# Patient Record
Sex: FEMALE | Race: OTHER | HISPANIC OR LATINO | Employment: OTHER | URBAN - METROPOLITAN AREA
[De-identification: names, ages, dates, MRNs, and addresses within clinical notes are randomized per-mention and may not be internally consistent; named-entity substitution may affect disease eponyms.]

---

## 2018-07-24 ENCOUNTER — OFFICE VISIT (OUTPATIENT)
Dept: FAMILY MEDICINE CLINIC | Facility: CLINIC | Age: 28
End: 2018-07-24
Payer: COMMERCIAL

## 2018-07-24 VITALS
HEART RATE: 114 BPM | TEMPERATURE: 97.4 F | OXYGEN SATURATION: 98 % | HEIGHT: 65 IN | WEIGHT: 109.7 LBS | BODY MASS INDEX: 18.28 KG/M2 | SYSTOLIC BLOOD PRESSURE: 110 MMHG | RESPIRATION RATE: 18 BRPM | DIASTOLIC BLOOD PRESSURE: 74 MMHG

## 2018-07-24 DIAGNOSIS — R93.89 ABNORMAL CHEST X-RAY: ICD-10-CM

## 2018-07-24 DIAGNOSIS — R87.610 ATYPICAL SQUAMOUS CELLS OF UNDETERMINED SIGNIFICANCE (ASCUS) ON PAPANICOLAOU SMEAR OF CERVIX: ICD-10-CM

## 2018-07-24 DIAGNOSIS — F41.9 ANXIETY: ICD-10-CM

## 2018-07-24 DIAGNOSIS — F17.200 NICOTINE USE DISORDER: Primary | ICD-10-CM

## 2018-07-24 DIAGNOSIS — F32.A DEPRESSION, UNSPECIFIED DEPRESSION TYPE: ICD-10-CM

## 2018-07-24 DIAGNOSIS — Z12.4 CERVICAL CANCER SCREENING: ICD-10-CM

## 2018-07-24 PROCEDURE — 99204 OFFICE O/P NEW MOD 45 MIN: CPT | Performed by: PHYSICIAN ASSISTANT

## 2018-07-24 PROCEDURE — 3008F BODY MASS INDEX DOCD: CPT | Performed by: PHYSICIAN ASSISTANT

## 2018-07-24 RX ORDER — HYDROXYZINE PAMOATE 50 MG/1
CAPSULE ORAL
COMMUNITY
End: 2019-04-04

## 2018-07-24 RX ORDER — TRAZODONE HYDROCHLORIDE 100 MG/1
TABLET ORAL
COMMUNITY
End: 2019-07-30 | Stop reason: ALTCHOICE

## 2018-07-24 RX ORDER — VARENICLINE TARTRATE 0.5 MG/1
TABLET, FILM COATED ORAL
COMMUNITY
End: 2019-04-04

## 2018-07-24 RX ORDER — IBUPROFEN 800 MG/1
TABLET ORAL EVERY 6 HOURS PRN
COMMUNITY
End: 2021-04-07

## 2018-07-24 NOTE — PATIENT INSTRUCTIONS
Records from [facility] from June and July have been reviewed  Chest x-ray does reveal a possible 7 mm nodule and recommendation is to have a follow-up CT scan of the chest in September October 2018  Other pulmonary studies done on July 5th were normal  Continue Chantix for smoking cessation as prescribed by previous PCP  Continue follow-up with Psychiatry as advised  Patient considering transferring    Phone number given for the [facility] for routine screening Pap smear since she has had an abnormal Pap smear in the past   Recommend follow-up here in October after the CT scan of the chest

## 2018-07-24 NOTE — PROGRESS NOTES
Assessment/Plan:    Patient Instructions   Records from [facility] from June and July have been reviewed  Chest x-ray does reveal a possible 7 mm nodule and recommendation is to have a follow-up CT scan of the chest in September October 2018  Other pulmonary studies done on July 5th were normal  Continue Chantix for smoking cessation as prescribed by previous PCP  Continue follow-up with Psychiatry as advised  Patient considering transferring  Phone number given for the [facility] for routine screening Pap smear since she has had an abnormal Pap smear in the past   Recommend follow-up here in October after the CT scan of the chest     M*Modal software was used to dictate this note  It may contain errors with dictating incorrect words/spelling  Please contact provider directly for any questions  Diagnoses and all orders for this visit:    Nicotine use disorder  -     CT chest w contrast; Future    Abnormal chest x-ray  -     CT chest w contrast; Future    Cervical cancer screening  -     Ambulatory referral to Gynecology; Future    Anxiety    Depression, unspecified depression type    Atypical squamous cells of undetermined significance (ASCUS) on Papanicolaou smear of cervix    Other orders  -     varenicline (CHANTIX) 0 5 mg tablet;   -     traZODone (DESYREL) 100 mg tablet;   -     lurasidone (LATUDA) 80 mg tablet;   -     hydrOXYzine pamoate (VISTARIL) 50 mg capsule;   -     Albuterol Sulfate 108 (90 Base) MCG/ACT AEPB; Inhale 1 puff every 4 (four) hours  -     ibuprofen (MOTRIN) 800 mg tablet; Take by mouth every 6 (six) hours as needed for headaches  -     Etonogestrel 68 MG IMPL; Inserted 9/24/2013          Subjective:      Patient ID: April Parks is a 33 y o  female  Patient presents for new patient establishment  She states that she was not happy with her care at [facility] so is transferring here    She was recently seen in the emergency room in June at [facility] for a cough  She still has a persistent dry cough  Denies any fevers or chills  She did have some pulmonary studies done ordered by her previous primary care provider which were normal   These were done on July 5th  She is concerned because she was told that she has a mass on her chest x-ray  She does currently smoke but is in midst of quitting by utilizing Chantix  This is not the 1st time she has tried to quit smoking  She would also like to establish with a [facility] gynecologist   She has been following them regularly because she has had abnormal Pap smears in the past         The following portions of the patient's history were reviewed and updated as appropriate:   She  has no past medical history on file  She   Patient Active Problem List    Diagnosis Date Noted    Nicotine use disorder 07/24/2018    Abnormal chest x-ray 07/24/2018    Cervical cancer screening 07/24/2018    Anxiety 07/24/2018    Depression 07/24/2018    Atypical squamous cells of undetermined significance (ASCUS) on Papanicolaou smear of cervix 05/28/2013     She  has no past surgical history on file  Her family history is not on file  She  reports that she has been smoking  She has never used smokeless tobacco  She reports that she drinks alcohol  She reports that she does not use drugs  Current Outpatient Prescriptions   Medication Sig Dispense Refill    Albuterol Sulfate 108 (90 Base) MCG/ACT AEPB Inhale 1 puff every 4 (four) hours      Etonogestrel 68 MG IMPL Inserted 9/24/2013      hydrOXYzine pamoate (VISTARIL) 50 mg capsule       ibuprofen (MOTRIN) 800 mg tablet Take by mouth every 6 (six) hours as needed for headaches      lurasidone (LATUDA) 80 mg tablet       traZODone (DESYREL) 100 mg tablet       varenicline (CHANTIX) 0 5 mg tablet        No current facility-administered medications for this visit  No current outpatient prescriptions on file prior to visit       No current facility-administered medications on file prior to visit  She has No Known Allergies       Review of Systems   Constitutional: Negative  HENT: Negative  Eyes: Negative  Respiratory:        As stated in HPI   Cardiovascular: Negative  Gastrointestinal: Negative  Endocrine: Negative  Genitourinary: Negative  Musculoskeletal: Negative  Skin: Negative  Allergic/Immunologic: Negative  Neurological: Negative  Hematological: Negative  Psychiatric/Behavioral:        She does follow with Psychiatry with galileo Bautista  She states that she stable on her medications  She is considering transferring to another psychiatrist          Objective:      /74 (BP Location: Right arm, Patient Position: Sitting, Cuff Size: Standard)   Pulse (!) 114   Temp (!) 97 4 °F (36 3 °C) (Tympanic)   Resp 18   Ht 5' 5" (1 651 m)   Wt 49 8 kg (109 lb 11 2 oz)   SpO2 98%   Breastfeeding?  No   BMI 18 26 kg/m²          Physical Exam

## 2018-08-23 ENCOUNTER — APPOINTMENT (OUTPATIENT)
Dept: RADIOLOGY | Facility: MEDICAL CENTER | Age: 28
End: 2018-08-23
Payer: COMMERCIAL

## 2018-08-23 ENCOUNTER — OFFICE VISIT (OUTPATIENT)
Dept: URGENT CARE | Facility: MEDICAL CENTER | Age: 28
End: 2018-08-23
Payer: COMMERCIAL

## 2018-08-23 VITALS
HEART RATE: 74 BPM | BODY MASS INDEX: 17.49 KG/M2 | SYSTOLIC BLOOD PRESSURE: 127 MMHG | RESPIRATION RATE: 16 BRPM | TEMPERATURE: 96.9 F | DIASTOLIC BLOOD PRESSURE: 68 MMHG | HEIGHT: 65 IN | OXYGEN SATURATION: 100 % | WEIGHT: 105 LBS

## 2018-08-23 DIAGNOSIS — M25.512 ACUTE PAIN OF LEFT SHOULDER: ICD-10-CM

## 2018-08-23 DIAGNOSIS — M75.22 BICEPS TENDINITIS OF LEFT SHOULDER: Primary | ICD-10-CM

## 2018-08-23 PROCEDURE — 96374 THER/PROPH/DIAG INJ IV PUSH: CPT | Performed by: FAMILY MEDICINE

## 2018-08-23 PROCEDURE — 73030 X-RAY EXAM OF SHOULDER: CPT

## 2018-08-23 PROCEDURE — 99213 OFFICE O/P EST LOW 20 MIN: CPT | Performed by: FAMILY MEDICINE

## 2018-08-23 RX ORDER — KETOROLAC TROMETHAMINE 30 MG/ML
30 INJECTION, SOLUTION INTRAMUSCULAR; INTRAVENOUS ONCE
Status: COMPLETED | OUTPATIENT
Start: 2018-08-23 | End: 2018-08-23

## 2018-08-23 RX ORDER — METHOCARBAMOL 500 MG/1
500 TABLET, FILM COATED ORAL 3 TIMES DAILY
Qty: 21 TABLET | Refills: 0 | Status: SHIPPED | OUTPATIENT
Start: 2018-08-23 | End: 2019-04-04

## 2018-08-23 RX ADMIN — KETOROLAC TROMETHAMINE 30 MG: 30 INJECTION, SOLUTION INTRAMUSCULAR; INTRAVENOUS at 21:31

## 2018-08-24 NOTE — PROGRESS NOTES
[facility]        NAME: Jermaine Che is a 33 y o  female  : 1990    MRN: [id number]  DATE: 2018  TIME: 9:30 PM    Assessment and Plan   Biceps tendinitis of left shoulder [M75 22]  1  Biceps tendinitis of left shoulder  methocarbamol (ROBAXIN) 500 mg tablet   2  Acute pain of left shoulder  ketorolac (TORADOL) injection 30 mg    XR shoulder 2+ vw left         Patient Instructions       Follow up with PCP in 3-5 days  Proceed to  ER if symptoms worsen  Chief Complaint     Chief Complaint   Patient presents with    Arm Pain     LUE; non-traumatic; increasingly worse x 1 week; took Advil yesterday         History of Present Illness       Patient complaining of left upper arm pain for the past week  About 1 week ago patient fell and landed on the left shoulder at home  Denies any pain immediately until the following day  Pain has been localized to the left upper arm  She has difficulty moving her arms  Pain is worse lifting, pulling  Feels better when rested  No previous injury to the left shoulder in the past   Pain is constant, localized  She has been taking some Advil with minimal improvement  Refers to occasional numbness but denies weakness of the left upper extremity  Pain is currently 6/10  At times, she notices crepitation of the left shoulder  Review of Systems   Review of Systems   Constitutional: Negative  Musculoskeletal: Positive for arthralgias and myalgias  Neurological: Negative            Current Medications       Current Outpatient Prescriptions:     Albuterol Sulfate 108 (90 Base) MCG/ACT AEPB, Inhale 1 puff every 4 (four) hours, Disp: , Rfl:     Etonogestrel 68 MG IMPL, Inserted 2013, Disp: , Rfl:     hydrOXYzine pamoate (VISTARIL) 50 mg capsule, , Disp: , Rfl:     ibuprofen (MOTRIN) 800 mg tablet, Take by mouth every 6 (six) hours as needed for headaches, Disp: , Rfl:     lurasidone (LATUDA) 80 mg tablet, , Disp: , Rfl:   methocarbamol (ROBAXIN) 500 mg tablet, Take 1 tablet (500 mg total) by mouth 3 (three) times a day for 7 days, Disp: 21 tablet, Rfl: 0    traZODone (DESYREL) 100 mg tablet, , Disp: , Rfl:     varenicline (CHANTIX) 0 5 mg tablet, , Disp: , Rfl:     Current Facility-Administered Medications:     ketorolac (TORADOL) injection 30 mg, 30 mg, Intramuscular, Once, João Howell MD    Current Allergies     Allergies as of 08/23/2018    (No Known Allergies)            The following portions of the patient's history were reviewed and updated as appropriate: allergies, current medications, past family history, past medical history, past social history, past surgical history and problem list      No past medical history on file  No past surgical history on file  No family history on file  Medications have been verified  Objective   /68   Pulse 74   Temp (!) 96 9 °F (36 1 °C)   Resp 16   Ht 5' 5" (1 651 m)   Wt 47 6 kg (105 lb)   SpO2 100%   BMI 17 47 kg/m²        Physical Exam     Physical Exam   Musculoskeletal: She exhibits tenderness  Left upper extremity-good range on flexion and extension at the elbow  Limited range at the shoulder secondary to pain  Point tenderness of the distal and proximal biceps had with no evidence ecchymosis or effusion  Good hand  and strength, 5/5  Nursing note and vitals reviewed

## 2018-08-24 NOTE — PATIENT INSTRUCTIONS
Left shoulder x-ray reveals no fracture subluxation  Patient's left arm was placed in a sling  Patient received Toradol 30 mg IM in the office, after 10-15 minutes, pain diminished  I prescribed Robaxin 500 mg t i d   Advised patient apply heating pad to affected area several times a day  I recommended she follow up with her primary care provider and request physical therapy  Tendinitis   WHAT YOU NEED TO KNOW:   Tendinitis is painful inflammation or breakdown of your tendons  It may also be called tendinopathy  Tendinitis often occurs in the knee, shoulder, ankle, hip, or elbow  DISCHARGE INSTRUCTIONS:   Medicines:   · Pain medicines  such as acetaminophen or NSAIDs may decrease swelling and pain or fever  These medicines are available without a doctor's order  Ask which medicine to take  Ask how much to take and when to take it  Follow directions  Acetaminophen and NSAIDs can cause liver or kidney damage if not taken correctly  If you take blood thinner medicine, always ask your healthcare provider if NSAIDs are safe for you  Always read the medicine label and follow the directions on it before using these medicine  · Take your medicine as directed  Contact your healthcare provider if you think your medicine is not helping or if you have side effects  Tell him if you are allergic to any medicine  Keep a list of the medicines, vitamins, and herbs you take  Include the amounts, and when and why you take them  Bring the list or the pill bottles to follow-up visits  Carry your medicine list with you in case of an emergency  Management:   · Rest  your tendon as directed to help it heal  Ask your healthcare provider if you need to stop putting weight on your affected area  · Ice  helps decrease swelling and pain  Ice may also help prevent tissue damage  Use an ice pack, or put crushed ice in a plastic bag   Cover it with a towel and place it on the affected area for 10 to 15 minutes every hour or as directed  · Support devices  such as a cane, splint, shoe insert, or brace may help reduce your pain  · Physical therapy  may be ordered by your healthcare provider  This may be used to teach you exercises to help improve movement and strength, and to decrease pain  You may also learn how to improve your posture, and how to lift or exercise correctly  Prevention:   · Stretch and warm up  before you exercise  · Exercise regularly  to strengthen the muscles around your joint  Ease into an exercise routine for the first 3 weeks to prevent another injury  Ask your healthcare provider about the best exercise plan for you  Rest fully between activities  · Use the right equipment  for sports and exercise  Wear braces or tape around weak joints as directed  Follow up with your healthcare provider as directed:  Write down your questions so you remember to ask them during your visits  Contact your healthcare provider if:   · You have increased pain even after you take medicine  · You have questions or concerns about your condition or care  Return to the emergency department if:   · You have increased redness over the joint, or swelling in the joint  · You suddenly cannot move your joint  © 2017 [employer]  Information is for End User's use only and may not be sold, redistributed or otherwise used for commercial purposes  All illustrations and images included in CareNotes® are the copyrighted property of [employer]  or Bhavin Haider  The above information is an  only  It is not intended as medical advice for individual conditions or treatments  Talk to your doctor, nurse or pharmacist before following any medical regimen to see if it is safe and effective for you

## 2019-02-05 ENCOUNTER — OFFICE VISIT (OUTPATIENT)
Dept: URGENT CARE | Age: 29
End: 2019-02-05
Payer: COMMERCIAL

## 2019-02-05 VITALS
HEIGHT: 65 IN | OXYGEN SATURATION: 99 % | HEART RATE: 115 BPM | DIASTOLIC BLOOD PRESSURE: 75 MMHG | TEMPERATURE: 99.3 F | BODY MASS INDEX: 17.93 KG/M2 | RESPIRATION RATE: 20 BRPM | SYSTOLIC BLOOD PRESSURE: 116 MMHG | WEIGHT: 107.6 LBS

## 2019-02-05 DIAGNOSIS — J22 CHEST COLD: Primary | ICD-10-CM

## 2019-02-05 PROCEDURE — 99213 OFFICE O/P EST LOW 20 MIN: CPT | Performed by: PHYSICIAN ASSISTANT

## 2019-02-05 RX ORDER — PREDNISONE 20 MG/1
40 TABLET ORAL DAILY
Qty: 10 TABLET | Refills: 0 | Status: SHIPPED | OUTPATIENT
Start: 2019-02-05 | End: 2019-02-11

## 2019-02-05 RX ORDER — FLUTICASONE PROPIONATE 50 MCG
1 SPRAY, SUSPENSION (ML) NASAL DAILY
Qty: 16 G | Refills: 0 | Status: SHIPPED | OUTPATIENT
Start: 2019-02-05 | End: 2019-04-04

## 2019-02-05 RX ORDER — ALBUTEROL SULFATE 90 UG/1
2 AEROSOL, METERED RESPIRATORY (INHALATION) EVERY 6 HOURS PRN
Qty: 18 G | Refills: 0 | Status: SHIPPED | OUTPATIENT
Start: 2019-02-05 | End: 2019-07-30 | Stop reason: ALTCHOICE

## 2019-02-05 NOTE — PROGRESS NOTES
[facility]        NAME: Binta Fernandez is a 33 y o  female  : 1990    MRN: [id number]  DATE: 2019  TIME: 3:25 PM    Assessment and Plan   Chest cold [J22]  1  Chest cold  albuterol (VENTOLIN HFA) 90 mcg/act inhaler    predniSONE 20 mg tablet    fluticasone (FLONASE) 50 mcg/act nasal spray     Patient appears clinically well  Tachycardic  Temperature 99 3  Patient has been using antipyretics which may be keeping temperature down  Patient will be treated for viral URI  Patient educated on indications to go to ER  Patient educated on fever control on oral rehydration  Patient states she understands agrees  Patient Instructions       Continue to monitor symptoms  Drink plenty of fluids  Take over-the-counter acetaminophen or ibuprofen for fever control  Follow up with family doctor this week  Go to emergency room if new or worsening symptoms develop  Chief Complaint     Chief Complaint   Patient presents with    Cold Like Symptoms     Pt reports three day hx of chest tightness, nasal congestion, sore throat, chills/aches and left ear pain  Had 103 fever last clarita  Taking Mucinex Cold & Flu  Last dose 4 hours ago  History of Present Illness       URI    This is a new problem  Episode onset: Two days  The problem has been gradually worsening  Maximum temperature: 103 yesterday per patient  T-max today 98  Associated symptoms include coughing, sinus pain, sneezing and a sore throat  Pertinent negatives include no abdominal pain, chest pain, congestion, diarrhea, dysuria, ear pain, headaches, nausea, neck pain, rash, rhinorrhea, swollen glands, vomiting or wheezing  Treatments tried: Mucinex cold and flu  The treatment provided moderate relief  Patient tolerating p o  Foods and liquids  No known sick contacts  Patient had flu shot this year      Review of Systems   Review of Systems   Constitutional: Negative for chills, diaphoresis, fatigue and fever    HENT: Positive for postnasal drip, sinus pain, sinus pressure, sneezing and sore throat  Negative for congestion, ear pain, rhinorrhea and voice change  Eyes: Negative  Respiratory: Positive for cough  Negative for chest tightness, shortness of breath and wheezing  Cardiovascular: Negative for chest pain and palpitations  Gastrointestinal: Negative for abdominal pain, constipation, diarrhea, nausea and vomiting  Endocrine: Negative  Genitourinary: Negative for dysuria  Musculoskeletal: Negative for back pain, myalgias and neck pain  Skin: Negative for pallor and rash  Allergic/Immunologic: Negative  Neurological: Negative for dizziness, syncope and headaches  Hematological: Negative  Psychiatric/Behavioral: Negative            Current Medications       Current Outpatient Prescriptions:     albuterol (VENTOLIN HFA) 90 mcg/act inhaler, Inhale 2 puffs every 6 (six) hours as needed for wheezing, Disp: 18 g, Rfl: 0    Etonogestrel 68 MG IMPL, Inserted 9/24/2013, Disp: , Rfl:     fluticasone (FLONASE) 50 mcg/act nasal spray, 1 spray into each nostril daily, Disp: 16 g, Rfl: 0    hydrOXYzine pamoate (VISTARIL) 50 mg capsule, , Disp: , Rfl:     ibuprofen (MOTRIN) 800 mg tablet, Take by mouth every 6 (six) hours as needed for headaches, Disp: , Rfl:     lurasidone (LATUDA) 80 mg tablet, , Disp: , Rfl:     methocarbamol (ROBAXIN) 500 mg tablet, Take 1 tablet (500 mg total) by mouth 3 (three) times a day for 7 days, Disp: 21 tablet, Rfl: 0    predniSONE 20 mg tablet, Take 2 tablets (40 mg total) by mouth daily for 5 days, Disp: 10 tablet, Rfl: 0    traZODone (DESYREL) 100 mg tablet, , Disp: , Rfl:     varenicline (CHANTIX) 0 5 mg tablet, , Disp: , Rfl:     Current Allergies     Allergies as of 02/05/2019    (No Known Allergies)            The following portions of the patient's history were reviewed and updated as appropriate: allergies, current medications, past family history, past medical history, past social history, past surgical history and problem list      No past medical history on file  History reviewed  No pertinent surgical history  History reviewed  No pertinent family history  Medications have been verified  Objective   /75   Pulse (!) 115   Temp 99 3 °F (37 4 °C)   Resp 20   Ht 5' 5" (1 651 m)   Wt 48 8 kg (107 lb 9 6 oz)   LMP 02/05/2019   SpO2 99%   BMI 17 91 kg/m²        Physical Exam     Physical Exam   Constitutional: She appears well-developed and well-nourished  No distress  HENT:   Head: Normocephalic and atraumatic  Right Ear: External ear normal    Left Ear: External ear normal    Eyes: Conjunctivae are normal  Right eye exhibits no discharge  Left eye exhibits no discharge  Neck: Normal range of motion  Neck supple  Cardiovascular: Normal rate, regular rhythm, normal heart sounds and intact distal pulses  Pulmonary/Chest: Effort normal and breath sounds normal  No respiratory distress  She has no wheezes  She has no rales  Lymphadenopathy:     She has no cervical adenopathy  Skin: Skin is warm  No rash noted  She is not diaphoretic  Nursing note and vitals reviewed

## 2019-02-05 NOTE — PATIENT INSTRUCTIONS
Continue to monitor symptoms  Drink plenty of fluids  Take over-the-counter acetaminophen or ibuprofen for fever control  Follow up with family doctor this week  Go to emergency room if new or worsening symptoms develop  Upper Respiratory Infection   WHAT YOU NEED TO KNOW:   An upper respiratory infection is also called the common cold  It is an infection that can affect your nose, throat, ears, and sinuses  For healthy people, the common cold is usually not serious and does not need special treatment  Cold symptoms are usually worst for the first 3 to 5 days  Most people get better in 7 to 14 days  You may continue to cough for 2 to 3 weeks  Colds are caused by viruses and do not get better with antibiotics  DISCHARGE INSTRUCTIONS:   Return to the emergency department if:   · You have chest pain or trouble breathing  Contact your healthcare provider if:   · You have a fever over 102ºF (39°C)  · Your sore throat gets worse or you see white or yellow spots in your throat  · Your symptoms get worse after 3 to 5 days or your cold is not better in 14 days  · You have a rash anywhere on your skin  · You have large, tender lumps in your neck  · You have thick, green or yellow drainage from your nose  · You cough up thick yellow, green, or bloody mucus  · You have vomiting for more than 24 hours and cannot keep fluids down  · You have a bad earache  · You have questions or concerns about your condition or care  Medicines: You may need any of the following:  · Decongestants  help reduce nasal congestion and help you breathe more easily  If you take decongestant pills, they may make you feel restless or cause problems with your sleep  Do not use decongestant sprays for more than a few days  · Cough suppressants  help reduce coughing  Ask your healthcare provider which type of cough medicine is best for you  · NSAIDs , such as ibuprofen, help decrease swelling, pain, and fever  NSAIDs can cause stomach bleeding or kidney problems in certain people  If you take blood thinner medicine, always ask your healthcare provider if NSAIDs are safe for you  Always read the medicine label and follow directions  · Acetaminophen  decreases pain and fever  It is available without a doctor's order  Ask how much to take and how often to take it  Follow directions  Read the labels of all other medicines you are using to see if they also contain acetaminophen, or ask your doctor or pharmacist  Acetaminophen can cause liver damage if not taken correctly  Do not use more than 4 grams (4,000 milligrams) total of acetaminophen in one day  · Take your medicine as directed  Contact your healthcare provider if you think your medicine is not helping or if you have side effects  Tell him or her if you are allergic to any medicine  Keep a list of the medicines, vitamins, and herbs you take  Include the amounts, and when and why you take them  Bring the list or the pill bottles to follow-up visits  Carry your medicine list with you in case of an emergency  Follow up with your healthcare provider as directed:  Write down your questions so you remember to ask them during your visits  Self-care:   · Rest as much as possible  Slowly start to do more each day  · Drink more liquids as directed  Liquids will help thin and loosen mucus so you can cough it up  Liquids will also help prevent dehydration  Liquids that help prevent dehydration include water, fruit juice, and broth  Do not drink liquids that contain caffeine  Caffeine can increase your risk for dehydration  Ask your healthcare provider how much liquid to drink each day  · Soothe a sore throat  Gargle with warm salt water  This helps your sore throat feel better  Make salt water by dissolving ¼ teaspoon salt in 1 cup warm water  You may also suck on hard candy or throat lozenges  You may use a sore throat spray      · Use a humidifier or vaporizer  Use a cool mist humidifier or a vaporizer to increase air moisture in your home  This may make it easier for you to breathe and help decrease your cough  · Use saline nasal drops as directed  These help relieve congestion  · Apply petroleum-based jelly around the outside of your nostrils  This can decrease irritation from blowing your nose  · Do not smoke  Nicotine and other chemicals in cigarettes and cigars can make your symptoms worse  They can also cause infections such as bronchitis or pneumonia  Ask your healthcare provider for information if you currently smoke and need help to quit  E-cigarettes or smokeless tobacco still contain nicotine  Talk to your healthcare provider before you use these products  Prevent spreading your cold to others:   · Try to stay away from other people during the first 2 to 3 days of your cold when it is more easily spread  · Do not share food or drinks  · Do not share hand towels with household members  · Wash your hands often, especially after you blow your nose  Turn away from other people and cover your mouth and nose with a tissue when you sneeze or cough  © 2017 [employer] Information is for End User's use only and may not be sold, redistributed or otherwise used for commercial purposes  All illustrations and images included in CareNotes® are the copyrighted property of [employer]  or [employer]havin Haider  The above information is an  only  It is not intended as medical advice for individual conditions or treatments  Talk to your doctor, nurse or pharmacist before following any medical regimen to see if it is safe and effective for you

## 2019-02-11 ENCOUNTER — OFFICE VISIT (OUTPATIENT)
Dept: FAMILY MEDICINE CLINIC | Facility: CLINIC | Age: 29
End: 2019-02-11

## 2019-02-11 VITALS
OXYGEN SATURATION: 97 % | RESPIRATION RATE: 16 BRPM | WEIGHT: 106 LBS | TEMPERATURE: 97 F | SYSTOLIC BLOOD PRESSURE: 118 MMHG | HEIGHT: 66 IN | BODY MASS INDEX: 17.04 KG/M2 | HEART RATE: 100 BPM | DIASTOLIC BLOOD PRESSURE: 66 MMHG

## 2019-02-11 DIAGNOSIS — J06.9 ACUTE UPPER RESPIRATORY INFECTION: Primary | ICD-10-CM

## 2019-02-11 PROCEDURE — 99214 OFFICE O/P EST MOD 30 MIN: CPT | Performed by: PHYSICIAN ASSISTANT

## 2019-02-11 RX ORDER — PROMETHAZINE HYDROCHLORIDE AND CODEINE PHOSPHATE 6.25; 1 MG/5ML; MG/5ML
5 SYRUP ORAL EVERY 4 HOURS PRN
Qty: 120 ML | Refills: 0 | Status: SHIPPED | OUTPATIENT
Start: 2019-02-11 | End: 2019-04-04

## 2019-02-11 NOTE — PROGRESS NOTES
Assessment/Plan:    ER record has been reviewed  No chest x-ray ordered    Patient Instructions   Increase clear liquids  Hold Mucinex cold and flu  Start promethazine with codeine as package directs  Avoid the medication of working or driving because the drowsy side effects  I did give her proper instruction on utilization of the Ventolin inhaler  she has been told that she can take 2 inhalations every 4-6 hours as needed  Recommend over-the-counter generic Sudafed 30 mg 2 tablets every 6 hours as needed  Apply moist heat to chest 3 times daily for 10 minutes as needed  Recommend over-the-counter ibuprofen as needed as package directs with food  Recommend follow-up if any symptoms increase or there is no improvement over the next week       M*Modal software was used to dictate this note  It may contain errors with dictating incorrect words/spelling  Please contact provider directly for any questions  Diagnoses and all orders for this visit:    Acute upper respiratory infection  -     promethazine-codeine (PHENERGAN WITH CODEINE) 6 25-10 mg/5 mL syrup; Take 5 mL by mouth every 4 (four) hours as needed for cough          Subjective:      Patient ID: Caro Boone is a 33 y o  female  Patient presents today for same-day appointment for evaluation of cough, nasal congestion over the past 7 days  She did go the emergency room and was prescribed fluticasone and Ventolin inhaler  She denies any pulmonary problems  She states that she does notice some yellow mucus production with cough  She states she had a fever her initial day which has now resolved  She also complains of chest discomfort with coughing but no treatment  She has been utilizing over-the-counter Mucinex cold and flu with minimal relief also  Denies any ear pain or throat pain  The following portions of the patient's history were reviewed and updated as appropriate:   She  has no past medical history on file    She Patient Active Problem List    Diagnosis Date Noted    Acute upper respiratory infection 02/11/2019    Nicotine use disorder 07/24/2018    Abnormal chest x-ray 07/24/2018    Cervical cancer screening 07/24/2018    Anxiety 07/24/2018    Depression 07/24/2018    Atypical squamous cells of undetermined significance (ASCUS) on Papanicolaou smear of cervix 05/28/2013     She  has no past surgical history on file  Her family history is not on file  She  reports that she has been smoking  She has never used smokeless tobacco  She reports that she drinks alcohol  She reports that she does not use drugs  Current Outpatient Medications   Medication Sig Dispense Refill    albuterol (VENTOLIN HFA) 90 mcg/act inhaler Inhale 2 puffs every 6 (six) hours as needed for wheezing 18 g 0    Etonogestrel 68 MG IMPL Inserted 9/24/2013      fluticasone (FLONASE) 50 mcg/act nasal spray 1 spray into each nostril daily 16 g 0    hydrOXYzine pamoate (VISTARIL) 50 mg capsule       ibuprofen (MOTRIN) 800 mg tablet Take by mouth every 6 (six) hours as needed for headaches      lurasidone (LATUDA) 80 mg tablet       methocarbamol (ROBAXIN) 500 mg tablet Take 1 tablet (500 mg total) by mouth 3 (three) times a day for 7 days 21 tablet 0    promethazine-codeine (PHENERGAN WITH CODEINE) 6 25-10 mg/5 mL syrup Take 5 mL by mouth every 4 (four) hours as needed for cough 120 mL 0    traZODone (DESYREL) 100 mg tablet       varenicline (CHANTIX) 0 5 mg tablet        No current facility-administered medications for this visit        Current Outpatient Medications on File Prior to Visit   Medication Sig    albuterol (VENTOLIN HFA) 90 mcg/act inhaler Inhale 2 puffs every 6 (six) hours as needed for wheezing    Etonogestrel 68 MG IMPL Inserted 9/24/2013    fluticasone (FLONASE) 50 mcg/act nasal spray 1 spray into each nostril daily    hydrOXYzine pamoate (VISTARIL) 50 mg capsule     ibuprofen (MOTRIN) 800 mg tablet Take by mouth every 6 (six) hours as needed for headaches    lurasidone (LATUDA) 80 mg tablet     methocarbamol (ROBAXIN) 500 mg tablet Take 1 tablet (500 mg total) by mouth 3 (three) times a day for 7 days    traZODone (DESYREL) 100 mg tablet     varenicline (CHANTIX) 0 5 mg tablet     [DISCONTINUED] predniSONE 20 mg tablet Take 2 tablets (40 mg total) by mouth daily for 5 days     No current facility-administered medications on file prior to visit  She has No Known Allergies       Review of Systems   Constitutional:        As stated in HPI   HENT:        As stated in HPI   Respiratory: Positive for cough  Objective:      /66 (BP Location: Right arm, Patient Position: Sitting, Cuff Size: Standard)   Pulse 100   Temp (!) 97 °F (36 1 °C) (Tympanic)   Resp 16   Ht 5' 6" (1 676 m)   Wt 48 1 kg (106 lb)   LMP 02/05/2019   SpO2 97%   Breastfeeding? No   BMI 17 11 kg/m²          Physical Exam   Constitutional: She appears well-developed and well-nourished  HENT:   Head: Normocephalic and atraumatic  Right Ear: External ear normal    Left Ear: External ear normal    Mouth/Throat: Oropharynx is clear and moist    Cardiovascular: Normal rate, regular rhythm and normal heart sounds  No murmur heard  Pulmonary/Chest: Effort normal and breath sounds normal  She has no wheezes  She has no rales  Lymphadenopathy:     She has no cervical adenopathy

## 2019-02-11 NOTE — PATIENT INSTRUCTIONS
Increase clear liquids  Hold Mucinex cold and flu  Start promethazine with codeine as package directs  Avoid the medication of working or driving because the drowsy side effects  I did give her proper instruction on utilization of the Ventolin inhaler  she has been told that she can take 2 inhalations every 4-6 hours as needed    Recommend over-the-counter generic Sudafed 30 mg 2 tablets every 6 hours as needed  Apply moist heat to chest 3 times daily for 10 minutes as needed  Recommend over-the-counter ibuprofen as needed as package directs with food  Recommend follow-up if any symptoms increase or there is no improvement over the next week

## 2019-02-22 ENCOUNTER — HOSPITAL ENCOUNTER (OUTPATIENT)
Dept: CT IMAGING | Facility: HOSPITAL | Age: 29
Discharge: HOME | End: 2019-02-22
Payer: COMMERCIAL

## 2019-02-22 DIAGNOSIS — R93.89 ABNORMAL CHEST X-RAY: ICD-10-CM

## 2019-02-22 DIAGNOSIS — F17.200 NICOTINE USE DISORDER: ICD-10-CM

## 2019-02-22 PROCEDURE — 71260 CT THORAX DX C+: CPT

## 2019-02-22 RX ADMIN — IOHEXOL 85 ML: 350 INJECTION, SOLUTION INTRAVENOUS at 16:46

## 2019-02-27 ENCOUNTER — TELEPHONE (OUTPATIENT)
Dept: FAMILY MEDICINE CLINIC | Facility: CLINIC | Age: 29
End: 2019-02-27

## 2019-03-28 ENCOUNTER — OFFICE VISIT (OUTPATIENT)
Dept: OBGYN CLINIC | Facility: CLINIC | Age: 29
End: 2019-03-28

## 2019-03-28 ENCOUNTER — TELEPHONE (OUTPATIENT)
Dept: OBGYN CLINIC | Facility: CLINIC | Age: 29
End: 2019-03-28

## 2019-03-28 VITALS
WEIGHT: 111 LBS | SYSTOLIC BLOOD PRESSURE: 105 MMHG | HEART RATE: 90 BPM | HEIGHT: 65 IN | BODY MASS INDEX: 18.49 KG/M2 | DIASTOLIC BLOOD PRESSURE: 68 MMHG

## 2019-03-28 DIAGNOSIS — Z30.016 ENCOUNTER FOR INITIAL PRESCRIPTION OF TRANSDERMAL PATCH HORMONAL CONTRACEPTIVE DEVICE: ICD-10-CM

## 2019-03-28 DIAGNOSIS — Z30.46 NEXPLANON REMOVAL: Primary | ICD-10-CM

## 2019-03-28 PROCEDURE — 99203 OFFICE O/P NEW LOW 30 MIN: CPT | Performed by: OBSTETRICS & GYNECOLOGY

## 2019-03-28 NOTE — TELEPHONE ENCOUNTER
Patient called stating that   norelgestromin-ethinyl estradiol (ORTHO EVRA) 150-35 MCG/24HR patch, not covered by insurance, may we prescribe BCP?      Message sent to Provider

## 2019-03-28 NOTE — PROGRESS NOTES
Assessment:  33 y o  G0 for removal of nexplanon secondary to acne side effects  Wishes to proceed with combined contraceptives; prefers patch  Discussed difficulties with insurance coverage  Will order and if too expensive she wishes to proceed with pills  Plan:  Diagnoses and all orders for this visit:    Encounter for Nexplanon removal       - Removed         Encounter for initial prescription of transdermal patch hormonal contraceptive device  -     norelgestromin-ethinyl estradiol (ORTHO EVRA) 150-35 MCG/24HR; Place 1 patch on the skin once a week For 3 weeks, then 1 week without patch   - Return to office in 3mo for annual and contraceptive check        __________________________________________________________________    Subjective   Darnelle Button is a 33 y o  G0 who presents with request to remove Nexplanon  Patient has used Nexplanon x5yrs  She notes that she has begun having worsening acne and attributes it to Nexplanon  Always has had acne on Nexplanon, but now just getting worse and nothing OTC is helping  She does not intend to get pregnant any time soon and wishes to switch to a method of contraception that will help with acne  Dicussed low-androgenic progestins  Discussed combined contraceptives such as pills/patches/rings  Patient denies a hx of migraine with aura (though does get headaches relieved with motrin occasionally)  She denies a hx of VTE/stroke/seizure or HTN  She notes she does smoke cigarettes and has no plan to quit  Discussed contraindication to estrogens when 33+ yo and using tobacco  Discussed other health benefits of smoking cessation  Offered help to quit; declines      Placed 9/6/16 (Lot# [id number])      The following portions of the patient's history were reviewed and updated as appropriate: allergies, current medications, past medical history, past social history, past surgical history and problem list       Objective  /68   Pulse 90   Ht 5' 5" (1 651 m)   Wt 50 3 kg (111 lb)   LMP 02/28/2019 (Exact Date)   BMI 18 47 kg/m²        Physical Exam:  Physical Exam   Constitutional: She is oriented to person, place, and time  She appears well-developed and well-nourished  No distress  HENT:   Head: Normocephalic and atraumatic  Eyes: No scleral icterus  Cardiovascular: Normal rate  Pulmonary/Chest: Effort normal  No accessory muscle usage  No respiratory distress  Abdominal: Soft  She exhibits no distension  There is no tenderness  There is no rebound and no guarding  Musculoskeletal: She exhibits no edema or tenderness  Neurological: She is alert and oriented to person, place, and time  Skin: Skin is warm and dry  No rash noted  No erythema  Psychiatric: She has a normal mood and affect  Her behavior is normal        Remove and insert drug implant  Date/Time: 3/29/2019 7:50 AM  Performed by: Luciano Zimmerman MD  Authorized by: Luciano Zimmerman MD     Consent:     Consent obtained:  Written    Consent given by:  Patient    Procedural risks discussed:  Bleeding, death, damage to other organs, repeat procedure, possible conversion to open surgery, infection and failure rate    Patient questions answered: yes      Patient agrees, verbalizes understanding, and wants to proceed: yes    Indication:     Indication: Presence of non-biodegradable drug delivery implant    Pre-procedure:     Local anesthetic:  Lidocaine 1%    The site was cleaned and prepped in a sterile fashion: yes    Procedure:     Procedure:  Removal    Small stab incision was made in arm: yes      Left/right:  Left  Comments:      Local anesthesia was infiltrated (lidocaine 1% 1mL)  Following sterile prep with betadine, a small stab incision was made at the site of insertion at the distal end of the implant  With pressure, implant was brought to the skin and the end was grasped with a hemostat  Device removed with traction easily  Incision closed with steri strips and sterile bandage applied  Patient tolerated the procedure well with no immediate complications

## 2019-03-29 ENCOUNTER — TELEPHONE (OUTPATIENT)
Dept: OBGYN CLINIC | Facility: CLINIC | Age: 29
End: 2019-03-29

## 2019-03-29 DIAGNOSIS — Z30.011 ENCOUNTER FOR INITIAL PRESCRIPTION OF CONTRACEPTIVE PILLS: Primary | ICD-10-CM

## 2019-03-29 PROCEDURE — 11982 REMOVE DRUG IMPLANT DEVICE: CPT | Performed by: OBSTETRICS & GYNECOLOGY

## 2019-03-29 RX ORDER — NORGESTIMATE AND ETHINYL ESTRADIOL 7DAYSX3 LO
1 KIT ORAL DAILY
Qty: 90 TABLET | Refills: 1 | Status: SHIPPED | OUTPATIENT
Start: 2019-03-29 | End: 2021-03-31

## 2019-03-29 NOTE — TELEPHONE ENCOUNTER
Called patient and left message via voicemail informing patient that the rx for BCP was sent to pharmacy

## 2019-04-04 ENCOUNTER — OFFICE VISIT (OUTPATIENT)
Dept: FAMILY MEDICINE CLINIC | Facility: CLINIC | Age: 29
End: 2019-04-04

## 2019-04-04 VITALS
WEIGHT: 113 LBS | TEMPERATURE: 98.4 F | HEIGHT: 65 IN | RESPIRATION RATE: 18 BRPM | OXYGEN SATURATION: 98 % | HEART RATE: 88 BPM | SYSTOLIC BLOOD PRESSURE: 100 MMHG | DIASTOLIC BLOOD PRESSURE: 70 MMHG | BODY MASS INDEX: 18.83 KG/M2

## 2019-04-04 DIAGNOSIS — F17.200 NICOTINE USE DISORDER: ICD-10-CM

## 2019-04-04 DIAGNOSIS — N89.8 VAGINAL DISCHARGE: ICD-10-CM

## 2019-04-04 DIAGNOSIS — R10.12 LEFT UPPER QUADRANT PAIN: Primary | ICD-10-CM

## 2019-04-04 PROCEDURE — 99214 OFFICE O/P EST MOD 30 MIN: CPT | Performed by: PHYSICIAN ASSISTANT

## 2019-04-04 RX ORDER — RANITIDINE 150 MG/1
150 TABLET ORAL 2 TIMES DAILY
Qty: 60 TABLET | Refills: 0 | Status: SHIPPED | OUTPATIENT
Start: 2019-04-04 | End: 2019-08-12 | Stop reason: SDUPTHER

## 2019-05-01 ENCOUNTER — TELEPHONE (OUTPATIENT)
Dept: FAMILY MEDICINE CLINIC | Facility: CLINIC | Age: 29
End: 2019-05-01

## 2019-05-21 ENCOUNTER — APPOINTMENT (EMERGENCY)
Dept: ULTRASOUND IMAGING | Facility: HOSPITAL | Age: 29
End: 2019-05-21
Payer: COMMERCIAL

## 2019-05-21 ENCOUNTER — HOSPITAL ENCOUNTER (EMERGENCY)
Facility: HOSPITAL | Age: 29
Discharge: HOME | End: 2019-05-21
Attending: EMERGENCY MEDICINE | Admitting: EMERGENCY MEDICINE
Payer: COMMERCIAL

## 2019-05-21 VITALS
OXYGEN SATURATION: 99 % | BODY MASS INDEX: 18.64 KG/M2 | HEART RATE: 88 BPM | DIASTOLIC BLOOD PRESSURE: 68 MMHG | SYSTOLIC BLOOD PRESSURE: 118 MMHG | TEMPERATURE: 98 F | RESPIRATION RATE: 18 BRPM | WEIGHT: 111.99 LBS

## 2019-05-21 DIAGNOSIS — M79.89 SWELLING OF RIGHT UPPER EXTREMITY: ICD-10-CM

## 2019-05-21 DIAGNOSIS — M79.603 UPPER EXTREMITY PAIN: ICD-10-CM

## 2019-05-21 DIAGNOSIS — R42 DIZZINESS: Primary | ICD-10-CM

## 2019-05-21 DIAGNOSIS — N93.8 DYSFUNCTIONAL UTERINE BLEEDING: ICD-10-CM

## 2019-05-21 LAB
ANION GAP SERPL CALCULATED.3IONS-SCNC: 7 MMOL/L (ref 5–14)
ANISOCYTOSIS BLD QL SMEAR: PRESENT
APTT PPP: 28 SECONDS (ref 23–34)
B-HCG SERPL-ACNC: <3 MIU/ML
BASOPHILS # BLD AUTO: 0.21 THOUSAND/UL (ref 0–0.1)
BASOPHILS NFR MAR MANUAL: 2 % (ref 0–1)
BUN SERPL-MCNC: 13 MG/DL (ref 5–25)
CALCIUM SERPL-MCNC: 9.7 MG/DL (ref 8.4–10.2)
CHLORIDE SERPL-SCNC: 107 MMOL/L (ref 97–108)
CO2 SERPL-SCNC: 26 MMOL/L (ref 22–30)
CREAT SERPL-MCNC: 0.71 MG/DL (ref 0.6–1.2)
DACRYOCYTES BLD QL SMEAR: PRESENT
EOSINOPHIL # BLD AUTO: 0.32 THOUSAND/UL (ref 0–0.4)
EOSINOPHIL NFR BLD MANUAL: 3 % (ref 0–6)
ERYTHROCYTE [DISTWIDTH] IN BLOOD BY AUTOMATED COUNT: 13.8 %
EXT PREG TEST URINE: NEGATIVE
GFR SERPL CREATININE-BSD FRML MDRD: 116 ML/MIN/1.73SQ M
GLUCOSE SERPL-MCNC: 107 MG/DL (ref 70–99)
HCT VFR BLD AUTO: 40 % (ref 36–46)
HGB BLD-MCNC: 13.6 G/DL (ref 12–16)
INR PPP: 0.95 (ref 0.89–1.1)
LG PLATELETS BLD QL SMEAR: PRESENT
LYMPHOCYTES # BLD AUTO: 4.28 THOUSAND/UL (ref 0.5–4)
LYMPHOCYTES # BLD AUTO: 40 % (ref 25–45)
MCH RBC QN AUTO: 31.7 PG (ref 26–34)
MCHC RBC AUTO-ENTMCNC: 34 G/DL (ref 31–36)
MCV RBC AUTO: 93 FL (ref 80–100)
MONOCYTES # BLD AUTO: 0.54 THOUSAND/UL (ref 0.2–0.9)
MONOCYTES NFR BLD AUTO: 5 % (ref 1–10)
NEUTS SEG # BLD: 4.92 THOUSAND/UL (ref 1.8–7.8)
NEUTS SEG NFR BLD AUTO: 46 %
PLATELET # BLD AUTO: 300 THOUSANDS/UL (ref 150–450)
PLATELET BLD QL SMEAR: ADEQUATE
PMV BLD AUTO: 8.6 FL (ref 8.9–12.7)
POIKILOCYTOSIS BLD QL SMEAR: PRESENT
POTASSIUM SERPL-SCNC: 3.6 MMOL/L (ref 3.6–5)
PROTHROMBIN TIME: 10.1 SECONDS (ref 9.5–11.6)
RBC # BLD AUTO: 4.3 MILLION/UL (ref 4–5.2)
RBC MORPH BLD: PRESENT
SODIUM SERPL-SCNC: 140 MMOL/L (ref 137–147)
TOTAL CELLS COUNTED SPEC: 100
VARIANT LYMPHS # BLD AUTO: 4 % (ref 0–0)
WBC # BLD AUTO: 10.7 THOUSAND/UL (ref 4.5–11)

## 2019-05-21 PROCEDURE — 85007 BL SMEAR W/DIFF WBC COUNT: CPT | Performed by: EMERGENCY MEDICINE

## 2019-05-21 PROCEDURE — 81025 URINE PREGNANCY TEST: CPT | Performed by: EMERGENCY MEDICINE

## 2019-05-21 PROCEDURE — 99285 EMERGENCY DEPT VISIT HI MDM: CPT | Performed by: EMERGENCY MEDICINE

## 2019-05-21 PROCEDURE — 76830 TRANSVAGINAL US NON-OB: CPT

## 2019-05-21 PROCEDURE — 85730 THROMBOPLASTIN TIME PARTIAL: CPT | Performed by: EMERGENCY MEDICINE

## 2019-05-21 PROCEDURE — 84702 CHORIONIC GONADOTROPIN TEST: CPT | Performed by: EMERGENCY MEDICINE

## 2019-05-21 PROCEDURE — 85610 PROTHROMBIN TIME: CPT | Performed by: EMERGENCY MEDICINE

## 2019-05-21 PROCEDURE — 85027 COMPLETE CBC AUTOMATED: CPT | Performed by: EMERGENCY MEDICINE

## 2019-05-21 PROCEDURE — 80048 BASIC METABOLIC PNL TOTAL CA: CPT | Performed by: EMERGENCY MEDICINE

## 2019-05-21 PROCEDURE — 99284 EMERGENCY DEPT VISIT MOD MDM: CPT

## 2019-05-21 PROCEDURE — 76856 US EXAM PELVIC COMPLETE: CPT

## 2019-05-21 PROCEDURE — 36415 COLL VENOUS BLD VENIPUNCTURE: CPT | Performed by: EMERGENCY MEDICINE

## 2019-06-05 ENCOUNTER — OFFICE VISIT (OUTPATIENT)
Dept: OBGYN CLINIC | Facility: CLINIC | Age: 29
End: 2019-06-05

## 2019-06-05 VITALS
DIASTOLIC BLOOD PRESSURE: 70 MMHG | BODY MASS INDEX: 18.33 KG/M2 | WEIGHT: 110 LBS | SYSTOLIC BLOOD PRESSURE: 123 MMHG | HEART RATE: 81 BPM | HEIGHT: 65 IN

## 2019-06-05 DIAGNOSIS — Z01.419 ENCOUNTER FOR ANNUAL ROUTINE GYNECOLOGICAL EXAMINATION: Primary | ICD-10-CM

## 2019-06-05 DIAGNOSIS — Z01.419 GYNECOLOGIC EXAM NORMAL: ICD-10-CM

## 2019-06-05 DIAGNOSIS — Z11.3 SCREEN FOR STD (SEXUALLY TRANSMITTED DISEASE): ICD-10-CM

## 2019-06-05 PROBLEM — Z72.51 HIGH RISK HETEROSEXUAL BEHAVIOR: Status: ACTIVE | Noted: 2019-06-05

## 2019-06-05 PROCEDURE — 87591 N.GONORRHOEAE DNA AMP PROB: CPT | Performed by: NURSE PRACTITIONER

## 2019-06-05 PROCEDURE — 87491 CHLMYD TRACH DNA AMP PROBE: CPT | Performed by: NURSE PRACTITIONER

## 2019-06-05 PROCEDURE — G0101 CA SCREEN;PELVIC/BREAST EXAM: HCPCS | Performed by: NURSE PRACTITIONER

## 2019-06-05 PROCEDURE — G0124 SCREEN C/V THIN LAYER BY MD: HCPCS | Performed by: PATHOLOGY

## 2019-06-05 PROCEDURE — G0145 SCR C/V CYTO,THINLAYER,RESCR: HCPCS | Performed by: PATHOLOGY

## 2019-06-07 LAB
C TRACH DNA SPEC QL NAA+PROBE: NEGATIVE
N GONORRHOEA DNA SPEC QL NAA+PROBE: NEGATIVE

## 2019-06-12 PROBLEM — R87.612 LGSIL ON PAP SMEAR OF CERVIX: Status: ACTIVE | Noted: 2019-06-12

## 2019-06-12 PROBLEM — R87.619 ATYPICAL GLANDULAR CELLS OF UNDETERMINED SIGNIFICANCE (AGUS) ON CERVICAL PAP SMEAR: Status: ACTIVE | Noted: 2019-06-12

## 2019-06-12 LAB
LAB AP GYN PRIMARY INTERPRETATION: ABNORMAL
Lab: ABNORMAL
PATH INTERP SPEC-IMP: ABNORMAL

## 2019-06-26 ENCOUNTER — PROCEDURE VISIT (OUTPATIENT)
Dept: OBGYN CLINIC | Facility: CLINIC | Age: 29
End: 2019-06-26

## 2019-06-26 VITALS
SYSTOLIC BLOOD PRESSURE: 109 MMHG | BODY MASS INDEX: 18.3 KG/M2 | WEIGHT: 110 LBS | DIASTOLIC BLOOD PRESSURE: 73 MMHG | HEART RATE: 102 BPM

## 2019-06-26 DIAGNOSIS — R87.612 LOW GRADE SQUAMOUS INTRAEPITH LESION ON CYTOLOGIC SMEAR CERVIX (LGSIL): Primary | ICD-10-CM

## 2019-06-26 LAB — SL AMB POCT URINE HCG: NEGATIVE

## 2019-06-26 PROCEDURE — 88305 TISSUE EXAM BY PATHOLOGIST: CPT | Performed by: PATHOLOGY

## 2019-06-26 PROCEDURE — 88344 IMHCHEM/IMCYTCHM EA MLT ANTB: CPT | Performed by: PATHOLOGY

## 2019-06-26 PROCEDURE — 81025 URINE PREGNANCY TEST: CPT | Performed by: STUDENT IN AN ORGANIZED HEALTH CARE EDUCATION/TRAINING PROGRAM

## 2019-06-26 PROCEDURE — 99213 OFFICE O/P EST LOW 20 MIN: CPT | Performed by: STUDENT IN AN ORGANIZED HEALTH CARE EDUCATION/TRAINING PROGRAM

## 2019-07-17 ENCOUNTER — OFFICE VISIT (OUTPATIENT)
Dept: OBGYN CLINIC | Facility: CLINIC | Age: 29
End: 2019-07-17

## 2019-07-17 VITALS
HEART RATE: 103 BPM | WEIGHT: 109 LBS | BODY MASS INDEX: 18.14 KG/M2 | DIASTOLIC BLOOD PRESSURE: 84 MMHG | SYSTOLIC BLOOD PRESSURE: 129 MMHG

## 2019-07-17 DIAGNOSIS — R87.612 LGSIL ON PAP SMEAR OF CERVIX: Primary | ICD-10-CM

## 2019-07-17 PROBLEM — R93.89 ABNORMAL CHEST X-RAY: Status: RESOLVED | Noted: 2018-07-24 | Resolved: 2019-07-17

## 2019-07-17 PROBLEM — F32.A DEPRESSION: Status: RESOLVED | Noted: 2018-07-24 | Resolved: 2019-07-17

## 2019-07-17 PROBLEM — F31.9 BIPOLAR 1 DISORDER (HCC): Status: ACTIVE | Noted: 2019-05-20

## 2019-07-17 PROBLEM — Z01.419 ENCOUNTER FOR ANNUAL ROUTINE GYNECOLOGICAL EXAMINATION: Status: RESOLVED | Noted: 2019-06-05 | Resolved: 2019-07-17

## 2019-07-17 PROBLEM — F42.9 OCD (OBSESSIVE COMPULSIVE DISORDER): Status: ACTIVE | Noted: 2019-05-20

## 2019-07-17 PROBLEM — Z72.51 HIGH RISK HETEROSEXUAL BEHAVIOR: Status: RESOLVED | Noted: 2019-06-05 | Resolved: 2019-07-17

## 2019-07-17 PROBLEM — F41.9 ANXIETY: Status: RESOLVED | Noted: 2018-07-24 | Resolved: 2019-07-17

## 2019-07-17 PROBLEM — J06.9 ACUTE UPPER RESPIRATORY INFECTION: Status: RESOLVED | Noted: 2019-02-11 | Resolved: 2019-07-17

## 2019-07-17 PROBLEM — F90.9 ADHD (ATTENTION DEFICIT HYPERACTIVITY DISORDER): Status: ACTIVE | Noted: 2019-05-20

## 2019-07-17 PROBLEM — R10.12 LEFT UPPER QUADRANT PAIN: Status: RESOLVED | Noted: 2019-04-04 | Resolved: 2019-07-17

## 2019-07-17 PROCEDURE — 99213 OFFICE O/P EST LOW 20 MIN: CPT | Performed by: OBSTETRICS & GYNECOLOGY

## 2019-07-17 RX ORDER — LURASIDONE HYDROCHLORIDE 40 MG/1
TABLET, FILM COATED ORAL
Refills: 0 | COMMUNITY
Start: 2019-07-04 | End: 2022-07-13

## 2019-07-17 NOTE — PROGRESS NOTES
H&P Exam  Dot Holding 30 y o  female MRN: [id number]  Unit/Bed#:  Encounter: [id number]      Patient is here for colposcopy results  Results are as follows:  2016: NILM  19: pap smear LSIL, atypical glandular cells  19: colposcopy 6o'clock WNL, 12 o'clock CIN2-3, ECC CIN2-3    Discussed with patient that per ASCCP guidelines, the next step is a LEEP procedure  Counseling given on procedural technique along with risks, benefits, and alternatives to the procedure  Risks include thermal damage, pain, bleeding, infection, and acute cardiac or respiratory events  Patient is aware that final pathology may include a diagnosis worse than CIN2-3 including cancer  All questions were answered and patient would like to proceed  LMP: 2019  Cycles are normal, every 28-30 days, lasting 5-6 days  Contraception: oral contraceptives  Allergies: none  Family history: Breast cancer in maternal grandmother, no history of uterine, ovarian, cervical cancer  ETOH: social   Drug use: smokes 1 pack/day, no other recreational drug use    Blood use during admission if needed: yes    OB History    Para Term  AB Living   0 0 0 0 0 0   SAB TAB Ectopic Multiple Live Births   0 0 0 0 0       Review of Systems   Constitutional: Negative  HENT: Negative  Eyes: Negative  Respiratory: Negative  Cardiovascular: Negative  Gastrointestinal: Negative  Endocrine: Negative  Genitourinary: Negative  Musculoskeletal: Negative  Allergic/Immunologic: Negative  Neurological: Negative  Hematological: Negative  Psychiatric/Behavioral: Negative           Historical Information   Past Medical History:   Diagnosis Date    Abnormal Pap smear of cervix      ASCUS    ADHD     Bipolar 1 disorder (HCC)     Migraine     OCD (obsessive compulsive disorder)      Past Surgical History:   Procedure Laterality Date    GYNECOLOGIC CRYOSURGERY      patient was 26years old    NO PAST SURGERIES       Social History   Social History     Substance and Sexual Activity   Alcohol Use Yes    Comment: Occasional      Social History     Substance and Sexual Activity   Drug Use No     Social History     Tobacco Use   Smoking Status Current Every Day Smoker    Packs/day: 1 00   Smokeless Tobacco Never Used     Family History: non-contributory    Meds/Allergies      (Not in a hospital admission)   No Known Allergies    OBJECTIVE:    Vitals: Blood pressure 129/84, pulse 103, weight 49 4 kg (109 lb), last menstrual period 07/14/2019, not currently breastfeeding  Body mass index is 18 14 kg/m²  Physical Exam   Constitutional: She is oriented to person, place, and time  She appears well-developed and well-nourished  Cardiovascular: Normal rate, regular rhythm and normal heart sounds  Exam reveals no gallop and no friction rub  No murmur heard  Pulmonary/Chest: Effort normal and breath sounds normal  No stridor  No respiratory distress  She has no wheezes  She has no rales  She exhibits no tenderness  Abdominal: Soft  Bowel sounds are normal  She exhibits no distension and no mass  There is no tenderness  There is no rebound and no guarding  No hernia  Neurological: She is alert and oriented to person, place, and time  Psychiatric: She has a normal mood and affect  Her behavior is normal  Judgment and thought content normal    Vitals reviewed  Assessment/Plan     ASSESSMENT:  32yo G0 female who will be undergoing LEEP procedure  PLAN:    BROCK 2-3 on colposcopy  Discussed procedure with patient, please see above  Consent form signed  Hibiclens given to patient  Will task Ciara to schedule surgery     NPO after midnight  No antibiotics needed    Discussed with Dr Ernie Bedolla MD  7/17/2019  1:49 PM

## 2019-07-17 NOTE — H&P (VIEW-ONLY)
H&P Exam  Monica Hernandez 30 y o  female MRN: [id number]  Unit/Bed#:  Encounter: [id number]      Patient is here for colposcopy results  Results are as follows:  2016: NILM  19: pap smear LSIL, atypical glandular cells  19: colposcopy 6o'clock WNL, 12 o'clock CIN2-3, ECC CIN2-3    Discussed with patient that per ASCCP guidelines, the next step is a LEEP procedure  Counseling given on procedural technique along with risks, benefits, and alternatives to the procedure  Risks include thermal damage, pain, bleeding, infection, and acute cardiac or respiratory events  Patient is aware that final pathology may include a diagnosis worse than CIN2-3 including cancer  All questions were answered and patient would like to proceed  LMP: 2019  Cycles are normal, every 28-30 days, lasting 5-6 days  Contraception: oral contraceptives  Allergies: none  Family history: Breast cancer in maternal grandmother, no history of uterine, ovarian, cervical cancer  ETOH: social   Drug use: smokes 1 pack/day, no other recreational drug use    Blood use during admission if needed: yes    OB History    Para Term  AB Living   0 0 0 0 0 0   SAB TAB Ectopic Multiple Live Births   0 0 0 0 0       Review of Systems   Constitutional: Negative  HENT: Negative  Eyes: Negative  Respiratory: Negative  Cardiovascular: Negative  Gastrointestinal: Negative  Endocrine: Negative  Genitourinary: Negative  Musculoskeletal: Negative  Allergic/Immunologic: Negative  Neurological: Negative  Hematological: Negative  Psychiatric/Behavioral: Negative           Historical Information   Past Medical History:   Diagnosis Date    Abnormal Pap smear of cervix      ASCUS    ADHD     Bipolar 1 disorder (HCC)     Migraine     OCD (obsessive compulsive disorder)      Past Surgical History:   Procedure Laterality Date    GYNECOLOGIC CRYOSURGERY      patient was 26years old    NO PAST SURGERIES       Social History   Social History     Substance and Sexual Activity   Alcohol Use Yes    Comment: Occasional      Social History     Substance and Sexual Activity   Drug Use No     Social History     Tobacco Use   Smoking Status Current Every Day Smoker    Packs/day: 1 00   Smokeless Tobacco Never Used     Family History: non-contributory    Meds/Allergies      (Not in a hospital admission)   No Known Allergies    OBJECTIVE:    Vitals: Blood pressure 129/84, pulse 103, weight 49 4 kg (109 lb), last menstrual period 07/14/2019, not currently breastfeeding  Body mass index is 18 14 kg/m²  Physical Exam   Constitutional: She is oriented to person, place, and time  She appears well-developed and well-nourished  Cardiovascular: Normal rate, regular rhythm and normal heart sounds  Exam reveals no gallop and no friction rub  No murmur heard  Pulmonary/Chest: Effort normal and breath sounds normal  No stridor  No respiratory distress  She has no wheezes  She has no rales  She exhibits no tenderness  Abdominal: Soft  Bowel sounds are normal  She exhibits no distension and no mass  There is no tenderness  There is no rebound and no guarding  No hernia  Neurological: She is alert and oriented to person, place, and time  Psychiatric: She has a normal mood and affect  Her behavior is normal  Judgment and thought content normal    Vitals reviewed  Assessment/Plan     ASSESSMENT:  32yo G0 female who will be undergoing LEEP procedure  PLAN:    BROCK 2-3 on colposcopy  Discussed procedure with patient, please see above  Consent form signed  Hibiclens given to patient  Will task Ciara to schedule surgery     NPO after midnight  No antibiotics needed    Discussed with Dr Brisa Márquez MD  7/17/2019  1:49 PM

## 2019-07-17 NOTE — H&P
H&P Exam  Weston King 30 y o  female MRN: [id number]  Unit/Bed#:  Encounter: [id number]      Patient is here for colposcopy results  Results are as follows:  2016: NILM  19: pap smear LSIL, atypical glandular cells  19: colposcopy 6o'clock WNL, 12 o'clock CIN2-3, ECC CIN2-3    Discussed with patient that per ASCCP guidelines, the next step is a LEEP procedure  Counseling given on procedural technique along with risks, benefits, and alternatives to the procedure  Risks include thermal damage, pain, bleeding, infection, and acute cardiac or respiratory events  Patient is aware that final pathology may include a diagnosis worse than CIN2-3 including cancer  All questions were answered and patient would like to proceed  LMP: 2019  Cycles are normal, every 28-30 days, lasting 5-6 days  Contraception: oral contraceptives  Allergies: none  Family history: Breast cancer in maternal grandmother, no history of uterine, ovarian, cervical cancer  ETOH: social   Drug use: smokes 1 pack/day, no other recreational drug use    Blood use during admission if needed: yes    OB History    Para Term  AB Living   0 0 0 0 0 0   SAB TAB Ectopic Multiple Live Births   0 0 0 0 0       Review of Systems   Constitutional: Negative  HENT: Negative  Eyes: Negative  Respiratory: Negative  Cardiovascular: Negative  Gastrointestinal: Negative  Endocrine: Negative  Genitourinary: Negative  Musculoskeletal: Negative  Allergic/Immunologic: Negative  Neurological: Negative  Hematological: Negative  Psychiatric/Behavioral: Negative           Historical Information   Past Medical History:   Diagnosis Date    Abnormal Pap smear of cervix      ASCUS    ADHD     Bipolar 1 disorder (HCC)     Migraine     OCD (obsessive compulsive disorder)      Past Surgical History:   Procedure Laterality Date    GYNECOLOGIC CRYOSURGERY      patient was 26years old    NO PAST SURGERIES       Social History   Social History     Substance and Sexual Activity   Alcohol Use Yes    Comment: Occasional      Social History     Substance and Sexual Activity   Drug Use No     Social History     Tobacco Use   Smoking Status Current Every Day Smoker    Packs/day: 1 00   Smokeless Tobacco Never Used     Family History: non-contributory    Meds/Allergies      (Not in a hospital admission)   No Known Allergies    OBJECTIVE:    Vitals: Blood pressure 129/84, pulse 103, weight 49 4 kg (109 lb), last menstrual period 07/14/2019, not currently breastfeeding  Body mass index is 18 14 kg/m²  Physical Exam   Constitutional: She is oriented to person, place, and time  She appears well-developed and well-nourished  Cardiovascular: Normal rate, regular rhythm and normal heart sounds  Exam reveals no gallop and no friction rub  No murmur heard  Pulmonary/Chest: Effort normal and breath sounds normal  No stridor  No respiratory distress  She has no wheezes  She has no rales  She exhibits no tenderness  Abdominal: Soft  Bowel sounds are normal  She exhibits no distension and no mass  There is no tenderness  There is no rebound and no guarding  No hernia  Neurological: She is alert and oriented to person, place, and time  Psychiatric: She has a normal mood and affect  Her behavior is normal  Judgment and thought content normal    Vitals reviewed  Assessment/Plan     ASSESSMENT:  32yo G0 female who will be undergoing LEEP procedure  PLAN:    BROCK 2-3 on colposcopy  Discussed procedure with patient, please see above  Consent form signed  Hibiclens given to patient  Will task Ciara to schedule surgery     NPO after midnight  No antibiotics needed    Discussed with Dr Nando Rae MD  7/17/2019  1:49 PM

## 2019-07-18 PROBLEM — Z01.411 ENCOUNTER FOR WELL WOMAN EXAM WITH ABNORMAL FINDINGS: Status: ACTIVE | Noted: 2019-07-18

## 2019-08-08 ENCOUNTER — ANESTHESIA EVENT (OUTPATIENT)
Dept: PERIOP | Facility: HOSPITAL | Age: 29
End: 2019-08-08
Payer: COMMERCIAL

## 2019-08-09 ENCOUNTER — ANESTHESIA (OUTPATIENT)
Dept: PERIOP | Facility: HOSPITAL | Age: 29
End: 2019-08-09
Payer: COMMERCIAL

## 2019-08-09 ENCOUNTER — HOSPITAL ENCOUNTER (OUTPATIENT)
Facility: HOSPITAL | Age: 29
Setting detail: OUTPATIENT SURGERY
Discharge: HOME | End: 2019-08-09
Attending: OBSTETRICS & GYNECOLOGY | Admitting: OBSTETRICS & GYNECOLOGY
Payer: COMMERCIAL

## 2019-08-09 VITALS
RESPIRATION RATE: 16 BRPM | WEIGHT: 108 LBS | DIASTOLIC BLOOD PRESSURE: 53 MMHG | TEMPERATURE: 97.6 F | OXYGEN SATURATION: 100 % | HEART RATE: 73 BPM | HEIGHT: 65 IN | SYSTOLIC BLOOD PRESSURE: 120 MMHG | BODY MASS INDEX: 17.99 KG/M2

## 2019-08-09 DIAGNOSIS — Z01.411 ENCOUNTER FOR WELL WOMAN EXAM WITH ABNORMAL FINDINGS: ICD-10-CM

## 2019-08-09 DIAGNOSIS — G89.18 POSTOPERATIVE PAIN: Primary | ICD-10-CM

## 2019-08-09 PROBLEM — Z98.890 S/P LEEP: Status: ACTIVE | Noted: 2019-08-09

## 2019-08-09 LAB
EXT PREGNANCY TEST URINE: NEGATIVE
EXT. CONTROL: NORMAL

## 2019-08-09 PROCEDURE — 88344 IMHCHEM/IMCYTCHM EA MLT ANTB: CPT | Performed by: PATHOLOGY

## 2019-08-09 PROCEDURE — 88307 TISSUE EXAM BY PATHOLOGIST: CPT | Performed by: PATHOLOGY

## 2019-08-09 PROCEDURE — 81025 URINE PREGNANCY TEST: CPT | Performed by: ANESTHESIOLOGY

## 2019-08-09 PROCEDURE — 57522 CONIZATION OF CERVIX: CPT | Performed by: OBSTETRICS & GYNECOLOGY

## 2019-08-09 RX ORDER — IODINE SOLUTION STRONG 5% (LUGOL'S) 5 %
SOLUTION ORAL AS NEEDED
Status: DISCONTINUED | OUTPATIENT
Start: 2019-08-09 | End: 2019-08-09 | Stop reason: HOSPADM

## 2019-08-09 RX ORDER — PROPOFOL 10 MG/ML
INJECTION, EMULSION INTRAVENOUS AS NEEDED
Status: DISCONTINUED | OUTPATIENT
Start: 2019-08-09 | End: 2019-08-09 | Stop reason: SURG

## 2019-08-09 RX ORDER — HYDROMORPHONE HCL/PF 1 MG/ML
0.5 SYRINGE (ML) INJECTION
Status: DISCONTINUED | OUTPATIENT
Start: 2019-08-09 | End: 2019-08-09 | Stop reason: HOSPADM

## 2019-08-09 RX ORDER — FENTANYL CITRATE 50 UG/ML
INJECTION, SOLUTION INTRAMUSCULAR; INTRAVENOUS AS NEEDED
Status: DISCONTINUED | OUTPATIENT
Start: 2019-08-09 | End: 2019-08-09 | Stop reason: SURG

## 2019-08-09 RX ORDER — FENTANYL CITRATE/PF 50 MCG/ML
50 SYRINGE (ML) INJECTION
Status: DISCONTINUED | OUTPATIENT
Start: 2019-08-09 | End: 2019-08-09 | Stop reason: HOSPADM

## 2019-08-09 RX ORDER — SODIUM CHLORIDE 9 MG/ML
125 INJECTION, SOLUTION INTRAVENOUS CONTINUOUS
Status: DISCONTINUED | OUTPATIENT
Start: 2019-08-09 | End: 2019-08-09 | Stop reason: HOSPADM

## 2019-08-09 RX ORDER — IBUPROFEN 800 MG/1
800 TABLET ORAL EVERY 8 HOURS PRN
Qty: 10 TABLET | Refills: 0 | Status: SHIPPED | OUTPATIENT
Start: 2019-08-09 | End: 2022-02-01

## 2019-08-09 RX ORDER — ONDANSETRON 2 MG/ML
4 INJECTION INTRAMUSCULAR; INTRAVENOUS ONCE AS NEEDED
Status: DISCONTINUED | OUTPATIENT
Start: 2019-08-09 | End: 2019-08-09 | Stop reason: HOSPADM

## 2019-08-09 RX ORDER — MEPERIDINE HYDROCHLORIDE 50 MG/ML
12.5 INJECTION INTRAMUSCULAR; INTRAVENOUS; SUBCUTANEOUS ONCE AS NEEDED
Status: DISCONTINUED | OUTPATIENT
Start: 2019-08-09 | End: 2019-08-09 | Stop reason: HOSPADM

## 2019-08-09 RX ORDER — MIDAZOLAM HYDROCHLORIDE 1 MG/ML
INJECTION INTRAMUSCULAR; INTRAVENOUS AS NEEDED
Status: DISCONTINUED | OUTPATIENT
Start: 2019-08-09 | End: 2019-08-09 | Stop reason: SURG

## 2019-08-09 RX ORDER — OXYCODONE HYDROCHLORIDE 5 MG/1
5 TABLET ORAL EVERY 4 HOURS PRN
Status: DISCONTINUED | OUTPATIENT
Start: 2019-08-09 | End: 2019-08-09 | Stop reason: HOSPADM

## 2019-08-09 RX ORDER — ONDANSETRON 2 MG/ML
INJECTION INTRAMUSCULAR; INTRAVENOUS AS NEEDED
Status: DISCONTINUED | OUTPATIENT
Start: 2019-08-09 | End: 2019-08-09 | Stop reason: SURG

## 2019-08-09 RX ORDER — KETOROLAC TROMETHAMINE 30 MG/ML
INJECTION, SOLUTION INTRAMUSCULAR; INTRAVENOUS AS NEEDED
Status: DISCONTINUED | OUTPATIENT
Start: 2019-08-09 | End: 2019-08-09 | Stop reason: SURG

## 2019-08-09 RX ORDER — IBUPROFEN 600 MG/1
600 TABLET ORAL EVERY 6 HOURS PRN
Status: DISCONTINUED | OUTPATIENT
Start: 2019-08-09 | End: 2019-08-09 | Stop reason: HOSPADM

## 2019-08-09 RX ORDER — DEXAMETHASONE SODIUM PHOSPHATE 4 MG/ML
INJECTION, SOLUTION INTRA-ARTICULAR; INTRALESIONAL; INTRAMUSCULAR; INTRAVENOUS; SOFT TISSUE AS NEEDED
Status: DISCONTINUED | OUTPATIENT
Start: 2019-08-09 | End: 2019-08-09 | Stop reason: SURG

## 2019-08-09 RX ADMIN — FENTANYL CITRATE 25 MCG: 50 INJECTION, SOLUTION INTRAMUSCULAR; INTRAVENOUS at 07:25

## 2019-08-09 RX ADMIN — FENTANYL CITRATE 25 MCG: 50 INJECTION, SOLUTION INTRAMUSCULAR; INTRAVENOUS at 07:30

## 2019-08-09 RX ADMIN — DEXAMETHASONE SODIUM PHOSPHATE 4 MG: 4 INJECTION, SOLUTION INTRAMUSCULAR; INTRAVENOUS at 07:25

## 2019-08-09 RX ADMIN — SODIUM CHLORIDE 125 ML/HR: 0.9 INJECTION, SOLUTION INTRAVENOUS at 06:10

## 2019-08-09 RX ADMIN — IBUPROFEN 600 MG: 600 TABLET ORAL at 09:08

## 2019-08-09 RX ADMIN — MIDAZOLAM 2 MG: 1 INJECTION INTRAMUSCULAR; INTRAVENOUS at 07:15

## 2019-08-09 RX ADMIN — SODIUM CHLORIDE 125 ML/HR: 0.9 INJECTION, SOLUTION INTRAVENOUS at 08:07

## 2019-08-09 RX ADMIN — KETOROLAC TROMETHAMINE 30 MG: 30 INJECTION, SOLUTION INTRAMUSCULAR at 07:39

## 2019-08-09 RX ADMIN — LIDOCAINE HYDROCHLORIDE 60 MG: 20 INJECTION, SOLUTION INTRAVENOUS at 07:21

## 2019-08-09 RX ADMIN — PROPOFOL 200 MG: 10 INJECTION, EMULSION INTRAVENOUS at 07:21

## 2019-08-09 RX ADMIN — ONDANSETRON 4 MG: 2 INJECTION INTRAMUSCULAR; INTRAVENOUS at 07:15

## 2019-08-09 NOTE — OP NOTE
OPERATIVE REPORT  PATIENT NAME: Baljeet Lakhani    :  1990  MRN: [id number]  Pt Location: [facility] OR ROOM 03    SURGERY DATE: 2019    Surgeon(s) and Role:     * Curly Valdivia MD - Primary     * Christopher Oh MD - Ean Ruiz MD - Observing    Preop Diagnosis:  Encounter for well woman exam with abnormal findings [Z01 411]    Post-Op Diagnosis Codes:     * Encounter for well woman exam with abnormal findings [Z01 411]    Procedure(s) (LRB):  BIOPSY LEEP CERVIX (N/A)    Specimen(s):  ID Type Source Tests Collected by Time Destination   1 : cervical leep Tissue Cervix TISSUE EXAM Curly Valdivia MD 2019 [id number]        Estimated Blood Loss:   5 mL      Anesthesia Type:   General, LMA    Operative Indications:  Encounter for well woman exam with abnormal findings [D75 007]  Colposcopy on 19 showed BROCK 2-3 in the 12 o'clock position and ECC BROCK 2-3    Operative Findings:  Normal external genitalia   No Lugol uptake in the 6 and 12 o'clock positions  No cervical masses or polyps visualized  Nulliparous cervix      Complications:   None    Procedure and Technique:  Indications for the procedure:   History: Ms Baljeet Lakhani is a 30y o  year old G 0 P 0 with a Pap smear on 19 showing LSIL and atypical glandular cells and a colposcopy on 19 showing  6o'clock WNL, 12 o'clock CIN2-3, ECC CIN2-3    Surgical risks: The patient was informed of all the risks and benefits of a loop electrosurgical excision procedure  Risks included but were not limited to bleeding, infection, injury to the vulva, vagina, cervix, uterine perforation, or negative effects on future pregnancy outcomes  The patient expressed understanding the risks involved, all portions were answered, the patient was consented to the procedure  Description of the procedure: The patient was taken to the operating room  IV sedation was administered and found to be adequate   The patient was then positioned on the operating table in dorsolithotomy position with legs supported by stirrups and SCDs bilaterally  All pressure points were padded  Warm blanket was placed to maintain control of core body temperature  The patient was then  draped in usual sterile fashion  Operative technique: A timeout was performed to confirm correct patient and correct procedure  A bivalved coated speculum was inserted into the vagina and the cervix was visualized  Lugol's solution was applied to visualize abnormal tissue  Local anesthesia of 1% epinephrine was injected at 6, 10, 12, 2 o'clock positions  The loop electrode was selected and used to excise the cervical sample using the medium sized D-shaped loop  The gross specimen was removed and sent to pathology  Ball electrocautery was used to obtain adequate hemostasis  Monsel's was then applied to maintain hemostasis  Good hemostasis was confirmed  The bivalve speculum was removed from the vagina  At the completion of the procedure all needle, sponge, instrument counts were noted to be correct ×2  Dr May Dno was present for all key portions of the procedure          Patient Disposition:  PACU     SIGNATURE: Taylor Pardo MD  DATE: August 9, 2019  TIME: 7:59 AM

## 2019-08-09 NOTE — DISCHARGE INSTRUCTIONS
Loop Electrosurgical Excision Procedure   WHAT YOU NEED TO KNOW:   A loop electrosurgical excision procedure (LEEP) is used to remove abnormal tissue from your cervix or vagina  Your cervix is the opening of your uterus  Your healthcare provider will use a small wire loop that is heated by an electrical current to remove the tissue  DISCHARGE INSTRUCTIONS:   Medicines: You may need any of the following:  · Acetaminophen  decreases pain  It is available without a doctor's order  Ask how much to take and how often to take it  Follow directions  Acetaminophen can cause liver damage if not taken correctly  · NSAIDs  decrease pain and swelling  This medicine is available without a doctor's order  This medicine can cause stomach bleeding or kidney problems  If you take blood thinner medicine, always ask your healthcare provider if NSAIDs are safe for you  Always read the medicine label and follow the directions on it before you use this medicine  · Take your medicine as directed  Contact your healthcare provider if you think your medicine is not helping or if you have side effects  Tell him if you are allergic to any medicine  Keep a list of the medicines, vitamins, and herbs you take  Include the amounts, and when and why you take them  Bring the list or the pill bottles to follow-up visits  Carry your medicine list with you in case of an emergency  Follow up with your healthcare provider or gynecologist as directed:  Write down your questions so you remember to ask them during your visits  Rest:  Rest when you feel it is needed  Slowly start to do more each day  Return to your daily activities as directed  Vaginal care: It is normal to have mild cramping, spotting, or discharge for several days after your procedure  You may also have a thin, watery discharge for up to 4 weeks after your procedure  Use a clean sanitary pad as needed   Do not  use tampons, douche, or have sex until your healthcare provider or gynecologist says that it is okay  Bathing:  Do not take a bath or use a hot tub for 2 weeks after your procedure, or as directed by your healthcare provider or gynecologist  Rigoberto Back may shower during this time  Contact your healthcare provider or gynecologist if:   · You have a fever or chills  · You have nausea or are vomiting  · You have blood in your urine  · Your pad becomes soaked with blood  · You have foul-smelling drainage from your vagina  · You have pain when you urinate or have sex  · You have questions or concerns about your condition or care  Seek care immediately or call 911 if:   · You have heavy bleeding from your vagina  · You have severe abdominal or vaginal pain that does not go away, even after you take pain medicine  · You are urinating less than before your procedure  © 2016 [address] is for End User's use only and may not be sold, redistributed or otherwise used for commercial purposes  All illustrations and images included in CareNotes® are the copyrighted property of [employer]  or [employer]ider  The above information is an  only  It is not intended as medical advice for individual conditions or treatments  Talk to your doctor, nurse or pharmacist before following any medical regimen to see if it is safe and effective for you

## 2019-08-09 NOTE — INTERVAL H&P NOTE
H&P reviewed  After examining the patient I find no changes in the patients condition since the H&P had been written    UPT negative, on OCP  LMP 7/14/19, having cramps, expecting menses soon

## 2019-08-09 NOTE — ANESTHESIA POSTPROCEDURE EVALUATION
Post-Op Assessment Note    CV Status:  Stable    Pain management: adequate     Mental Status:  Alert and awake   Hydration Status:  Euvolemic   PONV Controlled:  Controlled   Airway Patency:  Patent   Post Op Vitals Reviewed: Yes      Staff: Anesthesiologist           BP (P) 121/61 (08/09/19 0840)    Temp      Pulse (P) 71 (08/09/19 0840)   Resp (P) 16 (08/09/19 0840)    SpO2 (P) 99 % (08/09/19 0840)

## 2019-08-09 NOTE — ANESTHESIA PREPROCEDURE EVALUATION
Review of Systems/Medical History  Patient summary reviewed  Chart reviewed      Cardiovascular  Negative cardio ROS    Pulmonary  Smoker cigarette smoker  , Tobacco cessation counseling given Cumulative Pack Years: 8,        GI/Hepatic    GERD well controlled,        Negative  ROS        Endo/Other  Negative endo/other ROS      GYN       Hematology  Negative hematology ROS      Musculoskeletal  Negative musculoskeletal ROS        Neurology    Headaches,    Psychology   Psychiatric history, Anxiety, Depression , being treated for depression,              Physical Exam    Airway    Mallampati score: I  TM Distance: <3 FB  Neck ROM: full     Dental   Comment: upper and lower braces,     Cardiovascular  Comment: Negative ROS, Rhythm: regular, Rate: normal, Cardiovascular exam normal    Pulmonary  Pulmonary exam normal Breath sounds clear to auscultation,     Other Findings        Anesthesia Plan  ASA Score- 2     Anesthesia Type- general with ASA Monitors  Additional Monitors:   Airway Plan: LMA  Plan Factors- Patient instructed to abstain from smoking on day of procedure  Patient smoked on day of surgery  Induction- intravenous  Postoperative Plan-     Informed Consent- Anesthetic plan and risks discussed with patient

## 2019-08-12 DIAGNOSIS — R10.12 LEFT UPPER QUADRANT PAIN: ICD-10-CM

## 2019-08-12 RX ORDER — RANITIDINE 150 MG/1
150 TABLET ORAL 2 TIMES DAILY
Qty: 60 TABLET | Refills: 0 | Status: SHIPPED | OUTPATIENT
Start: 2019-08-12 | End: 2021-04-07

## 2019-08-22 ENCOUNTER — OFFICE VISIT (OUTPATIENT)
Dept: OBGYN CLINIC | Facility: CLINIC | Age: 29
End: 2019-08-22

## 2019-08-22 ENCOUNTER — TELEPHONE (OUTPATIENT)
Dept: OBGYN CLINIC | Facility: CLINIC | Age: 29
End: 2019-08-22

## 2019-08-22 VITALS
HEIGHT: 65 IN | DIASTOLIC BLOOD PRESSURE: 76 MMHG | HEART RATE: 87 BPM | SYSTOLIC BLOOD PRESSURE: 113 MMHG | BODY MASS INDEX: 18.13 KG/M2 | WEIGHT: 108.8 LBS

## 2019-08-22 DIAGNOSIS — Z98.890 S/P LEEP: Primary | ICD-10-CM

## 2019-08-22 PROCEDURE — 99213 OFFICE O/P EST LOW 20 MIN: CPT | Performed by: OBSTETRICS & GYNECOLOGY

## 2019-08-22 NOTE — PROGRESS NOTES
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  -  Multiplex immunohistochemical stains performed with appropriate controls highlights diffuse strong staining for p16 with elevated Ki-67 proliferative activity, supporting the diagnosis  -  The resection margins are negative for dysplasia  Reviewed negative margins on specimen  Per [facility]P guidelines, for Pap and HPV co-testing in 1 year  Patient agreeable with plan  Of note, upon reviewing pathology results and follow-up for Pap and HPV co-testing in 1 year, patient appeared annoyed and that it was "stupid for her to come for just results" Reviewed with patient that results visit were standard at this clinic in order to have face-to-face review of results and determine future plan  Anola Maci, DO      29 yo for LEEP results  Doing well postoperative  Reports increased vaginal discharge following procedure, but without bleeding or pain  Vitals:    08/22/19 1336   BP: 113/76   Pulse: 87     Physical Exam   Constitutional: She is oriented to person, place, and time  She appears well-developed and well-nourished  No distress  Cardiovascular: Normal rate, regular rhythm, normal heart sounds and intact distal pulses  Pulmonary/Chest: Effort normal and breath sounds normal  No stridor  No respiratory distress  Abdominal: Soft  Bowel sounds are normal  She exhibits no distension  There is no tenderness  Neurological: She is alert and oriented to person, place, and time  Skin: Skin is warm and dry  She is not diaphoretic  Psychiatric: She has a normal mood and affect   Her behavior is normal

## 2019-08-22 NOTE — ASSESSMENT & PLAN NOTE
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

## 2019-08-22 NOTE — PATIENT INSTRUCTIONS
Loop Electrosurgical Excision Procedure   AMBULATORY CARE:   A loop electrosurgical excision procedure (LEEP)  A LEEP is a procedure to remove abnormal tissue from your cervix or vagina  The tissue can be tested for cancer or infection  You may need a LEEP if other tests have found abnormal cells on your cervix or in your vagina  How to prepare for a LEEP:  Your healthcare provider will talk to you about how to prepare for your procedure  Do not douche, use tampons, or have sex for 24 hours before the procedure  Do not put medicines in your vagina for 24 hours before the procedure  Call your healthcare provider if you have your menstrual period on the day of the procedure  You may need to wait until your period ends to have the procedure  Arrange for someone to drive you home and stay with you after your procedure  What will happen during a LEEP:   · Your healthcare provider will insert a speculum in your vagina  A speculum is an instrument that holds the vagina open so your provider can see your cervix  You will be given local anesthesia to numb your cervix  With local anesthesia, you may feel pressure or pushing during your procedure, but you should not feel pain  · Your healthcare provider will insert a tube with a looped wire at the end into your vagina  He or she will use this instrument to remove a sample of tissue from your cervix  You may feel pain or cramping when the sample is taken  You may also feel dizzy  Tell your healthcare provider if the dizziness gets worse  Your healthcare provider may use a paste or tools to control bleeding  What will happen after a LEEP:  Your healthcare provider will monitor you for heavy bleeding  You may have cramping, bleeding, or dark brown discharge after your procedure  These symptoms may last up to 4 weeks  Risks of a LEEP:  You may bleed more than expected or get an infection  Your menstrual periods may feel more painful after the procedure   You may have problems getting pregnant or be at risk for a miscarriage or  birth  If you do get pregnant, your baby may be underweight  Seek care immediately if:   · You have severe pain in your lower abdomen  · You soak through 1 sanitary pad in 1 hour or less  · You feel weak, dizzy, or faint  Contact your healthcare provider if:   · You have a fever, chills, or foul-smelling discharge  · You have bleeding with clots  · Your bleeding is heavier than your menstrual period  · Your pain gets worse or does not get better after you take pain medicine  · You have questions or concerns about your condition or care  Medicines:   · NSAIDs , such as ibuprofen, help decrease swelling, pain, and fever  NSAIDs can cause stomach bleeding or kidney problems in certain people  If you take blood thinner medicine, always ask your healthcare provider if NSAIDs are safe for you  Always read the medicine label and follow directions  · Take your medicine as directed  Contact your healthcare provider if you think your medicine is not helping or if you have side effects  Tell him or her if you are allergic to any medicine  Keep a list of the medicines, vitamins, and herbs you take  Include the amounts, and when and why you take them  Bring the list or the pill bottles to follow-up visits  Carry your medicine list with you in case of an emergency  Activity:  Rest for 48 hours or as directed  Do not exercise, play sports, or lift anything heavier than 5 pounds  Do not go swimming or get in a hot tub  Ask your healthcare provider when you can return to your usual activities  Bathing:  Shower only after your procedure  Do not take a bath  Baths may increase your risk for an infection  Ask your healthcare provider how long to follow these instructions  Do not put anything in your vagina for 2 weeks:  Do not douche, use medicines in your vagina, or have sex  Do not use tampons   Instead, wear a sanitary pad for bleeding  Get pap smears as directed:  A pap smear can help diagnose cervical cancer early  Cervical cancer that is diagnosed early is easier to treat  Do not smoke:  Nicotine and other chemicals in cigarettes and cigars can increase your risk for cervical cancer  Ask your healthcare provider for more information if you currently smoke and need help to quit  E-cigarettes or smokeless tobacco still contain nicotine  Talk to your healthcare provider before you use these products  Practice safe sex:  Safe sex can help decrease your risk for sexually transmitted infections (STIs)  STIs can cause cervical cancer  Limit your number of sex partners  Use condoms and barrier methods for all types of sexual contact  Use a new condom or latex barrier each time you have sex  This includes oral, vaginal, and anal sex  Make sure that the condom fits and is put on correctly  Follow up with your healthcare provider as directed:  Write down your questions so you remember to ask them during your visits  © 2017 [address] [employer] Information is for End User's use only and may not be sold, redistributed or otherwise used for commercial purposes  All illustrations and images included in CareNotes® are the copyrighted property of [employer] , [employer]  or [employer]  The above information is an  only  It is not intended as medical advice for individual conditions or treatments  Talk to your doctor, nurse or pharmacist before following any medical regimen to see if it is safe and effective for you

## 2019-09-09 DIAGNOSIS — Z30.011 ENCOUNTER FOR INITIAL PRESCRIPTION OF CONTRACEPTIVE PILLS: ICD-10-CM

## 2019-09-09 RX ORDER — NORGESTIMATE AND ETHINYL ESTRADIOL 7DAYSX3 LO
KIT ORAL
Qty: 90 TABLET | Refills: 1 | OUTPATIENT
Start: 2019-09-09

## 2019-09-16 DIAGNOSIS — Z30.41 ENCOUNTER FOR SURVEILLANCE OF CONTRACEPTIVE PILLS: Primary | ICD-10-CM

## 2019-09-16 RX ORDER — NORGESTIMATE AND ETHINYL ESTRADIOL 0.25-0.035
1 KIT ORAL DAILY
Qty: 28 TABLET | Refills: 11 | Status: SHIPPED | OUTPATIENT
Start: 2019-09-16 | End: 2021-03-31

## 2019-09-25 ENCOUNTER — OFFICE VISIT (OUTPATIENT)
Dept: OBGYN CLINIC | Facility: CLINIC | Age: 29
End: 2019-09-25

## 2019-09-25 VITALS — DIASTOLIC BLOOD PRESSURE: 73 MMHG | BODY MASS INDEX: 18.34 KG/M2 | SYSTOLIC BLOOD PRESSURE: 109 MMHG | WEIGHT: 110.2 LBS

## 2019-09-25 DIAGNOSIS — N89.8 VAGINAL ODOR: Primary | ICD-10-CM

## 2019-09-25 LAB
BV WHIFF TEST VAG QL: NEGATIVE
CLUE CELLS SPEC QL WET PREP: NEGATIVE
PH SMN: 4 [PH]
SL AMB POCT WET MOUNT: NORMAL
T VAGINALIS VAG QL WET PREP: NEGATIVE
YEAST VAG QL WET PREP: NEGATIVE

## 2019-09-25 PROCEDURE — 99213 OFFICE O/P EST LOW 20 MIN: CPT | Performed by: NURSE PRACTITIONER

## 2019-09-25 PROCEDURE — 87210 SMEAR WET MOUNT SALINE/INK: CPT | Performed by: NURSE PRACTITIONER

## 2019-09-25 NOTE — PROGRESS NOTES
Assessment/Plan:         Diagnoses and all orders for this visit:    Vaginal odor  -     POCT wet mount     Plan   Call with needs or concerns  Return 8/2020 for annual GYN exam, PAP and HPV co-testing  Pt verbalized understanding of all discussed  Subjective:      Patient ID: Alirio Ramsey is a 30 y o  female  HPI  Pt presents with a Hx of a LEEP 8/9/2019 States she had intercourse 6 weeks post procedure and felt like something "ripped" Pt states she now notes VB and an odor  The following portions of the patient's history were reviewed and updated as appropriate: allergies, current medications, past family history, past medical history, past social history, past surgical history and problem list     Review of Systems      Pertinent items are note in the HPI    Objective:      /73   Wt 50 kg (110 lb 3 2 oz)   LMP 08/05/2019 (Approximate)   Breastfeeding?  No   BMI 18 34 kg/m²          Physical Exam    Alert and oriented  C/O intermittent cramping currently  Vulva negative lesions or erythena  Vagina normal mucosa  Cervix small amount of blood noted at 12 o'clock on cervix (examined with Dr Vesta Perez) silver nitrate applied to area, bleeding stopped  Explained wet mount was negative

## 2019-11-27 ENCOUNTER — HOSPITAL ENCOUNTER (EMERGENCY)
Facility: HOSPITAL | Age: 29
Discharge: HOME | End: 2019-11-27
Attending: EMERGENCY MEDICINE
Payer: COMMERCIAL

## 2019-11-27 ENCOUNTER — APPOINTMENT (EMERGENCY)
Dept: RADIOLOGY | Facility: HOSPITAL | Age: 29
End: 2019-11-27
Payer: COMMERCIAL

## 2019-11-27 VITALS
WEIGHT: 114 LBS | TEMPERATURE: 98.4 F | BODY MASS INDEX: 18.99 KG/M2 | OXYGEN SATURATION: 100 % | HEART RATE: 97 BPM | HEIGHT: 65 IN | DIASTOLIC BLOOD PRESSURE: 65 MMHG | SYSTOLIC BLOOD PRESSURE: 103 MMHG | RESPIRATION RATE: 16 BRPM

## 2019-11-27 DIAGNOSIS — B34.9 VIRAL SYNDROME: Primary | ICD-10-CM

## 2019-11-27 LAB
ANION GAP SERPL CALCULATED.3IONS-SCNC: 7 MMOL/L (ref 5–14)
BACTERIA UR QL AUTO: ABNORMAL /HPF
BASOPHILS # BLD AUTO: 0.2 THOUSANDS/ΜL (ref 0–0.1)
BASOPHILS NFR BLD AUTO: 2 % (ref 0–1)
BILIRUB UR QL STRIP: NEGATIVE
BUN SERPL-MCNC: 11 MG/DL (ref 5–25)
CALCIUM SERPL-MCNC: 9.2 MG/DL (ref 8.4–10.2)
CHLORIDE SERPL-SCNC: 104 MMOL/L (ref 97–108)
CLARITY UR: ABNORMAL
CO2 SERPL-SCNC: 26 MMOL/L (ref 22–30)
COLOR UR: ABNORMAL
CREAT SERPL-MCNC: 0.65 MG/DL (ref 0.6–1.2)
EOSINOPHIL # BLD AUTO: 0 THOUSAND/ΜL (ref 0–0.4)
EOSINOPHIL NFR BLD AUTO: 0 % (ref 0–6)
ERYTHROCYTE [DISTWIDTH] IN BLOOD BY AUTOMATED COUNT: 13.6 %
EXT PREG TEST URINE: NEGATIVE
EXT. CONTROL ED NAV: NORMAL
FLUAV RNA NPH QL NAA+PROBE: NORMAL
FLUBV RNA NPH QL NAA+PROBE: NORMAL
GFR SERPL CREATININE-BSD FRML MDRD: 121 ML/MIN/1.73SQ M
GLUCOSE SERPL-MCNC: 74 MG/DL (ref 70–99)
GLUCOSE UR STRIP-MCNC: NEGATIVE MG/DL
HCT VFR BLD AUTO: 42 % (ref 36–46)
HGB BLD-MCNC: 14.1 G/DL (ref 12–16)
HGB UR QL STRIP.AUTO: 50
KETONES UR STRIP-MCNC: NEGATIVE MG/DL
LEUKOCYTE ESTERASE UR QL STRIP: NEGATIVE
LYMPHOCYTES # BLD AUTO: 2.1 THOUSANDS/ΜL (ref 0.5–4)
LYMPHOCYTES NFR BLD AUTO: 21 % (ref 25–45)
MCH RBC QN AUTO: 31.6 PG (ref 26–34)
MCHC RBC AUTO-ENTMCNC: 33.5 G/DL (ref 31–36)
MCV RBC AUTO: 94 FL (ref 80–100)
MONOCYTES # BLD AUTO: 1.2 THOUSAND/ΜL (ref 0.2–0.9)
MONOCYTES NFR BLD AUTO: 11 % (ref 1–10)
MUCOUS THREADS UR QL AUTO: ABNORMAL
NEUTROPHILS # BLD AUTO: 6.8 THOUSANDS/ΜL (ref 1.8–7.8)
NEUTS SEG NFR BLD AUTO: 66 % (ref 45–65)
NITRITE UR QL STRIP: NEGATIVE
NON-SQ EPI CELLS URNS QL MICRO: ABNORMAL /HPF
PH UR STRIP.AUTO: 5 [PH]
PLATELET # BLD AUTO: 253 THOUSANDS/UL (ref 150–450)
PMV BLD AUTO: 8.6 FL (ref 8.9–12.7)
POTASSIUM SERPL-SCNC: 3.8 MMOL/L (ref 3.6–5)
PROT UR STRIP-MCNC: ABNORMAL MG/DL
RBC # BLD AUTO: 4.45 MILLION/UL (ref 4–5.2)
RBC #/AREA URNS AUTO: ABNORMAL /HPF
RSV RNA NPH QL NAA+PROBE: NORMAL
SODIUM SERPL-SCNC: 137 MMOL/L (ref 137–147)
SP GR UR STRIP.AUTO: 1.02 (ref 1–1.04)
UROBILINOGEN UA: 1 MG/DL
WBC # BLD AUTO: 10.3 THOUSAND/UL (ref 4.5–11)
WBC #/AREA URNS AUTO: ABNORMAL /HPF

## 2019-11-27 PROCEDURE — 71046 X-RAY EXAM CHEST 2 VIEWS: CPT

## 2019-11-27 PROCEDURE — 81001 URINALYSIS AUTO W/SCOPE: CPT | Performed by: PHYSICIAN ASSISTANT

## 2019-11-27 PROCEDURE — 81025 URINE PREGNANCY TEST: CPT | Performed by: PHYSICIAN ASSISTANT

## 2019-11-27 PROCEDURE — 80048 BASIC METABOLIC PNL TOTAL CA: CPT | Performed by: PHYSICIAN ASSISTANT

## 2019-11-27 PROCEDURE — 85025 COMPLETE CBC W/AUTO DIFF WBC: CPT | Performed by: PHYSICIAN ASSISTANT

## 2019-11-27 PROCEDURE — 87631 RESP VIRUS 3-5 TARGETS: CPT | Performed by: PHYSICIAN ASSISTANT

## 2019-11-27 PROCEDURE — 36415 COLL VENOUS BLD VENIPUNCTURE: CPT | Performed by: PHYSICIAN ASSISTANT

## 2019-11-27 PROCEDURE — 99284 EMERGENCY DEPT VISIT MOD MDM: CPT

## 2019-11-27 PROCEDURE — 99285 EMERGENCY DEPT VISIT HI MDM: CPT | Performed by: PHYSICIAN ASSISTANT

## 2019-11-27 PROCEDURE — 81003 URINALYSIS AUTO W/O SCOPE: CPT | Performed by: PHYSICIAN ASSISTANT

## 2019-11-27 PROCEDURE — 96360 HYDRATION IV INFUSION INIT: CPT

## 2019-11-27 RX ORDER — ACETAMINOPHEN 325 MG/1
975 TABLET ORAL ONCE
Status: COMPLETED | OUTPATIENT
Start: 2019-11-27 | End: 2019-11-27

## 2019-11-27 RX ORDER — SODIUM CHLORIDE 9 MG/ML
1000 INJECTION, SOLUTION INTRAVENOUS CONTINUOUS
Status: DISCONTINUED | OUTPATIENT
Start: 2019-11-27 | End: 2019-11-27 | Stop reason: HOSPADM

## 2019-11-27 RX ORDER — ONDANSETRON 4 MG/1
4 TABLET, FILM COATED ORAL EVERY 6 HOURS
Qty: 12 TABLET | Refills: 0 | Status: SHIPPED | OUTPATIENT
Start: 2019-11-27 | End: 2021-04-07

## 2019-11-27 RX ORDER — GUAIFENESIN 100 MG/5ML
200 SYRUP ORAL 3 TIMES DAILY PRN
Qty: 120 ML | Refills: 0 | Status: SHIPPED | OUTPATIENT
Start: 2019-11-27 | End: 2019-12-07

## 2019-11-27 RX ORDER — IBUPROFEN 600 MG/1
600 TABLET ORAL EVERY 6 HOURS PRN
Qty: 30 TABLET | Refills: 0 | Status: SHIPPED | OUTPATIENT
Start: 2019-11-27 | End: 2021-04-07

## 2019-11-27 RX ORDER — IBUPROFEN 400 MG/1
800 TABLET ORAL ONCE
Status: COMPLETED | OUTPATIENT
Start: 2019-11-27 | End: 2019-11-27

## 2019-11-27 RX ORDER — ACETAMINOPHEN 325 MG/1
650 TABLET ORAL EVERY 6 HOURS PRN
Qty: 30 TABLET | Refills: 0 | Status: SHIPPED | OUTPATIENT
Start: 2019-11-27 | End: 2021-04-07

## 2019-11-27 RX ADMIN — SODIUM CHLORIDE 1000 ML/HR: 0.9 INJECTION, SOLUTION INTRAVENOUS at 10:20

## 2019-11-27 RX ADMIN — ACETAMINOPHEN 975 MG: 325 TABLET ORAL at 10:01

## 2019-11-27 RX ADMIN — IBUPROFEN 800 MG: 400 TABLET ORAL at 10:01

## 2019-11-27 NOTE — ED PROVIDER NOTES
History  Chief Complaint   Patient presents with    Generalized Body Aches     I started with body aches and fever  two days ago  I have the chills and dizzyness  Also I have a dry cough        Patient is a 72-year-old female with past medical history of GERD, OCD, depression, and ADHD who presents today for evaluation of generalized body aches and overall sensation of not feeling well for the past 3 days  Patient reports subjective fevers  She reports chills  She reports some moderate coughing  She denies any significant production of cough  She denies any abdominal pain  She reports she is not eating or drinking anything  She denies any dysuria or hematuria  She denies any back pain  She denies any sore throat  She reports she is slightly dizzy today  She states she has had no headache  No blurred vision  No upper or lower extremity weakness  Patient is oriented  Prior to Admission Medications   Prescriptions Last Dose Informant Patient Reported? Taking?    LATUDA 40 MG tablet  Self Yes No   Sig: take 1 tablet by mouth once daily AFTER DINNER   ibuprofen (MOTRIN) 800 mg tablet  Self Yes No   Sig: Take by mouth every 6 (six) hours as needed for headaches   ibuprofen (MOTRIN) 800 mg tablet  Self No No   Sig: Take 1 tablet (800 mg total) by mouth every 8 (eight) hours as needed for mild pain   Patient not taking: Reported on 9/25/2019   norgestimate-ethinyl estradiol (ORTHO TRI-CYCLEN LO) 0 18/0 215/0 25 MG-25 MCG per tablet  Self No No   Sig: Take 1 tablet by mouth daily   norgestimate-ethinyl estradiol (ORTHO-CYCLEN) 0 25-35 MG-MCG per tablet   No No   Sig: Take 1 tablet by mouth daily   ranitidine (ZANTAC) 150 mg tablet  Self No No   Sig: Take 1 tablet (150 mg total) by mouth 2 (two) times a day      Facility-Administered Medications: None       Past Medical History:   Diagnosis Date    Abnormal Pap smear of cervix     2013 ASCUS    ADHD     Bipolar 1 disorder ([facility] [place] [address] )     Depression     GERD (gastroesophageal reflux disease)     Migraine     OCD (obsessive compulsive disorder)     Wears glasses        Past Surgical History:   Procedure Laterality Date    GYNECOLOGIC CRYOSURGERY      patient was 26years old    NO PAST SURGERIES      [place] COLPOSCOPY,CERVIX W/ADJ VAG,W/LOOP BX N/A 8/9/2019    Procedure: BIOPSY LEEP CERVIX;  Surgeon: Юлия Hagan MD;  [facility];  Service: Gynecology       Family History   Problem Relation Age of Onset    Bipolar disorder Mother     No Known Problems Father     Depression Sister      I have reviewed and agree with the history as documented  Social History     Tobacco Use    Smoking status: Current Every Day Smoker     Packs/day: 1 00     Types: Cigarettes    Smokeless tobacco: Never Used   Substance Use Topics    Alcohol use: Yes     Frequency: 2-4 times a month    Drug use: No        Review of Systems   Constitutional: Positive for chills, fatigue and fever  HENT: Positive for congestion and rhinorrhea  Eyes: Negative  Respiratory: Positive for cough  Cardiovascular: Negative  Gastrointestinal: Negative  Endocrine: Negative  Genitourinary: Negative  Musculoskeletal: Positive for arthralgias and myalgias  Skin: Negative  Allergic/Immunologic: Negative  Neurological: Positive for light-headedness  Hematological: Negative  Psychiatric/Behavioral: Negative  Physical Exam  Physical Exam   Constitutional: She is oriented to person, place, and time  She appears well-developed and well-nourished  HENT:   Head: Normocephalic and atraumatic  Right Ear: External ear normal    Left Ear: External ear normal    Nose: Nose normal    Mouth/Throat: Oropharynx is clear and moist  No oropharyngeal exudate  Eyes: Pupils are equal, round, and reactive to light  Conjunctivae and EOM are normal    Neck: Normal range of motion  No JVD present  No tracheal deviation present  No thyromegaly present  Cardiovascular: Normal rate, regular rhythm and normal heart sounds  No murmur heard  Pulmonary/Chest: Effort normal and breath sounds normal    Abdominal: Soft  She exhibits no distension  There is no tenderness  There is no guarding  Musculoskeletal: Normal range of motion  Patient has no decreased upper lower strength  No decreased sensation  Lymphadenopathy:     She has no cervical adenopathy  Neurological: She is alert and oriented to person, place, and time  She has normal strength  She displays normal reflexes  No cranial nerve deficit or sensory deficit  She displays a negative Romberg sign  Coordination normal  GCS eye subscore is 4  GCS verbal subscore is 5  GCS motor subscore is 6  Skin: Skin is warm  Capillary refill takes less than 2 seconds  Psychiatric: She has a normal mood and affect   Her behavior is normal  Judgment and thought content normal        Vital Signs  ED Triage Vitals [11/27/19 0910]   Temperature Pulse Respirations Blood Pressure SpO2   100 4 °F (38 °C) (!) 118 20 125/80 98 %      Temp Source Heart Rate Source Patient Position - Orthostatic VS BP Location FiO2 (%)   Tympanic Monitor Lying Left arm --      Pain Score       6           Vitals:    11/27/19 0910 11/27/19 1141   BP: 125/80 103/65   Pulse: (!) 118 97   Patient Position - Orthostatic VS: Lying Lying         Visual Acuity      ED Medications  Medications   ibuprofen (MOTRIN) tablet 800 mg (800 mg Oral Given 11/27/19 1001)   acetaminophen (TYLENOL) tablet 975 mg (975 mg Oral Given 11/27/19 1001)       Diagnostic Studies  Results Reviewed     Procedure Component Value Units Date/Time    Influenza A/B and RSV PCR [[id number]]  (Normal) Collected:  11/27/19 1018    Lab Status:  Final result Specimen:  Nares from Nose Updated:  11/27/19 1112     INFLUENZA A PCR None Detected     INFLUENZA B PCR None Detected     RSV PCR None Detected    Basic metabolic panel [[id number]]  (Normal) Collected:  11/27/19 1018    Lab Status:  Final result Specimen:  Blood from Arm, Left Updated:  11/27/19 1042     Sodium 137 mmol/L      Potassium 3 8 mmol/L      Chloride 104 mmol/L      CO2 26 mmol/L      ANION GAP 7 mmol/L      BUN 11 mg/dL      Creatinine 0 65 mg/dL      Glucose 74 mg/dL      Calcium 9 2 mg/dL      eGFR 121 ml/min/1 73sq m     Narrative:       [place] guidelines for Chronic Kidney Disease (CKD):     Stage 1 with normal or high GFR (GFR > 90 mL/min/1 73 square meters)    Stage 2 Mild CKD (GFR = 60-89 mL/min/1 73 square meters)    Stage 3A Moderate CKD (GFR = 45-59 mL/min/1 73 square meters)    Stage 3B Moderate CKD (GFR = 30-44 mL/min/1 73 square meters)    Stage 4 Severe CKD (GFR = 15-29 mL/min/1 73 square meters)    Stage 5 End Stage CKD (GFR <15 mL/min/1 73 square meters)  Note: GFR calculation is accurate only with a steady state creatinine    CBC and differential [[id number]]  (Abnormal) Collected:  11/27/19 1018    Lab Status:  Final result Specimen:  Blood from Arm, Left Updated:  11/27/19 1040     WBC 10 30 Thousand/uL      RBC 4 45 Million/uL      Hemoglobin 14 1 g/dL      Hematocrit 42 0 %      MCV 94 fL      MCH 31 6 pg      MCHC 33 5 g/dL      RDW 13 6 %      MPV 8 6 fL      Platelets 152 Thousands/uL      Neutrophils Relative 66 %      Lymphocytes Relative 21 %      Monocytes Relative 11 %      Eosinophils Relative 0 %      Basophils Relative 2 %      Neutrophils Absolute 6 80 Thousands/µL      Lymphocytes Absolute 2 10 Thousands/µL      Monocytes Absolute 1 20 Thousand/µL      Eosinophils Absolute 0 00 Thousand/µL      Basophils Absolute 0 20 Thousands/µL     Urine Microscopic [[id number]]  (Abnormal) Collected:  11/27/19 0954    Lab Status:  Final result Specimen:  Urine, Clean Catch Updated:  11/27/19 1016     RBC, UA 1-2 /hpf      WBC, UA 0-1 /hpf      Epithelial Cells Innumerable /hpf      Bacteria, UA Occasional /hpf      MUCUS THREADS Innumerable    UA w Reflex to Microscopic w Reflex to Culture [[id number]]  (Abnormal) Collected:  11/27/19 0954    Lab Status:  Final result Specimen:  Urine, Clean Catch Updated:  11/27/19 1004     Color, UA Arabella     Clarity, UA Slightly Cloudy     Specific [place], UA 1 020     pH, UA 5 0     Leukocytes, UA Negative     Nitrite, UA Negative     Protein, UA 15 (Trace) mg/dl      Glucose, UA Negative mg/dl      Ketones, UA Negative mg/dl      Bilirubin, UA Negative     Blood, UA 50 0     UROBILINOGEN UA 1 0 mg/dL     POCT pregnancy, urine [[id number]]  (Normal) Resulted:  11/27/19 0954    Lab Status:  Final result Updated:  11/27/19 0956     EXT PREG TEST UR (Ref: Negative) negative     Control valid                 XR chest 2 views   ED Interpretation by Lidia Veronica PA-C (11/27 1027)   No acute CV disease  Final Result by Leonardo Mohamud MD (11/27 1056)      No acute cardiopulmonary disease  Workstation performed: [id number]                    Procedures  Procedures       ED Course                               MDM  Number of Diagnoses or Management Options  Viral syndrome:   Diagnosis management comments: Patient is a 52-year-old female who presents today with generalized weakness and overall not feeling well  Patient was hydrated in the emergency department  She reported significant symptom improvement with administration of ibuprofen and Tylenol  Patient reports dizziness resolved  Patient was discharged home with recommendations to continue ibuprofen or Tylenol and Robitussin for relief  Chest x-ray did not reveal any acute abnormality  Upon entering the room to re-evaluate patient, patient was eating Cheetos and Sprite  I reviewed strict instructions on the fan when to return to the emergency department  Patient discharged home stable        Disposition  Final diagnoses:   Viral syndrome     Time reflects when diagnosis was documented in both MDM as applicable and the Disposition within this note     Time User Action Codes Description Comment    11/27/2019 11:40 AM Sherryle Finger SHEILA Add [B34 9] Viral syndrome       ED Disposition     ED Disposition Condition Date/Time Comment    Discharge Stable Wed Nov 27, 2019 11:40 AM Adrielgila Danyel discharge to home/self care  Follow-up Information    None         Discharge Medication List as of 11/27/2019 11:43 AM      START taking these medications    Details   acetaminophen (TYLENOL) 325 mg tablet Take 2 tablets (650 mg total) by mouth every 6 (six) hours as needed for moderate pain, Starting Wed 11/27/2019, Print      guaiFENesin (ROBITUSSIN) 100 mg/5 mL syrup Take 10 mL (200 mg total) by mouth 3 (three) times a day as needed for congestion for up to 10 days, Starting Wed 11/27/2019, Until Sat 12/7/2019, Print      !! ibuprofen (MOTRIN) 600 mg tablet Take 1 tablet (600 mg total) by mouth every 6 (six) hours as needed for moderate pain, Starting Wed 11/27/2019, Print      ondansetron (ZOFRAN) 4 mg tablet Take 1 tablet (4 mg total) by mouth every 6 (six) hours, Starting Wed 11/27/2019, Print       !! - Potential duplicate medications found  Please discuss with provider        CONTINUE these medications which have NOT CHANGED    Details   !! ibuprofen (MOTRIN) 800 mg tablet Take by mouth every 6 (six) hours as needed for headaches, Historical Med      !! ibuprofen (MOTRIN) 800 mg tablet Take 1 tablet (800 mg total) by mouth every 8 (eight) hours as needed for mild pain, Starting Fri 8/9/2019, Normal      LATUDA 40 MG tablet take 1 tablet by mouth once daily AFTER DINNER, Historical Med      norgestimate-ethinyl estradiol (ORTHO TRI-CYCLEN LO) 0 18/0 215/0 25 MG-25 MCG per tablet Take 1 tablet by mouth daily, Starting Fri 3/29/2019, Normal      norgestimate-ethinyl estradiol (ORTHO-CYCLEN) 0 25-35 MG-MCG per tablet Take 1 tablet by mouth daily, Starting Mon 9/16/2019, Normal      ranitidine (ZANTAC) 150 mg tablet Take 1 tablet (150 mg total) by mouth 2 (two) times a day, Starting Mon 8/12/2019, Normal       !! - Potential duplicate medications found  Please discuss with provider  No discharge procedures on file      ED Provider  Electronically Signed by           Simi Soliman PA-C  11/27/19 3360

## 2019-12-09 ENCOUNTER — OFFICE VISIT (OUTPATIENT)
Dept: OBGYN CLINIC | Facility: CLINIC | Age: 29
End: 2019-12-09

## 2019-12-09 VITALS
BODY MASS INDEX: 18.99 KG/M2 | DIASTOLIC BLOOD PRESSURE: 60 MMHG | SYSTOLIC BLOOD PRESSURE: 110 MMHG | HEIGHT: 65 IN | WEIGHT: 114 LBS

## 2019-12-09 DIAGNOSIS — B37.9 YEAST INFECTION: Primary | ICD-10-CM

## 2019-12-09 LAB
BV WHIFF TEST VAG QL: NEGATIVE
CLUE CELLS SPEC QL WET PREP: NEGATIVE
PH SMN: 4.5 [PH]
SL AMB POCT WET MOUNT: ABNORMAL
T VAGINALIS VAG QL WET PREP: NEGATIVE
YEAST VAG QL WET PREP: POSITIVE

## 2019-12-09 PROCEDURE — 87210 SMEAR WET MOUNT SALINE/INK: CPT | Performed by: NURSE PRACTITIONER

## 2019-12-09 PROCEDURE — 99213 OFFICE O/P EST LOW 20 MIN: CPT | Performed by: NURSE PRACTITIONER

## 2019-12-09 RX ORDER — FLUCONAZOLE 150 MG/1
150 TABLET ORAL ONCE
Qty: 1 TABLET | Refills: 0 | Status: SHIPPED | OUTPATIENT
Start: 2019-12-09 | End: 2019-12-09

## 2019-12-09 NOTE — PROGRESS NOTES
Assessment/Plan:     Diagnoses and all orders for this visit:    Yeast infection  -     fluconazole (DIFLUCAN) 150 mg tablet; Take 1 tablet (150 mg total) by mouth once for 1 dose  -     POCT wet mount      for yeast, discussed safe and effective use of Fluconazole, discussed comfort measures    Explained wet mount was positive Plan  Take Fluconazole as directed  Wear loose clothes and cotton underwear  Call with needs or concerns  Annual GYN exaam and PAP are due in June  Pt verbalized understanding of all discussed  Subjective:      Patient ID: Dipak Centeno is a 30 y o  female  HPI  Pt states she has had intermittent vaginal itching, thick white discharge and vaginal discomfort intermittently since her August LEEP  The following portions of the patient's history were reviewed and updated as appropriate: allergies, current medications, past family history, past medical history, past social history, past surgical history and problem list     Review of Systems      Pertinent items are note in the HPI    Objective:      /60   Ht 5' 5" (1 651 m)   Wt 51 7 kg (114 lb)   LMP 11/01/2019   BMI 18 97 kg/m²          Physical Exam    Alert and oriented  Denies pain  Vulva negative lesions, slight erythema  Vagina erythema, positive thick white discharge   Cervix positive thick white discharge, negative lesions  Wet mount positive for yeast

## 2019-12-09 NOTE — PATIENT INSTRUCTIONS
Take Fluconazole as directed  Wear loose clothes and cotton underwear  Call with needs or concerns  Annual GYN exaam and PAP are due in [place]

## 2020-03-27 ENCOUNTER — NURSE TRIAGE (OUTPATIENT)
Dept: OTHER | Facility: OTHER | Age: 30
End: 2020-03-27

## 2020-03-27 NOTE — TELEPHONE ENCOUNTER
Regarding: Sommer  ----- Message from [employer] sent at 3/27/2020  6:05 PM EDT -----  Fever: No  SOB: No  Cough: Yes  Chest tightness    Recent travel outside the country: No  Exposed to anyone positive for coronavirus: No

## 2020-03-27 NOTE — TELEPHONE ENCOUNTER
Pt advised to follow protocol for home isolation until symptoms resolve  Advised to call back if symptoms change or worsen  Advised to reach out to PCP during normal office hours  Informed pt that if symptoms were to become severe / emergent, to utilize emergency services  Pt verbalized understanding and was appreciative of call back  Reason for Disposition   Cough    Additional Information   [1] Dry cough occurs AND [2] onset > 14 days after COVID-19 EXPOSURE    Answer Assessment - Initial Assessment Questions  1  CONFIRMED CASE: "Who is the person with the confirmed COVID-19 infection that you were exposed to?"  Unknown         2  PLACE of CONTACT: "Where were you when you were exposed to COVID-19  (coronavirus disease 2019)?" (e g , city, state, country)      Unknown   3  TYPE of CONTACT: "How much contact was there?" (e g , live in same house, work in same office, same school)      Unknown   4  DATE of CONTACT: "When did you have contact with a coronavirus patient?" (e g , days)      Unknown   5  DURATION of CONTACT: "How long were you in contact with the COVID-19 (coronavirus disease) patient?" (e g , a few seconds, passed by person, a few minutes, live with the patient)      Unknown   6  SYMPTOMS: "Do you have any symptoms?" (e g , fever, cough, breathing difficulty)     Pt states cough is sometimes dry and sometimes productive, chest tightness  Report SOB when going up stairs, denies SOB when moving from one room to another  Denies fever  7  PREGNANCY OR POSTPARTUM: "Is there any chance you are pregnant?" "When was your last menstrual period?" "Did you deliver in the last 2 weeks?"      Denies pregnancy   8  HIGH RISK: "Do you have any heart or lung problems?  Do you have a weakened immune system?" (e g , CHF, COPD, asthma, HIV positive, chemotherapy, renal failure, diabetes mellitus, sickle cell anemia)      Denies PMH    Protocols used: COUGH - ACUTE NON-PRODUCTIVE-ADULT-AH, CORONAVIRUS (COVID-19) EXPOSURE-ADULT-AH

## 2020-04-10 ENCOUNTER — HOSPITAL ENCOUNTER (OUTPATIENT)
Dept: RADIOLOGY | Facility: HOSPITAL | Age: 30
Discharge: HOME | End: 2020-04-10
Payer: COMMERCIAL

## 2020-04-10 ENCOUNTER — TELEMEDICINE (OUTPATIENT)
Dept: FAMILY MEDICINE CLINIC | Facility: CLINIC | Age: 30
End: 2020-04-10

## 2020-04-10 DIAGNOSIS — M79.641 PAIN OF RIGHT HAND: ICD-10-CM
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
## 2020-04-10 PROCEDURE — 73130 X-RAY EXAM OF HAND: CPT

## 2020-04-10 PROCEDURE — 99213 OFFICE O/P EST LOW 20 MIN: CPT | Performed by: PHYSICIAN ASSISTANT

## 2020-05-12 ENCOUNTER — TELEMEDICINE (OUTPATIENT)
Dept: FAMILY MEDICINE CLINIC | Facility: CLINIC | Age: 30
End: 2020-05-12

## 2020-05-12 VITALS — WEIGHT: 110 LBS | BODY MASS INDEX: 18.33 KG/M2 | HEIGHT: 65 IN

## 2020-05-12 DIAGNOSIS — R42 DIZZINESS: Primary | ICD-10-CM

## 2020-05-12 PROCEDURE — 99213 OFFICE O/P EST LOW 20 MIN: CPT | Performed by: PHYSICIAN ASSISTANT

## 2020-05-14 ENCOUNTER — OFFICE VISIT (OUTPATIENT)
Dept: FAMILY MEDICINE CLINIC | Facility: CLINIC | Age: 30
End: 2020-05-14

## 2020-05-14 VITALS
BODY MASS INDEX: 18.86 KG/M2 | HEIGHT: 65 IN | RESPIRATION RATE: 20 BRPM | OXYGEN SATURATION: 98 % | HEART RATE: 98 BPM | DIASTOLIC BLOOD PRESSURE: 72 MMHG | TEMPERATURE: 97.3 F | SYSTOLIC BLOOD PRESSURE: 100 MMHG | WEIGHT: 113.2 LBS

## 2020-05-14 DIAGNOSIS — R42 DIZZINESS: Primary | ICD-10-CM

## 2020-05-14 DIAGNOSIS — F31.9 BIPOLAR 1 DISORDER (HCC): ICD-10-CM

## 2020-05-14 DIAGNOSIS — R42 VERTIGO: ICD-10-CM

## 2020-05-14 PROCEDURE — 99213 OFFICE O/P EST LOW 20 MIN: CPT | Performed by: PHYSICIAN ASSISTANT

## 2020-05-14 PROCEDURE — 3008F BODY MASS INDEX DOCD: CPT | Performed by: PHYSICIAN ASSISTANT

## 2020-05-14 PROCEDURE — 4004F PT TOBACCO SCREEN RCVD TLK: CPT | Performed by: PHYSICIAN ASSISTANT

## 2020-05-14 RX ORDER — MECLIZINE HYDROCHLORIDE 25 MG/1
25 TABLET ORAL EVERY 8 HOURS PRN
Qty: 30 TABLET | Refills: 0 | Status: SHIPPED | OUTPATIENT
Start: 2020-05-14 | End: 2020-05-20

## 2020-05-20 DIAGNOSIS — R42 VERTIGO: ICD-10-CM

## 2020-05-20 RX ORDER — MECLIZINE HYDROCHLORIDE 25 MG/1
TABLET ORAL
Qty: 30 TABLET | Refills: 0 | Status: SHIPPED | OUTPATIENT
Start: 2020-05-20 | End: 2020-06-01

## 2020-05-27 ENCOUNTER — LAB (OUTPATIENT)
Dept: LAB | Facility: HOSPITAL | Age: 30
End: 2020-05-27
Payer: COMMERCIAL

## 2020-05-27 DIAGNOSIS — R42 DIZZINESS: ICD-10-CM

## 2020-05-27 LAB
ALBUMIN SERPL BCP-MCNC: 4.3 G/DL (ref 3–5.2)
ALP SERPL-CCNC: 58 U/L (ref 43–122)
ALT SERPL W P-5'-P-CCNC: 14 U/L (ref 9–52)
ANION GAP SERPL CALCULATED.3IONS-SCNC: 8 MMOL/L (ref 5–14)
AST SERPL W P-5'-P-CCNC: 27 U/L (ref 14–36)
BILIRUB SERPL-MCNC: 0.6 MG/DL
BUN SERPL-MCNC: 11 MG/DL (ref 5–25)
CALCIUM SERPL-MCNC: 9.6 MG/DL (ref 8.4–10.2)
CHLORIDE SERPL-SCNC: 105 MMOL/L (ref 97–108)
CO2 SERPL-SCNC: 25 MMOL/L (ref 22–30)
CREAT SERPL-MCNC: 0.69 MG/DL (ref 0.6–1.2)
ERYTHROCYTE [DISTWIDTH] IN BLOOD BY AUTOMATED COUNT: 14.4 %
GFR SERPL CREATININE-BSD FRML MDRD: 118 ML/MIN/1.73SQ M
GLUCOSE P FAST SERPL-MCNC: 97 MG/DL (ref 70–99)
HCT VFR BLD AUTO: 43.3 % (ref 36–46)
HGB BLD-MCNC: 14.4 G/DL (ref 12–16)
LYMPHOCYTES # BLD AUTO: 5.41 THOUSAND/UL (ref 0.5–4)
LYMPHOCYTES # BLD AUTO: 52 % (ref 25–45)
MCH RBC QN AUTO: 31.3 PG (ref 26–34)
MCHC RBC AUTO-ENTMCNC: 33.2 G/DL (ref 31–36)
MCV RBC AUTO: 94 FL (ref 80–100)
MONOCYTES # BLD AUTO: 0.42 THOUSAND/UL (ref 0.2–0.9)
MONOCYTES NFR BLD AUTO: 4 % (ref 1–10)
NEUTS SEG # BLD: 4.58 THOUSAND/UL (ref 1.8–7.8)
NEUTS SEG NFR BLD AUTO: 44 %
PLATELET # BLD AUTO: 320 THOUSANDS/UL (ref 150–450)
PLATELET BLD QL SMEAR: ADEQUATE
PMV BLD AUTO: 9.1 FL (ref 8.9–12.7)
POTASSIUM SERPL-SCNC: 4.1 MMOL/L (ref 3.6–5)
PROT SERPL-MCNC: 7.6 G/DL (ref 5.9–8.4)
RBC # BLD AUTO: 4.6 MILLION/UL (ref 4–5.2)
RBC MORPH BLD: NORMAL
SODIUM SERPL-SCNC: 138 MMOL/L (ref 137–147)
TOTAL CELLS COUNTED SPEC: 100
TSH SERPL DL<=0.05 MIU/L-ACNC: 2.51 UIU/ML (ref 0.47–4.68)
WBC # BLD AUTO: 10.4 THOUSAND/UL (ref 4.5–11)

## 2020-05-27 PROCEDURE — 36415 COLL VENOUS BLD VENIPUNCTURE: CPT

## 2020-05-27 PROCEDURE — 80053 COMPREHEN METABOLIC PANEL: CPT

## 2020-05-27 PROCEDURE — 85027 COMPLETE CBC AUTOMATED: CPT

## 2020-05-27 PROCEDURE — 84443 ASSAY THYROID STIM HORMONE: CPT

## 2020-05-27 PROCEDURE — 85007 BL SMEAR W/DIFF WBC COUNT: CPT

## 2020-05-29 ENCOUNTER — PATIENT MESSAGE (OUTPATIENT)
Dept: FAMILY MEDICINE CLINIC | Facility: CLINIC | Age: 30
End: 2020-05-29

## 2020-06-01 DIAGNOSIS — R42 VERTIGO: ICD-10-CM

## 2020-06-01 RX ORDER — MECLIZINE HYDROCHLORIDE 25 MG/1
TABLET ORAL
Qty: 30 TABLET | Refills: 0 | Status: SHIPPED | OUTPATIENT
Start: 2020-06-01 | End: 2020-06-12

## 2020-06-09 ENCOUNTER — TELEPHONE (OUTPATIENT)
Dept: OBGYN CLINIC | Facility: CLINIC | Age: 30
End: 2020-06-09

## 2020-06-10 ENCOUNTER — ANNUAL EXAM (OUTPATIENT)
Dept: OBGYN CLINIC | Facility: CLINIC | Age: 30
End: 2020-06-10

## 2020-06-10 VITALS
BODY MASS INDEX: 19.27 KG/M2 | HEART RATE: 98 BPM | DIASTOLIC BLOOD PRESSURE: 71 MMHG | SYSTOLIC BLOOD PRESSURE: 105 MMHG | TEMPERATURE: 97.8 F | WEIGHT: 115.8 LBS

## 2020-06-10 DIAGNOSIS — Z30.41 ENCOUNTER FOR SURVEILLANCE OF CONTRACEPTIVE PILLS: ICD-10-CM

## 2020-06-10 DIAGNOSIS — Z11.3 SCREEN FOR STD (SEXUALLY TRANSMITTED DISEASE): ICD-10-CM

## 2020-06-10 DIAGNOSIS — Z01.419 ENCOUNTER FOR ANNUAL ROUTINE GYNECOLOGICAL EXAMINATION: Primary | ICD-10-CM

## 2020-06-10 DIAGNOSIS — N63.0 BREAST LUMP: ICD-10-CM

## 2020-06-10 PROCEDURE — G0101 CA SCREEN;PELVIC/BREAST EXAM: HCPCS | Performed by: NURSE PRACTITIONER

## 2020-06-10 PROCEDURE — G0145 SCR C/V CYTO,THINLAYER,RESCR: HCPCS | Performed by: NURSE PRACTITIONER

## 2020-06-10 PROCEDURE — 87624 HPV HI-RISK TYP POOLED RSLT: CPT | Performed by: NURSE PRACTITIONER

## 2020-06-10 RX ORDER — NORGESTIMATE AND ETHINYL ESTRADIOL 0.25-0.035
1 KIT ORAL DAILY
Qty: 28 TABLET | Refills: 11 | Status: SHIPPED | OUTPATIENT
Start: 2020-06-10 | End: 2021-03-31

## 2020-06-11 ENCOUNTER — APPOINTMENT (OUTPATIENT)
Dept: LAB | Facility: HOSPITAL | Age: 30
End: 2020-06-11
Payer: COMMERCIAL

## 2020-06-11 DIAGNOSIS — Z11.3 SCREEN FOR STD (SEXUALLY TRANSMITTED DISEASE): ICD-10-CM

## 2020-06-11 DIAGNOSIS — R42 VERTIGO: ICD-10-CM

## 2020-06-11 LAB
HBV SURFACE AG SER QL: NORMAL
RPR SER QL: NORMAL

## 2020-06-11 PROCEDURE — 87491 CHLMYD TRACH DNA AMP PROBE: CPT | Performed by: NURSE PRACTITIONER

## 2020-06-11 PROCEDURE — 87591 N.GONORRHOEAE DNA AMP PROB: CPT | Performed by: NURSE PRACTITIONER

## 2020-06-11 PROCEDURE — 87340 HEPATITIS B SURFACE AG IA: CPT

## 2020-06-11 PROCEDURE — 86592 SYPHILIS TEST NON-TREP QUAL: CPT | Performed by: NURSE PRACTITIONER

## 2020-06-11 PROCEDURE — 36415 COLL VENOUS BLD VENIPUNCTURE: CPT | Performed by: NURSE PRACTITIONER

## 2020-06-11 PROCEDURE — 87389 HIV-1 AG W/HIV-1&-2 AB AG IA: CPT | Performed by: NURSE PRACTITIONER

## 2020-06-12 LAB — HIV 1+2 AB+HIV1 P24 AG SERPL QL IA: NORMAL

## 2020-06-12 RX ORDER — MECLIZINE HYDROCHLORIDE 25 MG/1
TABLET ORAL
Qty: 30 TABLET | Refills: 0 | Status: SHIPPED | OUTPATIENT
Start: 2020-06-12 | End: 2021-04-07

## 2020-06-13 LAB
C TRACH DNA SPEC QL NAA+PROBE: NEGATIVE
N GONORRHOEA DNA SPEC QL NAA+PROBE: NEGATIVE

## 2020-06-16 LAB
HPV HR 12 DNA CVX QL NAA+PROBE: NEGATIVE
HPV16 DNA CVX QL NAA+PROBE: NEGATIVE
HPV18 DNA CVX QL NAA+PROBE: NEGATIVE

## 2020-06-21 LAB
LAB AP GYN PRIMARY INTERPRETATION: NORMAL
LAB AP LMP: NORMAL
Lab: NORMAL

## 2020-06-22 DIAGNOSIS — R42 VERTIGO: ICD-10-CM

## 2020-06-25 ENCOUNTER — HOSPITAL ENCOUNTER (OUTPATIENT)
Dept: MAMMOGRAPHY | Facility: CLINIC | Age: 30
Discharge: HOME | End: 2020-06-25
Payer: COMMERCIAL

## 2020-06-25 DIAGNOSIS — N63.0 BREAST LUMP: ICD-10-CM

## 2020-06-25 PROCEDURE — 76642 ULTRASOUND BREAST LIMITED: CPT

## 2020-06-25 RX ORDER — MECLIZINE HYDROCHLORIDE 25 MG/1
TABLET ORAL
Qty: 30 TABLET | Refills: 0 | OUTPATIENT
Start: 2020-06-25

## 2020-07-06 ENCOUNTER — TELEMEDICINE (OUTPATIENT)
Dept: FAMILY MEDICINE CLINIC | Facility: CLINIC | Age: 30
End: 2020-07-06

## 2020-07-06 DIAGNOSIS — R30.0 DYSURIA: Primary | ICD-10-CM

## 2020-07-06 PROCEDURE — 4004F PT TOBACCO SCREEN RCVD TLK: CPT | Performed by: PHYSICIAN ASSISTANT

## 2020-07-06 PROCEDURE — 99213 OFFICE O/P EST LOW 20 MIN: CPT | Performed by: PHYSICIAN ASSISTANT

## 2020-07-06 RX ORDER — PHENAZOPYRIDINE HYDROCHLORIDE 200 MG/1
200 TABLET, FILM COATED ORAL
Qty: 6 TABLET | Refills: 0 | Status: SHIPPED | OUTPATIENT
Start: 2020-07-06 | End: 2020-07-08

## 2020-07-06 RX ORDER — SULFAMETHOXAZOLE AND TRIMETHOPRIM 800; 160 MG/1; MG/1
1 TABLET ORAL 2 TIMES DAILY
Qty: 6 TABLET | Refills: 0 | Status: SHIPPED | OUTPATIENT
Start: 2020-07-06 | End: 2020-07-09

## 2020-07-06 NOTE — PROGRESS NOTES
Virtual Regular Visit      Assessment/Plan:    Problem List Items Addressed This Visit     None      Visit Diagnoses     Dysuria    -  Primary    Relevant Medications    sulfamethoxazole-trimethoprim (BACTRIM DS) 800-160 mg per tablet    phenazopyridine (PYRIDIUM) 200 mg tablet    Other Relevant Orders    UA w Reflex to Microscopic w Reflex to Culture -Lab Collect    Urine culture      Recommend doing UA first and then starting antibiotics  Reason for visit is   Chief Complaint   Patient presents with    Virtual Regular Visit        Encounter provider Jacinto Everett PA-C    Provider located at [address] [place] [id number] [phone]      Recent Visits  No visits were found meeting these conditions  Showing recent visits within past 7 days and meeting all other requirements     Future Appointments  No visits were found meeting these conditions  Showing future appointments within next 150 days and meeting all other requirements        The patient was identified by name and date of birth  Abigail Davis was informed that this is a telemedicine visit and that the visit is being conducted through [address] [place] [address] and patient was informed that this is not a secure, HIPAA-complaint platform  She agrees to proceed     My office door was closed  No one else was in the room  She acknowledged consent and understanding of privacy and security of the video platform  The patient has agreed to participate and understands they can discontinue the visit at any time  Patient is aware this is a billable service  Subjective  Abigail Davis is a 35 y o  female for dysuria x 3 days  No abdomen pain, itch, vaginal discharge, nausea/vomiting, fevers  She has not tried anything for this  It feels like past UTIs         HPI     Past Medical History:   Diagnosis Date    Abnormal Pap smear of cervix     2013 ASCUS    ADHD     [facility] 1 disorder ([facility] [address] )     Depression     GERD (gastroesophageal reflux disease)     Migraine     OCD (obsessive compulsive disorder)     Wears glasses        Past Surgical History:   Procedure Laterality Date    GYNECOLOGIC CRYOSURGERY      patient was 26years old    NO PAST SURGERIES      [place] COLPOSCOPY,CERVIX W/ADJ VAG,W/LOOP BX N/A 8/9/2019    Procedure: BIOPSY LEEP CERVIX;  Surgeon: Greg Osborne MD;  Location: [facility] OR;  Service: Gynecology       Current Outpatient Medications   Medication Sig Dispense Refill    acetaminophen (TYLENOL) 325 mg tablet Take 2 tablets (650 mg total) by mouth every 6 (six) hours as needed for moderate pain 30 tablet 0    ibuprofen (MOTRIN) 600 mg tablet Take 1 tablet (600 mg total) by mouth every 6 (six) hours as needed for moderate pain 30 tablet 0    ibuprofen (MOTRIN) 800 mg tablet Take by mouth every 6 (six) hours as needed for headaches      ibuprofen (MOTRIN) 800 mg tablet Take 1 tablet (800 mg total) by mouth every 8 (eight) hours as needed for mild pain 10 tablet 0    LATUDA 40 MG tablet take 1 tablet by mouth once daily AFTER DINNER  0    meclizine (ANTIVERT) 25 mg tablet take 1 tablet by mouth every 8 hours if needed for dizziness 30 tablet 0    norgestimate-ethinyl estradiol (ORTHO TRI-CYCLEN LO) 0 18/0 215/0 25 MG-25 MCG per tablet Take 1 tablet by mouth daily 90 tablet 1    norgestimate-ethinyl estradiol (ORTHO-CYCLEN) 0 25-35 MG-MCG per tablet Take 1 tablet by mouth daily 28 tablet 11    norgestimate-ethinyl estradiol (ORTHO-CYCLEN) 0 25-35 MG-MCG per tablet Take 1 tablet by mouth daily 28 tablet 11    ondansetron (ZOFRAN) 4 mg tablet Take 1 tablet (4 mg total) by mouth every 6 (six) hours 12 tablet 0    phenazopyridine (PYRIDIUM) 200 mg tablet Take 1 tablet (200 mg total) by mouth 3 (three) times a day with meals for 2 days 6 tablet 0    ranitidine (ZANTAC) 150 mg tablet Take 1 tablet (150 mg total) by mouth 2 (two) times a day (Patient not taking: Reported on 12/9/2019) 60 tablet 0    sulfamethoxazole-trimethoprim (BACTRIM DS) 800-160 mg per tablet Take 1 tablet by mouth 2 (two) times a day for 3 days 6 tablet 0     No current facility-administered medications for this visit  No Known Allergies    Review of Systems   Constitutional: Negative for chills and fever  Respiratory: Negative for shortness of breath  Cardiovascular: Negative for chest pain  Gastrointestinal: Negative for abdominal pain  Genitourinary: Positive for dysuria  Negative for flank pain, menstrual problem, pelvic pain and vaginal discharge  Skin: Negative for rash  Video Exam    There were no vitals filed for this visit  Physical Exam   Constitutional: She is oriented to person, place, and time  She appears well-developed and well-nourished  No distress  HENT:   Head: Normocephalic and atraumatic  Eyes: Conjunctivae are normal    Pulmonary/Chest: Effort normal    Neurological: She is alert and oriented to person, place, and time  Skin: She is not diaphoretic  Psychiatric: She has a normal mood and affect  Her behavior is normal         As a result of this visit, I have not referred the patient for further respiratory evaluation  I spent 10 minutes directly with the patient during this visit      VIRTUAL VISIT DISCLAIMER    Renee Steen acknowledges that she has consented to an online visit or consultation  She understands that the online visit is based solely on information provided by her, and that, in the absence of a face-to-face physical evaluation by the physician, the diagnosis she receives is both limited and provisional in terms of accuracy and completeness  This is not intended to replace a full medical face-to-face evaluation by the physician  Renee Steen understands and accepts these terms

## 2020-07-07 ENCOUNTER — LAB (OUTPATIENT)
Dept: LAB | Facility: HOSPITAL | Age: 30
End: 2020-07-07
Payer: COMMERCIAL

## 2020-07-07 ENCOUNTER — TELEPHONE (OUTPATIENT)
Dept: FAMILY MEDICINE CLINIC | Facility: CLINIC | Age: 30
End: 2020-07-07

## 2020-07-07 DIAGNOSIS — R30.0 DYSURIA: ICD-10-CM

## 2020-07-07 LAB
BACTERIA UR QL AUTO: ABNORMAL /HPF
BILIRUB UR QL STRIP: NEGATIVE
CLARITY UR: CLEAR
COLOR UR: YELLOW
GLUCOSE UR STRIP-MCNC: NEGATIVE MG/DL
HGB UR QL STRIP.AUTO: 10
KETONES UR STRIP-MCNC: NEGATIVE MG/DL
LEUKOCYTE ESTERASE UR QL STRIP: NEGATIVE
MUCOUS THREADS UR QL AUTO: ABNORMAL
NITRITE UR QL STRIP: NEGATIVE
NON-SQ EPI CELLS URNS QL MICRO: ABNORMAL /HPF
PH UR STRIP.AUTO: 6.5 [PH]
PROT UR STRIP-MCNC: NEGATIVE MG/DL
RBC #/AREA URNS AUTO: ABNORMAL /HPF
SP GR UR STRIP.AUTO: 1.01 (ref 1–1.04)
UROBILINOGEN UA: NEGATIVE MG/DL
WBC #/AREA URNS AUTO: ABNORMAL /HPF

## 2020-07-07 PROCEDURE — 87186 SC STD MICRODIL/AGAR DIL: CPT

## 2020-07-07 PROCEDURE — 81001 URINALYSIS AUTO W/SCOPE: CPT | Performed by: PHYSICIAN ASSISTANT

## 2020-07-07 PROCEDURE — 87077 CULTURE AEROBIC IDENTIFY: CPT

## 2020-07-07 PROCEDURE — 81003 URINALYSIS AUTO W/O SCOPE: CPT | Performed by: PHYSICIAN ASSISTANT

## 2020-07-07 PROCEDURE — 87086 URINE CULTURE/COLONY COUNT: CPT

## 2020-07-09 LAB — BACTERIA UR CULT: ABNORMAL

## 2020-10-05 ENCOUNTER — TELEPHONE (OUTPATIENT)
Dept: OBGYN CLINIC | Facility: CLINIC | Age: 30
End: 2020-10-05

## 2020-10-14 ENCOUNTER — OFFICE VISIT (OUTPATIENT)
Dept: URGENT CARE | Age: 30
End: 2020-10-14
Payer: COMMERCIAL

## 2020-10-14 VITALS
HEART RATE: 86 BPM | DIASTOLIC BLOOD PRESSURE: 73 MMHG | SYSTOLIC BLOOD PRESSURE: 122 MMHG | WEIGHT: 110 LBS | TEMPERATURE: 97.7 F | OXYGEN SATURATION: 99 % | RESPIRATION RATE: 16 BRPM | BODY MASS INDEX: 18.33 KG/M2 | HEIGHT: 65 IN

## 2020-10-14 DIAGNOSIS — S61.214A LACERATION OF RIGHT RING FINGER WITHOUT FOREIGN BODY WITHOUT DAMAGE TO NAIL, INITIAL ENCOUNTER: Primary | ICD-10-CM

## 2020-10-14 PROCEDURE — 90715 TDAP VACCINE 7 YRS/> IM: CPT

## 2020-10-14 PROCEDURE — 90471 IMMUNIZATION ADMIN: CPT | Performed by: PHYSICIAN ASSISTANT

## 2020-10-14 PROCEDURE — 99213 OFFICE O/P EST LOW 20 MIN: CPT | Performed by: PHYSICIAN ASSISTANT

## 2020-10-14 RX ORDER — CEPHALEXIN 500 MG/1
500 CAPSULE ORAL EVERY 8 HOURS SCHEDULED
Qty: 21 CAPSULE | Refills: 0 | Status: SHIPPED | OUTPATIENT
Start: 2020-10-14 | End: 2020-10-21

## 2020-12-21 ENCOUNTER — TELEMEDICINE (OUTPATIENT)
Dept: FAMILY MEDICINE CLINIC | Facility: CLINIC | Age: 30
End: 2020-12-21

## 2020-12-21 DIAGNOSIS — R42 DIZZINESS: Primary | ICD-10-CM

## 2020-12-21 PROCEDURE — G2025 DIS SITE TELE SVCS RHC/FQHC: HCPCS | Performed by: FAMILY MEDICINE

## 2020-12-30 ENCOUNTER — TELEPHONE (OUTPATIENT)
Dept: FAMILY MEDICINE CLINIC | Facility: CLINIC | Age: 30
End: 2020-12-30

## 2020-12-30 DIAGNOSIS — L70.9 ACNE, UNSPECIFIED ACNE TYPE: Primary | ICD-10-CM

## 2021-01-07 ENCOUNTER — OFFICE VISIT (OUTPATIENT)
Dept: OBGYN CLINIC | Facility: CLINIC | Age: 31
End: 2021-01-07

## 2021-01-07 VITALS
HEIGHT: 65 IN | SYSTOLIC BLOOD PRESSURE: 123 MMHG | BODY MASS INDEX: 19.66 KG/M2 | DIASTOLIC BLOOD PRESSURE: 70 MMHG | WEIGHT: 118 LBS | HEART RATE: 118 BPM

## 2021-01-07 DIAGNOSIS — Z30.09 GENERAL COUNSELING AND ADVICE ON FEMALE CONTRACEPTION: ICD-10-CM

## 2021-01-07 DIAGNOSIS — Z30.013 ENCOUNTER FOR INITIAL PRESCRIPTION OF INJECTABLE CONTRACEPTIVE: Primary | ICD-10-CM

## 2021-01-07 PROCEDURE — 99213 OFFICE O/P EST LOW 20 MIN: CPT | Performed by: NURSE PRACTITIONER

## 2021-01-07 PROCEDURE — 96372 THER/PROPH/DIAG INJ SC/IM: CPT | Performed by: OBSTETRICS & GYNECOLOGY

## 2021-01-07 RX ORDER — MEDROXYPROGESTERONE ACETATE 150 MG/ML
150 INJECTION, SUSPENSION INTRAMUSCULAR
Qty: 1 ML | Refills: 5 | Status: SHIPPED | OUTPATIENT
Start: 2021-01-07 | End: 2022-04-28

## 2021-01-07 RX ORDER — MEDROXYPROGESTERONE ACETATE 150 MG/ML
150 INJECTION, SUSPENSION INTRAMUSCULAR ONCE
Status: COMPLETED | OUTPATIENT
Start: 2021-01-07 | End: 2021-01-07

## 2021-01-07 RX ADMIN — MEDROXYPROGESTERONE ACETATE 150 MG: 150 INJECTION, SUSPENSION INTRAMUSCULAR at 10:29

## 2021-01-07 NOTE — PATIENT INSTRUCTIONS
Return today for [address]  Annual GYN exam is due after 6/10/2021  Call with needs or concerns

## 2021-01-07 NOTE — PROGRESS NOTES
Assessment/Plan:         Diagnoses and all orders for this visit:    Encounter for initial prescription of injectable contraceptive  -     medroxyPROGESTERone (DEPO-PROVERA) 150 mg/mL injection; Inject 1 mL (150 mg total) into a muscle every 3 (three) months    General counseling and advice on female contraception      Plan  Return today for [address]  Annual GYN exam is due after 6/10/2021  Call with needs or concerns  Pt verbalized understanding of all discussed  Subjective:      Patient ID: Leroy Kirkpatrick is a 35 y o  female  HPI  Pt presents to restart Depoprovera  Pt states she was Depoprovera in the past and was happy with the method  Pt states she stop OCP because she noted facial acne, pt states she stopped the OCP's and she no longer has acne  Pt states she has not been sexually active x 6 months  Pt has a Hx of LEEP 2019  Last WNL PAP and negative HPV was 6/2020    Safe and effective use of Depoprovera was provided    The following portions of the patient's history were reviewed and updated as appropriate: allergies, current medications, past family history, past medical history, past social history, past surgical history and problem list     Review of Systems      Objective:      /70   Pulse (!) 118   Ht 5' 5" (1 651 m)   Wt 53 5 kg (118 lb)   LMP 12/28/2020 (Exact Date)   BMI 19 64 kg/m²          Physical Exam  Constitutional:       Appearance: Normal appearance  Eyes:      General:         Right eye: No discharge  Left eye: No discharge  Neck:      Musculoskeletal: Normal range of motion  Pulmonary:      Effort: No respiratory distress  Musculoskeletal: Normal range of motion  Neurological:      Mental Status: She is oriented to person, place, and time  Psychiatric:         Mood and Affect: Mood normal          Behavior: Behavior normal          Thought Content:  Thought content normal        Negative cough or SOB

## 2021-01-12 ENCOUNTER — TELEMEDICINE (OUTPATIENT)
Dept: FAMILY MEDICINE CLINIC | Facility: CLINIC | Age: 31
End: 2021-01-12

## 2021-01-12 DIAGNOSIS — U07.1 COVID-19: Primary | ICD-10-CM

## 2021-01-12 PROCEDURE — 99213 OFFICE O/P EST LOW 20 MIN: CPT | Performed by: PHYSICIAN ASSISTANT

## 2021-01-12 NOTE — PATIENT INSTRUCTIONS
Dear Elsy Pichardo,     Here at [facility] we work to bring new and innovative treatments to our patients  Our response to COVID-19, also known as Coronavirus, is no different  One pioneering treatment we are investigating uses plasma donated from those that have recovered from COVID-19 to treat patients currently sick with the illness  Plasma is a component of blood that can be donated to help other people  We are looking for people who have been diagnosed with COVID-19 and are now well again with no remaining symptoms  If you'd like to participate, please complete the [email or web address](attached questionnaire) to see if you qualify  Those that are eligible will be contacted to donate their plasma  Remember, your participation is completely voluntary and does not affect any further treatment you may receive at [facility]  If you have questions about this opportunity, please contact your Primary Care Provider

## 2021-01-12 NOTE — PROGRESS NOTES
COVID-19 Virtual Visit     Assessment/Plan:    Problem List Items Addressed This Visit     None      Visit Diagnoses     COVID-19    -  Primary         Disposition:     I recommended continued isolation until at least 24 hours have passed since recovery defined as resolution of fever without the use of fever-reducing medications AND improvement in COVID symptoms AND 10 days have passed since onset of symptoms (or 10 days have passed since date of first positive viral diagnostic test for asymptomatic patients)  Recommend isolation until 1/17and improvement in symtpoms  Discussed plasma donation program with patient and they are interested  Verification of eligibility was sent to [facility], [facility]  To learn more about convalescent plasma donation, please visit [facility]'s website at [email or web address]    I have spent 12 minutes directly with the patient  Greater than 50% of this time was spent in counseling/coordination of care regarding: risks and benefits of treatment options, instructions for management and impressions  Encounter provider Frances Gorman PA-C    Provider located at [address] [place] [id number] [phone]    Recent Visits  No visits were found meeting these conditions  Showing recent visits within past 7 days and meeting all other requirements     Today's Visits  Date Type Provider Dept   01/12/21 Telemedicine Jacinto Everett PA-C  [facility] Con[facility]   Showing today's visits and meeting all other requirements     Future Appointments  No visits were found meeting these conditions  Showing future appointments within next 150 days and meeting all other requirements        Patient agrees to participate in a virtual check in via telephone or video visit instead of presenting to the office to address urgent/immediate medical needs   Patient is aware this is a billable service  After connecting through [facility], the patient was identified by name and date of birth  Leroy Kirkpatrick was informed that this was a telemedicine visit and that the exam was being conducted confidentially over secure lines  Leroy Kirkpatrick acknowledged consent and understanding of privacy and security of the telemedicine visit  I informed the patient that I have reviewed her record in Epic and presented the opportunity for her to ask any questions regarding the visit today  The patient agreed to participate  Subjective: Leroy Kirkpatrick is a 35 y o  female who has been screened for COVID-19  Patient's symptoms include chills, fatigue, sore throat, anosmia, loss of taste, chest tightness, diarrhea and myalgias  Patient denies fever, cough, shortness of breath, abdominal pain, nausea (had but resolved), vomiting and headaches       Date of symptom onset: 1/7/2021  Date of positive COVID-19 PCR: 1/7/2021    Lab Results   Component Value Date    SARSCORONAVI Detected ([facility]) 01/07/2021     Past Medical History:   Diagnosis Date    Abnormal Pap smear of cervix     2013 ASCUS    ADHD     Bipolar 1 disorder (HCC)     Depression     GERD (gastroesophageal reflux disease)     Migraine     OCD (obsessive compulsive disorder)     Wears glasses      Past Surgical History:   Procedure Laterality Date    GYNECOLOGIC CRYOSURGERY      patient was 26years old    NO PAST SURGERIES      IN COLPOSCOPY,CERVIX W/ADJ VAG,W/LOOP BX N/A 8/9/2019    Procedure: BIOPSY LEEP CERVIX;  Surgeon: Kathryn Rodriguez MD;  Location: [facility] OR;  Service: Gynecology     Current Outpatient Medications   Medication Sig Dispense Refill    acetaminophen (TYLENOL) 325 mg tablet Take 2 tablets (650 mg total) by mouth every 6 (six) hours as needed for moderate pain 30 tablet 0    ibuprofen (MOTRIN) 600 mg tablet Take 1 tablet (600 mg total) by mouth every 6 (six) hours as needed for moderate pain 30 tablet 0    ibuprofen (MOTRIN) 800 mg tablet Take by mouth every 6 (six) hours as needed for headaches      ibuprofen (MOTRIN) 800 mg tablet Take 1 tablet (800 mg total) by mouth every 8 (eight) hours as needed for mild pain 10 tablet 0    LATUDA 40 MG tablet take 1 tablet by mouth once daily AFTER DINNER  0    meclizine (ANTIVERT) 25 mg tablet take 1 tablet by mouth every 8 hours if needed for dizziness (Patient not taking: Reported on 10/14/2020) 30 tablet 0    medroxyPROGESTERone (DEPO-PROVERA) 150 mg/mL injection Inject 1 mL (150 mg total) into a muscle every 3 (three) months 1 mL 5    norgestimate-ethinyl estradiol (ORTHO TRI-CYCLEN LO) 0 18/0 215/0 25 MG-25 MCG per tablet Take 1 tablet by mouth daily 90 tablet 1    norgestimate-ethinyl estradiol (ORTHO-CYCLEN) 0 25-35 MG-MCG per tablet Take 1 tablet by mouth daily (Patient not taking: Reported on 10/14/2020) 28 tablet 11    norgestimate-ethinyl estradiol (ORTHO-CYCLEN) 0 25-35 MG-MCG per tablet Take 1 tablet by mouth daily (Patient not taking: Reported on 10/14/2020) 28 tablet 11    ondansetron (ZOFRAN) 4 mg tablet Take 1 tablet (4 mg total) by mouth every 6 (six) hours 12 tablet 0    ranitidine (ZANTAC) 150 mg tablet Take 1 tablet (150 mg total) by mouth 2 (two) times a day (Patient not taking: Reported on 12/9/2019) 60 tablet 0     No current facility-administered medications for this visit  No Known Allergies    Review of Systems   Constitutional: Positive for chills and fatigue  Negative for fever  HENT: Positive for sore throat  Respiratory: Positive for chest tightness  Negative for cough and shortness of breath  Gastrointestinal: Positive for diarrhea  Negative for abdominal pain, nausea (had but resolved) and vomiting  Musculoskeletal: Positive for myalgias  Neurological: Negative for dizziness and headaches  Objective: There were no vitals filed for this visit      Physical Exam  Constitutional: Appearance: Normal appearance  HENT:      Head: Normocephalic and atraumatic  Right Ear: Ear canal normal       Left Ear: Ear canal normal       Nose: Nose normal    Eyes:      Conjunctiva/sclera: Conjunctivae normal    Neck:      Musculoskeletal: Normal range of motion  Comments: No visible masses    Pulmonary:      Effort: Pulmonary effort is normal  No respiratory distress  Neurological:      Mental Status: She is alert  Psychiatric:         Mood and Affect: Mood normal          Behavior: Behavior normal        VIRTUAL VISIT DISCLAIMER    Dipak Centeno acknowledges that she has consented to an online visit or consultation  She understands that the online visit is based solely on information provided by her, and that, in the absence of a face-to-face physical evaluation by the physician, the diagnosis she receives is both limited and provisional in terms of accuracy and completeness  This is not intended to replace a full medical face-to-face evaluation by the physician  Dipak Centeno understands and accepts these terms

## 2021-03-30 DIAGNOSIS — Z23 ENCOUNTER FOR IMMUNIZATION: ICD-10-CM

## 2021-03-31 ENCOUNTER — OFFICE VISIT (OUTPATIENT)
Dept: OBGYN CLINIC | Facility: CLINIC | Age: 31
End: 2021-03-31

## 2021-03-31 VITALS
HEART RATE: 91 BPM | BODY MASS INDEX: 18.8 KG/M2 | SYSTOLIC BLOOD PRESSURE: 116 MMHG | WEIGHT: 113 LBS | DIASTOLIC BLOOD PRESSURE: 76 MMHG

## 2021-03-31 DIAGNOSIS — Z11.3 SCREEN FOR STD (SEXUALLY TRANSMITTED DISEASE): ICD-10-CM

## 2021-03-31 DIAGNOSIS — B96.89 BACTERIAL VAGINOSIS: Primary | ICD-10-CM

## 2021-03-31 DIAGNOSIS — N76.0 BACTERIAL VAGINOSIS: Primary | ICD-10-CM

## 2021-03-31 DIAGNOSIS — Z30.41 ENCOUNTER FOR SURVEILLANCE OF CONTRACEPTIVE PILLS: ICD-10-CM

## 2021-03-31 LAB
BV WHIFF TEST VAG QL: POSITIVE
CLUE CELLS SPEC QL WET PREP: POSITIVE
PH SMN: 5.5 [PH]
SL AMB POCT URINE HCG: NORMAL
SL AMB POCT WET MOUNT: ABNORMAL
T VAGINALIS VAG QL WET PREP: NEGATIVE
YEAST VAG QL WET PREP: NEGATIVE

## 2021-03-31 PROCEDURE — 87210 SMEAR WET MOUNT SALINE/INK: CPT | Performed by: NURSE PRACTITIONER

## 2021-03-31 PROCEDURE — 99213 OFFICE O/P EST LOW 20 MIN: CPT | Performed by: NURSE PRACTITIONER

## 2021-03-31 PROCEDURE — 81025 URINE PREGNANCY TEST: CPT | Performed by: NURSE PRACTITIONER

## 2021-03-31 PROCEDURE — 96372 THER/PROPH/DIAG INJ SC/IM: CPT

## 2021-03-31 RX ORDER — METRONIDAZOLE 500 MG/1
500 TABLET ORAL EVERY 12 HOURS SCHEDULED
Qty: 14 TABLET | Refills: 0 | Status: SHIPPED | OUTPATIENT
Start: 2021-03-31 | End: 2021-04-07

## 2021-03-31 RX ORDER — MEDROXYPROGESTERONE ACETATE 150 MG/ML
150 INJECTION, SUSPENSION INTRAMUSCULAR ONCE
Status: COMPLETED | OUTPATIENT
Start: 2021-03-31 | End: 2021-03-31

## 2021-03-31 RX ADMIN — MEDROXYPROGESTERONE ACETATE 150 MG: 150 INJECTION, SUSPENSION INTRAMUSCULAR at 10:57

## 2021-03-31 NOTE — PATIENT INSTRUCTIONS
Take Metronidazole as directed  STD scrren results can take up to 2 weeks  Remember safe sex and condom use  Return for next Depoprovera  Complete lab work as directed  Annual GYN exam is due 8/2021    COVID-19 Instructions    If you are having any of the following:  Cough   Shortness of breath   Fever  If traveled within past 2 weeks internationally or to high risk [place] states  Or been in contact with someone that has     Please call either:   Your PCP office  [phone], option [phone]    They will screen you over the phone and direct you to the nearest appropriate testing location    DO NOT go to your PCP or OB office without calling first

## 2021-03-31 NOTE — PROGRESS NOTES
Pt given Depo injection  Left arm   Pregnancy test Negative    JQC#[id number]  WZO#[id number]  XNX#[id number]/9/2304

## 2021-03-31 NOTE — PROGRESS NOTES
Assessment/Plan:     Diagnoses and all orders for this visit:    Bacterial vaginosis  -     POCT wet mount  -     metroNIDAZOLE (FLAGYL) 500 mg tablet; Take 1 tablet (500 mg total) by mouth every 12 (twelve) hours for 7 days    Screen for STD (sexually transmitted disease)  -     Chlamydia/GC amplified DNA by PCR  -     RPR  -     Hepatitis B surface antigen; Future  -     HIV 1/2 Antigen/Antibody (4th Generation) w Reflex [facility]      Plan  Take Metronidazole as directed  STD scrren results can take up to 2 weeks  Remember safe sex and condom use  [facility]  Complete lab work as directed  Pt verbalized understanding of all discussed  Subjective:      Patient ID: Gildardo Gill is a 35 y o  female      HPI  Pt presents with C/O of vaginal discharge with and odor  Would like STD testing for peace of mind, 1 partner x 2 years they do not use condoms    Explained wet mount was positive for BV, safe and effective use of Metronidazole provided     The following portions of the patient's history were reviewed and updated as appropriate: allergies, current medications, past family history, past medical history, past social history, past surgical history and problem list     Review of Systems      Objective:      /76   Pulse 91   Wt 51 3 kg (113 lb)   BMI 18 80 kg/m²          Physical Exam    Alert and oriented  Denies pain  WNL respiratory effort, negative cough or SOB  Vulva negative lesions or erythema  Vagina positive thin gray/white discharge  Cervix negative lseions, positive thin gray discharge  Wet mount was positive for BV

## 2021-04-07 RX ORDER — MIRTAZAPINE 15 MG/1
TABLET, FILM COATED ORAL
COMMUNITY
Start: 2021-03-18 | End: 2021-07-19

## 2021-04-07 RX ORDER — MIRTAZAPINE 15 MG/1
TABLET, FILM COATED ORAL
COMMUNITY
End: 2021-07-19

## 2021-04-08 ENCOUNTER — OFFICE VISIT (OUTPATIENT)
Dept: FAMILY MEDICINE CLINIC | Facility: CLINIC | Age: 31
End: 2021-04-08

## 2021-04-08 VITALS
SYSTOLIC BLOOD PRESSURE: 92 MMHG | RESPIRATION RATE: 16 BRPM | OXYGEN SATURATION: 97 % | HEIGHT: 65 IN | DIASTOLIC BLOOD PRESSURE: 60 MMHG | WEIGHT: 113 LBS | HEART RATE: 100 BPM | BODY MASS INDEX: 18.83 KG/M2 | TEMPERATURE: 98.1 F

## 2021-04-08 DIAGNOSIS — R09.81 NASAL CONGESTION: Primary | ICD-10-CM

## 2021-04-08 DIAGNOSIS — F31.9 BIPOLAR 1 DISORDER (HCC): ICD-10-CM

## 2021-04-08 PROCEDURE — 99213 OFFICE O/P EST LOW 20 MIN: CPT | Performed by: PHYSICIAN ASSISTANT

## 2021-04-08 RX ORDER — GUAIFENESIN 600 MG
1200 TABLET, EXTENDED RELEASE 12 HR ORAL EVERY 12 HOURS SCHEDULED
Qty: 20 TABLET | Refills: 0 | Status: SHIPPED | OUTPATIENT
Start: 2021-04-08 | End: 2021-07-19

## 2021-04-08 RX ORDER — FLUTICASONE PROPIONATE 50 MCG
2 SPRAY, SUSPENSION (ML) NASAL DAILY
Qty: 1 BOTTLE | Refills: 0 | Status: SHIPPED | OUTPATIENT
Start: 2021-04-08 | End: 2021-07-19

## 2021-04-08 NOTE — PROGRESS NOTES
Assessment/Plan:    Bipolar 1 disorder ([facility] )  Continue with psychiatry       Diagnoses and all orders for this visit:    Nasal congestion  -     fluticasone (FLONASE) 50 mcg/act nasal spray; 2 sprays into each nostril daily  -     guaiFENesin (MUCINEX) 600 mg 12 hr tablet; Take 2 tablets (1,200 mg total) by mouth every 12 (twelve) hours    Bipolar 1 disorder ([facility])    Other orders  -     mirtazapine (REMERON) 15 mg tablet  -     mirtazapine (REMERON) 15 mg tablet          Subjective:      Patient ID: Lucretia Kawasaki is a 35 y o  female presenting for nasal congestion x 3 months  She's had nasal congestion since she was diagnosed with COVID  She notices blood sometimes when she blows her nose  She tried Vicks and an OTC nasal spray that she doesn't remember the name of  She also tried steaming water but it doesn't clear her nostrils  Patient says she feels as if she has mucous stuck in her throat that doesn't come out with clearing of her throat  She denies coughing, sore throat, facial swelling, sinus pain/pressure, sneezing, trouble swallowing, visual disturbances, ear pain  She has chronic headaches and dizziness but states this has been going on prior to [facility]  She denies a history of allergies  HPI    The following portions of the patient's history were reviewed and updated as appropriate:   She  has a past medical history of Abnormal Pap smear of cervix, ADHD, Bipolar 1 disorder ([facility] ), Depression, GERD (gastroesophageal reflux disease), Migraine, OCD (obsessive compulsive disorder), and Wears glasses    She   Patient Active Problem List    Diagnosis Date Noted    Screen for STD (sexually transmitted disease) 06/10/2020    Encounter for annual routine gynecological examination 06/10/2020    Breast lump 06/10/2020    Dizziness 05/12/2020    Yeast infection 12/09/2019    Vaginal odor 09/25/2019    Encounter for surveillance of contraceptive pills 09/16/2019    S/P LEEP 08/09/2019    RENA on Pap smear of cervix 06/12/2019    Atypical glandular cells of undetermined significance (MARIA E) on cervical Pap smear 06/12/2019    ADHD (attention deficit hyperactivity disorder) 05/20/2019    Bipolar 1 disorder (HCC) 05/20/2019    OCD (obsessive compulsive disorder) 05/20/2019    Nicotine use disorder 07/24/2018    Cervical cancer screening 07/24/2018     She  has a past surgical history that includes No past surgeries; Gynecologic cryosurgery; and pr colposcopy,cervix w/adj vag,w/loop bx (N/A, 8/9/2019)  Her family history includes Bipolar disorder in her mother; Depression in her sister; No Known Problems in her father  She  reports that she has been smoking cigarettes  She has been smoking about 1 00 pack per day  She has never used smokeless tobacco  She reports current alcohol use  She reports that she does not use drugs  Current Outpatient Medications   Medication Sig Dispense Refill    ibuprofen (MOTRIN) 800 mg tablet Take 1 tablet (800 mg total) by mouth every 8 (eight) hours as needed for mild pain 10 tablet 0    LATUDA 40 MG tablet take 1 tablet by mouth once daily AFTER DINNER  0    medroxyPROGESTERone (DEPO-PROVERA) 150 mg/mL injection Inject 1 mL (150 mg total) into a muscle every 3 (three) months 1 mL 5    fluticasone (FLONASE) 50 mcg/act nasal spray 2 sprays into each nostril daily 1 Bottle 0    guaiFENesin (MUCINEX) 600 mg 12 hr tablet Take 2 tablets (1,200 mg total) by mouth every 12 (twelve) hours 20 tablet 0    mirtazapine (REMERON) 15 mg tablet       mirtazapine (REMERON) 15 mg tablet        No current facility-administered medications for this visit        Current Outpatient Medications on File Prior to Visit   Medication Sig    ibuprofen (MOTRIN) 800 mg tablet Take 1 tablet (800 mg total) by mouth every 8 (eight) hours as needed for mild pain    LATUDA 40 MG tablet take 1 tablet by mouth once daily AFTER DINNER    medroxyPROGESTERone (DEPO-PROVERA) 150 mg/mL injection Inject 1 mL (150 mg total) into a muscle every 3 (three) months    [] metroNIDAZOLE (FLAGYL) 500 mg tablet Take 1 tablet (500 mg total) by mouth every 12 (twelve) hours for 7 days    mirtazapine (REMERON) 15 mg tablet     mirtazapine (REMERON) 15 mg tablet      No current facility-administered medications on file prior to visit  She has No Known Allergies       Review of Systems   Constitutional: Negative for chills, fatigue and fever  HENT: Positive for congestion  Negative for ear pain, facial swelling, sinus pressure, sinus pain, sneezing, sore throat and trouble swallowing  Eyes: Negative for visual disturbance  Respiratory: Negative for cough and shortness of breath  Cardiovascular: Negative for chest pain  Gastrointestinal: Negative for abdominal pain, diarrhea, nausea and vomiting  Neurological: Positive for dizziness and headaches  Objective:      BP 92/60 (BP Location: Left arm, Patient Position: Sitting, Cuff Size: Standard)   Pulse 100   Temp 98 1 °F (36 7 °C) (Temporal)   Resp 16   Ht 5' 5" (1 651 m)   Wt 51 3 kg (113 lb)   SpO2 97%   Breastfeeding No   BMI 18 80 kg/m²          Physical Exam  Constitutional:       General: She is not in acute distress  Appearance: Normal appearance  She is normal weight  She is not ill-appearing or toxic-appearing  HENT:      Head: Normocephalic and atraumatic  Right Ear: Tympanic membrane, ear canal and external ear normal  There is no impacted cerumen  Left Ear: Tympanic membrane, ear canal and external ear normal  There is no impacted cerumen  Nose: No congestion or rhinorrhea  Mouth/Throat:      Mouth: Mucous membranes are moist       Pharynx: Posterior oropharyngeal erythema present  Eyes:      Extraocular Movements: Extraocular movements intact  Conjunctiva/sclera: Conjunctivae normal    Cardiovascular:      Rate and Rhythm: Normal rate and regular rhythm  Pulses: Normal pulses        Heart sounds: Normal heart sounds  No murmur  No friction rub  No gallop  Pulmonary:      Effort: Pulmonary effort is normal  No respiratory distress  Breath sounds: Normal breath sounds  No wheezing, rhonchi or rales  Neurological:      Mental Status: She is alert

## 2021-05-06 ENCOUNTER — OFFICE VISIT (OUTPATIENT)
Dept: URGENT CARE | Age: 31
End: 2021-05-06
Payer: COMMERCIAL

## 2021-05-06 VITALS
WEIGHT: 113 LBS | HEART RATE: 64 BPM | SYSTOLIC BLOOD PRESSURE: 107 MMHG | TEMPERATURE: 98 F | BODY MASS INDEX: 18.83 KG/M2 | RESPIRATION RATE: 18 BRPM | HEIGHT: 65 IN | OXYGEN SATURATION: 100 % | DIASTOLIC BLOOD PRESSURE: 54 MMHG

## 2021-05-06 DIAGNOSIS — R30.0 DYSURIA: ICD-10-CM

## 2021-05-06 DIAGNOSIS — R35.0 URINE FREQUENCY: Primary | ICD-10-CM

## 2021-05-06 DIAGNOSIS — R21 RASH: ICD-10-CM

## 2021-05-06 LAB
SL AMB  POCT GLUCOSE, UA: ABNORMAL
SL AMB LEUKOCYTE ESTERASE,UA: ABNORMAL
SL AMB POCT BILIRUBIN,UA: ABNORMAL
SL AMB POCT BLOOD,UA: ABNORMAL
SL AMB POCT CLARITY,UA: ABNORMAL
SL AMB POCT COLOR,UA: YELLOW
SL AMB POCT KETONES,UA: ABNORMAL
SL AMB POCT NITRITE,UA: ABNORMAL
SL AMB POCT PH,UA: 5
SL AMB POCT SPECIFIC GRAVITY,UA: 1.03
SL AMB POCT URINE PROTEIN: ABNORMAL
SL AMB POCT UROBILINOGEN: 0.2

## 2021-05-06 PROCEDURE — 87086 URINE CULTURE/COLONY COUNT: CPT | Performed by: NURSE PRACTITIONER

## 2021-05-06 PROCEDURE — 99213 OFFICE O/P EST LOW 20 MIN: CPT | Performed by: NURSE PRACTITIONER

## 2021-05-06 PROCEDURE — 81002 URINALYSIS NONAUTO W/O SCOPE: CPT | Performed by: NURSE PRACTITIONER

## 2021-05-06 RX ORDER — NITROFURANTOIN 25; 75 MG/1; MG/1
100 CAPSULE ORAL 2 TIMES DAILY
Qty: 14 CAPSULE | Refills: 0 | Status: SHIPPED | OUTPATIENT
Start: 2021-05-06 | End: 2021-06-03

## 2021-05-06 RX ORDER — PREDNISONE 10 MG/1
10 TABLET ORAL DAILY
Qty: 21 TABLET | Refills: 0 | Status: SHIPPED | OUTPATIENT
Start: 2021-05-06 | End: 2021-05-06 | Stop reason: CLARIF

## 2021-05-06 RX ORDER — PREDNISONE 10 MG/1
TABLET ORAL
Qty: 21 TABLET | Refills: 0 | Status: SHIPPED | OUTPATIENT
Start: 2021-05-06 | End: 2021-06-03

## 2021-05-06 NOTE — PROGRESS NOTES
NAME: German Munguia is a 28 y o  female  : 1990    MRN: [id number]    /54   Pulse 64   Temp 98 °F (36 7 °C)   Resp 18   Ht 5' 5" (1 651 m)   Wt 51 3 kg (113 lb)   SpO2 100%   BMI 18 80 kg/m²     Assessment and Plan   Urine frequency [R35 0]  1  Urine frequency  POCT urine dip    Urine culture    nitrofurantoin (MACROBID) 100 mg capsule   2  Rash  predniSONE 10 mg tablet    DISCONTINUED: predniSONE 10 mg tablet   3  Dysuria  nitrofurantoin (MACROBID) 100 mg capsule       Rob Umaña was seen today for possible uti  Diagnoses and all orders for this visit:    Urine frequency  -     POCT urine dip  -     Urine culture  -     nitrofurantoin (MACROBID) 100 mg capsule; Take 1 capsule (100 mg total) by mouth 2 (two) times a day    Rash  -     Discontinue: predniSONE 10 mg tablet; Take 1 tablet (10 mg total) by mouth daily  -     predniSONE 10 mg tablet; Take 6 tablets today and decrease by one tablet daily until gone    Dysuria  -     nitrofurantoin (MACROBID) 100 mg capsule; Take 1 capsule (100 mg total) by mouth 2 (two) times a day        Patient Instructions   Patient Instructions   Take benadryl prior to bed  Use calamine lotion to the extremities  Take zyrtec or allegra daily   Take antibiotic as directed   Urine sent for culture        Dysuria   WHAT YOU NEED TO KNOW:   Dysuria is difficulty urinating, or pain, burning, or discomfort with urination  Dysuria is usually a symptom of another problem  DISCHARGE INSTRUCTIONS:   Return to the emergency department if:   · You have severe back, side, or abdominal pain  · You have fever and shaking chills  · You vomit several times in a row  Contact your healthcare provider if:   · Your symptoms do not go away, even after treatment  · You have questions or concerns about your condition or care  Medicines:   · Medicines  may be given to help treat a bacterial infection or help decrease bladder spasms      · Take your medicine as directed  Contact your healthcare provider if you think your medicine is not helping or if you have side effects  Tell him of her if you are allergic to any medicine  Keep a list of the medicines, vitamins, and herbs you take  Include the amounts, and when and why you take them  Bring the list or the pill bottles to follow-up visits  Carry your medicine list with you in case of an emergency  Follow up with your healthcare provider as directed: Your healthcare provider may also refer you to a urologist or nephrologist to have additional testing  Write down your questions so you remember to ask them during your visits  Manage your dysuria:   · Drink more liquids  Liquids help flush out bacteria that may be causing an infection  Ask your healthcare provider how much liquid to drink each day and which liquids are best for you  · Take sitz baths as directed  Fill a bathtub with 4 to 6 inches of warm water  You may also use a sitz bath pan that fits over a toilet  Sit in the sitz bath for 20 minutes  Do this 2 to 3 times a day, or as directed  The warm water can help decrease pain and swelling  © Copyright [address] [place] [address] Drive Information is for End User's use only and may not be sold, redistributed or otherwise used for commercial purposes  All illustrations and images included in CareNotes® are the copyrighted property of A D A M , Inc  or [address] [place] [address]  The above information is an  only  It is not intended as medical advice for individual conditions or treatments  Talk to your doctor, nurse or pharmacist before following any medical regimen to see if it is safe and effective for you  Proceed to the nearest ER if symptoms worsen, Follow up with your PCP  Continue to social distance, wash your hands, and wear your masks  Please continue to follow the CDC  gov guidelines daily for they are subject to change on COVID-19    Chief Complaint     Chief Complaint   Patient presents with    Possible UTI     pt started with a uti and rash on her lower leg, arms and upper thighs about 2-3 days ago  History of Present Illness      Patient is a 81-year-old female who is here today with burning with urination has been present for the past 2 days  She also has a rash on her lower extremities and upper extremities  She recently was in [facility] returned 2 days ago and was only in pools  Patient states she was not any hot tubs or [place] or sand area  Patient states she has not had any fevers abdominal pain back pain flank pain vaginal discharge or bleeding at this time  She has had UTIs in the past and feels very similar  The rash on her lower extremities are very fine and slightly raised and itchy in nature  Rash appears to be due to exposure of sun patient use tanning orals and sunscreen while in [facility]   Patient is also sexually active with 1 male partner  Discussed risk for STDs and patient refused at this time  She stated recently in the past 1-2 weeks she had all STD testing and was negative and did not want that done today  Patient is also on Depo and refused urine pregnancy test today      Review of Systems   Review of Systems   Genitourinary: Positive for dysuria  Negative for difficulty urinating, dyspareunia, flank pain, frequency, genital sores and hematuria  Skin: Positive for rash           Current Medications       Current Outpatient Medications:     fluticasone (FLONASE) 50 mcg/act nasal spray, 2 sprays into each nostril daily, Disp: 1 Bottle, Rfl: 0    guaiFENesin (MUCINEX) 600 mg 12 hr tablet, Take 2 tablets (1,200 mg total) by mouth every 12 (twelve) hours, Disp: 20 tablet, Rfl: 0    ibuprofen (MOTRIN) 800 mg tablet, Take 1 tablet (800 mg total) by mouth every 8 (eight) hours as needed for mild pain, Disp: 10 tablet, Rfl: 0    LATUDA 40 MG tablet, take 1 tablet by mouth once daily AFTER DINNER, Disp: , Rfl: 0    medroxyPROGESTERone (DEPO-PROVERA) 150 mg/mL injection, Inject 1 mL (150 mg total) into a muscle every 3 (three) months, Disp: 1 mL, Rfl: 5    mirtazapine (REMERON) 15 mg tablet, , Disp: , Rfl:     mirtazapine (REMERON) 15 mg tablet, , Disp: , Rfl:     nitrofurantoin (MACROBID) 100 mg capsule, Take 1 capsule (100 mg total) by mouth 2 (two) times a day, Disp: 14 capsule, Rfl: 0    predniSONE 10 mg tablet, Take 6 tablets today and decrease by one tablet daily until gone, Disp: 21 tablet, Rfl: 0    Current Allergies     Allergies as of 05/06/2021    (No Known Allergies)              Past Medical History:   Diagnosis Date    Abnormal Pap smear of cervix     2013 ASCUS    ADHD     Bipolar 1 disorder (HCC)     Depression     GERD (gastroesophageal reflux disease)     Migraine     OCD (obsessive compulsive disorder)     Wears glasses        Past Surgical History:   Procedure Laterality Date    GYNECOLOGIC CRYOSURGERY      patient was 26years old    NO PAST SURGERIES      [place] COLPOSCOPY,CERVIX W/ADJ VAG,W/LOOP BX N/A 8/9/2019    Procedure: BIOPSY LEEP CERVIX;  Surgeon: Greg Osborne MD;  Location: [facility];  Service: Gynecology       Family History   Problem Relation Age of Onset    Bipolar disorder Mother     No Known Problems Father     Depression Sister          Medications have been verified  The following portions of the patient's history were reviewed and updated as appropriate: allergies, current medications, past family history, past medical history, past social history, past surgical history and problem list     Objective   /54   Pulse 64   Temp 98 °F (36 7 °C)   Resp 18   Ht 5' 5" (1 651 m)   Wt 51 3 kg (113 lb)   SpO2 100%   BMI 18 80 kg/m²      Physical Exam     Physical Exam  Exam conducted with a chaperone present  Constitutional:       Appearance: Normal appearance  Pulmonary:      Effort: Pulmonary effort is normal       Breath sounds: Normal breath sounds and air entry  No decreased breath sounds  Abdominal:      General: Bowel sounds are normal       Palpations: Abdomen is soft  Tenderness: There is abdominal tenderness  There is no right CVA tenderness or left CVA tenderness  Skin:     General: Skin is warm  Capillary Refill: Capillary refill takes less than 2 seconds  Findings: Rash present  Neurological:      General: No focal deficit present  Mental Status: She is alert and oriented to person, place, and time  Psychiatric:         Attention and Perception: Attention normal          Mood and Affect: Mood normal          Speech: Speech normal                Note: Portions of this record may have been created with voice recognition software  Occasional wrong word or "sound a like" substitutions may have occurred due to the inherent limitations of voice recognition software  Please read the chart carefully and recognize, using context, where substitutions have occurred  KARTIK Argueta

## 2021-05-06 NOTE — PATIENT INSTRUCTIONS
Take benadryl prior to bed  Use calamine lotion to the extremities  Take zyrtec or allegra daily   Take antibiotic as directed   Urine sent for culture        Dysuria   WHAT YOU NEED TO KNOW:   Dysuria is difficulty urinating, or pain, burning, or discomfort with urination  Dysuria is usually a symptom of another problem  DISCHARGE INSTRUCTIONS:   Return to the emergency department if:   · You have severe back, side, or abdominal pain  · You have fever and shaking chills  · You vomit several times in a row  Contact your healthcare provider if:   · Your symptoms do not go away, even after treatment  · You have questions or concerns about your condition or care  Medicines:   · Medicines  may be given to help treat a bacterial infection or help decrease bladder spasms  · Take your medicine as directed  Contact your healthcare provider if you think your medicine is not helping or if you have side effects  Tell him of her if you are allergic to any medicine  Keep a list of the medicines, vitamins, and herbs you take  Include the amounts, and when and why you take them  Bring the list or the pill bottles to follow-up visits  Carry your medicine list with you in case of an emergency  Follow up with your healthcare provider as directed: Your healthcare provider may also refer you to a urologist or nephrologist to have additional testing  Write down your questions so you remember to ask them during your visits  Manage your dysuria:   · Drink more liquids  Liquids help flush out bacteria that may be causing an infection  Ask your healthcare provider how much liquid to drink each day and which liquids are best for you  · Take sitz baths as directed  Fill a bathtub with 4 to 6 inches of warm water  You may also use a sitz bath pan that fits over a toilet  Sit in the sitz bath for 20 minutes  Do this 2 to 3 times a day, or as directed  The warm water can help decrease pain and swelling       © Copyright [address] Information is for [employer] use only and may not be sold, redistributed or otherwise used for commercial purposes  All illustrations and images included in CareNotes® are the copyrighted property of [employer] Inc  or [email or web address]  The above information is an  only  It is not intended as medical advice for individual conditions or treatments  Talk to your doctor, nurse or pharmacist before following any medical regimen to see if it is safe and effective for you

## 2021-05-06 NOTE — LETTER
May 6, 2021     Patient: Vinicius Bello   YOB: 1990   Date of Visit: 5/6/2021       To Whom It May Concern: It is my medical opinion that Vinicius Bello may return to work on 05/07/2021          Sincerely,        KARTIK Yin    CC: No Recipients

## 2021-05-07 LAB — BACTERIA UR CULT: NORMAL

## 2021-05-14 ENCOUNTER — OFFICE VISIT (OUTPATIENT)
Dept: OBGYN CLINIC | Facility: CLINIC | Age: 31
End: 2021-05-14

## 2021-05-14 VITALS
SYSTOLIC BLOOD PRESSURE: 125 MMHG | HEIGHT: 65 IN | BODY MASS INDEX: 18.66 KG/M2 | DIASTOLIC BLOOD PRESSURE: 68 MMHG | HEART RATE: 58 BPM | WEIGHT: 112 LBS

## 2021-05-14 DIAGNOSIS — B37.9 YEAST INFECTION: Primary | ICD-10-CM

## 2021-05-14 DIAGNOSIS — N30.00 ACUTE CYSTITIS WITHOUT HEMATURIA: ICD-10-CM

## 2021-05-14 PROCEDURE — 99213 OFFICE O/P EST LOW 20 MIN: CPT | Performed by: NURSE PRACTITIONER

## 2021-05-14 PROCEDURE — 87210 SMEAR WET MOUNT SALINE/INK: CPT | Performed by: NURSE PRACTITIONER

## 2021-05-14 RX ORDER — FLUCONAZOLE 150 MG/1
150 TABLET ORAL ONCE
Qty: 1 TABLET | Refills: 1 | Status: SHIPPED | OUTPATIENT
Start: 2021-05-14 | End: 2021-05-14

## 2021-05-14 NOTE — PATIENT INSTRUCTIONS
Take Fluconazole as directed  Wear loose clothes and cotton underwear  Take Bactrim DS as directed  Increase water intake  Call with needs or concerns  Annual GYN exam is due after 6/2/2021    COVID-19 Instructions    If you are having any of the following:  Cough   Shortness of breath   Fever  If traveled within past 2 weeks internationally or to high risk [place] states  Or been in contact with someone that has     Please call either:   Your PCP office  [phone], option [phone]    They will screen you over the phone and direct you to the nearest appropriate testing location    DO NOT go to your PCP or OB office without calling first

## 2021-05-14 NOTE — PROGRESS NOTES
Assessment/Plan:     Diagnoses and all orders for this visit:    Yeast infection  -     fluconazole (DIFLUCAN) 150 mg tablet; Take 1 tablet (150 mg total) by mouth once for 1 dose  -     POCT wet mount    Acute cystitis without hematuria      Plan  Take Fluconazole as directed  Wear loose clothes and cotton underwear  Take Bactrim DS as directed  Increase water intake  Call with needs or concerns  Annual GYN exam is due after 6/2/2021  Pt verbalized understanding of all discussed  Subjective:      Patient ID: Jhonny Cates is a 28 y o  female  HPI  Pt presents with vaginal discharge and and vulvar burning  Pt states she also continues with pain with urination  Pt was seen 5/6/2021 in urgent care and treated for a UTI with 7 days of Macrobid  Pt states she started Macrobid she has noted the vaginal discharge and vaginal burning started    Explained wet mount was positive was yeast and urine dip was positive for a UTI  Safe and effective use of Bactrim DS and Fluconazole was provided  Explained that antibiotics can sometimes can cause a yeast infection  Explained a different antibiotic would be ordered and she is to increase water intake  A urine would be sent to the lab for culture and she would be called if the antibiotic ordered was not an antibiotic that would be effective to treat her UTI       The following portions of the patient's history were reviewed and updated as appropriate: allergies, current medications, past family history, past medical history, past social history, past surgical history and problem list     Review of Systems      Pertinent items are note in the HPI    Objective:      /68   Pulse 58   Ht 5' 5" (1 651 m)   Wt 50 8 kg (112 lb)   BMI 18 64 kg/m²          Physical Exam    Alert and oriented  Denies pain  WNL respiratory effort, negative cough or SOB  Vulva negative lesions, slight erythema  Vagina erythema, positive thick white discharge   Cervix positive thick white discharge, negative lesions  Wet mount positive for yeast  Urine dip positive for a UTI

## 2021-05-18 DIAGNOSIS — N30.01 ACUTE CYSTITIS WITH HEMATURIA: Primary | ICD-10-CM

## 2021-05-18 RX ORDER — SULFAMETHOXAZOLE AND TRIMETHOPRIM 800; 160 MG/1; MG/1
1 TABLET ORAL EVERY 12 HOURS SCHEDULED
Qty: 6 TABLET | Refills: 0 | Status: SHIPPED | OUTPATIENT
Start: 2021-05-18 | End: 2021-05-21

## 2021-06-02 RX ORDER — FLUCONAZOLE 150 MG/1
TABLET ORAL
COMMUNITY
Start: 2021-05-14 | End: 2021-07-19

## 2021-06-03 ENCOUNTER — OFFICE VISIT (OUTPATIENT)
Dept: FAMILY MEDICINE CLINIC | Facility: CLINIC | Age: 31
End: 2021-06-03

## 2021-06-03 VITALS
SYSTOLIC BLOOD PRESSURE: 120 MMHG | HEIGHT: 65 IN | WEIGHT: 113.2 LBS | OXYGEN SATURATION: 98 % | BODY MASS INDEX: 18.86 KG/M2 | RESPIRATION RATE: 18 BRPM | HEART RATE: 87 BPM | TEMPERATURE: 96 F | DIASTOLIC BLOOD PRESSURE: 62 MMHG

## 2021-06-03 DIAGNOSIS — F17.200 NICOTINE USE DISORDER: ICD-10-CM

## 2021-06-03 DIAGNOSIS — F17.200 SMOKER: ICD-10-CM

## 2021-06-03 DIAGNOSIS — Z00.00 MEDICARE ANNUAL WELLNESS VISIT, SUBSEQUENT: Primary | ICD-10-CM

## 2021-06-03 DIAGNOSIS — F31.9 BIPOLAR 1 DISORDER (HCC): ICD-10-CM

## 2021-06-03 PROBLEM — E44.1 MILD PROTEIN-CALORIE MALNUTRITION (HCC): Status: ACTIVE | Noted: 2021-06-03

## 2021-06-03 PROCEDURE — G0439 PPPS, SUBSEQ VISIT: HCPCS | Performed by: PHYSICIAN ASSISTANT

## 2021-06-03 PROCEDURE — G0009 ADMIN PNEUMOCOCCAL VACCINE: HCPCS | Performed by: PHYSICIAN ASSISTANT

## 2021-06-03 PROCEDURE — 90732 PPSV23 VACC 2 YRS+ SUBQ/IM: CPT | Performed by: PHYSICIAN ASSISTANT

## 2021-06-03 NOTE — PATIENT INSTRUCTIONS
Medicare Preventive Visit Patient Instructions  Thank you for completing your Welcome to Medicare Visit or Medicare Annual Wellness Visit today  Your next wellness visit will be due in one year (6/4/2022)  The screening/preventive services that you may require over the next 5-10 years are detailed below  Some tests may not apply to you based off risk factors and/or age  Screening tests ordered at today's visit but not completed yet may show as past due  Also, please note that scanned in results may not display below  Preventive Screenings:  Service Recommendations Previous Testing/Comments   Colorectal Cancer Screening  * Colonoscopy    * Fecal Occult Blood Test (FOBT)/Fecal Immunochemical Test (FIT)  * Fecal DNA/Cologuard Test  * Flexible Sigmoidoscopy Age: 54-65 years old   Colonoscopy: every 10 years (may be performed more frequently if at higher risk)  OR  FOBT/FIT: every 1 year  OR  Cologuard: every 3 years  OR  Sigmoidoscopy: every 5 years  Screening may be recommended earlier than age 48 if at higher risk for colorectal cancer  Also, an individualized decision between you and your healthcare provider will decide whether screening between the ages of 74-80 would be appropriate  Colonoscopy: Not on file  FOBT/FIT: Not on file  Cologuard: Not on file  Sigmoidoscopy: Not on file          Breast Cancer Screening Age: 36 years old  Frequency: every 1-2 years  Not required if history of left and right mastectomy Mammogram: Not on file    Screening Not Indicated   Cervical Cancer Screening Between the ages of 21-29, pap smear recommended once every 3 years  Between the ages of 33-67, can perform pap smear with HPV co-testing every 5 years     Recommendations may differ for women with a history of total hysterectomy, cervical cancer, or abnormal pap smears in past  Pap Smear: 06/10/2020    Screening Current   Hepatitis C Screening Once for adults born between 1945 and 1965  More frequently in patients at high risk for Hepatitis C Hep C Antibody: Not on file        Diabetes Screening 1-2 times per year if you're at risk for diabetes or have pre-diabetes Fasting glucose: 97 mg/dL   A1C: No results in last 5 years        Cholesterol Screening Once every 5 years if you don't have a lipid disorder  May order more often based on risk factors  Lipid panel: Not on file          Other Preventive Screenings Covered by Medicare:  1  Abdominal Aortic Aneurysm (AAA) Screening: covered once if your at risk  You're considered to be at risk if you have a family history of AAA  2  Lung Cancer Screening: covers low dose CT scan once per year if you meet all of the following conditions: (1) Age 50-69; (2) No signs or symptoms of lung cancer; (3) Current smoker or have quit smoking within the last 15 years; (4) You have a tobacco smoking history of at least 30 pack years (packs per day multiplied by number of years you smoked); (5) You get a written order from a healthcare provider  3  Glaucoma Screening: covered annually if you're considered high risk: (1) You have diabetes OR (2) Family history of glaucoma OR (3)  aged 48 and older OR (3)  American aged 72 and older  3  Osteoporosis Screening: covered every 2 years if you meet one of the following conditions: (1) You're estrogen deficient and at risk for osteoporosis based off medical history and other findings; (2) Have a vertebral abnormality; (3) On glucocorticoid therapy for more than 3 months; (4) Have primary hyperparathyroidism; (5) On osteoporosis medications and need to assess response to drug therapy  · Last bone density test (DXA Scan): Not on file  5  HIV Screening: covered annually if you're between the age of 12-76  Also covered annually if you are younger than 13 and older than 72 with risk factors for HIV infection  For pregnant patients, it is covered up to 3 times per pregnancy      Immunizations:  Immunization Recommendations   Influenza Vaccine Annual influenza vaccination during flu season is recommended for all persons aged >= 6 months who do not have contraindications   Pneumococcal Vaccine (Prevnar and Pneumovax)  * Prevnar = PCV13  * Pneumovax = PPSV23   Adults 25-60 years old: 1-3 doses may be recommended based on certain risk factors  Adults 72 years old: Prevnar (PCV13) vaccine recommended followed by Pneumovax (PPSV23) vaccine  If already received PPSV23 since turning 65, then PCV13 recommended at least one year after PPSV23 dose  Hepatitis B Vaccine 3 dose series if at intermediate or high risk (ex: diabetes, end stage renal disease, liver disease)   Tetanus (Td) Vaccine - COST NOT COVERED BY MEDICARE PART B Following completion of primary series, a booster dose should be given every 10 years to maintain immunity against tetanus  Td may also be given as tetanus wound prophylaxis  Tdap Vaccine - COST NOT COVERED BY MEDICARE PART B Recommended at least once for all adults  For pregnant patients, recommended with each pregnancy  Shingles Vaccine (Shingrix) - COST NOT COVERED BY MEDICARE PART B  2 shot series recommended in those aged 48 and above     Health Maintenance Due:      Topic Date Due    Cervical Cancer Screening  06/10/2023    HIV Screening  Completed     Immunizations Due:      Topic Date Due    Pneumococcal Vaccine: Pediatrics (0 to 5 Years) and At-Risk Patients (6 to 59 Years) (1 of 1 - PPSV23) Never done     Advance Directives   What are advance directives? Advance directives are legal documents that state your wishes and plans for medical care  These plans are made ahead of time in case you lose your ability to make decisions for yourself  Advance directives can apply to any medical decision, such as the treatments you want, and if you want to donate organs  What are the types of advance directives? There are many types of advance directives, and each state has rules about how to use them   You may choose a combination of any of the following:  · Living will: This is a written record of the treatment you want  You can also choose which treatments you do not want, which to limit, and which to stop at a certain time  This includes surgery, medicine, IV fluid, and tube feedings  · Durable power of  for healthcare [facility]): This is a written record that states who you want to make healthcare choices for you when you are unable to make them for yourself  This person, called a proxy, is usually a family member or a friend  You may choose more than 1 proxy  · Do not resuscitate (DNR) order:  A DNR order is used in case your heart stops beating or you stop breathing  It is a request not to have certain forms of treatment, such as CPR  A DNR order may be included in other types of advance directives  · Medical directive: This covers the care that you want if you are in a coma, near death, or unable to make decisions for yourself  You can list the treatments you want for each condition  Treatment may include pain medicine, surgery, blood transfusions, dialysis, IV or tube feedings, and a ventilator (breathing machine)  · Values history: This document has questions about your views, beliefs, and how you feel and think about life  This information can help others choose the care that you would choose  Why are advance directives important? An advance directive helps you control your care  Although spoken wishes may be used, it is better to have your wishes written down  Spoken wishes can be misunderstood, or not followed  Treatments may be given even if you do not want them  An advance directive may make it easier for your family to make difficult choices about your care  Cigarette Smoking and Your Health   Risks to your health if you smoke:  Nicotine and other chemicals found in tobacco damage every cell in your body   Even if you are a light smoker, you have an increased risk for cancer, heart disease, and lung disease  If you are pregnant or have diabetes, smoking increases your risk for complications  Benefits to your health if you stop smoking:   · You decrease respiratory symptoms such as coughing, wheezing, and shortness of breath  · You reduce your risk for cancers of the lung, mouth, throat, kidney, bladder, pancreas, stomach, and cervix  If you already have cancer, you increase the benefits of chemotherapy  You also reduce your risk for cancer returning or a second cancer from developing  · You reduce your risk for heart disease, blood clots, heart attack, and stroke  · You reduce your risk for lung infections, and diseases such as pneumonia, asthma, chronic bronchitis, and emphysema  · Your circulation improves  More oxygen can be delivered to your body  If you have diabetes, you lower your risk for complications, such as kidney, artery, and eye diseases  You also lower your risk for nerve damage  Nerve damage can lead to amputations, poor vision, and blindness  · You improve your body's ability to heal and to fight infections  For more information and support to stop smoking:   · #waywire  Phone: 4- 369 - 106-7008  Web Address: www [employer]  [address] [place], [place] [address]ximena López 2018 Information is for End User's use only and may not be sold, redistributed or otherwise used for commercial purposes   All illustrations and images included in CareNotes® are the copyrighted property of A D A M , Inc  or [address] [place] [address] [facility]

## 2021-06-03 NOTE — PROGRESS NOTES
Assessment and Plan:     Problem List Items Addressed This Visit        Other    Nicotine use disorder     Tobacco Cessation Counseling: Tobacco cessation counseling and education was provided  The patient is sincerely urged to quit consumption of tobacco  She is not ready to quit tobacco  The numerous health risks of tobacco consumption were discussed  If she decides to quit, there are a number of helpful adjunctive aids, and she can see me to discuss nicotine replacement therapy, chantix, or bupropion anytime in the future  Bipolar 1 disorder ([address] )     Doing well continue with psych         Relevant Orders    CBC and differential    Comprehensive metabolic panel    TSH, 3rd generation    Lipid panel      Other Visit Diagnoses     Medicare annual wellness visit, subsequent    -  Primary    Smoker        Relevant Orders    PNEUMOCOCCAL POLYSACCHARIDE VACCINE 23-VALENT =>3YO SQ IM (Completed)           Preventive health issues were discussed with patient, and age appropriate screening tests were ordered as noted in patient's After Visit Summary  Personalized health advice and appropriate referrals for health education or preventive services given if needed, as noted in patient's After Visit Summary       History of Present Illness:     Patient presents for Medicare Annual Wellness visit    Patient Care Team:  Igor Gan PA-C as PCP - General (Family Medicine)     Problem List:     Patient Active Problem List   Diagnosis    Nicotine use disorder    Cervical cancer screening    LGSIL on Pap smear of cervix    Atypical glandular cells of undetermined significance (MARIA E) on cervical Pap smear    ADHD (attention deficit hyperactivity disorder)    Bipolar 1 disorder (HCC)    OCD (obsessive compulsive disorder)    S/P LEEP    Encounter for surveillance of contraceptive pills    Vaginal odor    Yeast infection    Dizziness    Screen for STD (sexually transmitted disease)    Encounter for annual routine gynecological examination    Breast lump      Past Medical and Surgical History:     Past Medical History:   Diagnosis Date    Abnormal Pap smear of cervix     2013 ASCUS    ADHD     Bipolar 1 disorder (HCC)     Depression     GERD (gastroesophageal reflux disease)     Migraine     OCD (obsessive compulsive disorder)     Wears glasses      Past Surgical History:   Procedure Laterality Date    GYNECOLOGIC CRYOSURGERY      patient was 26years old    NO PAST SURGERIES      OH COLPOSCOPY,CERVIX W/ADJ VAG,W/LOOP BX N/A 8/9/2019    Procedure: BIOPSY LEEP CERVIX;  Surgeon: Vinnie Velazquez MD;  Location: [facility] OR;  Service: Gynecology      Family History:     Family History   Problem Relation Age of Onset    Bipolar disorder Mother     No Known Problems Father     Depression Sister       Social History:        Social History     Socioeconomic History    Marital status: Single     Spouse name: None    Number of children: None    Years of education: None    Highest education level: None   Occupational History    None   Social Needs    Financial resource strain: None    Food insecurity     Worry: None     Inability: None    Transportation needs     Medical: None     Non-medical: None   Tobacco Use    Smoking status: Current Every Day Smoker     Packs/day: 1 00     Types: Cigarettes    Smokeless tobacco: Never Used   Substance and Sexual Activity    Alcohol use: Yes     Frequency: 2-4 times a month    Drug use: No    Sexual activity: Yes     Birth control/protection: Injection   Lifestyle    Physical activity     Days per week: None     Minutes per session: None    Stress: None   Relationships    Social connections     Talks on phone: None     Gets together: None     Attends Buddhist service: None     Active member of club or organization: None     Attends meetings of clubs or organizations: None     Relationship status: None    Intimate partner violence     Fear of current or ex partner: None     Emotionally abused: None     Physically abused: None     Forced sexual activity: None   Other Topics Concern    None   Social History Narrative    None      Medications and Allergies:     Current Outpatient Medications   Medication Sig Dispense Refill    fluconazole (DIFLUCAN) 150 mg tablet       fluticasone (FLONASE) 50 mcg/act nasal spray 2 sprays into each nostril daily 1 Bottle 0    guaiFENesin (MUCINEX) 600 mg 12 hr tablet Take 2 tablets (1,200 mg total) by mouth every 12 (twelve) hours 20 tablet 0    ibuprofen (MOTRIN) 800 mg tablet Take 1 tablet (800 mg total) by mouth every 8 (eight) hours as needed for mild pain 10 tablet 0    LATUDA 40 MG tablet take 1 tablet by mouth once daily AFTER DINNER  0    medroxyPROGESTERone (DEPO-PROVERA) 150 mg/mL injection Inject 1 mL (150 mg total) into a muscle every 3 (three) months 1 mL 5    mirtazapine (REMERON) 15 mg tablet       mirtazapine (REMERON) 15 mg tablet        No current facility-administered medications for this visit        No Known Allergies   Immunizations:     Immunization History   Administered Date(s) Administered    DTaP 1991, 1991, 1991, 09/30/1992, 05/05/1995    H1N1, All Formulations 12/28/2009    HPV Quadrivalent 05/24/2007, 07/24/2007, 12/24/2007, 05/16/2013, 09/19/2013, 12/30/2013    Hep B, Adolescent or Pediatric 04/21/1998, 08/04/1998, 02/02/1999    HiB 1991, 1991, 1991, 09/30/1992    INFLUENZA 12/28/2009, 12/28/2010, 10/27/2015, 11/01/2016, 09/12/2017, 10/10/2018    IPV 1991, 1991, 09/30/1992, 05/05/1995    Influenza, injectable, quadrivalent, preservative free 0 5 mL 09/24/2018, 09/15/2020    MMR 06/09/1992, 03/19/1994, 01/12/2005    Meningococcal MCV4P 10/07/2008    Pneumococcal Polysaccharide PPV23 06/03/2021    SARS-CoV-2 / COVID-19 mRNA IM (Rufus Marques) 03/22/2021, 04/20/2021    Td (adult), adsorbed 10/13/2003    Tdap 12/28/2009, 10/14/2020      Health Maintenance:         Topic Date Due    Cervical Cancer Screening  06/10/2023    HIV Screening  Completed     There are no preventive care reminders to display for this patient  Medicare Health Risk Assessment:     /62 (BP Location: Left arm, Patient Position: Sitting, Cuff Size: Standard)   Pulse 87   Temp (!) 96 °F (35 6 °C)   Resp 18   Ht 5' 5" (1 651 m)   Wt 51 3 kg (113 lb 3 2 oz)   LMP  (LMP Unknown)   SpO2 98%   Breastfeeding No   BMI 18 84 kg/m²      Ayleen Brown is here for her Subsequent Wellness visit  Health Risk Assessment:   Patient rates overall health as good  Patient feels that their physical health rating is same  Patient is satisfied with their life  Eyesight was rated as same  Hearing was rated as same  Patient feels that their emotional and mental health rating is same  Patients states they are always angry  Patient states they are never, rarely unusually tired/fatigued  Pain experienced in the last 7 days has been none  Patient states that she has experienced no weight loss or gain in last 6 months  Fall Risk Screening: In the past year, patient has experienced: no history of falling in past year      Urinary Incontinence Screening:   Patient has not leaked urine accidently in the last six months  Home Safety:  Patient does not have trouble with stairs inside or outside of their home  Patient has working smoke alarms and has working carbon monoxide detector  Home safety hazards include: none  Nutrition:   Current diet is Regular  Medications:   Patient is not currently taking any over-the-counter supplements  Patient is able to manage medications  Activities of Daily Living (ADLs)/Instrumental Activities of Daily Living (IADLs):   Walk and transfer into and out of bed and chair?: Yes  Dress and groom yourself?: Yes    Bathe or shower yourself?: Yes    Feed yourself?  Yes  Do your laundry/housekeeping?: Yes  Manage your money, pay your bills and track your expenses?: Yes  Make your own meals?: Yes    Do your own shopping?: Yes    Previous Hospitalizations:   Any hospitalizations or ED visits within the last 12 months?: Yes    How many hospitalizations have you had in the last year?: 1-2    Advance Care Planning:   Living will: No    Durable POA for healthcare: No    Advanced directive: No    Advanced directive counseling given: No    Five wishes given: Yes    Patient declined ACP directive: No    End of Life Decisions reviewed with patient: No    Provider agrees with end of life decisions: No      Cognitive Screening:   Provider or family/friend/caregiver concerned regarding cognition?: No    PREVENTIVE SCREENINGS      Cardiovascular Screening:    General: Risks and Benefits Discussed    Due for: Lipid Panel      Diabetes Screening:     General: Screening Current      Colorectal Cancer Screening:     General: Screening Not Indicated      Breast Cancer Screening:     General: Screening Not Indicated      Cervical Cancer Screening:    General: Screening Current      Abdominal Aortic Aneurysm (AAA) Screening:        General: Screening Not Indicated      Lung Cancer Screening:     General: Screening Not Indicated    Screening, Brief Intervention, and Referral to Treatment (SBIRT)    Screening  Typical number of drinks in a day: 0  Typical number of drinks in a week: 0  Interpretation: Low risk drinking behavior      Single Item Drug Screening:  How often have you used an illegal drug (including marijuana) or a prescription medication for non-medical reasons in the past year? never    Single Item Drug Screen Score: 0  Interpretation: Negative screen for possible drug use disorder    Other Counseling Topics:   Recommend quitting smoking but she is not ready      Jacinto Everett PA-C

## 2021-06-04 ENCOUNTER — TELEPHONE (OUTPATIENT)
Dept: FAMILY MEDICINE CLINIC | Facility: CLINIC | Age: 31
End: 2021-06-04

## 2021-06-04 ENCOUNTER — LAB (OUTPATIENT)
Dept: LAB | Facility: HOSPITAL | Age: 31
End: 2021-06-04
Payer: COMMERCIAL

## 2021-06-04 DIAGNOSIS — F31.9 BIPOLAR 1 DISORDER (HCC): ICD-10-CM

## 2021-06-04 DIAGNOSIS — Z11.3 SCREEN FOR STD (SEXUALLY TRANSMITTED DISEASE): ICD-10-CM

## 2021-06-04 DIAGNOSIS — N30.00 ACUTE CYSTITIS WITHOUT HEMATURIA: ICD-10-CM

## 2021-06-04 PROBLEM — E44.1 MILD PROTEIN-CALORIE MALNUTRITION (HCC): Status: RESOLVED | Noted: 2021-06-03 | Resolved: 2021-06-04

## 2021-06-04 LAB
ALBUMIN SERPL BCP-MCNC: 4.2 G/DL (ref 3–5.2)
ALP SERPL-CCNC: 58 U/L (ref 43–122)
ALT SERPL W P-5'-P-CCNC: 12 U/L
ANION GAP SERPL CALCULATED.3IONS-SCNC: 10 MMOL/L (ref 5–14)
AST SERPL W P-5'-P-CCNC: 26 U/L (ref 14–36)
BASOPHILS # BLD AUTO: 0.1 THOUSANDS/ΜL (ref 0–0.1)
BASOPHILS NFR BLD AUTO: 1 % (ref 0–1)
BILIRUB SERPL-MCNC: 0.3 MG/DL
BILIRUB UR QL STRIP: NEGATIVE
BUN SERPL-MCNC: 9 MG/DL (ref 5–25)
CALCIUM SERPL-MCNC: 9.7 MG/DL (ref 8.4–10.2)
CHLORIDE SERPL-SCNC: 110 MMOL/L (ref 97–108)
CHOLEST SERPL-MCNC: 166 MG/DL
CLARITY UR: CLEAR
CO2 SERPL-SCNC: 20 MMOL/L (ref 22–30)
COLOR UR: YELLOW
CREAT SERPL-MCNC: 0.66 MG/DL (ref 0.6–1.2)
EOSINOPHIL # BLD AUTO: 0.1 THOUSAND/ΜL (ref 0–0.4)
EOSINOPHIL NFR BLD AUTO: 1 % (ref 0–6)
ERYTHROCYTE [DISTWIDTH] IN BLOOD BY AUTOMATED COUNT: 14.1 %
GFR SERPL CREATININE-BSD FRML MDRD: 119 ML/MIN/1.73SQ M
GLUCOSE P FAST SERPL-MCNC: 89 MG/DL (ref 70–99)
GLUCOSE UR STRIP-MCNC: NEGATIVE MG/DL
HBV SURFACE AG SER QL: NORMAL
HCT VFR BLD AUTO: 42.2 % (ref 36–46)
HDLC SERPL-MCNC: 25 MG/DL
HGB BLD-MCNC: 14.3 G/DL (ref 12–16)
HGB UR QL STRIP.AUTO: NEGATIVE
KETONES UR STRIP-MCNC: NEGATIVE MG/DL
LDLC SERPL CALC-MCNC: 109 MG/DL
LEUKOCYTE ESTERASE UR QL STRIP: NEGATIVE
LYMPHOCYTES # BLD AUTO: 3.8 THOUSANDS/ΜL (ref 0.5–4)
LYMPHOCYTES NFR BLD AUTO: 37 % (ref 25–45)
MCH RBC QN AUTO: 31.8 PG (ref 26–34)
MCHC RBC AUTO-ENTMCNC: 33.8 G/DL (ref 31–36)
MCV RBC AUTO: 94 FL (ref 80–100)
MONOCYTES # BLD AUTO: 0.5 THOUSAND/ΜL (ref 0.2–0.9)
MONOCYTES NFR BLD AUTO: 5 % (ref 1–10)
NEUTROPHILS # BLD AUTO: 5.7 THOUSANDS/ΜL (ref 1.8–7.8)
NEUTS SEG NFR BLD AUTO: 56 % (ref 45–65)
NITRITE UR QL STRIP: NEGATIVE
NONHDLC SERPL-MCNC: 141 MG/DL
PH UR STRIP.AUTO: 7 [PH]
PLATELET # BLD AUTO: 306 THOUSANDS/UL (ref 150–450)
PMV BLD AUTO: 9 FL (ref 8.9–12.7)
POTASSIUM SERPL-SCNC: 3.9 MMOL/L (ref 3.6–5)
PROT SERPL-MCNC: 7.4 G/DL (ref 5.9–8.4)
PROT UR STRIP-MCNC: NEGATIVE MG/DL
RBC # BLD AUTO: 4.49 MILLION/UL (ref 4–5.2)
SODIUM SERPL-SCNC: 140 MMOL/L (ref 137–147)
SP GR UR STRIP.AUTO: 1.01 (ref 1–1.04)
TRIGL SERPL-MCNC: 159 MG/DL
TSH SERPL DL<=0.05 MIU/L-ACNC: 2.08 UIU/ML (ref 0.47–4.68)
UROBILINOGEN UA: NEGATIVE MG/DL
WBC # BLD AUTO: 10.2 THOUSAND/UL (ref 4.5–11)

## 2021-06-04 PROCEDURE — 87591 N.GONORRHOEAE DNA AMP PROB: CPT | Performed by: NURSE PRACTITIONER

## 2021-06-04 PROCEDURE — 84443 ASSAY THYROID STIM HORMONE: CPT

## 2021-06-04 PROCEDURE — 87491 CHLMYD TRACH DNA AMP PROBE: CPT | Performed by: NURSE PRACTITIONER

## 2021-06-04 PROCEDURE — 80053 COMPREHEN METABOLIC PANEL: CPT

## 2021-06-04 PROCEDURE — 87389 HIV-1 AG W/HIV-1&-2 AB AG IA: CPT | Performed by: NURSE PRACTITIONER

## 2021-06-04 PROCEDURE — 86592 SYPHILIS TEST NON-TREP QUAL: CPT | Performed by: NURSE PRACTITIONER

## 2021-06-04 PROCEDURE — 87340 HEPATITIS B SURFACE AG IA: CPT

## 2021-06-04 PROCEDURE — 80061 LIPID PANEL: CPT

## 2021-06-04 PROCEDURE — 85025 COMPLETE CBC W/AUTO DIFF WBC: CPT

## 2021-06-04 PROCEDURE — 36415 COLL VENOUS BLD VENIPUNCTURE: CPT | Performed by: NURSE PRACTITIONER

## 2021-06-04 PROCEDURE — 87086 URINE CULTURE/COLONY COUNT: CPT

## 2021-06-04 NOTE — TELEPHONE ENCOUNTER
Pt called requesting a call back regarding her lab results  I did call the pt back and review the results and some are still in process  Pt is concerned about the [address] right now  Please advise wants to know what these are

## 2021-06-04 NOTE — RESULT ENCOUNTER NOTE
Alessandro Lomax, I am not concerned about the chloride or anion gap  Your kidney function tests are very good and there is nothing else to suggest a further problem  The choride level being off is what caused the anion gap to abnormal  Since your kidneys are working well it is likely you just need to increase your water intake        I would also increase exercise as it will raise HDL(good)cholesterol and lower triglycerides

## 2021-06-05 LAB — BACTERIA UR CULT: NORMAL

## 2021-06-06 LAB
C TRACH DNA SPEC QL NAA+PROBE: NEGATIVE
HIV 1+2 AB+HIV1 P24 AG SERPL QL IA: NORMAL
N GONORRHOEA DNA SPEC QL NAA+PROBE: NEGATIVE

## 2021-06-07 LAB — RPR SER QL: NORMAL

## 2021-06-16 ENCOUNTER — ANNUAL EXAM (OUTPATIENT)
Dept: OBGYN CLINIC | Facility: CLINIC | Age: 31
End: 2021-06-16

## 2021-06-16 VITALS
HEIGHT: 65 IN | DIASTOLIC BLOOD PRESSURE: 76 MMHG | WEIGHT: 118 LBS | BODY MASS INDEX: 19.66 KG/M2 | HEART RATE: 99 BPM | SYSTOLIC BLOOD PRESSURE: 110 MMHG

## 2021-06-16 DIAGNOSIS — Z01.419 ENCOUNTER FOR ANNUAL ROUTINE GYNECOLOGICAL EXAMINATION: Primary | ICD-10-CM

## 2021-06-16 DIAGNOSIS — Z30.42 ENCOUNTER FOR SURVEILLANCE OF INJECTABLE CONTRACEPTIVE: ICD-10-CM

## 2021-06-16 PROCEDURE — 87624 HPV HI-RISK TYP POOLED RSLT: CPT | Performed by: NURSE PRACTITIONER

## 2021-06-16 PROCEDURE — 96372 THER/PROPH/DIAG INJ SC/IM: CPT

## 2021-06-16 PROCEDURE — G0145 SCR C/V CYTO,THINLAYER,RESCR: HCPCS | Performed by: NURSE PRACTITIONER

## 2021-06-16 RX ORDER — HYDROXYZINE HYDROCHLORIDE 25 MG/1
TABLET, FILM COATED ORAL
COMMUNITY
Start: 2021-06-02 | End: 2021-07-19

## 2021-06-16 RX ORDER — MEDROXYPROGESTERONE ACETATE 150 MG/ML
INJECTION, SUSPENSION INTRAMUSCULAR
COMMUNITY
Start: 2021-06-15 | End: 2022-02-01

## 2021-06-16 RX ORDER — MEDROXYPROGESTERONE ACETATE 150 MG/ML
150 INJECTION, SUSPENSION INTRAMUSCULAR
Status: DISCONTINUED | OUTPATIENT
Start: 2021-06-16 | End: 2022-07-13

## 2021-06-16 RX ORDER — MEDROXYPROGESTERONE ACETATE 150 MG/ML
150 INJECTION, SUSPENSION INTRAMUSCULAR
Qty: 1 ML | Refills: 5 | Status: SHIPPED | OUTPATIENT
Start: 2021-06-16 | End: 2022-02-01

## 2021-06-16 RX ADMIN — MEDROXYPROGESTERONE ACETATE 150 MG: 150 INJECTION, SUSPENSION INTRAMUSCULAR at 10:25

## 2021-06-16 NOTE — LETTER
2021    Patrick Pizarro  [facility] [id number]      2021    To Patrick Pizarro  : 1990      This letter is to advise you that your recent PAP SMEAR results were reviewed by me and are NORMAL    We will see you in 1 year for your annual exam     Madalyn Dawkins

## 2021-06-16 NOTE — PROGRESS NOTES
Annual Exam    Assessment   1  Encounter for annual routine gynecological examination  Liquid-based pap, screening   2  Encounter for surveillance of injectable contraceptive  medroxyPROGESTERone acetate (DEPO-PROVERA SYRINGE) IM injection 150 mg    medroxyPROGESTERone (DEPO-PROVERA) 150 mg/mL injection     well woman       Plan       All questions answered  Breast self exam technique reviewed and patient encouraged to perform self-exam monthly  Contraception: Depo-Provera injections  Diagnosis explained in detail, including differential   Discussed healthy lifestyle modifications  Follow up in 3 months  Thin prep Pap smear  Patient Instructions   PAP results can take up to 2 weeks  Call with needs or concerns  Return in 1 year for annual GYN exam  Return in 3 months for Depoprovera  Pt verbalized understanding of all discussed  Subjective      Daniela Chen is a 28 y o  [place] female who presents for annual well woman exam  Periods are rare, lasting spotting wih Depo days  No intermenstrual bleeding, spotting, or discharge  Last WNL PAP and negeafive HPV   1 partner x 3 years, denies domestic violence    Current contraception: Depo-Provera injections  History of abnormal Pap smear: yes - Hx of LEEP  with negative margins, PAP  was LGSIS, MARIA E HPV was negative  Family history of uterine or ovarian cancer: no  Regular self breast exam: yes  History of abnormal mammogram: N/A  Family history of breast cancer: yes - Grandmother, has [id number] number  History of abnormal lipids: yes - diet controlled   Menstrual History:  OB History        0    Para   0    Term   0       0    AB   0    Living   0       SAB   0    TAB   0    Ectopic   0    Multiple   0    Live Births   0                Menarche age: 16  No LMP recorded (lmp unknown)  (Menstrual status: Birth Control)         The following portions of the patient's history were reviewed and updated as appropriate: allergies, current medications, past family history, past medical history, past social history, past surgical history and problem list     Review of Systems  Pertinent items are noted in HPI        Objective      /76   Pulse 99   Ht 5' 5" (1 651 m)   Wt 53 5 kg (118 lb)   LMP  (LMP Unknown)   BMI 19 64 kg/m²     General: alert and oriented, in no acute distress, alert, appears stated age and cooperative   Heart: regular rate and rhythm, S1, S2 normal, no murmur, click, rub or gallop   Lungs: clear to auscultation bilaterally, WNL respiratory effort, negative cough or SOB   Thyroid: Negative masses   Abdomen: soft, non-tender, without masses or organomegaly   Vulva: normal   Vagina: normal mucosa   Cervix: no bleeding following Pap, no cervical motion tenderness and no lesions   Uterus: normal size, non-tender, normal shape and consistency   Adnexa: normal adnexa   Urethra: normal   Breasts: NT,negative masses, discharge, or dimpling, bilaqteral nipple piercing

## 2021-06-16 NOTE — PATIENT INSTRUCTIONS
PAP results can take up to 2 weeks  Call with needs or concerns  Return in 1 year for annual GYN exam  Return in 3 months for Depoprovera    COVID-19 Instructions    If you are having any of the following:  Cough   Shortness of breath   Fever  If traveled within past 2 weeks internationally or to high risk [place] states  Or been in contact with someone that has     Please call either:   Your PCP office  [phone], option [phone]    They will screen you over the phone and direct you to the nearest appropriate testing location    DO NOT go to your PCP or OB office without calling first

## 2021-06-18 ENCOUNTER — OFFICE VISIT (OUTPATIENT)
Dept: URGENT CARE | Age: 31
End: 2021-06-18
Payer: COMMERCIAL

## 2021-06-18 VITALS
WEIGHT: 112 LBS | OXYGEN SATURATION: 99 % | BODY MASS INDEX: 18.66 KG/M2 | HEIGHT: 65 IN | DIASTOLIC BLOOD PRESSURE: 68 MMHG | TEMPERATURE: 97.6 F | SYSTOLIC BLOOD PRESSURE: 118 MMHG | RESPIRATION RATE: 18 BRPM | HEART RATE: 74 BPM

## 2021-06-18 DIAGNOSIS — M75.21 BICEPS TENDONITIS ON RIGHT: ICD-10-CM

## 2021-06-18 DIAGNOSIS — M75.41 IMPINGEMENT SYNDROME OF RIGHT SHOULDER: Primary | ICD-10-CM

## 2021-06-18 PROCEDURE — 99213 OFFICE O/P EST LOW 20 MIN: CPT | Performed by: PHYSICIAN ASSISTANT

## 2021-06-18 NOTE — PATIENT INSTRUCTIONS
Motrin 600 mg three times daily for 5-7 days  Supplement with Tylenol 1000 mg every 8 hours as needed  Heat or ice for 20 minutes on 20 minutes off  PT referral provider  Follow-up with PCP in 3-5 days  If symptoms worsen, report to the emergency department  Shoulder Impingement Syndrome   AMBULATORY CARE:   Shoulder impingement syndrome  happens when the tendons or bursa become trapped between bones of your shoulder joint  You have pain when you reach over your head or lie on the shoulder while sleeping  You may also have pain when you move your arm out to the side or behind your body  Seek care immediately if:   · Your shoulder, arm, hand, or fingers turn bluish or pale, or feel cold or numb  · Your pain gets worse, even after rest and medicine  · You cannot move your fingers  Call your doctor if:   · You have questions or concerns about your condition or care  Treatment may include:   · Rest  will help your shoulder pain  Limit reaching above your shoulder's height  · Apply ice  on your shoulder for 20 minutes 1 or 2 times in a day  Use an ice pack, or put crushed ice in a plastic bag  You may also use a bag of frozen peas or corn  Cover the bag with a towel  Ice decreases swelling and pain  · Physical therapy  will teach exercises to stretch and strengthen your shoulder  Exercises will also help decrease pain  · Medicines  may be given to relieve pain and swelling  · Surgery  may be needed if your rotator cuff is torn or other treatments do not work  Follow up with your doctor and physical therapist as directed:  Write down your questions so you remember to ask them during your visits  © Copyright [address] Information is for End User's use only and may not be sold, redistributed or otherwise used for commercial purposes   All illustrations and images included in CareNotes® are the copyrighted property of [employer]  or [employer]  The above information is an  only  It is not intended as medical advice for individual conditions or treatments  Talk to your doctor, nurse or pharmacist before following any medical regimen to see if it is safe and effective for you  Tendinitis   WHAT YOU NEED TO KNOW:   What is tendinitis? Tendinitis is painful inflammation or breakdown of your tendons  It may also be called tendinopathy  Tendinitis often occurs in the knee, shoulder, ankle, hip, or elbow  What increases my risk for tendinitis? · Injury, overuse, or repeated movement of a joint     · Not warming up before exercise, or not resting enough between activities    · Use of shoes or exercise equipment that do not fit well    · Muscle weakness, balance problems, or poor posture    What are the signs and symptoms of tendinitis? You may have redness, pain, or swelling around your joint, tendon, or muscle  You may also have pain, stiffness, or decreased movement in your joint  How is tendinitis diagnosed? Your healthcare provider will check your range of motion of the affected joint  He may also lightly press on your tendon to check for pain  You may also need an ultrasound, x-ray, or MRI to show if you have tendinitis or another condition  How is tendinitis treated? · Pain medicines  such as NSAIDs and acetaminophen may decrease swelling and pain  These medicines are available without a doctor's order  Ask how much to take and when to take it  Follow directions  NSAIDs and acetaminophen may cause liver or kidney damage if not taken correctly  · Steroids  may be used to decrease pain and swelling  They may be given as a pill or as an injection into the affected area  Steroids are rarely used in children  · Surgery  may rarely be needed if other treatment does not work  How can I manage my symptoms?    · Rest  your tendon as directed to help it heal  Ask your healthcare provider if you need to stop putting weight on your affected area     · Ice  helps decrease swelling and pain, and may help prevent tissue damage  Use an ice pack, or put crushed ice in a plastic bag  Cover it with a towel and place it on the affected area for 10 to 15 minutes every hour or as directed  · Support devices  such as a cane, splint, shoe insert, or brace may help reduce your pain  · Physical therapy  may be ordered by your healthcare provider  This may be used to teach you exercises to help improve movement and strength, and to decrease pain  You may also learn how to improve your posture, and how to lift or exercise correctly  How can I prevent tendinitis? · Warm up or stretch  before you exercise  · Exercise regularly  to strengthen the muscles around your joint  Ease into an exercise routine for the first 3 weeks to prevent another injury  Ask your healthcare provider about the best exercise plan for you  Rest fully between activities  · Use the right equipment  for sports and exercise  Wear braces or tape around weak joints as directed  When should I contact my healthcare provider? · You have increased pain even after you take medicine  · You have questions or concerns about your condition or care  When should I seek immediate help? · You have increased redness over the joint, or swelling in the joint  · You suddenly cannot move your joint  CARE AGREEMENT:   You have the right to help plan your care  Learn about your health condition and how it may be treated  Discuss treatment options with your healthcare providers to decide what care you want to receive  You always have the right to refuse treatment  The above information is an  only  It is not intended as medical advice for individual conditions or treatments  Talk to your doctor, nurse or pharmacist before following any medical regimen to see if it is safe and effective for you    © Copyright [employer] 2020 Information is for End User's use only and may not be sold, redistributed or otherwise used for commercial purposes   All illustrations and images included in CareNotes® are the copyrighted property of A D A M , Inc  or [facility] [address]

## 2021-06-18 NOTE — PROGRESS NOTES
[facility]        NAME: Manav Hill is a 28 y o  female  : 1990    MRN: [id number]  DATE: 2021  TIME: 9:39 AM    Assessment and Plan   Impingement syndrome of right shoulder [M75 41]  1  Impingement syndrome of right shoulder  Ambulatory referral to Physical Therapy   2  Biceps tendonitis on right  Ambulatory referral to Physical Therapy   pt with symptoms and exam consistent with biceps tendonitis and impingement (+ Neer and Ayala and O'dora's testing)  X-ray offered, but patient declines at this time as no inciting injury  Discussed conservative measures (Motrin, Tylenol, Heat, and ice) and referred to PT  Pt will follow-up with her PCP if not improving for possible orthopedic referral        Patient Instructions   Motrin 600 mg three times daily for 5-7 days  Supplement with Tylenol 1000 mg every 8 hours as needed  Heat or ice for 20 minutes on 20 minutes off  PT referral provider  Follow-up with PCP in 3-5 days  If symptoms worsen, report to the emergency department  Shoulder Impingement Syndrome   AMBULATORY CARE:   Shoulder impingement syndrome  happens when the tendons or bursa become trapped between bones of your shoulder joint  You have pain when you reach over your head or lie on the shoulder while sleeping  You may also have pain when you move your arm out to the side or behind your body  Seek care immediately if:   · Your shoulder, arm, hand, or fingers turn bluish or pale, or feel cold or numb  · Your pain gets worse, even after rest and medicine  · You cannot move your fingers  Call your doctor if:   · You have questions or concerns about your condition or care  Treatment may include:   · Rest  will help your shoulder pain  Limit reaching above your shoulder's height  · Apply ice  on your shoulder for 20 minutes 1 or 2 times in a day  Use an ice pack, or put crushed ice in a plastic bag   You may also use a bag of frozen peas or corn  Cover the bag with a towel  Ice decreases swelling and pain  · Physical therapy  will teach exercises to stretch and strengthen your shoulder  Exercises will also help decrease pain  · Medicines  may be given to relieve pain and swelling  · Surgery  may be needed if your rotator cuff is torn or other treatments do not work  Follow up with your doctor and physical therapist as directed:  Write down your questions so you remember to ask them during your visits  © Copyright [address] Information is for End User's use only and may not be sold, redistributed or otherwise used for commercial purposes  All illustrations and images included in CareNotes® are the copyrighted property of [employer]  or [employer]   The above information is an  only  It is not intended as medical advice for individual conditions or treatments  Talk to your doctor, nurse or pharmacist before following any medical regimen to see if it is safe and effective for you  Tendinitis   WHAT YOU NEED TO KNOW:   What is tendinitis? Tendinitis is painful inflammation or breakdown of your tendons  It may also be called tendinopathy  Tendinitis often occurs in the knee, shoulder, ankle, hip, or elbow  What increases my risk for tendinitis? · Injury, overuse, or repeated movement of a joint     · Not warming up before exercise, or not resting enough between activities    · Use of shoes or exercise equipment that do not fit well    · Muscle weakness, balance problems, or poor posture    What are the signs and symptoms of tendinitis? You may have redness, pain, or swelling around your joint, tendon, or muscle  You may also have pain, stiffness, or decreased movement in your joint  How is tendinitis diagnosed? Your healthcare provider will check your range of motion of the affected joint  He may also lightly press on your tendon to check for pain   You may also need an ultrasound, x-ray, or MRI to show if you have tendinitis or another condition  How is tendinitis treated? · Pain medicines  such as NSAIDs and acetaminophen may decrease swelling and pain  These medicines are available without a doctor's order  Ask how much to take and when to take it  Follow directions  NSAIDs and acetaminophen may cause liver or kidney damage if not taken correctly  · Steroids  may be used to decrease pain and swelling  They may be given as a pill or as an injection into the affected area  Steroids are rarely used in children  · Surgery  may rarely be needed if other treatment does not work  How can I manage my symptoms? · Rest  your tendon as directed to help it heal  Ask your healthcare provider if you need to stop putting weight on your affected area  · Ice  helps decrease swelling and pain, and may help prevent tissue damage  Use an ice pack, or put crushed ice in a plastic bag  Cover it with a towel and place it on the affected area for 10 to 15 minutes every hour or as directed  · Support devices  such as a cane, splint, shoe insert, or brace may help reduce your pain  · Physical therapy  may be ordered by your healthcare provider  This may be used to teach you exercises to help improve movement and strength, and to decrease pain  You may also learn how to improve your posture, and how to lift or exercise correctly  How can I prevent tendinitis? · Warm up or stretch  before you exercise  · Exercise regularly  to strengthen the muscles around your joint  Ease into an exercise routine for the first 3 weeks to prevent another injury  Ask your healthcare provider about the best exercise plan for you  Rest fully between activities  · Use the right equipment  for sports and exercise  Wear braces or tape around weak joints as directed  When should I contact my healthcare provider? · You have increased pain even after you take medicine      · You have questions or concerns about your condition or care  When should I seek immediate help? · You have increased redness over the joint, or swelling in the joint  · You suddenly cannot move your joint  CARE AGREEMENT:   You have the right to help plan your care  Learn about your health condition and how it may be treated  Discuss treatment options with your healthcare providers to decide what care you want to receive  You always have the right to refuse treatment  The above information is an  only  It is not intended as medical advice for individual conditions or treatments  Talk to your doctor, nurse or pharmacist before following any medical regimen to see if it is safe and effective for you  © Copyright [address] Information is for End User's use only and may not be sold, redistributed or otherwise used for commercial purposes  All illustrations and images included in CareNotes® are the copyrighted property of [employer]  or [employer]    Follow up with PCP in 3-5 days  Proceed to  ER if symptoms worsen  Chief Complaint     Chief Complaint   Patient presents with    Arm Pain     right arm pain x2 days feels like something is pinching her upper arm by shoulder         History of Present Illness       28year old female presents with complaints of upper right arm pain that radiates into the wrist for 2 days duration  Pt denies inciting injury  She reports that she feels like someone is pinching her shoulder when she moves it and rates her pain as 6/10  Pt has tried heat with no benefit  Pt also took some Motrin which did help  She notes occasional tingling and reports weakness of the arm as well  She has no prior right arm or neck injuries  No other concerns or complaints today  Review of Systems   Review of Systems   Constitutional: Negative for chills and fever  Musculoskeletal: Positive for arthralgias (right shouler) and myalgias (right arm pain )           Current Medications Current Outpatient Medications:     ibuprofen (MOTRIN) 800 mg tablet, Take 1 tablet (800 mg total) by mouth every 8 (eight) hours as needed for mild pain, Disp: 10 tablet, Rfl: 0    LATUDA 40 MG tablet, take 1 tablet by mouth once daily AFTER DINNER, Disp: , Rfl: 0    medroxyPROGESTERone (DEPO-PROVERA) 150 mg/mL injection, Inject 1 mL (150 mg total) into a muscle every 3 (three) months, Disp: 1 mL, Rfl: 5    medroxyPROGESTERone (DEPO-PROVERA) 150 mg/mL injection, Inject 1 mL (150 mg total) into a muscle every 3 (three) months, Disp: 1 mL, Rfl: 5    medroxyPROGESTERone acetate (DEPO-PROVERA SYRINGE) 150 mg/mL injection, , Disp: , Rfl:     fluconazole (DIFLUCAN) 150 mg tablet, , Disp: , Rfl:     fluticasone (FLONASE) 50 mcg/act nasal spray, 2 sprays into each nostril daily (Patient not taking: Reported on 6/18/2021), Disp: 1 Bottle, Rfl: 0    guaiFENesin (MUCINEX) 600 mg 12 hr tablet, Take 2 tablets (1,200 mg total) by mouth every 12 (twelve) hours (Patient not taking: Reported on 6/18/2021), Disp: 20 tablet, Rfl: 0    hydrOXYzine HCL (ATARAX) 25 mg tablet, , Disp: , Rfl:     mirtazapine (REMERON) 15 mg tablet, , Disp: , Rfl:     mirtazapine (REMERON) 15 mg tablet, , Disp: , Rfl:     Current Facility-Administered Medications:     medroxyPROGESTERone acetate (DEPO-PROVERA SYRINGE) IM injection 150 mg, 150 mg, Intramuscular, Q3 Months, KARTIK Abraham, 150 mg at 06/16/21 1025    Current Allergies     Allergies as of 06/18/2021    (No Known Allergies)            The following portions of the patient's history were reviewed and updated as appropriate: allergies, current medications, past family history, past medical history, past social history, past surgical history and problem list      Past Medical History:   Diagnosis Date    Abnormal Pap smear of cervix     2013 ASCUS    ADHD     Bipolar 1 disorder (HCC)     Depression     GERD (gastroesophageal reflux disease)     Migraine     OCD (obsessive compulsive disorder)     Wears glasses        Past Surgical History:   Procedure Laterality Date    GYNECOLOGIC CRYOSURGERY      patient was 26years old    NO PAST SURGERIES      SC COLPOSCOPY,CERVIX W/ADJ VAG,W/LOOP BX N/A 8/9/2019    Procedure: BIOPSY LEEP CERVIX;  Surgeon: Tory Loja MD;  Location: [facility];  Service: Gynecology       Family History   Problem Relation Age of Onset    Bipolar disorder Mother     No Known Problems Father     Depression Sister          Medications have been verified  Objective   /68 (BP Location: Left arm, Patient Position: Sitting)   Pulse 74   Temp 97 6 °F (36 4 °C) (Tympanic)   Resp 18   Ht 5' 5" (1 651 m)   Wt 50 8 kg (112 lb)   LMP  (LMP Unknown)   SpO2 99%   BMI 18 64 kg/m²   No LMP recorded (lmp unknown)  (Menstrual status: Birth Control)  Physical Exam     Physical Exam  Vitals and nursing note reviewed  Constitutional:       General: She is awake  She is not in acute distress  Appearance: Normal appearance  She is well-developed, well-groomed and normal weight  She is not ill-appearing, toxic-appearing or diaphoretic  HENT:      Head: Normocephalic and atraumatic  Right Ear: Hearing and external ear normal       Left Ear: Hearing and external ear normal    Eyes:      General: Lids are normal  Vision grossly intact  Gaze aligned appropriately  No allergic shiner  Extraocular Movements: Extraocular movements intact  Conjunctiva/sclera: Conjunctivae normal    Neck:      Comments: Negative Lhermitte's   Cardiovascular:      Rate and Rhythm: Normal rate and regular rhythm  Pulmonary:      Effort: Pulmonary effort is normal       Comments: Patient is speaking in full sentences with no increased respiratory effort  No audible wheezing or stridor     Musculoskeletal:      Right shoulder: Tenderness (infraspinataus, biceps tendon, subacromial space) and bony tenderness (right spinous process of the scapula (prominent)) present  Normal strength  Normal pulse  Left shoulder: Normal       Right upper arm: Tenderness (biceps brachii muscles  ) present  No swelling, edema, deformity, lacerations or bony tenderness  Left upper arm: Normal       Right elbow: Normal       Right forearm: Normal       Right wrist: Normal       Right hand: Normal       Cervical back: Normal range of motion  Comments: Pt has positive Neer and Hawkin's signs and positive O'dora's testing, concerning for impingement and biceps tendonitis  Skin:     General: Skin is warm and dry  Neurological:      Mental Status: She is alert and oriented to person, place, and time  Coordination: Coordination is intact  Gait: Gait is intact  Psychiatric:         Attention and Perception: Attention and perception normal          Mood and Affect: Mood and affect normal          Speech: Speech normal          Behavior: Behavior normal  Behavior is cooperative

## 2021-06-23 LAB
LAB AP GYN PRIMARY INTERPRETATION: NORMAL
Lab: NORMAL

## 2021-07-19 ENCOUNTER — OFFICE VISIT (OUTPATIENT)
Dept: FAMILY MEDICINE CLINIC | Facility: CLINIC | Age: 31
End: 2021-07-19

## 2021-07-19 VITALS
BODY MASS INDEX: 19.16 KG/M2 | TEMPERATURE: 97.6 F | SYSTOLIC BLOOD PRESSURE: 112 MMHG | HEIGHT: 65 IN | DIASTOLIC BLOOD PRESSURE: 70 MMHG | RESPIRATION RATE: 18 BRPM | WEIGHT: 115 LBS

## 2021-07-19 DIAGNOSIS — K29.00 ACUTE GASTRITIS WITHOUT HEMORRHAGE, UNSPECIFIED GASTRITIS TYPE: Primary | ICD-10-CM

## 2021-07-19 PROCEDURE — 99213 OFFICE O/P EST LOW 20 MIN: CPT | Performed by: FAMILY MEDICINE

## 2021-07-19 RX ORDER — SUCRALFATE ORAL 1 G/10ML
1 SUSPENSION ORAL 4 TIMES DAILY
Qty: 420 ML | Refills: 1 | Status: SHIPPED | OUTPATIENT
Start: 2021-07-19 | End: 2022-02-01

## 2021-07-19 RX ORDER — OMEPRAZOLE 20 MG/1
20 CAPSULE, DELAYED RELEASE ORAL DAILY
Qty: 30 CAPSULE | Refills: 1 | Status: SHIPPED | OUTPATIENT
Start: 2021-07-19 | End: 2021-11-18

## 2021-07-19 NOTE — PATIENT INSTRUCTIONS
Gastritis   WHAT YOU NEED TO KNOW:   Gastritis is inflammation or irritation of the lining of your stomach  DISCHARGE INSTRUCTIONS:   Call 911 for any of the following:   · You develop chest pain or shortness of breath  Return to the emergency department if:   · You vomit blood  · You have black or bloody bowel movements  · You have severe stomach or back pain  Contact your healthcare provider if:   · You have a fever  · You have new or worsening symptoms, even after treatment  · You have questions or concerns about your condition or care  Medicines:   · Medicines  may be given to help treat a bacterial infection or decrease stomach acid  · Take your medicine as directed  Contact your healthcare provider if you think your medicine is not helping or if you have side effects  Tell him or her if you are allergic to any medicine  Keep a list of the medicines, vitamins, and herbs you take  Include the amounts, and when and why you take them  Bring the list or the pill bottles to follow-up visits  Carry your medicine list with you in case of an emergency  Manage or prevent gastritis:   · Do not smoke  Nicotine and other chemicals in cigarettes and cigars can make your symptoms worse and cause lung damage  Ask your healthcare provider for information if you currently smoke and need help to quit  E-cigarettes or smokeless tobacco still contain nicotine  Talk to your healthcare provider before you use these products  · Do not drink alcohol  Alcohol can prevent healing and make your gastritis worse  Talk to your healthcare provider if you need help to stop drinking  · Do not take NSAIDs or aspirin unless directed  These and similar medicines can cause irritation  If your healthcare provider says it is okay to take NSAIDs, take them with food  · Do not eat foods that cause irritation  Foods such as oranges and salsa can cause burning or pain  Eat a variety of healthy foods  Examples include fruits (not citrus), vegetables, low-fat dairy products, beans, whole-grain breads, and lean meats and fish  Try to eat small meals, and drink water with your meals  Do not eat for at least 3 hours before you go to bed  · Find ways to relax and decrease stress  Stress can increase stomach acid and make gastritis worse  Activities such as yoga, meditation, or listening to music can help you relax  Spend time with friends, or do things you enjoy  Follow up with your healthcare provider as directed: You may need ongoing tests or treatment, or referral to a gastroenterologist  Write down your questions so you remember to ask them during your visits  © Copyright [address] Information is for End User's use only and may not be sold, redistributed or otherwise used for commercial purposes  All illustrations and images included in CareNotes® are the copyrighted property of [employer]  or [facility] Rigoberto English   The above information is an  only  It is not intended as medical advice for individual conditions or treatments  Talk to your doctor, nurse or pharmacist before following any medical regimen to see if it is safe and effective for you

## 2021-07-19 NOTE — PROGRESS NOTES
Assessment/Plan:    Acute gastritis without hemorrhage  Likely contributed by smoking (1ppd for 15 years)  Recent CBC, CMP, TSH all normal  Will start omeprazole 20 mg daily and follow up in 4-6 weeks   Consider H-pylori testing if unresolving  Smoking cessation counseling offered  Avoid acidic foods, HOB elevation       Diagnoses and all orders for this visit:    Acute gastritis without hemorrhage, unspecified gastritis type  -     omeprazole (PriLOSEC) 20 mg delayed release capsule; Take 1 capsule (20 mg total) by mouth daily  -     sucralfate (CARAFATE) 1 g/10 mL suspension; Take 10 mL (1 g total) by mouth 4 (four) times a day    Other orders  -     Cancel: H  pylori antigen, stool; Future          Subjective:      Patient ID: Iwona Serna is a 28 y o  female  HPI     Abdominal pain in epigastric region, onset a week ago, rated 5/10, occurs all the time, non-radiating, states when she has food in her mouth, she starts to feel nausea, does not matter type of food  Also feels nausea even when she smells her dog  Nothing makes better or worse  Does not eat spicy foods, coffee intake is once in a while  Stools 2x/week which is normal for her lifetime,  Uses ibuprofen 1-2x/week as needed when she has HA  No new medications recently  No LMP as on depo provera  Smokes 1 ppd since 15 years  Denies alcohol use  Denies vomiting, diarrhea, bloody stools, dysuria, hematuria, flank pain, cough especially at nighttime, weight loss, fatigue, fever, chills, denies more than normal flatulence, bloating, vaginal discharge  The following portions of the patient's history were reviewed and updated as appropriate: allergies, current medications, past family history, past medical history, past social history, past surgical history and problem list     Review of Systems   Constitutional: Negative for activity change, appetite change, chills, fatigue and fever  Eyes: Negative for pain and visual disturbance  Respiratory: Negative for cough, chest tightness and shortness of breath  Cardiovascular: Negative for chest pain, palpitations and leg swelling  Gastrointestinal: Positive for abdominal pain (epigastric) and nausea  Negative for diarrhea and vomiting  Endocrine: Negative for polydipsia and polyuria  Genitourinary: Negative for difficulty urinating, dysuria, frequency, hematuria and urgency  Skin: Negative for pallor  Objective:      /70 (BP Location: Left arm, Patient Position: Sitting, Cuff Size: Standard)   Temp 97 6 °F (36 4 °C) (Temporal)   Resp 18   Ht 5' 5" (1 651 m)   Wt 52 2 kg (115 lb)   BMI 19 14 kg/m²        Physical Exam  Vitals reviewed  Constitutional:       General: She is not in acute distress  HENT:      Mouth/Throat:      Pharynx: No oropharyngeal exudate  Eyes:      General:         Right eye: No discharge  Left eye: No discharge  Conjunctiva/sclera: Conjunctivae normal    Neck:      Thyroid: No thyromegaly  Cardiovascular:      Rate and Rhythm: Normal rate and regular rhythm  Heart sounds: Normal heart sounds  No murmur heard  Pulmonary:      Effort: Pulmonary effort is normal  No respiratory distress  Breath sounds: Normal breath sounds  No wheezing  Abdominal:      General: Bowel sounds are normal       Palpations: Abdomen is soft  Tenderness: There is abdominal tenderness (mild epigastric)  There is no guarding  Musculoskeletal:         General: No swelling or tenderness  Cervical back: Neck supple  Right lower leg: No edema  Lymphadenopathy:      Cervical: No cervical adenopathy  Skin:     General: Skin is warm  Capillary Refill: Capillary refill takes less than 2 seconds  Findings: No rash  Neurological:      Mental Status: She is alert and oriented to person, place, and time  Psychiatric:         Thought Content:  Thought content normal

## 2021-07-19 NOTE — ASSESSMENT & PLAN NOTE
Likely contributed by smoking (1ppd for 15 years)  Recent CBC, CMP, TSH all normal  Will start omeprazole 20 mg daily and follow up in 4-6 weeks   Consider H-pylori testing if unresolving  Smoking cessation counseling offered  Avoid acidic foods, HOB elevation

## 2021-08-26 ENCOUNTER — VBI (OUTPATIENT)
Dept: ADMINISTRATIVE | Facility: OTHER | Age: 31
End: 2021-08-26

## 2021-09-01 ENCOUNTER — CLINICAL SUPPORT (OUTPATIENT)
Dept: OBGYN CLINIC | Facility: CLINIC | Age: 31
End: 2021-09-01

## 2021-09-01 VITALS
SYSTOLIC BLOOD PRESSURE: 107 MMHG | WEIGHT: 110 LBS | HEART RATE: 100 BPM | BODY MASS INDEX: 18.3 KG/M2 | DIASTOLIC BLOOD PRESSURE: 76 MMHG

## 2021-09-01 DIAGNOSIS — Z30.42 ENCOUNTER FOR SURVEILLANCE OF INJECTABLE CONTRACEPTIVE: Primary | ICD-10-CM

## 2021-09-01 PROCEDURE — 96372 THER/PROPH/DIAG INJ SC/IM: CPT

## 2021-09-01 RX ORDER — MEDROXYPROGESTERONE ACETATE 150 MG/ML
150 INJECTION, SUSPENSION INTRAMUSCULAR ONCE
Status: COMPLETED | OUTPATIENT
Start: 2021-09-01 | End: 2021-09-01

## 2021-09-01 RX ADMIN — MEDROXYPROGESTERONE ACETATE 150 MG: 150 INJECTION, SUSPENSION INTRAMUSCULAR at 09:54

## 2021-11-17 ENCOUNTER — CLINICAL SUPPORT (OUTPATIENT)
Dept: OBGYN CLINIC | Facility: CLINIC | Age: 31
End: 2021-11-17

## 2021-11-17 VITALS
DIASTOLIC BLOOD PRESSURE: 67 MMHG | BODY MASS INDEX: 18.99 KG/M2 | WEIGHT: 114 LBS | HEART RATE: 116 BPM | HEIGHT: 65 IN | SYSTOLIC BLOOD PRESSURE: 94 MMHG

## 2021-11-17 DIAGNOSIS — Z30.09 UNWANTED FERTILITY: ICD-10-CM

## 2021-11-17 PROCEDURE — 96372 THER/PROPH/DIAG INJ SC/IM: CPT

## 2021-11-17 RX ADMIN — MEDROXYPROGESTERONE ACETATE 150 MG: 150 INJECTION, SUSPENSION INTRAMUSCULAR at 08:47

## 2021-12-30 ENCOUNTER — OFFICE VISIT (OUTPATIENT)
Dept: FAMILY MEDICINE CLINIC | Facility: CLINIC | Age: 31
End: 2021-12-30

## 2021-12-30 DIAGNOSIS — Z20.822 SUSPECTED COVID-19 VIRUS INFECTION: Primary | ICD-10-CM

## 2021-12-30 PROCEDURE — G2025 DIS SITE TELE SVCS RHC/FQHC: HCPCS | Performed by: FAMILY MEDICINE

## 2021-12-31 PROCEDURE — U0005 INFEC AGEN DETEC AMPLI PROBE: HCPCS | Performed by: FAMILY MEDICINE

## 2021-12-31 PROCEDURE — U0003 INFECTIOUS AGENT DETECTION BY NUCLEIC ACID (DNA OR RNA); SEVERE ACUTE RESPIRATORY SYNDROME CORONAVIRUS 2 (SARS-COV-2) (CORONAVIRUS DISEASE [COVID-19]), AMPLIFIED PROBE TECHNIQUE, MAKING USE OF HIGH THROUGHPUT TECHNOLOGIES AS DESCRIBED BY CMS-2020-01-R: HCPCS | Performed by: FAMILY MEDICINE

## 2022-01-05 ENCOUNTER — TELEMEDICINE (OUTPATIENT)
Dept: FAMILY MEDICINE CLINIC | Facility: CLINIC | Age: 32
End: 2022-01-05

## 2022-01-05 DIAGNOSIS — U07.1 COVID-19: Primary | ICD-10-CM

## 2022-01-05 DIAGNOSIS — G43.109 MIGRAINE WITH AURA AND WITHOUT STATUS MIGRAINOSUS, NOT INTRACTABLE: ICD-10-CM

## 2022-01-05 PROCEDURE — 99213 OFFICE O/P EST LOW 20 MIN: CPT | Performed by: FAMILY MEDICINE

## 2022-01-05 RX ORDER — NAPROXEN 500 MG/1
500 TABLET ORAL 2 TIMES DAILY WITH MEALS
Qty: 60 TABLET | Refills: 1 | Status: SHIPPED | OUTPATIENT
Start: 2022-01-05 | End: 2022-02-01

## 2022-01-05 NOTE — PROGRESS NOTES
COVID-19 Virtual Follow Up Visit     Verification of patient location:    Patient is located in the following state in which I hold an active license [place]      Assessment/Plan:    Problem List Items Addressed This Visit        Cardiovascular and Mediastinum    Migraine     Chronic problem  Symptoms are not linked to menstrual cycle as patient is on OCP  Symptoms are associated with photophobia, nausea, and occasional non-bloody vomiting  Denies any paralysis during headache episodes  Patient reports she feels the headache on the right side of her eye with right eye droopiness  Patient has a history of epilepsy (last seizure reported at age 10)  NSAIDs (motrin) provides limited relief  Aura includes seeing black spots in her vision field prior to the headache  No reported focal neurologic deficits associated with migraine  Patient currently on oral contraceptive pills (Depo)  -Counseled on the benefit of adequate hydration and weight loss  -Prescribe Naproxen 500 mg PRN to be taken at the onset of headache   -Educated patient with respect to the foods and beverages that can trigger a migraine attack (including caffeinated sodas and drinks, tea whether cold or  warm, all varieties of cheese (except American), and alcoholic beverages  -Recommended keeping a headache diary: keep track of days with HA, associated symptoms, and possible triggers  - Patient advised to book follow up with PCP              Other    COVID-19 - Primary     Positive COVID test on 12-31-21  Patient reports significant improvement at this time however complains of continued headache with anosmia  At this time I recommend social distancing from family members who are currently asymptomatic  They should isolate for 10-14 days from date of positive test    I recommend using a mask while in common areas in the household   Frequent hand washing and cleaning of commonly used surfaces should be reinforced for the patient and all family members  Please also see plan for migraine  Disposition:    Symptoms started on December 25th  Positive test December 31st   Headache, loss of sense of smell  No loss of taste  Sense of smell is warped - reports things smell like feces  Reports getting daily headaches  Headache severity is rated a 10  Also reports associated nausea and vomiting  I recommended continued isolation until at least 24 hours have passed since recovery defined as resolution of fever without the use of fever-reducing medications and improvement in respiratory symptoms AND 10 days have passed since onset of symptoms  I spent 20 minutes directly with the patient during this visit    This virtual check-in was done via [email or web address] and patient was informed that this is not a secure, HIPAA-compliant platform  She agrees to proceed     Encounter provider Medardo Holt MD    Provider located at [address] [address]  [phone]    Recent Visits  Date Type Provider Dept   12/30/21 Office Visit MD Sonja Moreau [facility]   Showing recent visits within past 7 days and meeting all other requirements  Today's Visits  Date Type Provider Dept   01/05/22 Telemedicine MD Sonja Valente Con[facility]   Showing today's visits and meeting all other requirements  Future Appointments  No visits were found meeting these conditions  Showing future appointments within next 150 days and meeting all other requirements       Patient agrees to participate in a virtual check in via telephone or video visit instead of presenting to the office to address urgent/immediate medical needs  Patient is aware this is a billable service  After connecting through telephone, the patient was identified by name and date of birth    Manav Hill was informed that this was a telemedicine visit and that the exam was being conducted confidentially over secure lines  My office door was closed  No one else was in the room  Amy Waters acknowledged consent and understanding of privacy and security of the telemedicine visit  I informed the patient that I have reviewed her record in Epic and presented the opportunity for her to ask any questions regarding the visit today  The patient agreed to participate  Subjective  Amy Waters is a 28 y o  female who has been screened for COVID-19  Since the last visit, Derrick Calderón reports that she has improved  She reports COVID-19 SYMPTOMS: headache, fatigue, anosmia, nausea and vomiting  She has not experienced fever, chills, cough, shortness of breath, myalgias and abdominal pain Derrick Calderón has been staying home and has isolated themselves in her home  She is taking care to not share personal items and is cleaning all surfaces that are touched often, like counters, tabletops, and doorknobs using household cleaning sprays or wipes  She is wearing a mask when she leaves her room        Past Medical History:   Diagnosis Date    Abnormal Pap smear of cervix     2013 ASCUS    ADHD     Bipolar 1 disorder (HCC)     Depression     GERD (gastroesophageal reflux disease)     Migraine     OCD (obsessive compulsive disorder)     Wears glasses        Past Surgical History:   Procedure Laterality Date    GYNECOLOGIC CRYOSURGERY      patient was 26years old    NO PAST SURGERIES      [place] COLPOSCOPY,CERVIX W/ADJ VAG,W/LOOP BX N/A 8/9/2019    Procedure: BIOPSY LEEP CERVIX;  Surgeon: Courtney Najera MD;  Location: [facility];  Service: Gynecology       Current Outpatient Medications   Medication Sig Dispense Refill    ibuprofen (MOTRIN) 800 mg tablet Take 1 tablet (800 mg total) by mouth every 8 (eight) hours as needed for mild pain 10 tablet 0    LATUDA 40 MG tablet take 1 tablet by mouth once daily AFTER DINNER  0    medroxyPROGESTERone (DEPO-PROVERA) 150 mg/mL injection Inject 1 mL (150 mg total) into a muscle every 3 (three) months 1 mL 5    medroxyPROGESTERone (DEPO-PROVERA) 150 mg/mL injection Inject 1 mL (150 mg total) into a muscle every 3 (three) months 1 mL 5    medroxyPROGESTERone acetate (DEPO-PROVERA SYRINGE) 150 mg/mL injection       omeprazole (PriLOSEC) 20 mg delayed release capsule TAKE 1 CAPSULE(20 MG) BY MOUTH DAILY 30 capsule 0    sucralfate (CARAFATE) 1 g/10 mL suspension Take 10 mL (1 g total) by mouth 4 (four) times a day 420 mL 1     Current Facility-Administered Medications   Medication Dose Route Frequency Provider Last Rate Last Admin    medroxyPROGESTERone acetate (DEPO-PROVERA SYRINGE) IM injection 150 mg  150 mg Intramuscular Q3 Months KARTIK Abraham   150 mg at 11/17/21 0847        No Known Allergies    Review of Systems     Video Exam    There were no vitals filed for this visit  Shawanda Hodgkin appears alert, no distress, cooperative  VIRTUAL VISIT DISCLAIMER      Daniela Chen verbally agrees to participate in [employer]  Pt is aware that [employer] could be limited without vital signs or the ability to perform a full hands-on physical Debby Rodriguez understands she or the provider may request at any time to terminate the video visit and request the patient to seek care or treatment in person

## 2022-01-05 NOTE — ASSESSMENT & PLAN NOTE
Chronic problem  Symptoms are not linked to menstrual cycle as patient is on OCP  Symptoms are associated with photophobia, nausea, and occasional non-bloody vomiting  Denies any paralysis during headache episodes  Patient reports she feels the headache on the right side of her eye with right eye droopiness  Patient has a history of epilepsy (last seizure reported at age 10)  NSAIDs (motrin) provides limited relief  Aura includes seeing black spots in her vision field prior to the headache  No reported focal neurologic deficits associated with migraine  Patient currently on oral contraceptive pills (Depo)  -Counseled on the benefit of adequate hydration and weight loss  -Prescribe Naproxen 500 mg PRN to be taken at the onset of headache   -Educated patient with respect to the foods and beverages that can trigger a migraine attack (including caffeinated sodas and drinks, tea whether cold or  warm, all varieties of cheese (except American), and alcoholic beverages    -Recommended keeping a headache diary: keep track of days with HA, associated symptoms, and possible triggers  - Patient advised to book follow up with PCP

## 2022-01-05 NOTE — ASSESSMENT & PLAN NOTE
Positive COVID test on 12-31-21  Patient reports significant improvement at this time however complains of continued headache with anosmia  At this time I recommend social distancing from family members who are currently asymptomatic  They should isolate for 10-14 days from date of positive test    I recommend using a mask while in common areas in the household  Frequent hand washing and cleaning of commonly used surfaces should be reinforced for the patient and all family members  Please also see plan for migraine

## 2022-01-07 ENCOUNTER — TELEMEDICINE (OUTPATIENT)
Dept: FAMILY MEDICINE CLINIC | Facility: CLINIC | Age: 32
End: 2022-01-07

## 2022-01-07 DIAGNOSIS — R09.81 NASAL CONGESTION: Primary | ICD-10-CM

## 2022-01-07 DIAGNOSIS — U07.1 COVID-19: ICD-10-CM

## 2022-01-07 DIAGNOSIS — G43.019 INTRACTABLE MIGRAINE WITHOUT AURA AND WITHOUT STATUS MIGRAINOSUS: ICD-10-CM

## 2022-01-07 PROCEDURE — 99214 OFFICE O/P EST MOD 30 MIN: CPT | Performed by: PHYSICIAN ASSISTANT

## 2022-01-07 RX ORDER — DEXAMETHASONE 2 MG/1
TABLET ORAL
Qty: 6 TABLET | Refills: 0 | Status: SHIPPED | OUTPATIENT
Start: 2022-01-07 | End: 2022-02-01

## 2022-01-07 RX ORDER — TOPIRAMATE 25 MG/1
25 TABLET ORAL DAILY
Qty: 30 TABLET | Refills: 1 | Status: SHIPPED | OUTPATIENT
Start: 2022-01-07 | End: 2022-02-01 | Stop reason: SDUPTHER

## 2022-01-07 RX ORDER — BUTALBITAL, ACETAMINOPHEN AND CAFFEINE 50; 325; 40 MG/1; MG/1; MG/1
1 TABLET ORAL EVERY 6 HOURS PRN
Qty: 6 TABLET | Refills: 0 | Status: SHIPPED | OUTPATIENT
Start: 2022-01-07 | End: 2022-01-11 | Stop reason: SDUPTHER

## 2022-01-07 NOTE — PROGRESS NOTES
COVID-19 Outpatient Progress Note    Assessment/Plan:    Problem List Items Addressed This Visit        Cardiovascular and Mediastinum    Migraine     Start 3 day decadron taper and prn fioricet  To start topamax at night also  To stop naproxen         Relevant Medications    topiramate (Topamax) 25 mg tablet    dexamethasone (DECADRON) 2 mg tablet    butalbital-acetaminophen-caffeine (Esgic) -40 mg per tablet       Other    COVID-19     Symptoms improving           Other Visit Diagnoses     Nasal congestion    -  Primary    Relevant Orders    Ambulatory Referral to Otolaryngology         Disposition:     I recommended continued isolation until at least 24 hours have passed since recovery defined as resolution of fever without the use of fever-reducing medications AND improvement in COVID symptoms AND 10 days have passed since onset of symptoms (or 10 days have passed since date of first positive viral diagnostic test for asymptomatic patients)  I have spent 16 minutes directly with the patient  Greater than 50% of this time was spent in counseling/coordination of care regarding: patient and family education  Encounter provider Igor Gan PA-C    Provider located at [place] [id number] [phone]    Recent Visits  Date Type Provider Dept   01/05/22 Telemedicine MD Sonja Broderick Con[facility]   Showing recent visits within past 7 days and meeting all other requirements  Today's Visits  Date Type Provider Dept   01/07/22 Telemedicine KAELYN Medina Cony   Showing today's visits and meeting all other requirements  Future Appointments  No visits were found meeting these conditions  Showing future appointments within next 150 days and meeting all other requirements     This virtual check-in was done via telephone and she agrees to proceed      Patient agrees to participate in a virtual check in via telephone or video visit instead of presenting to the office to address urgent/immediate medical needs  Patient is aware this is a billable service  After connecting through Telephone, the patient was identified by name and date of birth  Devora Baires was informed that this was a telemedicine visit and that the exam was being conducted confidentially over secure lines  My office door was closed  No one else was in the room  Devora Baires acknowledged consent and understanding of privacy and security of the telemedicine visit  I informed the patient that I have reviewed her record in Epic and presented the opportunity for her to ask any questions regarding the visit today  The patient agreed to participate  It was my intent to perform this visit via video technology but the patient was not able to do a video connection so the visit was completed via audio telephone only  Verification of patient location:  Patient is located in the following state in which I hold an active license: [place]    Subjective: Devora Baires is a 28 y o  female who has been screened for COVID-19  Symptom change since last report: resolving  Patient's symptoms include nasal congestion and headache (x 1 month )  Date of symptom onset: 12/25/2021  Date of exposure: 12/24/2021  Date of positive COVID-19 PCR: 12/31/2021  COVID-19 vaccination status: Fully vaccinated with booster    Ayleen Brown has been staying home and has isolated themselves in her home  She is taking care to not share personal items and is cleaning all surfaces that are touched often, like counters, tabletops, and doorknobs using household cleaning sprays or wipes  She is wearing a mask when she leaves her room  She is getting headaches daily x 1 moth  shehas hx of migraines  It is front of head, back of head and over eye  She feels like eye looks lazy with headache but resolves after   She is scheduled with eye doctor and goes yearly  She is taking prn naproxen and it helps some but not competetly  + nausea  +photophobia  She had covid last year and since then has been congested, smell never returned back        Lab Results   Component Value Date    SARSCOV2 Positive (A) 12/31/2021    SARSCORONAVI Detected (Reed Topete) 01/07/2021     Past Medical History:   Diagnosis Date    Abnormal Pap smear of cervix     2013 ASCUS    ADHD     Bipolar 1 disorder (HCC)     Depression     GERD (gastroesophageal reflux disease)     Migraine     OCD (obsessive compulsive disorder)     Wears glasses      Past Surgical History:   Procedure Laterality Date    GYNECOLOGIC CRYOSURGERY      patient was 26years old    NO PAST SURGERIES      [place] COLPOSCOPY,CERVIX W/ADJ VAG,W/LOOP BX N/A 8/9/2019    Procedure: BIOPSY LEEP CERVIX;  Surgeon: Steve Key MD;  Location: [facility];  Service: Gynecology     Current Outpatient Medications   Medication Sig Dispense Refill    butalbital-acetaminophen-caffeine (Esgic) -40 mg per tablet Take 1 tablet by mouth every 6 (six) hours as needed for headaches 6 tablet 0    dexamethasone (DECADRON) 2 mg tablet Take 3 pills day 1, 2 pills day 2 and 1 pill on last day 6 tablet 0    ibuprofen (MOTRIN) 800 mg tablet Take 1 tablet (800 mg total) by mouth every 8 (eight) hours as needed for mild pain 10 tablet 0    LATUDA 40 MG tablet take 1 tablet by mouth once daily AFTER DINNER  0    medroxyPROGESTERone (DEPO-PROVERA) 150 mg/mL injection Inject 1 mL (150 mg total) into a muscle every 3 (three) months 1 mL 5    medroxyPROGESTERone (DEPO-PROVERA) 150 mg/mL injection Inject 1 mL (150 mg total) into a muscle every 3 (three) months 1 mL 5    medroxyPROGESTERone acetate (DEPO-PROVERA SYRINGE) 150 mg/mL injection       naproxen (Naprosyn) 500 mg tablet Take 1 tablet (500 mg total) by mouth 2 (two) times a day with meals 60 tablet 1    omeprazole (PriLOSEC) 20 mg delayed release capsule TAKE 1 CAPSULE(20 MG) BY MOUTH DAILY 30 capsule 0    sucralfate (CARAFATE) 1 g/10 mL suspension Take 10 mL (1 g total) by mouth 4 (four) times a day 420 mL 1    topiramate (Topamax) 25 mg tablet Take 1 tablet (25 mg total) by mouth in the morning 30 tablet 1     Current Facility-Administered Medications   Medication Dose Route Frequency Provider Last Rate Last Admin    medroxyPROGESTERone acetate (DEPO-PROVERA SYRINGE) IM injection 150 mg  150 mg Intramuscular Q3 Months KARTIK Abraham   150 mg at 11/17/21 0847     No Known Allergies    Review of Systems   HENT: Positive for congestion  Neurological: Positive for headaches (x 1 month )  Objective: There were no vitals filed for this visit  Physical Exam    VIRTUAL VISIT DISCLAIMER    Jhonny Ctaes verbally agrees to participate in [employer]  Pt is aware that [employer] could be limited without vital signs or the ability to perform a full hands-on physical Deborra Rather understands she or the provider may request at any time to terminate the video visit and request the patient to seek care or treatment in person

## 2022-01-11 DIAGNOSIS — G43.019 INTRACTABLE MIGRAINE WITHOUT AURA AND WITHOUT STATUS MIGRAINOSUS: ICD-10-CM

## 2022-01-14 RX ORDER — BUTALBITAL, ACETAMINOPHEN AND CAFFEINE 50; 325; 40 MG/1; MG/1; MG/1
1 TABLET ORAL EVERY 6 HOURS PRN
Qty: 6 TABLET | Refills: 0 | Status: SHIPPED | OUTPATIENT
Start: 2022-01-14 | End: 2022-04-28

## 2022-01-22 DIAGNOSIS — K29.00 ACUTE GASTRITIS WITHOUT HEMORRHAGE, UNSPECIFIED GASTRITIS TYPE: ICD-10-CM

## 2022-01-24 RX ORDER — OMEPRAZOLE 20 MG/1
CAPSULE, DELAYED RELEASE ORAL
Qty: 30 CAPSULE | Refills: 0 | Status: SHIPPED | OUTPATIENT
Start: 2022-01-24

## 2022-02-01 ENCOUNTER — OFFICE VISIT (OUTPATIENT)
Dept: FAMILY MEDICINE CLINIC | Facility: CLINIC | Age: 32
End: 2022-02-01

## 2022-02-01 VITALS
WEIGHT: 115.2 LBS | BODY MASS INDEX: 19.19 KG/M2 | HEIGHT: 65 IN | HEART RATE: 86 BPM | SYSTOLIC BLOOD PRESSURE: 100 MMHG | DIASTOLIC BLOOD PRESSURE: 78 MMHG | TEMPERATURE: 98.2 F | OXYGEN SATURATION: 95 % | RESPIRATION RATE: 18 BRPM

## 2022-02-01 DIAGNOSIS — G43.019 INTRACTABLE MIGRAINE WITHOUT AURA AND WITHOUT STATUS MIGRAINOSUS: ICD-10-CM

## 2022-02-01 PROCEDURE — 99214 OFFICE O/P EST MOD 30 MIN: CPT | Performed by: PHYSICIAN ASSISTANT

## 2022-02-01 RX ORDER — MIRTAZAPINE 15 MG/1
TABLET, FILM COATED ORAL
COMMUNITY
Start: 2022-01-17

## 2022-02-01 RX ORDER — HYDROXYZINE HYDROCHLORIDE 25 MG/1
TABLET, FILM COATED ORAL
COMMUNITY
Start: 2022-01-17 | End: 2022-07-13

## 2022-02-01 RX ORDER — TOPIRAMATE 25 MG/1
25 TABLET ORAL 2 TIMES DAILY
Qty: 60 TABLET | Refills: 1 | Status: SHIPPED | OUTPATIENT
Start: 2022-02-01 | End: 2022-03-01 | Stop reason: SDUPTHER

## 2022-02-01 NOTE — PROGRESS NOTES
Assessment/Plan:    Migraine  Increase topamax to 25mg BID  Discussed furher increase if no change          Problem List Items Addressed This Visit        Cardiovascular and Mediastinum    Migraine     Increase topamax to 25mg BID  Discussed furher increase if no change          Relevant Medications    mirtazapine (REMERON) 15 mg tablet    topiramate (Topamax) 25 mg tablet            Subjective:      Patient ID: Laure Ashford is a 28 y o  female  HPI  Here for migraines  + photophobina, nausea/vomtiing  She got rare migraines but then in December got covid and has had frequent headaches since  She is taking topamax and still getting headaches but not as intense  Diet, sleep, meds, stress level all the same  The following portions of the patient's history were reviewed and updated as appropriate:   She  has a past medical history of Abnormal Pap smear of cervix, ADHD, [facility] ([facility] ), Depression, GERD (gastroesophageal reflux disease), Migraine, OCD (obsessive compulsive disorder), and Wears glasses    She   Patient Active Problem List    Diagnosis Date Noted    Migraine     COVID-19     Suspected COVID-19 virus infection 12/30/2021    Acute gastritis without hemorrhage 07/19/2021    Screen for STD (sexually transmitted disease) 06/10/2020    Encounter for annual routine gynecological examination 06/10/2020    Breast lump 06/10/2020    Dizziness 05/12/2020    Yeast infection 12/09/2019    Vaginal odor 09/25/2019    Encounter for surveillance of contraceptive pills 09/16/2019    S/P LEEP 08/09/2019    LGSIL on Pap smear of cervix 06/12/2019    Atypical glandular cells of undetermined significance (MARIA E) on cervical Pap smear 06/12/2019    ADHD (attention deficit hyperactivity disorder) 05/20/2019    Bipolar 1 disorder ([facility] ) 05/20/2019    OCD (obsessive compulsive disorder) 05/20/2019    Nicotine use disorder 07/24/2018    Cervical cancer screening 07/24/2018     She  has a past surgical history that includes No past surgeries; Gynecologic cryosurgery; and pr colposcopy,cervix w/adj vag,w/loop bx (N/A, 8/9/2019)  Her family history includes Bipolar disorder in her mother; Depression in her sister; No Known Problems in her father  She  reports that she has been smoking cigarettes  She has been smoking about 1 00 pack per day  She has never used smokeless tobacco  She reports current alcohol use  She reports that she does not use drugs    Current Outpatient Medications   Medication Sig Dispense Refill    butalbital-acetaminophen-caffeine (Esgic) -40 mg per tablet Take 1 tablet by mouth every 6 (six) hours as needed for headaches 6 tablet 0    fluticasone (FLONASE) 50 mcg/act nasal spray SHAKE LIQUID AND USE 1 SPRAY IN EACH NOSTRIL TWICE DAILY 48 g 1    hydrOXYzine HCL (ATARAX) 25 mg tablet       LATUDA 40 MG tablet take 1 tablet by mouth once daily AFTER DINNER  0    medroxyPROGESTERone (DEPO-PROVERA) 150 mg/mL injection Inject 1 mL (150 mg total) into a muscle every 3 (three) months 1 mL 5    mirtazapine (REMERON) 15 mg tablet       omeprazole (PriLOSEC) 20 mg delayed release capsule TAKE 1 CAPSULE(20 MG) BY MOUTH DAILY 30 capsule 0    topiramate (Topamax) 25 mg tablet Take 1 tablet (25 mg total) by mouth 2 (two) times a day 60 tablet 1     Current Facility-Administered Medications   Medication Dose Route Frequency Provider Last Rate Last Admin    medroxyPROGESTERone acetate (DEPO-PROVERA SYRINGE) IM injection 150 mg  150 mg Intramuscular Q3 Months KARTIK Abraham   150 mg at 11/17/21 4655     Current Outpatient Medications on File Prior to Visit   Medication Sig    butalbital-acetaminophen-caffeine (Esgic) -40 mg per tablet Take 1 tablet by mouth every 6 (six) hours as needed for headaches    fluticasone (FLONASE) 50 mcg/act nasal spray SHAKE LIQUID AND USE 1 SPRAY IN EACH NOSTRIL TWICE DAILY    hydrOXYzine HCL (ATARAX) 25 mg tablet     LATUDA 40 MG tablet take 1 tablet by mouth once daily AFTER DINNER    medroxyPROGESTERone (DEPO-PROVERA) 150 mg/mL injection Inject 1 mL (150 mg total) into a muscle every 3 (three) months    mirtazapine (REMERON) 15 mg tablet     omeprazole (PriLOSEC) 20 mg delayed release capsule TAKE 1 CAPSULE(20 MG) BY MOUTH DAILY    [DISCONTINUED] dexamethasone (DECADRON) 2 mg tablet Take 3 pills day 1, 2 pills day 2 and 1 pill on last day    [DISCONTINUED] ibuprofen (MOTRIN) 800 mg tablet Take 1 tablet (800 mg total) by mouth every 8 (eight) hours as needed for mild pain    [DISCONTINUED] medroxyPROGESTERone (DEPO-PROVERA) 150 mg/mL injection Inject 1 mL (150 mg total) into a muscle every 3 (three) months    [DISCONTINUED] medroxyPROGESTERone acetate (DEPO-PROVERA SYRINGE) 150 mg/mL injection     [DISCONTINUED] naproxen (Naprosyn) 500 mg tablet Take 1 tablet (500 mg total) by mouth 2 (two) times a day with meals    [DISCONTINUED] sucralfate (CARAFATE) 1 g/10 mL suspension Take 10 mL (1 g total) by mouth 4 (four) times a day    [DISCONTINUED] topiramate (Topamax) 25 mg tablet Take 1 tablet (25 mg total) by mouth in the morning     Current Facility-Administered Medications on File Prior to Visit   Medication    medroxyPROGESTERone acetate (DEPO-PROVERA SYRINGE) IM injection 150 mg     She has No Known Allergies       Review of Systems   Constitutional: Negative for chills and fever  HENT: Negative for ear pain and sore throat  Eyes: Negative for pain and visual disturbance  Respiratory: Negative for cough and shortness of breath  Cardiovascular: Negative for chest pain and palpitations  Gastrointestinal: Positive for nausea  Negative for abdominal pain and vomiting  Genitourinary: Negative for dysuria and hematuria  Musculoskeletal: Negative for arthralgias and back pain  Skin: Negative for color change and rash  Neurological: Positive for headaches  Negative for seizures and syncope     All other systems reviewed and are negative  Objective:      /78 (BP Location: Left arm, Patient Position: Sitting, Cuff Size: Standard)   Pulse 86   Temp 98 2 °F (36 8 °C) (Probe)   Resp 18   Ht 5' 5" (1 651 m)   Wt 52 3 kg (115 lb 3 2 oz)   LMP  (LMP Unknown)   SpO2 95%   Breastfeeding No   BMI 19 17 kg/m²          Physical Exam  Vitals and nursing note reviewed  Constitutional:       General: She is not in acute distress  Appearance: She is well-developed  HENT:      Head: Normocephalic and atraumatic  Right Ear: External ear normal       Left Ear: External ear normal    Eyes:      Conjunctiva/sclera: Conjunctivae normal    Neck:      Thyroid: No thyromegaly  Cardiovascular:      Rate and Rhythm: Normal rate and regular rhythm  Heart sounds: Normal heart sounds  No murmur heard  Pulmonary:      Effort: Pulmonary effort is normal  No respiratory distress  Breath sounds: Normal breath sounds  No wheezing  Musculoskeletal:      Cervical back: Normal range of motion and neck supple  Lymphadenopathy:      Cervical: No cervical adenopathy  Neurological:      Mental Status: She is alert and oriented to person, place, and time     Psychiatric:         Mood and Affect: Mood normal          Behavior: Behavior normal

## 2022-02-01 NOTE — PATIENT INSTRUCTIONS
Migraine Headache   WHAT YOU NEED TO KNOW:   What is a migraine headache? A migraine is a severe headache  The pain can be so severe that it interferes with your daily activities  A migraine can last a few hours up to several days  The exact cause of migraines is not known  A family history of migraines increases your risk  Your risk is also higher if you are a woman or take medicines such as estrogen or a vasodilator  What are the warning signs that a migraine headache is about to start? Warning signs usually start 15 to 60 minutes before the headache:  · Visual changes (auras), such as blurred vision, temporary blind or bright spots, lines, or hallucinations    · Unusual tiredness or frequent yawning    · Tingling in an arm or leg    What are the signs and symptoms of a migraine headache? A migraine headache usually begins as a dull ache around the eye or temple  The pain may get worse with movement  You may also have the following:  · Pain in your head that may increase to the point that you cannot do everyday activities    · Pain on one or both sides of your head    · Throbbing, pulsing, or pounding pain in your head    · Nausea and vomiting    · Sensitivity to light, noise, or smells    What can trigger a migraine headache? · Stress, eye strain, oversleeping, or not getting enough sleep    · Hormone changes in women from birth control pills, pregnancy, menopause, or during a monthly period    · Skipping meals, going too long without eating, or not drinking enough liquids    · Certain foods or drinks such as chocolate, hard cheese, alcohol, or drinks that contain caffeine    · Foods that contain gluten, nitrates, MSG, or artificial sweeteners    · Sunlight, bright or flashing lights, loud noises, smoke, or strong smells    · Heat, humidity, or changes in the weather    How is a migraine headache diagnosed? Your healthcare provider will ask about your headaches   Describe the pain and any other symptoms, such as nausea  Tell the provider if you think anything triggered the pain  The provider will also want to know what you ate and drank before the pain started  Tell the provider about any medical conditions you have or that run in your family  Include any recent stressors you have had  You may also need any of the following:  · A neurologic exam  is used to check how your pupils react to light  Your healthcare provider may check your memory, hand grasp, and balance  · CT or MRI pictures  may be taken of your brain  You may be given contrast liquid to help your brain show up better in the pictures  Tell the healthcare provider if you have ever had an allergic reaction to contrast liquid  Do not enter the MRI room with anything metal  Metal can cause serious injury  Tell the healthcare provider if you have any metal in or on your body  How is a migraine headache treated? Migraines cannot be cured  The goal of treatment is to reduce your symptoms  · Medicines  may be given to help you manage migraines  Take medicine as soon as you feel a migraine begin, or as directed  Your healthcare provider may recommend any of the following:    ? Migraine medicines  are used to help prevent or stop a migraine  ? NSAIDs  help decrease swelling and pain or fever  This medicine is available with or without a doctor's order  NSAIDs can cause stomach bleeding or kidney problems in certain people  If you take blood thinner medicine, always ask your healthcare provider if NSAIDs are safe for you  Always read the medicine label and follow directions  ? Acetaminophen  decreases pain and fever  It is available without a doctor's order  Ask how much to take and how often to take it  Follow directions  Read the labels of all other medicines you are using to see if they also contain acetaminophen, or ask your doctor or pharmacist  Acetaminophen can cause liver damage if not taken correctly   Do not use more than 4 grams (4,000 milligrams) total of acetaminophen in one day  ? Prescription pain medicine  may be given  Ask your healthcare provider how to take this medicine safely  Some prescription pain medicines contain acetaminophen  Do not take other medicines that contain acetaminophen without talking to your healthcare provider  Too much acetaminophen may cause liver damage  Prescription pain medicine may cause constipation  Ask your healthcare provider how to prevent or treat constipation  ? Antinausea medicine  may be given to calm your stomach and to help prevent vomiting  This medicine can also help relieve pain  · Cognitive behavior therapy (CBT)  can help you learn ways to manage and prevent migraines  A therapist can teach you to relax and to reduce stress  You may learn ways to create healthy nutrition, activity, and sleep habits to prevent migraines  The therapist can also help you manage conditions that can affect migraines, such as anxiety or depression  What can I do to manage my symptoms? · Rest in a dark, quiet room  This will help decrease your pain  Sleep may also help relieve the pain  · Apply ice to decrease pain  Use an ice pack, or put crushed ice in a plastic bag  Cover the ice pack with a towel and place it on your head  Apply ice for 15 to 20 minutes every hour  · Apply heat to decrease pain and muscle spasms  Use a small towel dampened with warm water or a heating pad, or sit in a warm bath  Apply heat on the area for 20 to 30 minutes every 2 hours  You may alternate heat and ice  · Keep a migraine record  Write down when your migraines start and stop  Include your symptoms and what you were doing when a migraine began  Record what you ate or drank for 24 hours before the migraine started  Keep track of what you did to treat your migraine and if it worked  Bring the migraine record with you to visits with your healthcare provider  What can I do to prevent another migraine headache? · Prevent a medicine overuse headache  Take pain medicines only as long as directed  A medicine may be limited to a certain amount each month  Your healthcare provider can help you create a plan so you get a safe amount each month  · Do not smoke  Nicotine and other chemicals in cigarettes and cigars can trigger a migraine or make it worse  Ask your healthcare provider for information if you currently smoke and need help to quit  E-cigarettes or smokeless tobacco still contain nicotine  Talk to your healthcare provider before you use these products  · Do not drink alcohol  Alcohol can trigger a migraine  It can also keep medicines used to treat your migraines from working  · Be physically active  Physical activity, such as exercise, may help prevent migraines  Talk to your healthcare provider about the best activity plan for you  Try to get at least 30 minutes of physical activity on most days  · Manage stress  Stress may trigger a migraine  Learn new ways to relax, such as deep breathing  · Create a sleep schedule  Go to bed and get up at the same times each day  Do not watch television before bed  · Eat a variety of healthy foods  Include healthy foods such as include fruit, vegetables, whole-grain breads, low-fat dairy products, beans, lean meat, and fish  Do not have food or drinks that trigger your migraines  · Drink more liquids to prevent dehydration  Your healthcare provider can tell you how much liquid to drink each day and which liquids are best for you  Call your local emergency number (911 in the [address] [place] [address],[address]) or have someone call if:   · You feel like you are going to faint, you become confused, or you have a seizure  When should I seek immediate care? · You have a headache that seems different or much worse than your usual migraine headache  · You have a severe headache with a fever or a stiff neck      · You have new problems with speech, vision, balance, or movement  When should I call my doctor? · Your migraines interfere with your daily activities  · Your medicines or treatments stop working  · You have questions or concerns about your condition or care  CARE AGREEMENT:   You have the right to help plan your care  Learn about your health condition and how it may be treated  Discuss treatment options with your healthcare providers to decide what care you want to receive  You always have the right to refuse treatment  The above information is an  only  It is not intended as medical advice for individual conditions or treatments  Talk to your doctor, nurse or pharmacist before following any medical regimen to see if it is safe and effective for you  © Copyright [employer] 2021 Information is for End User's use only and may not be sold, redistributed or otherwise used for commercial purposes   All illustrations and images included in CareNotes® are the copyrighted property of [employer]  or [address] [place] [address]

## 2022-02-02 ENCOUNTER — CLINICAL SUPPORT (OUTPATIENT)
Dept: OBGYN CLINIC | Facility: CLINIC | Age: 32
End: 2022-02-02

## 2022-02-02 VITALS
BODY MASS INDEX: 19.07 KG/M2 | DIASTOLIC BLOOD PRESSURE: 73 MMHG | SYSTOLIC BLOOD PRESSURE: 108 MMHG | HEART RATE: 112 BPM | WEIGHT: 114.6 LBS

## 2022-02-02 DIAGNOSIS — Z30.42 ENCOUNTER FOR SURVEILLANCE OF INJECTABLE CONTRACEPTIVE: Primary | ICD-10-CM

## 2022-02-02 PROCEDURE — 96372 THER/PROPH/DIAG INJ SC/IM: CPT

## 2022-02-02 RX ADMIN — MEDROXYPROGESTERONE ACETATE 150 MG: 150 INJECTION, SUSPENSION INTRAMUSCULAR at 08:37

## 2022-02-08 ENCOUNTER — OFFICE VISIT (OUTPATIENT)
Dept: URGENT CARE | Facility: MEDICAL CENTER | Age: 32
End: 2022-02-08
Payer: MEDICARE

## 2022-02-08 VITALS
HEART RATE: 94 BPM | OXYGEN SATURATION: 100 % | TEMPERATURE: 99.1 F | RESPIRATION RATE: 16 BRPM | DIASTOLIC BLOOD PRESSURE: 76 MMHG | SYSTOLIC BLOOD PRESSURE: 104 MMHG

## 2022-02-08 DIAGNOSIS — M79.601 PAIN OF RIGHT UPPER EXTREMITY: Primary | ICD-10-CM

## 2022-02-08 DIAGNOSIS — M75.41 IMPINGEMENT SYNDROME OF RIGHT SHOULDER: ICD-10-CM

## 2022-02-08 PROCEDURE — 99213 OFFICE O/P EST LOW 20 MIN: CPT | Performed by: PHYSICIAN ASSISTANT

## 2022-02-08 RX ORDER — PREDNISONE 10 MG/1
TABLET ORAL
Qty: 21 TABLET | Refills: 0 | Status: SHIPPED | OUTPATIENT
Start: 2022-02-08 | End: 2022-04-28

## 2022-02-08 NOTE — PATIENT INSTRUCTIONS
Medication as prescribed (take first thing in the morning with food)  Ice over affected area  Gentle stretching  Follow up with ortho if symptoms do not resolve  Follow up with PCP in 3-5 days  Proceed to  ER if symptoms worsen  Shoulder Impingement Syndrome   WHAT YOU NEED TO KNOW:   The most common type of shoulder impingement syndrome is subacromial impingement syndrome (SIS)  SIS happens when the tendons or bursa become trapped between bones of your shoulder joint  You have pain when you reach over your head or lie on the shoulder while sleeping  You may also have pain when you move your arm out to the side or behind your body  DISCHARGE INSTRUCTIONS:   Return to the emergency department if:   · Your shoulder, arm, hand, or fingers turn bluish or pale, or feel cold or numb  · Your pain gets worse, even after rest and medicine  · You cannot move your fingers  Call your doctor if:   · You have questions or concerns about your condition or care  Medicines: You may  be given any of the following:  · Acetaminophen  decreases pain  It is available without a doctor's order  Ask how much to take and how often to take it  Follow directions  Read the labels of all other medicines you are using to see if they also contain acetaminophen, or ask your doctor or pharmacist  Acetaminophen can cause liver damage if not taken correctly  Do not use more than 4 grams (4,000 milligrams) total of acetaminophen in one day  · NSAIDs,  such as ibuprofen, help decrease swelling and pain  This medicine is available with or without a doctor's order  NSAIDs can cause stomach bleeding or kidney problems in certain people  If you take blood thinner medicine, always ask your healthcare provider if NSAIDs are safe for you  Always read the medicine label and follow directions  · Take your medicine as directed    Contact your healthcare provider if you think your medicine is not helping or if you have side effects  Tell him or her if you are allergic to any medicine  Keep a list of the medicines, vitamins, and herbs you take  Include the amounts, and when and why you take them  Bring the list or the pill bottles to follow-up visits  Carry your medicine list with you in case of an emergency  Rest  will help your shoulder pain  Limit reaching above your shoulder's height  Apply ice  on your shoulder for 20 minutes 1 or 2 times in a day  Use an ice pack, or put crushed ice in a plastic bag  You may also use a bag of frozen peas or corn  Cover the bag with a towel  Ice decreases swelling and pain  Follow up with your doctor and physical therapist as directed:  Write down your questions so you remember to ask them during your visits  © Copyright [employer] 2021 Information is for End User's use only and may not be sold, redistributed or otherwise used for commercial purposes  All illustrations and images included in CareNotes® are the copyrighted property of [employer]  or [employer]   The above information is an  only  It is not intended as medical advice for individual conditions or treatments  Talk to your doctor, nurse or pharmacist before following any medical regimen to see if it is safe and effective for you

## 2022-02-08 NOTE — PROGRESS NOTES
[facility]        NAME: Maicol Mesa is a 28 y o  female  : 1990    MRN: [id number]  DATE: 2022  TIME: 11:03 AM    Assessment and Plan   Pain of right upper extremity [M79 601]  1  Pain of right upper extremity  Ambulatory referral to Orthopedic Surgery   2  Impingement syndrome of right shoulder  predniSONE 10 mg tablet    Ambulatory referral to Orthopedic Surgery         Patient Instructions     Medication as prescribed (take first thing in the morning with food)  Ice over affected area  Gentle stretching  Follow up with ortho if symptoms do not resolve  Follow up with PCP in 3-5 days  Proceed to  ER if symptoms worsen  Chief Complaint     Chief Complaint   Patient presents with    Arm Pain     Patient relates started with right arm pain since Saturday  "my entire right arm and it is pinching" Denies fever  Denies injury and lifting  No falls  Patient had same pain in  and seen  at urgent care  2018 dx with L shoulder biceps tendinitis and was given toradol and robaxin    History of Present Illness       Patient does not currently work but states she frequently cleans  Arm Pain   Incident onset: Saturday  There was no injury mechanism  Pain location: R arm  Radiates to: R shoulder down to R hand  The pain is moderate  Associated symptoms include numbness  Pertinent negatives include no chest pain or tingling  The symptoms are aggravated by movement  She has tried ice and heat (motrin) for the symptoms  Review of Systems   Review of Systems   Constitutional: Negative for chills and fever  Respiratory: Negative for shortness of breath  Cardiovascular: Negative for chest pain  Musculoskeletal: Negative for joint swelling  Skin: Negative for color change  Neurological: Positive for numbness  Negative for tingling and weakness           Current Medications       Current Outpatient Medications:     butalbital-acetaminophen-caffeine (Esgic) -40 mg per tablet, Take 1 tablet by mouth every 6 (six) hours as needed for headaches, Disp: 6 tablet, Rfl: 0    fluticasone (FLONASE) 50 mcg/act nasal spray, SHAKE LIQUID AND USE 1 SPRAY IN EACH NOSTRIL TWICE DAILY, Disp: 48 g, Rfl: 1    LATUDA 40 MG tablet, take 1 tablet by mouth once daily AFTER DINNER, Disp: , Rfl: 0    medroxyPROGESTERone (DEPO-PROVERA) 150 mg/mL injection, Inject 1 mL (150 mg total) into a muscle every 3 (three) months, Disp: 1 mL, Rfl: 5    mirtazapine (REMERON) 15 mg tablet, , Disp: , Rfl:     omeprazole (PriLOSEC) 20 mg delayed release capsule, TAKE 1 CAPSULE(20 MG) BY MOUTH DAILY, Disp: 30 capsule, Rfl: 0    topiramate (Topamax) 25 mg tablet, Take 1 tablet (25 mg total) by mouth 2 (two) times a day, Disp: 60 tablet, Rfl: 1    hydrOXYzine HCL (ATARAX) 25 mg tablet, , Disp: , Rfl:     predniSONE 10 mg tablet, Take 6 pills day one, 5 pills day 2, 4 pills day 3, 3 pills day 4, 2 pills day 5, and 1 pill day 6 , Disp: 21 tablet, Rfl: 0    Current Facility-Administered Medications:     medroxyPROGESTERone acetate (DEPO-PROVERA SYRINGE) IM injection 150 mg, 150 mg, Intramuscular, Q3 Months, KATRIK Abraham, 150 mg at 02/02/22 2619    Current Allergies     Allergies as of 02/08/2022    (No Known Allergies)            The following portions of the patient's history were reviewed and updated as appropriate: allergies, current medications, past family history, past medical history, past social history, past surgical history and problem list      Past Medical History:   Diagnosis Date    Abnormal Pap smear of cervix     2013 ASCUS    ADHD     Bipolar 1 disorder (HCC)     Depression     GERD (gastroesophageal reflux disease)     Migraine     OCD (obsessive compulsive disorder)     Wears glasses        Past Surgical History:   Procedure Laterality Date    GYNECOLOGIC CRYOSURGERY      patient was 26years old    NO PAST SURGERIES      WA COLPOSCOPY,CERVIX W/ADJ VAG,W/LOOP BX N/A 8/9/2019    Procedure: BIOPSY LEEP CERVIX;  Surgeon: Jordan Torres MD;  Location: [facility];  Service: Gynecology       Family History   Problem Relation Age of Onset    Bipolar disorder Mother     No Known Problems Father     Depression Sister          Medications have been verified  Objective   /76   Pulse 94   Temp 99 1 °F (37 3 °C) (Tympanic)   Resp 16   LMP  (LMP Unknown)   SpO2 100%   No LMP recorded (lmp unknown)  Patient has had an injection  Physical Exam     Physical Exam  Vitals reviewed  Constitutional:       General: She is not in acute distress  Appearance: She is well-developed  Cardiovascular:      Rate and Rhythm: Normal rate and regular rhythm  Pulses: Normal pulses  Heart sounds: Normal heart sounds  No murmur heard  No friction rub  No gallop  Pulmonary:      Effort: Pulmonary effort is normal  No respiratory distress  Breath sounds: Normal breath sounds  No wheezing or rales  Chest:      Chest wall: No tenderness  Musculoskeletal:         General: Tenderness (R proximal shoulder) present  No swelling or deformity  Normal range of motion  Comments: Full but painful ROM in all fields  Negative tinels  Skin:     General: Skin is warm  Capillary Refill: Capillary refill takes less than 2 seconds  Findings: No bruising or erythema  Neurological:      Mental Status: She is alert and oriented to person, place, and time  Sensory: No sensory deficit  Deep Tendon Reflexes: Reflexes are normal and symmetric  Psychiatric:         Behavior: Behavior normal          Thought Content:  Thought content normal          Judgment: Judgment normal

## 2022-02-11 DIAGNOSIS — Z30.42 ENCOUNTER FOR SURVEILLANCE OF INJECTABLE CONTRACEPTIVE: Primary | ICD-10-CM

## 2022-02-16 RX ORDER — MEDROXYPROGESTERONE ACETATE 150 MG/ML
INJECTION, SUSPENSION INTRAMUSCULAR
Qty: 1 ML | Refills: 5 | Status: SHIPPED | OUTPATIENT
Start: 2022-02-16

## 2022-02-17 ENCOUNTER — OFFICE VISIT (OUTPATIENT)
Dept: OBGYN CLINIC | Facility: MEDICAL CENTER | Age: 32
End: 2022-02-17
Payer: MEDICARE

## 2022-02-17 ENCOUNTER — APPOINTMENT (OUTPATIENT)
Dept: RADIOLOGY | Facility: MEDICAL CENTER | Age: 32
End: 2022-02-17
Payer: MEDICARE

## 2022-02-17 VITALS
HEART RATE: 112 BPM | DIASTOLIC BLOOD PRESSURE: 68 MMHG | HEIGHT: 65 IN | WEIGHT: 110 LBS | SYSTOLIC BLOOD PRESSURE: 98 MMHG | BODY MASS INDEX: 18.33 KG/M2

## 2022-02-17 DIAGNOSIS — M25.511 RIGHT SHOULDER PAIN, UNSPECIFIED CHRONICITY: ICD-10-CM

## 2022-02-17 DIAGNOSIS — M79.601 PAIN OF RIGHT UPPER EXTREMITY: ICD-10-CM

## 2022-02-17 DIAGNOSIS — M54.12 RADICULOPATHY, CERVICAL REGION: ICD-10-CM

## 2022-02-17 DIAGNOSIS — M75.41 IMPINGEMENT SYNDROME OF RIGHT SHOULDER: ICD-10-CM

## 2022-02-17 DIAGNOSIS — M54.12 RADICULOPATHY, CERVICAL REGION: Primary | ICD-10-CM

## 2022-02-17 PROCEDURE — 99204 OFFICE O/P NEW MOD 45 MIN: CPT | Performed by: EMERGENCY MEDICINE

## 2022-02-17 PROCEDURE — 72040 X-RAY EXAM NECK SPINE 2-3 VW: CPT

## 2022-02-17 RX ORDER — GABAPENTIN 300 MG/1
300 CAPSULE ORAL
Qty: 30 CAPSULE | Refills: 0 | Status: SHIPPED | OUTPATIENT
Start: 2022-02-17 | End: 2022-03-25

## 2022-02-17 NOTE — PROGRESS NOTES
Assessment/Plan:    Diagnoses and all orders for this visit:    Radiculopathy, cervical region  -     Ambulatory Referral to Physical Therapy; Future  -     XR spine cervical 2 or 3 vw injury; Future  -     gabapentin (Neurontin) 300 mg capsule; Take 1 capsule (300 mg total) by mouth daily at bedtime    Right shoulder pain, unspecified chronicity  -     Ambulatory Referral to Physical Therapy; Future  -     XR spine cervical 2 or 3 vw injury; Future  -     gabapentin (Neurontin) 300 mg capsule; Take 1 capsule (300 mg total) by mouth daily at bedtime    Pain of right upper extremity  -     Ambulatory referral to Orthopedic Surgery  -     gabapentin (Neurontin) 300 mg capsule; Take 1 capsule (300 mg total) by mouth daily at bedtime    Impingement syndrome of right shoulder  -     Ambulatory referral to Orthopedic Surgery  -     gabapentin (Neurontin) 300 mg capsule; Take 1 capsule (300 mg total) by mouth daily at bedtime    s/p prednisone, Xray C spine wnl  Start PT, continue Motrin, start gabapentin at night time    Return in about 5 weeks (around 3/24/2022)  Chief Complaint:     Chief Complaint   Patient presents with    Right Shoulder - Pain       Subjective:   Patient ID: Collin Montelongo is a 28 y o  female  NP presents for Was evaluated neck and arm pain she notes pain on the right side of the neck in the trapezius as well as the shoulder down to the hand she does note right arm numbness tingling  She was evaluated at urgent care placed on prednisone currently taking motrin  Review of Systems    The following portions of the patient's chart were reviewed and updated as appropriate:    Allergy:  No Known Allergies      Past Medical History:   Diagnosis Date    Abnormal Pap smear of cervix     2013 ASCUS    ADHD     Bipolar 1 disorder (HCC)     Depression     GERD (gastroesophageal reflux disease)     Migraine     OCD (obsessive compulsive disorder)     Wears glasses        Past Surgical History:   Procedure Laterality Date    GYNECOLOGIC CRYOSURGERY      patient was 26years old    NO PAST SURGERIES      NE COLPOSCOPY,CERVIX W/ADJ VAG,W/LOOP BX N/A 8/9/2019    Procedure: BIOPSY LEEP CERVIX;  Surgeon: Nidhi Alba MD;  Location: [facility] OR;  Service: Gynecology       Social History     Socioeconomic History    Marital status: Single     Spouse name: Not on file    Number of children: Not on file    Years of education: Not on file    Highest education level: Not on file   Occupational History    Not on file   Tobacco Use    Smoking status: Current Every Day Smoker     Packs/day: 1 00     Types: Cigarettes    Smokeless tobacco: Never Used   Vaping Use    Vaping Use: Never used   Substance and Sexual Activity    Alcohol use: Yes    Drug use: No    Sexual activity: Yes     Partners: Male     Birth control/protection: Injection   Other Topics Concern    Not on file   Social History Narrative    Not on file     Social Determinants of Health     Financial Resource Strain: Low Risk     Difficulty of Paying Living Expenses: Not hard at all   Food Insecurity: No Food Insecurity    Worried About Running Out of Food in the Last Year: Never true    [address] in the Last Year: Never true   Transportation Needs: No Transportation Needs    Lack of Transportation (Medical): No    Lack of Transportation (Non-Medical): No   Physical Activity: Inactive    Days of Exercise per Week: 0 days    Minutes of Exercise per Session: 0 min   Stress: No Stress Concern Present    Feeling of Stress :  Only a little   Social Connections: Not on file   Intimate Partner Violence: Not At Risk    Fear of Current or Ex-Partner: No    Emotionally Abused: No    Physically Abused: No    Sexually Abused: No   Housing Stability: Unknown    Unable to Pay for Housing in the Last Year: No    Number of Places Lived in the Last Year: Not on file    Unstable Housing in the Last Year: No Family History   Problem Relation Age of Onset    Bipolar disorder Mother     No Known Problems Father     Depression Sister        Medications:    Current Outpatient Medications:     butalbital-acetaminophen-caffeine (Esgic) -40 mg per tablet, Take 1 tablet by mouth every 6 (six) hours as needed for headaches, Disp: 6 tablet, Rfl: 0    fluticasone (FLONASE) 50 mcg/act nasal spray, SHAKE LIQUID AND USE 1 SPRAY IN EACH NOSTRIL TWICE DAILY, Disp: 48 g, Rfl: 1    hydrOXYzine HCL (ATARAX) 25 mg tablet, , Disp: , Rfl:     LATUDA 40 MG tablet, take 1 tablet by mouth once daily AFTER DINNER, Disp: , Rfl: 0    medroxyPROGESTERone (DEPO-PROVERA) 150 mg/mL injection, Inject 1 mL (150 mg total) into a muscle every 3 (three) months, Disp: 1 mL, Rfl: 5    medroxyPROGESTERone acetate (DEPO-PROVERA SYRINGE) 150 mg/mL injection, INJECT 1 ML INTO THE MUSCLE EVERY 3 MONTHS, Disp: 1 mL, Rfl: 5    mirtazapine (REMERON) 15 mg tablet, , Disp: , Rfl:     omeprazole (PriLOSEC) 20 mg delayed release capsule, TAKE 1 CAPSULE(20 MG) BY MOUTH DAILY, Disp: 30 capsule, Rfl: 0    predniSONE 10 mg tablet, Take 6 pills day one, 5 pills day 2, 4 pills day 3, 3 pills day 4, 2 pills day 5, and 1 pill day 6 , Disp: 21 tablet, Rfl: 0    topiramate (Topamax) 25 mg tablet, Take 1 tablet (25 mg total) by mouth 2 (two) times a day, Disp: 60 tablet, Rfl: 1    gabapentin (Neurontin) 300 mg capsule, Take 1 capsule (300 mg total) by mouth daily at bedtime, Disp: 30 capsule, Rfl: 0    Current Facility-Administered Medications:     medroxyPROGESTERone acetate (DEPO-PROVERA SYRINGE) IM injection 150 mg, 150 mg, Intramuscular, Q3 Months, KARTIK Abraham, 150 mg at 02/02/22 8142    Patient Active Problem List   Diagnosis    Nicotine use disorder    Cervical cancer screening    LGSIL on Pap smear of cervix    Atypical glandular cells of undetermined significance (MARIA E) on cervical Pap smear    ADHD (attention deficit hyperactivity disorder)    Bipolar 1 disorder (HCC)    OCD (obsessive compulsive disorder)    S/P LEEP    Encounter for surveillance of contraceptive pills    Vaginal odor    Yeast infection    Dizziness    Screen for STD (sexually transmitted disease)    Encounter for annual routine gynecological examination    Breast lump    Acute gastritis without hemorrhage    Suspected COVID-19 virus infection    Migraine    COVID-19       Objective:  BP 98/68   Pulse (!) 112   Ht 5' 5" (1 651 m)   Wt 49 9 kg (110 lb)   LMP  (LMP Unknown)   BMI 18 30 kg/m²     Left Ankle Exam     Comments:  C-spine full active range of motion she does have right-sided neck pain with rotation to the left      Right Shoulder Exam     Range of Motion   The patient has normal right shoulder ROM      Muscle Strength   External rotation: 5/5   Supraspinatus: 5/5     Tests   Impingement: positive  Drop arm: negative    Comments:  Right UE strength 5/5             Physical Exam      Neurologic Exam    Procedures    I have personally reviewed pertinent films in PACS and my interpretation is Xrays C spine normal alignment

## 2022-02-17 NOTE — PATIENT INSTRUCTIONS
You may use Advil (ibuprofen) 600mg every 6 hours OR Aleve (naproxen) 250-500mg every 12 hours as needed for pain and inflammation  You may also take Tylenol 500mg every 4-6 hours as needed OR max 1,000mg per dose up to 3 times per day for a total of 3,000mg per day  Check with your primary care physician to see if these medications are safe to take and to make sure they do not interfere with your other medications and medical issues

## 2022-03-01 ENCOUNTER — OFFICE VISIT (OUTPATIENT)
Dept: FAMILY MEDICINE CLINIC | Facility: CLINIC | Age: 32
End: 2022-03-01

## 2022-03-01 VITALS
HEIGHT: 65 IN | OXYGEN SATURATION: 97 % | RESPIRATION RATE: 16 BRPM | SYSTOLIC BLOOD PRESSURE: 102 MMHG | HEART RATE: 92 BPM | TEMPERATURE: 97.7 F | DIASTOLIC BLOOD PRESSURE: 64 MMHG | WEIGHT: 115 LBS | BODY MASS INDEX: 19.16 KG/M2

## 2022-03-01 DIAGNOSIS — F31.9 BIPOLAR 1 DISORDER (HCC): ICD-10-CM

## 2022-03-01 DIAGNOSIS — G43.109 MIGRAINE WITH AURA AND WITHOUT STATUS MIGRAINOSUS, NOT INTRACTABLE: Primary | ICD-10-CM

## 2022-03-01 DIAGNOSIS — G43.019 INTRACTABLE MIGRAINE WITHOUT AURA AND WITHOUT STATUS MIGRAINOSUS: ICD-10-CM

## 2022-03-01 PROBLEM — H91.93 BILATERAL HEARING LOSS: Status: ACTIVE | Noted: 2022-03-01

## 2022-03-01 PROCEDURE — 99214 OFFICE O/P EST MOD 30 MIN: CPT | Performed by: NURSE PRACTITIONER

## 2022-03-01 RX ORDER — SUMATRIPTAN 25 MG/1
25 TABLET, FILM COATED ORAL ONCE AS NEEDED
Qty: 9 TABLET | Refills: 0 | Status: SHIPPED | OUTPATIENT
Start: 2022-03-01

## 2022-03-01 RX ORDER — DEXAMETHASONE 2 MG/1
TABLET ORAL
Qty: 6 TABLET | Refills: 0 | Status: SHIPPED | OUTPATIENT
Start: 2022-03-01

## 2022-03-01 RX ORDER — TOPIRAMATE 25 MG/1
TABLET ORAL
Qty: 90 TABLET | Refills: 0 | Status: SHIPPED | OUTPATIENT
Start: 2022-03-01 | End: 2022-03-29 | Stop reason: SDUPTHER

## 2022-03-01 NOTE — PATIENT INSTRUCTIONS
Migraine Headache   AMBULATORY CARE:   A migraine headache  is a severe headache  The pain can be so severe that it interferes with your daily activities  A migraine can last a few hours up to several days  The exact cause of migraines is not known  A family history of migraines increases your risk  Your risk is also higher if you are a woman or take medicines such as estrogen or a vasodilator  Common warning signs include the following:  Warning signs usually start 15 to 60 minutes before the headache:  · Visual changes (auras), such as blurred vision, temporary blind or bright spots, lines, or hallucinations    · Unusual tiredness or frequent yawning    · Tingling in an arm or leg    Signs and symptoms of a migraine headache:  A migraine headache usually begins as a dull ache around the eye or temple  The pain may get worse with movement  You may also have the following:  · Pain in your head that may increase to the point that you cannot do everyday activities    · Pain on one or both sides of your head    · Throbbing, pulsing, or pounding pain in your head    · Nausea and vomiting    · Sensitivity to light, noise, or smells    Call your local emergency number (911 in the [address] [place] [address],[address]) or have someone call if:   · You feel like you are going to faint, you become confused, or you have a seizure  Seek care immediately if:   · You have a headache that seems different or much worse than your usual migraine headache  · You have a severe headache with a fever or a stiff neck  · You have new problems with speech, vision, balance, or movement  Call your doctor or neurologist if:   · You have a fever  · Your migraines interfere with your daily activities  · Your medicines or treatments stop working  · You have questions about your condition or care  Treatment:  Migraines cannot be cured  The goal of treatment is to reduce your symptoms  Take medicine as soon as you feel a migraine begin, or as directed  The following may be used to manage migraines:  · Medicines  may be given to prevent or treat migraines  Take medicine to prevent migraines as soon as you feel a migraine begin, or as directed  Your healthcare provider may recommend any of the following:    ? Migraine medicines  are used to help prevent or stop a migraine  ? NSAIDs  help decrease swelling and pain or fever  This medicine is available with or without a doctor's order  NSAIDs can cause stomach bleeding or kidney problems in certain people  If you take blood thinner medicine, always ask your healthcare provider if NSAIDs are safe for you  Always read the medicine label and follow directions  ? Acetaminophen  decreases pain and fever  It is available without a doctor's order  Ask how much to take and how often to take it  Follow directions  Read the labels of all other medicines you are using to see if they also contain acetaminophen, or ask your doctor or pharmacist  Acetaminophen can cause liver damage if not taken correctly  Do not use more than 4 grams (4,000 milligrams) total of acetaminophen in one day  ? Prescription pain medicine  may be given  Ask your healthcare provider how to take this medicine safely  Some prescription pain medicines contain acetaminophen  Do not take other medicines that contain acetaminophen without talking to your healthcare provider  Too much acetaminophen may cause liver damage  Prescription pain medicine may cause constipation  Ask your healthcare provider how to prevent or treat constipation  ? Antinausea medicine  may be given to calm your stomach and to help prevent vomiting  This medicine can also help relieve pain  · Cognitive behavior therapy (CBT)  can help you learn ways to manage and prevent migraines  A therapist can teach you to relax and to reduce stress  You may learn ways to create healthy nutrition, activity, and sleep habits to prevent migraines   The therapist can also help you manage conditions that can affect migraines, such as anxiety or depression  Common triggers for a migraine include the following:   · Stress, eye strain, oversleeping, or not getting enough sleep    · Hormone changes in women from birth control pills, pregnancy, menopause, or during a monthly period    · Skipping meals, going too long without eating, or not drinking enough liquids    · Certain foods or drinks such as chocolate, hard cheese, alcohol, or drinks that contain caffeine    · Foods that contain gluten, nitrates, MSG, or artificial sweeteners    · Sunlight, bright or flashing lights, loud noises, smoke, or strong smells    · Heat, humidity, or changes in the weather    Manage your symptoms:   · Rest in a dark, quiet room  This will help decrease your pain  Sleep may also help relieve the pain  · Apply ice to decrease pain  Use an ice pack, or put crushed ice in a plastic bag  Cover the ice pack with a towel and place it on your head where it hurts for 15 to 20 minutes every hour  · Apply heat to decrease pain and muscle spasms  Use a small towel dampened with warm water or a heating pad, or sit in a warm bath  Apply heat on the area for 20 to 30 minutes every 2 hours  You may alternate heat and ice  · Keep a migraine record  Write down when your migraines start and stop  Include your symptoms and what you were doing when a migraine began  Record what you ate or drank for 24 hours before the migraine started  Keep track of what you did to treat your migraine and if it worked  Prevent another migraine headache:   · Prevent a medicine overuse headache  Take pain medicines only as long as directed  A medicine may be limited to a certain amount each month  Your healthcare provider can help you create a plan so you get a safe amount each month  · Do not smoke  Nicotine and other chemicals in cigarettes and cigars can trigger a migraine and also cause lung damage   Ask your healthcare provider for information if you currently smoke and need help to quit  E-cigarettes or smokeless tobacco still contain nicotine  Talk to your healthcare provider before you use these products  · Do not drink alcohol  Alcohol can trigger a migraine  It can also interfere with the medicines used to treat your migraine  · Be physically active  Physical activity, such as exercise, may help prevent migraines  Talk to your healthcare provider about the best activity plan for you  Try to get at least 30 minutes of physical activity on most days  · Manage stress  Stress may trigger a migraine  Learn new ways to relax, such as deep breathing  · Follow a sleep schedule  Go to bed and get up at the same time each day  · Eat a variety of healthy foods  Healthy foods include fruit, vegetables, whole-grain breads, low-fat dairy products, beans, lean meats, and fish  Do not have foods or drinks that trigger your migraines  · Drink more liquids to prevent dehydration  Your healthcare provider can tell you how much liquid to drink each day and which liquids are best for you  Follow up with your doctor or neurologist as directed:  Bring your migraine record with you  Write down your questions so you remember to ask them during your visits  © Copyright [employer] 2022 Information is for End User's use only and may not be sold, redistributed or otherwise used for commercial purposes  All illustrations and images included in CareNotes® are the copyrighted property of [employer]  or [employer]  The above information is an  only  It is not intended as medical advice for individual conditions or treatments  Talk to your doctor, nurse or pharmacist before following any medical regimen to see if it is safe and effective for you

## 2022-03-01 NOTE — PROGRESS NOTES
Assessment/Plan:    Migraine  Increase Topamax to 75 mg daily   Start Imitrex PRN   Start Magnesium supplement   Check labs       Mikael Solis was seen today for follow-up  Diagnoses and all orders for this visit:    Migraine with aura and without status migrainosus, not intractable  -     magnesium oxide (MAG-OX) 400 mg; Take 1 tablet (400 mg total) by mouth 2 (two) times a day  -     SUMAtriptan (Imitrex) 25 mg tablet; Take 1 tablet (25 mg total) by mouth once as needed for migraine for up to 1 dose  -     Vitamin D 25 hydroxy; Future    Intractable migraine without aura and without status migrainosus  -     CBC and differential; Future  -     Comprehensive metabolic panel; Future  -     Hemoglobin A1C; Future  -     TSH, 3rd generation with Free T4 reflex; Future  -     Vitamin B12; Future  -     topiramate (Topamax) 25 mg tablet; Take 25 mg in the AM and 50 mg in the PM  -     SUMAtriptan (Imitrex) 25 mg tablet; Take 1 tablet (25 mg total) by mouth once as needed for migraine for up to 1 dose  -     dexamethasone (DECADRON) 2 mg tablet; Take 3 tabs x 1 day, 2 tabs x 1 day, 1 tab x 1 day    Bipolar 1 disorder ([facility] [address] )        Return in about 4 weeks (around 3/29/2022) for migraines   Patient Instructions     Migraine Headache   AMBULATORY CARE:   A migraine headache  is a severe headache  The pain can be so severe that it interferes with your daily activities  A migraine can last a few hours up to several days  The exact cause of migraines is not known  A family history of migraines increases your risk  Your risk is also higher if you are a woman or take medicines such as estrogen or a vasodilator    Common warning signs include the following:  Warning signs usually start 15 to 60 minutes before the headache:  · Visual changes (auras), such as blurred vision, temporary blind or bright spots, lines, or hallucinations    · Unusual tiredness or frequent yawning    · Tingling in an arm or leg    Signs and symptoms of a migraine headache:  A migraine headache usually begins as a dull ache around the eye or temple  The pain may get worse with movement  You may also have the following:  · Pain in your head that may increase to the point that you cannot do everyday activities    · Pain on one or both sides of your head    · Throbbing, pulsing, or pounding pain in your head    · Nausea and vomiting    · Sensitivity to light, noise, or smells    Call your local emergency number (911 in the [address] [facility]) or have someone call if:   · You feel like you are going to faint, you become confused, or you have a seizure  Seek care immediately if:   · You have a headache that seems different or much worse than your usual migraine headache  · You have a severe headache with a fever or a stiff neck  · You have new problems with speech, vision, balance, or movement  Call your doctor or neurologist if:   · You have a fever  · Your migraines interfere with your daily activities  · Your medicines or treatments stop working  · You have questions about your condition or care  Treatment:  Migraines cannot be cured  The goal of treatment is to reduce your symptoms  Take medicine as soon as you feel a migraine begin, or as directed  The following may be used to manage migraines:  · Medicines  may be given to prevent or treat migraines  Take medicine to prevent migraines as soon as you feel a migraine begin, or as directed  Your healthcare provider may recommend any of the following:    ? Migraine medicines  are used to help prevent or stop a migraine  ? NSAIDs  help decrease swelling and pain or fever  This medicine is available with or without a doctor's order  NSAIDs can cause stomach bleeding or kidney problems in certain people  If you take blood thinner medicine, always ask your healthcare provider if NSAIDs are safe for you  Always read the medicine label and follow directions  ? Acetaminophen  decreases pain and fever   It is available without a doctor's order  Ask how much to take and how often to take it  Follow directions  Read the labels of all other medicines you are using to see if they also contain acetaminophen, or ask your doctor or pharmacist  Acetaminophen can cause liver damage if not taken correctly  Do not use more than 4 grams (4,000 milligrams) total of acetaminophen in one day  ? Prescription pain medicine  may be given  Ask your healthcare provider how to take this medicine safely  Some prescription pain medicines contain acetaminophen  Do not take other medicines that contain acetaminophen without talking to your healthcare provider  Too much acetaminophen may cause liver damage  Prescription pain medicine may cause constipation  Ask your healthcare provider how to prevent or treat constipation  ? Antinausea medicine  may be given to calm your stomach and to help prevent vomiting  This medicine can also help relieve pain  · Cognitive behavior therapy (CBT)  can help you learn ways to manage and prevent migraines  A therapist can teach you to relax and to reduce stress  You may learn ways to create healthy nutrition, activity, and sleep habits to prevent migraines  The therapist can also help you manage conditions that can affect migraines, such as anxiety or depression      Common triggers for a migraine include the following:   · Stress, eye strain, oversleeping, or not getting enough sleep    · Hormone changes in women from birth control pills, pregnancy, menopause, or during a monthly period    · Skipping meals, going too long without eating, or not drinking enough liquids    · Certain foods or drinks such as chocolate, hard cheese, alcohol, or drinks that contain caffeine    · Foods that contain gluten, nitrates, MSG, or artificial sweeteners    · Sunlight, bright or flashing lights, loud noises, smoke, or strong smells    · Heat, humidity, or changes in the weather    Manage your symptoms:   · Rest in a dark, quiet room  This will help decrease your pain  Sleep may also help relieve the pain  · Apply ice to decrease pain  Use an ice pack, or put crushed ice in a plastic bag  Cover the ice pack with a towel and place it on your head where it hurts for 15 to 20 minutes every hour  · Apply heat to decrease pain and muscle spasms  Use a small towel dampened with warm water or a heating pad, or sit in a warm bath  Apply heat on the area for 20 to 30 minutes every 2 hours  You may alternate heat and ice  · Keep a migraine record  Write down when your migraines start and stop  Include your symptoms and what you were doing when a migraine began  Record what you ate or drank for 24 hours before the migraine started  Keep track of what you did to treat your migraine and if it worked  Prevent another migraine headache:   · Prevent a medicine overuse headache  Take pain medicines only as long as directed  A medicine may be limited to a certain amount each month  Your healthcare provider can help you create a plan so you get a safe amount each month  · Do not smoke  Nicotine and other chemicals in cigarettes and cigars can trigger a migraine and also cause lung damage  Ask your healthcare provider for information if you currently smoke and need help to quit  E-cigarettes or smokeless tobacco still contain nicotine  Talk to your healthcare provider before you use these products  · Do not drink alcohol  Alcohol can trigger a migraine  It can also interfere with the medicines used to treat your migraine  · Be physically active  Physical activity, such as exercise, may help prevent migraines  Talk to your healthcare provider about the best activity plan for you  Try to get at least 30 minutes of physical activity on most days  · Manage stress  Stress may trigger a migraine  Learn new ways to relax, such as deep breathing  · Follow a sleep schedule    Go to bed and get up at the same time each day     · Eat a variety of healthy foods  Healthy foods include fruit, vegetables, whole-grain breads, low-fat dairy products, beans, lean meats, and fish  Do not have foods or drinks that trigger your migraines  · Drink more liquids to prevent dehydration  Your healthcare provider can tell you how much liquid to drink each day and which liquids are best for you  Follow up with your doctor or neurologist as directed:  Bring your migraine record with you  Write down your questions so you remember to ask them during your visits  © Copyright [employer] 2022 Information is for End User's use only and may not be sold, redistributed or otherwise used for commercial purposes  All illustrations and images included in CareNotes® are the copyrighted property of A D A M , Inc  or [facility] Rigoberto English   The above information is an  only  It is not intended as medical advice for individual conditions or treatments  Talk to your doctor, nurse or pharmacist before following any medical regimen to see if it is safe and effective for you  Subjective: Kimberlyn Magdaleno is a 28 y o  female who  has a past medical history of Abnormal Pap smear of cervix, ADHD, Bipolar 1 disorder ([address] ), Depression, GERD (gastroesophageal reflux disease), Migraine, OCD (obsessive compulsive disorder), and Wears glasses  who presented to the office today for follow up  Reports that migraines are not improved  Since she had COVID in 12/2021 she reports having HA just about every day  Currently on Topamax and Fioricet without improvement  Denies chest pain, weakness, shortness of breath, syncope       The following portions of the patient's history were reviewed and updated as appropriate: allergies, current medications, past family history, past medical history, past social history, past surgical history and problem list     Current Outpatient Medications on File Prior to Visit   Medication Sig Dispense Refill    butalbital-acetaminophen-caffeine (Esgic) -40 mg per tablet Take 1 tablet by mouth every 6 (six) hours as needed for headaches 6 tablet 0    fluticasone (FLONASE) 50 mcg/act nasal spray SHAKE LIQUID AND USE 1 SPRAY IN EACH NOSTRIL TWICE DAILY 48 g 1    gabapentin (Neurontin) 300 mg capsule Take 1 capsule (300 mg total) by mouth daily at bedtime 30 capsule 0    hydrOXYzine HCL (ATARAX) 25 mg tablet       LATUDA 40 MG tablet take 1 tablet by mouth once daily AFTER DINNER  0    medroxyPROGESTERone (DEPO-PROVERA) 150 mg/mL injection Inject 1 mL (150 mg total) into a muscle every 3 (three) months 1 mL 5    medroxyPROGESTERone acetate (DEPO-PROVERA SYRINGE) 150 mg/mL injection INJECT 1 ML INTO THE MUSCLE EVERY 3 MONTHS 1 mL 5    mirtazapine (REMERON) 15 mg tablet       omeprazole (PriLOSEC) 20 mg delayed release capsule TAKE 1 CAPSULE(20 MG) BY MOUTH DAILY 30 capsule 0    predniSONE 10 mg tablet Take 6 pills day one, 5 pills day 2, 4 pills day 3, 3 pills day 4, 2 pills day 5, and 1 pill day 6  21 tablet 0    [DISCONTINUED] topiramate (Topamax) 25 mg tablet Take 1 tablet (25 mg total) by mouth 2 (two) times a day 60 tablet 1     Current Facility-Administered Medications on File Prior to Visit   Medication Dose Route Frequency Provider Last Rate Last Admin    medroxyPROGESTERone acetate (DEPO-PROVERA SYRINGE) IM injection 150 mg  150 mg Intramuscular Q3 Months KARTIK Abraham   150 mg at 02/02/22 6971       Review of Systems   Constitutional: Negative for chills and fever  HENT: Negative for ear pain and sore throat  Eyes: Negative for pain and visual disturbance  Respiratory: Negative for cough and shortness of breath  Cardiovascular: Negative for chest pain and palpitations  Gastrointestinal: Negative for abdominal pain and vomiting  Genitourinary: Negative for dysuria and hematuria  Musculoskeletal: Negative for arthralgias and back pain     Skin: Negative for color change and rash  Neurological: Positive for headaches  Negative for dizziness, seizures and syncope  All other systems reviewed and are negative  Objective:    /64 (BP Location: Right arm, Patient Position: Sitting, Cuff Size: Standard)   Pulse 92   Temp 97 7 °F (36 5 °C) (Temporal)   Resp 16   Ht 5' 5" (1 651 m)   Wt 52 2 kg (115 lb)   LMP  (LMP Unknown)   SpO2 97%   BMI 19 14 kg/m²     Physical Exam  Vitals and nursing note reviewed  Constitutional:       General: She is not in acute distress  Appearance: She is well-developed  She is not diaphoretic  HENT:      Head: Normocephalic and atraumatic  Right Ear: External ear normal       Left Ear: External ear normal    Eyes:      Extraocular Movements: Extraocular movements intact  Conjunctiva/sclera: Conjunctivae normal       Pupils: Pupils are equal, round, and reactive to light  Cardiovascular:      Rate and Rhythm: Normal rate and regular rhythm  Pulmonary:      Effort: Pulmonary effort is normal  No respiratory distress  Breath sounds: Normal breath sounds  No wheezing  Abdominal:      General: Bowel sounds are normal  There is no distension  Palpations: Abdomen is soft  Tenderness: There is no abdominal tenderness  Musculoskeletal:         General: No deformity  Normal range of motion  Cervical back: Normal range of motion and neck supple  Lymphadenopathy:      Cervical: No cervical adenopathy  Skin:     General: Skin is warm and dry  Capillary Refill: Capillary refill takes less than 2 seconds  Findings: No rash  Neurological:      General: No focal deficit present  Mental Status: She is alert and oriented to person, place, and time  Cranial Nerves: No cranial nerve deficit  Sensory: No sensory deficit  Motor: No weakness        Coordination: Coordination normal       Gait: Gait normal       Deep Tendon Reflexes: Reflexes normal    Psychiatric: Behavior: Behavior normal          KARTIK Connell  03/01/22  3:40 PM

## 2022-03-02 ENCOUNTER — EVALUATION (OUTPATIENT)
Dept: PHYSICAL THERAPY | Facility: CLINIC | Age: 32
End: 2022-03-02
Payer: MEDICARE

## 2022-03-02 DIAGNOSIS — M54.12 RADICULOPATHY, CERVICAL REGION: Primary | ICD-10-CM

## 2022-03-02 DIAGNOSIS — M25.511 RIGHT SHOULDER PAIN, UNSPECIFIED CHRONICITY: ICD-10-CM

## 2022-03-02 PROCEDURE — 97162 PT EVAL MOD COMPLEX 30 MIN: CPT

## 2022-03-02 NOTE — PROGRESS NOTES
PT Evaluation     Today's date: 3/2/2022  Patient name: Sarah Vega  : 1990  MRN: [id number]  Referring provider: Jane Ward MD  Dx:   Encounter Diagnosis     ICD-10-CM    1  Radiculopathy, cervical region  M54 12 Ambulatory Referral to Physical Therapy   2  Right shoulder pain, unspecified chronicity  M25 511 Ambulatory Referral to Physical Therapy                  Assessment  Assessment details: Sarah Vega is a 28 y o  female who presents today to outpatient therapy for evaluation of Radiculopathy, cervical region and right shoulder pain, unspecified chronicity  Upon assessment today, pt exhibits postural deviations; decreased/painful C/S AROM; decreased/painful (R) Shoulder AROM; decreased sensation thru the (R) UE; decreased (R) UE strength; and TTP thru the (R) upper quarter  Pt responded (+) to trial of repeated C/S retractions, reporting centralization of (R) UE pain to the (R) posterolateral neck afterwards  These impairments are contributing to functional limitations with writing; gripping; reaching [address]; and sleeping comfortably at night  Pt would therefore benefit from PT intervention in order to address the aforementioned deficits so that she can return to her PLOF and function comfortably/safely in her home and surrounding environment  Thank you for the referral! - Adele Alfaro, PT, DPT  Impairments: abnormal or restricted ROM, abnormal movement, impaired balance, impaired physical strength, pain with function and poor posture   Understanding of Dx/Px/POC: good   Prognosis: good    Goals  STG 1: Pt will demonstrate compliance w/ HEP to supplement therapy in 1-2 weeks  STG 2: Pt will report centralization of (R) UE pain by 25% in 2-4 weeks  STG 3: Pt will demonstrate increased  strength by 5-10# in 2-4 weeks  LTG 1: Pt will be able to reach [address] with the (R) UE with min difficulty in 6-8 weeks    LTG 2: Pt will be able to write with min to no difficulty in 6-8 weeks   LTG 3: Pt will demonstrate increased  strength to 90% within contralateral UE in 6-8 weeks to assist pt with completing ADLs such as cooking/cleaning  LTG 4: Pt will be able to find a comfortable sleeping position with min to no difficulty related to the (R) UE in 6-8 weeks  Plan  Plan details: Educated pt today about spinal anatomy; concepts of centralization vs peripheralization; herniated disc; HINA; prognosis; role of posture and T/S; S/S neural involvement; function of nerves; anticipated response to repeated movements; and issued pt HEP  Patient would benefit from: skilled physical therapy  Planned modality interventions: cryotherapy, TENS, traction and unattended electrical stimulation  Planned therapy interventions: abdominal trunk stabilization, self care, postural training, patient education, neuromuscular re-education, joint mobilization, manual therapy, massage, activity modification, balance, body mechanics training, breathing training, Merino taping, strengthening, stretching, therapeutic activities, coordination, flexibility, home exercise program, therapeutic exercise and functional ROM exercises  Frequency: 2x week  Duration in weeks: 4  Treatment plan discussed with: patient        Subjective Evaluation    History of Present Illness  Mechanism of injury: Pt reports onset of (R) shoulder/neck pain which began about a month ago in February insidiously  When she f/u with her doctor, she obtained x-rays and was diagnosed with a pinched nerve - she agrees with this dx  Her doctor also advised her to perform physical therapy and prescribed her Gabapentin which is helping "a little " Currently pt reports difficulty writing; gripping; reaching [place]; and finding a comfortable sleeping at night      Eases: Rest   Aggs: Moving too much  Pain  Current pain ratin  At best pain ratin  At worst pain ratin  Location: Pt reports a "pinching" type pain thru the (R) anterolateral shoulder, elbow, and 5th digit  Pt also reports N/T  Patient Goals  Patient goals for therapy: decreased pain  Patient goal: Pt would like to be able to clean  Objective     Postural Observations    Additional Postural Observation Details  Inc hiking thru [place]ER[place] UTs, (L) > (R)  Palpation     Additional Palpation Details  Mod TTP thru (R) UT, rhomboids, C/S PS  LTG: Min TTP  Neurological Testing     Sensation     Shoulder   Left Shoulder   Intact: light touch    Right Shoulder   Diminished: light touch    Comments   Right light touch: Diminished thru C5, C6, T1    Active Range of Motion   Cervical/Thoracic Spine       Cervical    Flexion:  Restriction level: minimal  Extension:  with pain Restriction level: moderate  Left lateral flexion:  with pain Restriction level: minimal  Right lateral flexion:  Restriction level minimal  Left rotation:  WFL and with pain  Right rotation:  Restriction level: minimal    Thoracic    Flexion:  WFL  Extension:  WFL  Left rotation:  Restriction level: moderate  Right rotation:  Restriction level: moderate  Left Shoulder   Flexion: 150 degrees   Abduction: 158 degrees   External rotation BTH: T4   Internal rotation BTB: T9     Right Shoulder   Flexion: 95 degrees with pain  Abduction: 95 degrees with pain  External rotation BTH: C7 (Slowed speed)   Internal rotation BTB: T8   Mechanical Assessment    Cervical    Seated retraction: repeated movements   Pain location: centralized    Thoracic      Lumbar      Strength/Myotome Testing     Left Shoulder     Planes of Motion   Flexion: 5   Abduction: 5   External rotation at 0°: 5   Internal rotation at 0°: 5     Isolated Muscles   Biceps: 4+   Triceps: 4+     Right Shoulder     Planes of Motion   Flexion: 4 (pain)   Abduction: 4+   External rotation at 0°: 4+   Internal rotation at 0°: 4+     Isolated Muscles   Biceps: 4   Triceps: 4 (pain)     Additional Strength Details   strength: 54# on (L), 14# on (R)   LT# on (R)     Pinch strength: Unremarkable             Precautions: C/S Radiculopathy; migraines; KEREN hearing loss; bipolar; OCD  Date 3/2            FOTO IE             Re-eval IE                Manuals 3/2            C/S Manual traction Trial nv                                      Neuro Re-Ed 3/2            Scap retractions nv            No moneys nv            Tband rows/ext nv            Seated chin tucks 2x10 HEP            FRO             Spidermans             Wall ABCs             Swimmer's n glide nv            Supine chin tucks nv            SA punches                                       Ther Ex 3/2            Tband IR/ER              Digi              Corner pec (S)             T/S ext over ball nv            T/S rot with cane/ball nv            Scap, FF             Supine pec (S) nv            Open books nv            Supine cane flex nv            Supine hor abd w/ tband nv            Prone Y, Ts                          Ther Activity 3/2            Retro UBE nv            Pulleys - Flex, Scap nv            Seated SWB flex nv                                      Pt Edu AL                         Modalities 3/2            MH or ice, prn

## 2022-03-05 ENCOUNTER — APPOINTMENT (OUTPATIENT)
Dept: LAB | Facility: HOSPITAL | Age: 32
End: 2022-03-05
Payer: MEDICARE

## 2022-03-05 DIAGNOSIS — G43.019 INTRACTABLE MIGRAINE WITHOUT AURA AND WITHOUT STATUS MIGRAINOSUS: ICD-10-CM

## 2022-03-05 DIAGNOSIS — G43.109 MIGRAINE WITH AURA AND WITHOUT STATUS MIGRAINOSUS, NOT INTRACTABLE: ICD-10-CM

## 2022-03-05 LAB
25(OH)D3 SERPL-MCNC: 11.8 NG/ML (ref 30–100)
ALBUMIN SERPL BCP-MCNC: 4.6 G/DL (ref 3–5.2)
ALP SERPL-CCNC: 62 U/L (ref 43–122)
ALT SERPL W P-5'-P-CCNC: 12 U/L
ANION GAP SERPL CALCULATED.3IONS-SCNC: 6 MMOL/L (ref 5–14)
AST SERPL W P-5'-P-CCNC: 23 U/L (ref 14–36)
BASOPHILS # BLD AUTO: 0 THOUSANDS/ΜL (ref 0–0.1)
BASOPHILS NFR BLD AUTO: 0 % (ref 0–1)
BILIRUB SERPL-MCNC: 0.54 MG/DL
BUN SERPL-MCNC: 14 MG/DL (ref 5–25)
CALCIUM SERPL-MCNC: 9.6 MG/DL (ref 8.4–10.2)
CHLORIDE SERPL-SCNC: 111 MMOL/L (ref 97–108)
CO2 SERPL-SCNC: 25 MMOL/L (ref 22–30)
CREAT SERPL-MCNC: 0.74 MG/DL (ref 0.6–1.2)
EOSINOPHIL # BLD AUTO: 0.1 THOUSAND/ΜL (ref 0–0.4)
EOSINOPHIL NFR BLD AUTO: 2 % (ref 0–6)
ERYTHROCYTE [DISTWIDTH] IN BLOOD BY AUTOMATED COUNT: 14.5 %
EST. AVERAGE GLUCOSE BLD GHB EST-MCNC: 123 MG/DL
GFR SERPL CREATININE-BSD FRML MDRD: 109 ML/MIN/1.73SQ M
GLUCOSE P FAST SERPL-MCNC: 99 MG/DL (ref 70–99)
HBA1C MFR BLD: 5.9 %
HCT VFR BLD AUTO: 43.5 % (ref 36–46)
HGB BLD-MCNC: 14.3 G/DL (ref 12–16)
LYMPHOCYTES # BLD AUTO: 3.7 THOUSANDS/ΜL (ref 0.5–4)
LYMPHOCYTES NFR BLD AUTO: 43 % (ref 25–45)
MCH RBC QN AUTO: 30.7 PG (ref 26–34)
MCHC RBC AUTO-ENTMCNC: 32.8 G/DL (ref 31–36)
MCV RBC AUTO: 94 FL (ref 80–100)
MONOCYTES # BLD AUTO: 0.6 THOUSAND/ΜL (ref 0.2–0.9)
MONOCYTES NFR BLD AUTO: 7 % (ref 1–10)
NEUTROPHILS # BLD AUTO: 4.1 THOUSANDS/ΜL (ref 1.8–7.8)
NEUTS SEG NFR BLD AUTO: 48 % (ref 45–65)
PLATELET # BLD AUTO: 315 THOUSANDS/UL (ref 150–450)
PMV BLD AUTO: 8.7 FL (ref 8.9–12.7)
POTASSIUM SERPL-SCNC: 4.4 MMOL/L (ref 3.6–5)
PROT SERPL-MCNC: 7.5 G/DL (ref 5.9–8.4)
RBC # BLD AUTO: 4.65 MILLION/UL (ref 4–5.2)
SODIUM SERPL-SCNC: 142 MMOL/L (ref 137–147)
TSH SERPL DL<=0.05 MIU/L-ACNC: 2.25 UIU/ML (ref 0.47–4.68)
VIT B12 SERPL-MCNC: 532 PG/ML (ref 100–900)
WBC # BLD AUTO: 8.6 THOUSAND/UL (ref 4.5–11)

## 2022-03-05 PROCEDURE — 85025 COMPLETE CBC W/AUTO DIFF WBC: CPT

## 2022-03-05 PROCEDURE — 80053 COMPREHEN METABOLIC PANEL: CPT

## 2022-03-05 PROCEDURE — 82306 VITAMIN D 25 HYDROXY: CPT

## 2022-03-05 PROCEDURE — 84443 ASSAY THYROID STIM HORMONE: CPT

## 2022-03-05 PROCEDURE — 82607 VITAMIN B-12: CPT

## 2022-03-05 PROCEDURE — 36415 COLL VENOUS BLD VENIPUNCTURE: CPT

## 2022-03-05 PROCEDURE — 83036 HEMOGLOBIN GLYCOSYLATED A1C: CPT

## 2022-03-08 ENCOUNTER — OFFICE VISIT (OUTPATIENT)
Dept: PHYSICAL THERAPY | Facility: CLINIC | Age: 32
End: 2022-03-08
Payer: MEDICARE

## 2022-03-08 DIAGNOSIS — M25.511 RIGHT SHOULDER PAIN, UNSPECIFIED CHRONICITY: Primary | ICD-10-CM

## 2022-03-08 DIAGNOSIS — M54.12 RADICULOPATHY, CERVICAL REGION: ICD-10-CM

## 2022-03-08 PROCEDURE — 97110 THERAPEUTIC EXERCISES: CPT

## 2022-03-08 PROCEDURE — 97112 NEUROMUSCULAR REEDUCATION: CPT

## 2022-03-08 PROCEDURE — 97530 THERAPEUTIC ACTIVITIES: CPT

## 2022-03-08 NOTE — PROGRESS NOTES
Daily Note     Today's date: 3/8/2022  Patient name: Trace Alvarez  : 1990  MRN: [id number]  Referring provider: Joanne Stahl MD  Dx:   Encounter Diagnosis     ICD-10-CM    1  Right shoulder pain, unspecified chronicity  M25 511    2  Radiculopathy, cervical region  M54 12        Start Time: 0748        Subjective: Pt arrives to first f/u since IE reporting relief in (R) radicular Sx achieved /c chin tucks returned afterwards (is unable to recall when or how far her radicular Sx traveled)  Pt arrives to today's Tx reporting radicular Sx throughout entire (Quinn Pastrana reaching in to her fingertips  Objective: See treatment diary below    Assessment: Tolerated treatment well  Pt able to complete all interventions today without c/o  She was challenged appropriately with no moneys  Post tx session, she reported "tightness" thru the (R) elbow  Encouraged pt to continue performing C/S retractions at home  Patient demonstrated fatigue post treatment, exhibited good technique with therapeutic exercises and would benefit from continued PT to progress scapular endurance and postural stability to improve pt's tolerance to pushing/pulling with the (R) UE daily as when cooking and cleaning  Plan: Continue per plan of care  Progress treatment as tolerated         Precautions: C/S Radiculopathy; migraines; KEREN hearing loss; bipolar; OCD  Date 3/2 3/8           FOTO IE             Re-eval IE                Manuals 3/2 3/8           C/S Manual traction Trial nv                                      Neuro Re-Ed 3/2 3/8           Scap retractions nv 20x3"           No moneys nv Pink TB 20x3"           Tband rows/ext nv            Seated chin tucks 2x10 HEP 2x10           FRO             Spidermans             Wall ABCs             Swimmer's n glide nv nv           Supine chin tucks nv 20x3"           SA punches                                       Ther Ex 3/2 3/8           Tband IR/ER              Digi  Corner pec (S)  4x15"           T/S ext over ball nv 20x3"           T/S rot with cane/ball nv 15x3" ea           Scap, FF             Supine pec (S)             Open books nv 10x5" ea           Supine cane flex nv 20x3"           Supine hor abd w/ tband nv Pink TB 20x3"           Prone Y, Ts                          Ther Activity 3/2 3/8           Retro UBE nv nv           Pulleys - Flex, Scap nv 5'            Seated SWB flex nv 3'                                      Pt Edu AL AL                        Modalities 3/2 3/8           MH or ice, prn

## 2022-03-16 ENCOUNTER — OFFICE VISIT (OUTPATIENT)
Dept: PHYSICAL THERAPY | Facility: CLINIC | Age: 32
End: 2022-03-16
Payer: MEDICARE

## 2022-03-16 DIAGNOSIS — M25.511 RIGHT SHOULDER PAIN, UNSPECIFIED CHRONICITY: Primary | ICD-10-CM

## 2022-03-16 DIAGNOSIS — M54.12 RADICULOPATHY, CERVICAL REGION: ICD-10-CM

## 2022-03-16 PROCEDURE — 97140 MANUAL THERAPY 1/> REGIONS: CPT

## 2022-03-16 PROCEDURE — 97110 THERAPEUTIC EXERCISES: CPT

## 2022-03-16 PROCEDURE — 97112 NEUROMUSCULAR REEDUCATION: CPT

## 2022-03-16 PROCEDURE — 97530 THERAPEUTIC ACTIVITIES: CPT

## 2022-03-16 NOTE — PROGRESS NOTES
Daily Note     Today's date: 3/16/2022  Patient name: Renee Steen  : 1990  MRN: [id number]  Referring provider: Shaquille Maldonado MD  Dx:   Encounter Diagnosis     ICD-10-CM    1  Right shoulder pain, unspecified chronicity  M25 511    2  Radiculopathy, cervical region  M54 12        Start Time: 0845  Stop Time: 09  Total time in clinic (min): 46 minutes  Subjective: Pt reports no significant changes in radicular Sx since IE  Pt denies N/T into (R)UE/hand today - reports "tightness" in (R)UE  Pt demonstrates slightly forward hunched posture /c rounded shoulders and activated (B/L) UT's  Objective: See treatment diary below    Assessment: Tolerated treatment well including additions and progressions to exercise diary  Tx progressed today without pt reporting adverse Sx - therefore provided pt /c HEP to include strengthening and mobility exercises in order to supplement pt performing OPPT 1x/week  Pt requires frequent cuing to reduce rounded shldrs (fair carryover) and UT hiking at rest (poor carryover)  Added 1/2 roll supine static pec stretch /c cross friction to pec insertion to address postural concerns - pt tolerated well and notes strong stretch during (denies pain)  Pt has moderate restrictions present along pec muscle fibers - encouraged pt to perform pec stretch at home, pt notes her mom would be able to provide cross-friction massage  Provided pt /c appropriate resistance bands at home as well as extensive pt education re: PT POC, progression of program as tolerated, role of HEP, indications/purposes for mobility vs strengthening activities, and improving postural awareness  Pt demonstrated fatigue post treatment, exhibited good technique with therapeutic exercises and would benefit from continued PT to progress scapular endurance and postural stability to improve pt's tolerance to pushing/pulling with the (R) UE daily as when cooking and cleaning  Plan: Cont /c PT POC  Progress as toleratd        Precautions: C/S Radiculopathy; migraines; KEREN hearing loss; bipolar; OCD  Date 3/2 3/8 03/16          FOTO IE             Re-eval IE                Manuals 3/2 3/8 03/16          C/S Manual traction Trial nv            (R) pec cross friction   SP                       Neuro Re-Ed 3/2 3/8 03/16          Scap retractions nv 20x3" 5"x15          No moneys nv Pink TB 20x3" BTBx20          Shldr Row nv  6Rx20          Shldr Ext             Seated chin tucks 2x10 HEP 2x10 5"x15          FRO             Spidermans             Wall ABCs             Swimmer's n glide nv nv           Supine chin tucks nv 20x3"           SA punches                                       Ther Ex 3/2 3/8 03/16          (R) shldr IR   3Rx20          (R) shldr ER   1Rx20          Digi              Corner pec (S)  4x15"           T/S ext over ball nv 20x3"           T/S rot with cane/ball nv 15x3" ea           Scap, FF             Supine pec (S)   3' 1/2 foam          Open books nv 10x5" ea           Supine cane flex nv 20x3"           Supine hor abd w/ tband nv Pink TB 20x3" BTBx20          Prone Y, Ts                          Ther Activity 3/2 3/8 03/16          Retro UBE nv nv 2'/2' L1            Pulleys - Flex, Scap nv 5'  5'           Seated SWB flex nv 3'                                      Pt Edu AL AL SP HEP                       Modalities 3/2 3/8 03/16          MH or ice, prn                              Donnamarie Session, PTA

## 2022-03-18 ENCOUNTER — OFFICE VISIT (OUTPATIENT)
Dept: PHYSICAL THERAPY | Facility: CLINIC | Age: 32
End: 2022-03-18
Payer: MEDICARE

## 2022-03-18 DIAGNOSIS — M25.511 RIGHT SHOULDER PAIN, UNSPECIFIED CHRONICITY: Primary | ICD-10-CM

## 2022-03-18 DIAGNOSIS — M54.12 RADICULOPATHY, CERVICAL REGION: ICD-10-CM

## 2022-03-18 PROCEDURE — 97110 THERAPEUTIC EXERCISES: CPT

## 2022-03-18 PROCEDURE — 97112 NEUROMUSCULAR REEDUCATION: CPT

## 2022-03-18 PROCEDURE — 97530 THERAPEUTIC ACTIVITIES: CPT

## 2022-03-18 NOTE — PROGRESS NOTES
Daily Note     Today's date: 3/18/2022  Patient name: Vinicius Bello  : 1990  MRN: [id number]  Referring provider: Yamil Adrian MD  Dx:   Encounter Diagnosis     ICD-10-CM    1  Right shoulder pain, unspecified chronicity  M25 511    2  Radiculopathy, cervical region  M54 12        Start Time: 0853        Subjective: Pt denies adverse Sx following progression of exercise diary   Pt arrives to today's Tx /c cco weakness throughout (R)UE  Pt reports she performed HEP provided at  and does not have any questions re: current home program     Objective: See treatment diary below    Assessment: Tolerated treatment well and denies adverse Sx /c today's Tx  Pt reports fatigue /c addition of stabilization exercise of ball @ wall and would benefit from continued stability work  Pt remains challenged /c (R) shldr ER on [employer]  Pt demonstrated fatigue post treatment, exhibited good technique with therapeutic exercises and would benefit from continued PT to progress scapular endurance and postural stability to improve pt's tolerance to pushing/pulling with the (R) UE daily as when cooking and cleaning  Plan: Cont /c PT POC  Progress as toleratd        Precautions: C/S Radiculopathy; migraines; KEREN hearing loss; bipolar; OCD  Date 3/2 3/8 03/16 03/18         Visit 1 2 3 4         FOTO IE             Re-eval IE                Manuals 3/2 3/8 03/16 03/18         C/S Manual traction Trial nv            (R) pec cross friction   SP                       Neuro Re-Ed 3/2 3/8 03/16 03/18         Scap retractions nv 20x3" 5"x15          No moneys nv Pink TB 20x3" BTBx20 BTBx30         Shldr Row nv  6Rx20 10Rx30         Shldr Ext    6Rx30         Seated chin tucks 2x10 HEP 2x10 5"x15          FRO             Spidermans             Wall ABCs (R)    RMB 2x         Swimmer's n glide nv nv           Supine chin tucks nv 20x3"           SA punches                                       Ther Ex 3/2 3/8 03/16 03/18         (R) shldr IR   3Rx20 3Rx30         (R) shldr ER   1Rx20 1Rx30         Digi              Corner pec (S)  4x15"           T/S ext over ball nv 20x3"           T/S rot with cane/ball nv 15x3" ea           Scap, FF             Supine pec (S)   3' 1/2 foam          Open books nv 10x5" ea           Supine cane flex nv 20x3"  3#x30         Supine hor abd w/ tband nv Pink TB 20x3" BTBx20 BTBx30         Prone Y, Ts                          Ther Activity 3/2 3/8 03/16 03/18         Retro UBE nv nv 2'/2' L1   3'/3' L2 twn pks         Pulleys - Flex, Scap nv 5'  5'  3'          Seated SWB flex nv 3'                                      Pt Edu AL AL SP HEP                       Modalities 3/2 3/8 03/16 03/18         MH or ice, prn                              Adalgisa Blanco, PTA

## 2022-03-24 ENCOUNTER — OFFICE VISIT (OUTPATIENT)
Dept: OBGYN CLINIC | Facility: MEDICAL CENTER | Age: 32
End: 2022-03-24
Payer: MEDICARE

## 2022-03-24 VITALS
SYSTOLIC BLOOD PRESSURE: 97 MMHG | HEART RATE: 106 BPM | HEIGHT: 65 IN | WEIGHT: 110 LBS | BODY MASS INDEX: 18.33 KG/M2 | DIASTOLIC BLOOD PRESSURE: 67 MMHG

## 2022-03-24 DIAGNOSIS — M25.511 RIGHT SHOULDER PAIN, UNSPECIFIED CHRONICITY: ICD-10-CM

## 2022-03-24 DIAGNOSIS — M75.41 IMPINGEMENT SYNDROME OF RIGHT SHOULDER: ICD-10-CM

## 2022-03-24 DIAGNOSIS — M54.2 NECK PAIN: Primary | ICD-10-CM

## 2022-03-24 PROCEDURE — 99213 OFFICE O/P EST LOW 20 MIN: CPT | Performed by: EMERGENCY MEDICINE

## 2022-03-24 NOTE — PROGRESS NOTES
Assessment/Plan:    Diagnoses and all orders for this visit:    Neck pain    Right shoulder pain, unspecified chronicity    Impingement syndrome of right shoulder    Continue PT, gabapentin qHS t/c weaning as her radicular symptoms seems to have improved  Continue OTC meds  T/c Xray Right shoulder next eval  She did have left shoulder pain similar to this episode in 2018    Return in about 4 weeks (around 4/21/2022)  Chief Complaint:     Chief Complaint   Patient presents with    Right Shoulder - Follow-up       Subjective:   Patient ID: Tima Matta is a 28 y o  female  Patient returns with improvement of symptoms she has been particpating in PT  Pain is posterior neck and right lateral shoulder  Max pain 7/10, min 3/10  S/p prednisone on Gabapentin qHS, taking motrin / tylenol PRN  Xray C spine reviewed    Initial note:  NP presents for Was evaluated neck and arm pain she notes pain on the right side of the neck in the trapezius as well as the shoulder down to the hand she does note right arm numbness tingling  She was evaluated at urgent care placed on prednisone currently taking motrin  Review of Systems    The following portions of the patient's chart were reviewed and updated as appropriate:    Allergy:  No Known Allergies      Past Medical History:   Diagnosis Date    Abnormal Pap smear of cervix     2013 ASCUS    ADHD     Bipolar 1 disorder (HCC)     Depression     GERD (gastroesophageal reflux disease)     Migraine     OCD (obsessive compulsive disorder)     Wears glasses        Past Surgical History:   Procedure Laterality Date    GYNECOLOGIC CRYOSURGERY      patient was 26years old    NO PAST SURGERIES      MN COLPOSCOPY,CERVIX W/ADJ VAG,W/LOOP BX N/A 8/9/2019    Procedure: BIOPSY LEEP CERVIX;  Surgeon: Tosin Duvall MD;  Location: [facility];  Service: Gynecology       Social History     Socioeconomic History    Marital status: Single     Spouse name: Not on file    Number of children: Not on file    Years of education: Not on file    Highest education level: Not on file   Occupational History    Not on file   Tobacco Use    Smoking status: Current Every Day Smoker     Packs/day: 1 00     Types: Cigarettes    Smokeless tobacco: Never Used   Vaping Use    Vaping Use: Never used   Substance and Sexual Activity    Alcohol use: Yes    Drug use: No    Sexual activity: Yes     Partners: Male     Birth control/protection: Injection   Other Topics Concern    Not on file   Social History Narrative    Not on file     Social Determinants of Health     Financial Resource Strain: Low Risk     Difficulty of Paying Living Expenses: Not hard at all   Food Insecurity: No Food Insecurity    Worried About Running Out of Food in the Last Year: Never true    [address] in the Last Year: Never true   Transportation Needs: No Transportation Needs    Lack of Transportation (Medical): No    Lack of Transportation (Non-Medical): No   Physical Activity: Inactive    Days of Exercise per Week: 0 days    Minutes of Exercise per Session: 0 min   Stress: No Stress Concern Present    Feeling of Stress :  Only a little   Social Connections: Not on file   Intimate Partner Violence: Not At Risk    Fear of Current or Ex-Partner: No    Emotionally Abused: No    Physically Abused: No    Sexually Abused: No   Housing Stability: Unknown    Unable to Pay for Housing in the Last Year: No    Number of Places Lived in the Last Year: Not on file    Unstable Housing in the Last Year: No       Family History   Problem Relation Age of Onset    Bipolar disorder Mother     No Known Problems Father     Depression Sister        Medications:    Current Outpatient Medications:     fluticasone (FLONASE) 50 mcg/act nasal spray, SHAKE LIQUID AND USE 1 SPRAY IN EACH NOSTRIL TWICE DAILY, Disp: 48 g, Rfl: 1    gabapentin (Neurontin) 300 mg capsule, Take 1 capsule (300 mg total) by mouth daily at bedtime, Disp: 30 capsule, Rfl: 0    hydrOXYzine HCL (ATARAX) 25 mg tablet, , Disp: , Rfl:     LATUDA 40 MG tablet, take 1 tablet by mouth once daily AFTER DINNER, Disp: , Rfl: 0    medroxyPROGESTERone acetate (DEPO-PROVERA SYRINGE) 150 mg/mL injection, INJECT 1 ML INTO THE MUSCLE EVERY 3 MONTHS, Disp: 1 mL, Rfl: 5    mirtazapine (REMERON) 15 mg tablet, , Disp: , Rfl:     omeprazole (PriLOSEC) 20 mg delayed release capsule, TAKE 1 CAPSULE(20 MG) BY MOUTH DAILY, Disp: 30 capsule, Rfl: 0    SUMAtriptan (Imitrex) 25 mg tablet, Take 1 tablet (25 mg total) by mouth once as needed for migraine for up to 1 dose, Disp: 9 tablet, Rfl: 0    topiramate (Topamax) 25 mg tablet, Take 25 mg in the AM and 50 mg in the PM, Disp: 90 tablet, Rfl: 0    butalbital-acetaminophen-caffeine (Esgic) -40 mg per tablet, Take 1 tablet by mouth every 6 (six) hours as needed for headaches (Patient not taking: Reported on 3/24/2022 ), Disp: 6 tablet, Rfl: 0    dexamethasone (DECADRON) 2 mg tablet, Take 3 tabs x 1 day, 2 tabs x 1 day, 1 tab x 1 day (Patient not taking: Reported on 3/24/2022 ), Disp: 6 tablet, Rfl: 0    ergocalciferol (VITAMIN D2) 50,000 units, Take 1 capsule (50,000 Units total) by mouth once a week (Patient not taking: Reported on 3/24/2022 ), Disp: 16 capsule, Rfl: 0    magnesium oxide (MAG-OX) 400 mg, Take 1 tablet (400 mg total) by mouth 2 (two) times a day (Patient not taking: Reported on 3/24/2022 ), Disp: 60 tablet, Rfl: 0    medroxyPROGESTERone (DEPO-PROVERA) 150 mg/mL injection, Inject 1 mL (150 mg total) into a muscle every 3 (three) months (Patient not taking: Reported on 3/24/2022 ), Disp: 1 mL, Rfl: 5    predniSONE 10 mg tablet, Take 6 pills day one, 5 pills day 2, 4 pills day 3, 3 pills day 4, 2 pills day 5, and 1 pill day 6   (Patient not taking: Reported on 3/24/2022 ), Disp: 21 tablet, Rfl: 0    Current Facility-Administered Medications:     medroxyPROGESTERone acetate (DEPO-PROVERA SYRINGE) IM injection 150 mg, 150 mg, Intramuscular, Q3 Months, KARTIK Abraham, 150 mg at 02/02/22 9864    Patient Active Problem List   Diagnosis    Nicotine use disorder    Cervical cancer screening    LGSIL on Pap smear of cervix    Atypical glandular cells of undetermined significance (MARIA E) on cervical Pap smear    ADHD (attention deficit hyperactivity disorder)    Bipolar 1 disorder (HCC)    OCD (obsessive compulsive disorder)    S/P LEEP    Encounter for surveillance of contraceptive pills    Vaginal odor    Yeast infection    Dizziness    Screen for STD (sexually transmitted disease)    Encounter for annual routine gynecological examination    Breast lump    Acute gastritis without hemorrhage    Suspected COVID-19 virus infection    Migraine    COVID-19    Bilateral hearing loss       Objective:  BP 97/67   Pulse (!) 106   Ht 5' 5" (1 651 m)   Wt 49 9 kg (110 lb)   BMI 18 30 kg/m²     Right Shoulder Exam     Range of Motion   Active abduction: normal     Tests   Impingement: positive    Other   Erythema: absent    Comments:  Pain with resisted external rotation  No pain full strength full can test            Physical Exam      Neurologic Exam    Procedures    I have personally reviewed the written report of the pertinent studies

## 2022-03-25 ENCOUNTER — OFFICE VISIT (OUTPATIENT)
Dept: PHYSICAL THERAPY | Facility: CLINIC | Age: 32
End: 2022-03-25
Payer: MEDICARE

## 2022-03-25 DIAGNOSIS — M25.511 RIGHT SHOULDER PAIN, UNSPECIFIED CHRONICITY: ICD-10-CM

## 2022-03-25 DIAGNOSIS — M79.601 PAIN OF RIGHT UPPER EXTREMITY: ICD-10-CM

## 2022-03-25 DIAGNOSIS — M75.41 IMPINGEMENT SYNDROME OF RIGHT SHOULDER: ICD-10-CM

## 2022-03-25 DIAGNOSIS — M54.12 RADICULOPATHY, CERVICAL REGION: ICD-10-CM

## 2022-03-25 DIAGNOSIS — M25.511 RIGHT SHOULDER PAIN, UNSPECIFIED CHRONICITY: Primary | ICD-10-CM

## 2022-03-25 PROCEDURE — 97110 THERAPEUTIC EXERCISES: CPT

## 2022-03-25 PROCEDURE — 97530 THERAPEUTIC ACTIVITIES: CPT

## 2022-03-25 PROCEDURE — 97112 NEUROMUSCULAR REEDUCATION: CPT

## 2022-03-25 RX ORDER — GABAPENTIN 300 MG/1
CAPSULE ORAL
Qty: 30 CAPSULE | Refills: 0 | Status: SHIPPED | OUTPATIENT
Start: 2022-03-25 | End: 2022-07-13

## 2022-03-25 NOTE — PROGRESS NOTES
Daily Note     Today's date: 3/25/2022  Patient name: Beulah Corcoran  : 1990  MRN: [id number]  Referring provider: Venice Mota MD  Dx:   Encounter Diagnosis     ICD-10-CM    1  Right shoulder pain, unspecified chronicity  M25 511    2  Radiculopathy, cervical region  M54 12        Start Time: 0805  Stop Time: 2929  Total time in clinic (min): 39 minutes  Subjective: Pt reports to Tx noting that although she continues to experience (R) shldr/UE pain Sx, they have decreased in both frequency and intensity since beginning OPPT  Objective: See treatment diary below    Assessment: Pt demonstrates good SA activation /c supine punches, requiring only initial instruction for form  Overall pt tolerated Tx well, including gentle progressions of resistance throughout program   Pt demonstrated fatigue post treatment, exhibited good technique with therapeutic exercises and would benefit from continued PT to progress scapular endurance and postural stability to improve pt's tolerance to pushing/pulling with the (R) UE daily as when cooking and cleaning  Plan: Cont /c PT POC  Progress as toleratd        Precautions: C/S Radiculopathy; migraines; KEREN hearing loss; bipolar; OCD  Date 3/2 3/8 03/16 03/18 03/25        Visit 1 2 3 4 5        FOTO IE             Re-eval IE                Manuals 3/2 3/8 03/16 03/18 03/25        C/S Manual traction Trial nv            (R) pec cross friction   SP                       Neuro Re-Ed 3/2 3/8 03/16 03/18 03/25        Scap retractions nv 20x3" 5"x15          No moneys nv Pink TB 20x3" BTBx20 BTBx30         Shldr Row nv  6Rx20 10Rx30 11Rx30        Shldr Ext    6Rx30 7Rx30        Seated chin tucks 2x10 HEP 2x10 5"x15          FRO             S[employer]ermans             Wall ABCs (R)    RMB 2x RMB 2x        Swimmer's n glide nv nv           Supine chin tucks nv 20x3"           SA punches     3#bar 5"x20                                  Ther Ex 3/2 3/8 03/16 03/18 03/25 (R) shldr IR   3Rx20 3Rx30 3 5Rx30        (R) shldr ER   1Rx20 1Rx30 1 5Rx30        Digi              Corner pec (S)  4x15"           T/S ext over ball nv 20x3"           T/S rot with cane/ball nv 15x3" ea           Scap, FF             Supine pec (S)   3' 1/2 foam          Open books nv 10x5" ea           Supine cane flex nv 20x3"  3#x30 3# 5"x30        Supine hor abd w/ tband nv Pink TB 20x3" BTBx20 BTBx30 BTBx30        Prone Y, Ts                          Ther Activity 3/2 3/8 03/16 03/18 03/25        Retro UBE nv nv 2'/2' L1   3'/3' L2 twn pks 3'/3' L2 twn pks        Pulleys - Flex, Scap nv 5'  5'  3'          Seated SWB flex nv 3'    3'                                  Pt Edu AL AL SP HEP                       Modalities 3/2 3/8 03/16 03/18 03/25        MH or ice, prn                              Nate Turner, PTA

## 2022-03-28 ENCOUNTER — OFFICE VISIT (OUTPATIENT)
Dept: PHYSICAL THERAPY | Facility: CLINIC | Age: 32
End: 2022-03-28
Payer: MEDICARE

## 2022-03-28 DIAGNOSIS — M54.12 RADICULOPATHY, CERVICAL REGION: ICD-10-CM

## 2022-03-28 DIAGNOSIS — M25.511 RIGHT SHOULDER PAIN, UNSPECIFIED CHRONICITY: Primary | ICD-10-CM

## 2022-03-28 PROCEDURE — 97112 NEUROMUSCULAR REEDUCATION: CPT

## 2022-03-28 PROCEDURE — 97530 THERAPEUTIC ACTIVITIES: CPT

## 2022-03-28 PROCEDURE — 97110 THERAPEUTIC EXERCISES: CPT

## 2022-03-28 NOTE — PROGRESS NOTES
Daily Note     Today's date: 3/28/2022  Patient name: Stanley Cuteo  : 1990  MRN: [id number]  Referring provider: Jovita Ness MD  Dx:   Encounter Diagnosis     ICD-10-CM    1  Right shoulder pain, unspecified chronicity  M25 511    2  Radiculopathy, cervical region  M54 12                   Subjective: Pt states that while she continues to have the pain thru the (R) UE, it is not quite as bad as before  Objective: See treatment diary below      Assessment: Tolerated treatment well  Pt responded well to addition of self overpressure during C/S retractions, reporting a "good stretch" in the neck  She had difficulty performing UBE this AM secondary to (R) UE pain  She was challenged appropriately by addition of chin tuck + lift, reporting fatigue afterwards  Post tx session, pt reported feeling "better " Educated pt today about role of DNF and goals/appropriate sensations during nerve glides  Also encouraged pt to continue performing C/S retractions at home  Patient demonstrated fatigue post treatment, exhibited good technique with therapeutic exercises and would benefit from continued PT to progress DNF strength and cervicothoracic mobility to reduce stresses on the C/S and improve pt's tolerance to reaching OH/using the (R) UE while completing ADLs such as cooking/cleaning  Plan: Continue per plan of care  Progress treatment as tolerated         Precautions: C/S Radiculopathy; migraines; KEREN hearing loss; bipolar; OCD  Date 3/2 3/8 03/16 03/18 03/25 3/28       Visit 1 2 3 4 5 6       FOTO IE      xx       Re-eval IE                Manuals 3/2 3/8 03/16 03/18 03/25 3/28       C/S Manual traction Trial nv            (R) pec cross friction   SP                       Neuro Re-Ed 3/2 3/8 03/16 03/18 03/25 3/28       Scap retractions nv 20x3" 5"x15          No moneys nv Pink TB 20x3" BTBx20 BTBx30         Shldr Row nv  6Rx20 10Rx30 11Rx30 11R 20x       Shldr Ext    6Rx30 7Rx30 7R 20x Seated chin tucks 2x10 HEP 2x10 5"x15   Self OP 2x10       FRO             S[employer]ermans             Wall ABCs (R)    RMB 2x RMB 2x        Swimmer's n glide nv nv    15x        Supine chin tucks nv 20x3"    20x3"                    SA punches     3#bar 5"x20 3# bar 20x5"                                 Ther Ex 3/2 3/8 03/16 03/18 03/25 3/28       (R) shldr IR   3Rx20 3Rx30 3 5Rx30        (R) shldr ER   1Rx20 1Rx30 1 5Rx30        Digi              Corner pec (S)  4x15"           T/S ext over ball nv 20x3"           T/S rot with cane/ball nv 15x3" ea           Scap, FF             Supine pec (S)   3' 1/2 foam   2'       Open books nv 10x5" ea           Supine cane flex nv 20x3"  3#x30 3# 5"x30        Supine hor abd w/ tband nv Pink TB 20x3" BTBx20 BTBx30 BTBx30 BTB 30x       Prone Y, Ts      15x ea                    Ther Activity 3/2 3/8 03/16 03/18 03/25 3/28       Retro UBE nv nv 2'/2' L1   3'/3' L2 twn pks 3'/3' L2 twn pks 3'/3' L2 twn peaks       Pulleys - Flex, Scap nv 5'  5'  3'   3'       Seated SWB flex nv 3'    3' 3'                                 Pt Edu AL AL SP HEP   AL                    Modalities 3/2 3/8 03/16 03/18 03/25 3/28       MH or ice, prn

## 2022-03-29 ENCOUNTER — OFFICE VISIT (OUTPATIENT)
Dept: FAMILY MEDICINE CLINIC | Facility: CLINIC | Age: 32
End: 2022-03-29

## 2022-03-29 VITALS
TEMPERATURE: 98.1 F | SYSTOLIC BLOOD PRESSURE: 98 MMHG | WEIGHT: 117.1 LBS | BODY MASS INDEX: 19.51 KG/M2 | HEART RATE: 72 BPM | RESPIRATION RATE: 16 BRPM | HEIGHT: 65 IN | DIASTOLIC BLOOD PRESSURE: 68 MMHG

## 2022-03-29 DIAGNOSIS — G43.019 INTRACTABLE MIGRAINE WITHOUT AURA AND WITHOUT STATUS MIGRAINOSUS: ICD-10-CM

## 2022-03-29 DIAGNOSIS — R79.89 LOW VITAMIN D LEVEL: ICD-10-CM

## 2022-03-29 PROCEDURE — 99214 OFFICE O/P EST MOD 30 MIN: CPT | Performed by: NURSE PRACTITIONER

## 2022-03-29 RX ORDER — ERGOCALCIFEROL 1.25 MG/1
50000 CAPSULE ORAL 3 TIMES WEEKLY
Qty: 21 CAPSULE | Refills: 0 | Status: SHIPPED | OUTPATIENT
Start: 2022-03-30

## 2022-03-29 RX ORDER — TOPIRAMATE 50 MG/1
TABLET, FILM COATED ORAL
Qty: 180 TABLET | Refills: 0 | Status: SHIPPED | OUTPATIENT
Start: 2022-03-29

## 2022-03-29 NOTE — PATIENT INSTRUCTIONS
Migraine Headache   WHAT YOU NEED TO KNOW:   A migraine is a severe headache  The pain can be so severe that it interferes with your daily activities  A migraine can last a few hours up to several days  The exact cause of migraines is not known  DISCHARGE INSTRUCTIONS:   Call your local emergency number (911 in the [address]) or have someone call if:   · You feel like you are going to faint, you become confused, or you have a seizure  Return to the emergency department if:   · You have a headache that seems different or much worse than your usual migraine headache  · You have a severe headache with a fever or a stiff neck  · You have new problems with speech, vision, balance, or movement  Call your doctor or neurologist if:   · Your migraines interfere with your daily activities  · Your medicines or treatments stop working  · You have questions or concerns about your condition or care  Medicines:  Some medicines may only be given while you are in the emergency department  You may also need medicines later to manage migraines or other health problems they can cause  Take medicine as soon as you feel a migraine begin, or as directed  · Migraine medicines  are used to help prevent or stop a migraine  · NSAIDs  help decrease swelling and pain or fever  This medicine is available with or without a doctor's order  NSAIDs can cause stomach bleeding or kidney problems in certain people  If you take blood thinner medicine, always ask your healthcare provider if NSAIDs are safe for you  Always read the medicine label and follow directions  · Acetaminophen  decreases pain and fever  It is available without a doctor's order  Ask how much to take and how often to take it  Follow directions  Read the labels of all other medicines you are using to see if they also contain acetaminophen, or ask your doctor or pharmacist  Acetaminophen can cause liver damage if not taken correctly   Do not use more than 4 grams (4,000 milligrams) total of acetaminophen in one day  · Prescription pain medicine  may be given  Ask your healthcare provider how to take this medicine safely  Some prescription pain medicines contain acetaminophen  Do not take other medicines that contain acetaminophen without talking to your healthcare provider  Too much acetaminophen may cause liver damage  Prescription pain medicine may cause constipation  Ask your healthcare provider how to prevent or treat constipation  · Antinausea medicine  may be given to calm your stomach and to help prevent vomiting  This medicine can also help relieve pain  · Steroids  may be given to prevent a migraine from coming back right away  · Take your medicine as directed  Contact your healthcare provider if you think your medicine is not helping or if you have side effects  Tell him or her if you are allergic to any medicine  Keep a list of the medicines, vitamins, and herbs you take  Include the amounts, and when and why you take them  Bring the list or the pill bottles to follow-up visits  Carry your medicine list with you in case of an emergency  Manage your symptoms:   · Rest in a dark, quiet room  This will help decrease your pain  Sleep may also help relieve the pain  · Apply ice to decrease pain  Use an ice pack, or put crushed ice in a plastic bag  Cover the ice pack with a towel and place it on your head  Apply ice for 15 to 20 minutes every hour  · Apply heat to decrease pain and muscle spasms  Use a small towel dampened with warm water or a heating pad, or sit in a warm bath  Apply heat on the area for 20 to 30 minutes every 2 hours  You may alternate heat and ice  · Keep a migraine record  Write down when your migraines start and stop  Include your symptoms and what you were doing when a migraine began  Record what you ate or drank for 24 hours before the migraine started   Keep track of what you did to treat your migraine and if it worked  Bring the migraine record with you to visits with your healthcare provider  Common triggers for a migraine include the following:   · Stress, eye strain, oversleeping, or not getting enough sleep    · Hormone changes in women from birth control pills, pregnancy, menopause, or during a monthly period    · Skipping meals, going too long without eating, or not drinking enough liquids    · Certain foods or drinks such as chocolate, hard cheese, alcohol, or drinks that contain caffeine    · Foods that contain gluten, nitrates, MSG, or artificial sweeteners    · Sunlight, bright or flashing lights, loud noises, smoke, or strong smells    · Heat, humidity, or changes in the weather    Prevent another migraine:   · Prevent a medicine overuse headache  Take pain medicines only as long as directed  A medicine may be limited to a certain amount each month  Your healthcare provider can help you create a plan so you get a safe amount each month  · Do not smoke  Nicotine and other chemicals in cigarettes and cigars can trigger a migraine or make it worse  Ask your healthcare provider for information if you currently smoke and need help to quit  E-cigarettes or smokeless tobacco still contain nicotine  Talk to your healthcare provider before you use these products  · Do not drink alcohol  Alcohol can trigger a migraine  It can also keep medicines used to treat your migraines from working  · Be physically active  Physical activity, such as exercise, may help prevent migraines  Talk to your healthcare provider about the best activity plan for you  Try to get at least 30 minutes of physical activity on most days  · Manage stress  Stress may trigger a migraine  Learn new ways to relax, such as deep breathing  · Create a sleep schedule  Go to bed and get up at the same times each day  Do not watch television before bed  · Eat a variety of healthy foods    Include healthy foods such as fruit, vegetables, whole-grain breads, low-fat dairy products, beans, lean meat, and fish  Do not have food or drinks that trigger your migraines  · Drink more liquids to prevent dehydration  Your healthcare provider can tell you how much liquid to drink each day and which liquids are best for you  Follow up with your doctor or neurologist as directed:  Bring your migraine record with you  Write down your questions so you remember to ask them during your visits  © Copyright [employer] 2022 Information is for End User's use only and may not be sold, redistributed or otherwise used for commercial purposes  All illustrations and images included in CareNotes® are the copyrighted property of A D A M , [employer]  or [address] [place], [place] [address]   The above information is an  only  It is not intended as medical advice for individual conditions or treatments  Talk to your doctor, nurse or pharmacist before following any medical regimen to see if it is safe and effective for you

## 2022-03-29 NOTE — PROGRESS NOTES
Assessment/Plan:    Migraine    Migraines have improved substantially since taking Topamax 75 mg daily, increase to 100 mg daily ( 50 mg in a m  and 50 mg in p m )   continue with Imitrex p r n  and magnesium supplement    Yamilex Mariano was seen today for headache - recurrent or known dx migraines  Diagnoses and all orders for this visit:    Intractable migraine without aura and without status migrainosus  -     topiramate (Topamax) 50 MG tablet; Take 50 mg in the AM and 50 mg in the PM    Low vitamin D level  -     ergocalciferol (VITAMIN D2) 50,000 units; Take 1 capsule (50,000 Units total) by mouth 3 (three) times a week        Return in about 3 months (around 6/29/2022) for migraines  Patient Instructions     Migraine Headache   WHAT YOU NEED TO KNOW:   A migraine is a severe headache  The pain can be so severe that it interferes with your daily activities  A migraine can last a few hours up to several days  The exact cause of migraines is not known  DISCHARGE INSTRUCTIONS:   Call your local emergency number (911 in the [address] [place] [address],[address]) or have someone call if:   · You feel like you are going to faint, you become confused, or you have a seizure  Return to the emergency department if:   · You have a headache that seems different or much worse than your usual migraine headache  · You have a severe headache with a fever or a stiff neck  · You have new problems with speech, vision, balance, or movement  Call your doctor or neurologist if:   · Your migraines interfere with your daily activities  · Your medicines or treatments stop working  · You have questions or concerns about your condition or care  Medicines:  Some medicines may only be given while you are in the emergency department  You may also need medicines later to manage migraines or other health problems they can cause  Take medicine as soon as you feel a migraine begin, or as directed    · Migraine medicines  are used to help prevent or stop a migraine  · NSAIDs  help decrease swelling and pain or fever  This medicine is available with or without a doctor's order  NSAIDs can cause stomach bleeding or kidney problems in certain people  If you take blood thinner medicine, always ask your healthcare provider if NSAIDs are safe for you  Always read the medicine label and follow directions  · Acetaminophen  decreases pain and fever  It is available without a doctor's order  Ask how much to take and how often to take it  Follow directions  Read the labels of all other medicines you are using to see if they also contain acetaminophen, or ask your doctor or pharmacist  Acetaminophen can cause liver damage if not taken correctly  Do not use more than 4 grams (4,000 milligrams) total of acetaminophen in one day  · Prescription pain medicine  may be given  Ask your healthcare provider how to take this medicine safely  Some prescription pain medicines contain acetaminophen  Do not take other medicines that contain acetaminophen without talking to your healthcare provider  Too much acetaminophen may cause liver damage  Prescription pain medicine may cause constipation  Ask your healthcare provider how to prevent or treat constipation  · Antinausea medicine  may be given to calm your stomach and to help prevent vomiting  This medicine can also help relieve pain  · Steroids  may be given to prevent a migraine from coming back right away  · Take your medicine as directed  Contact your healthcare provider if you think your medicine is not helping or if you have side effects  Tell him or her if you are allergic to any medicine  Keep a list of the medicines, vitamins, and herbs you take  Include the amounts, and when and why you take them  Bring the list or the pill bottles to follow-up visits  Carry your medicine list with you in case of an emergency  Manage your symptoms:   · Rest in a dark, quiet room  This will help decrease your pain   Sleep may also help relieve the pain  · Apply ice to decrease pain  Use an ice pack, or put crushed ice in a plastic bag  Cover the ice pack with a towel and place it on your head  Apply ice for 15 to 20 minutes every hour  · Apply heat to decrease pain and muscle spasms  Use a small towel dampened with warm water or a heating pad, or sit in a warm bath  Apply heat on the area for 20 to 30 minutes every 2 hours  You may alternate heat and ice  · Keep a migraine record  Write down when your migraines start and stop  Include your symptoms and what you were doing when a migraine began  Record what you ate or drank for 24 hours before the migraine started  Keep track of what you did to treat your migraine and if it worked  Bring the migraine record with you to visits with your healthcare provider  Common triggers for a migraine include the following:   · Stress, eye strain, oversleeping, or not getting enough sleep    · Hormone changes in women from birth control pills, pregnancy, menopause, or during a monthly period    · Skipping meals, going too long without eating, or not drinking enough liquids    · Certain foods or drinks such as chocolate, hard cheese, alcohol, or drinks that contain caffeine    · Foods that contain gluten, nitrates, MSG, or artificial sweeteners    · Sunlight, bright or flashing lights, loud noises, smoke, or strong smells    · Heat, humidity, or changes in the weather    Prevent another migraine:   · Prevent a medicine overuse headache  Take pain medicines only as long as directed  A medicine may be limited to a certain amount each month  Your healthcare provider can help you create a plan so you get a safe amount each month  · Do not smoke  Nicotine and other chemicals in cigarettes and cigars can trigger a migraine or make it worse  Ask your healthcare provider for information if you currently smoke and need help to quit  E-cigarettes or smokeless tobacco still contain nicotine  Talk to your healthcare provider before you use these products  · Do not drink alcohol  Alcohol can trigger a migraine  It can also keep medicines used to treat your migraines from working  · Be physically active  Physical activity, such as exercise, may help prevent migraines  Talk to your healthcare provider about the best activity plan for you  Try to get at least 30 minutes of physical activity on most days  · Manage stress  Stress may trigger a migraine  Learn new ways to relax, such as deep breathing  · Create a sleep schedule  Go to bed and get up at the same times each day  Do not watch television before bed  · Eat a variety of healthy foods  Include healthy foods such as fruit, vegetables, whole-grain breads, low-fat dairy products, beans, lean meat, and fish  Do not have food or drinks that trigger your migraines  · Drink more liquids to prevent dehydration  Your healthcare provider can tell you how much liquid to drink each day and which liquids are best for you  Follow up with your doctor or neurologist as directed:  Bring your migraine record with you  Write down your questions so you remember to ask them during your visits  © Copyright [employer] 2022 Information is for End User's use only and may not be sold, redistributed or otherwise used for commercial purposes  All illustrations and images included in CareNotes® are the copyrighted property of [employer] M , [employer]  or [address] [place] [address]  The above information is an  only  It is not intended as medical advice for individual conditions or treatments  Talk to your doctor, nurse or pharmacist before following any medical regimen to see if it is safe and effective for you  Subjective:      Tima Matta is a 28 y o  female who  has a past medical history of Abnormal Pap smear of cervix, ADHD, Bipolar 1 disorder ([facility] [address] ), Depression, GERD (gastroesophageal reflux disease), Migraine, OCD (obsessive compulsive disorder), and Wears glasses  who presented to the office today for  Follow-up  Reports that since starting Topamax 75 mg daily migraines have decreased in frequency and severity  She has also used the Imitrex for acute migraine which has been effective        The following portions of the patient's history were reviewed and updated as appropriate: allergies, current medications, past family history, past medical history, past social history, past surgical history and problem list     Current Outpatient Medications on File Prior to Visit   Medication Sig Dispense Refill    butalbital-acetaminophen-caffeine (Esgic) -40 mg per tablet Take 1 tablet by mouth every 6 (six) hours as needed for headaches (Patient not taking: Reported on 3/24/2022 ) 6 tablet 0    dexamethasone (DECADRON) 2 mg tablet Take 3 tabs x 1 day, 2 tabs x 1 day, 1 tab x 1 day (Patient not taking: Reported on 3/24/2022 ) 6 tablet 0    fluticasone (FLONASE) 50 mcg/act nasal spray SHAKE LIQUID AND USE 1 SPRAY IN EACH NOSTRIL TWICE DAILY 48 g 1    gabapentin (NEURONTIN) 300 mg capsule TAKE 1 CAPSULE(300 MG) BY MOUTH DAILY AT BEDTIME 30 capsule 0    hydrOXYzine HCL (ATARAX) 25 mg tablet       LATUDA 40 MG tablet take 1 tablet by mouth once daily AFTER DINNER  0    magnesium oxide (MAG-OX) 400 mg Take 1 tablet (400 mg total) by mouth 2 (two) times a day (Patient not taking: Reported on 3/24/2022 ) 60 tablet 0    medroxyPROGESTERone (DEPO-PROVERA) 150 mg/mL injection Inject 1 mL (150 mg total) into a muscle every 3 (three) months (Patient not taking: Reported on 3/24/2022 ) 1 mL 5    medroxyPROGESTERone acetate (DEPO-PROVERA SYRINGE) 150 mg/mL injection INJECT 1 ML INTO THE MUSCLE EVERY 3 MONTHS 1 mL 5    mirtazapine (REMERON) 15 mg tablet       omeprazole (PriLOSEC) 20 mg delayed release capsule TAKE 1 CAPSULE(20 MG) BY MOUTH DAILY 30 capsule 0    predniSONE 10 mg tablet Take 6 pills day one, 5 pills day 2, 4 pills day 3, 3 pills day 4, 2 pills day 5, and 1 pill day 6  (Patient not taking: Reported on 3/24/2022 ) 21 tablet 0    SUMAtriptan (Imitrex) 25 mg tablet Take 1 tablet (25 mg total) by mouth once as needed for migraine for up to 1 dose 9 tablet 0    [DISCONTINUED] ergocalciferol (VITAMIN D2) 50,000 units Take 1 capsule (50,000 Units total) by mouth once a week (Patient not taking: Reported on 3/24/2022 ) 16 capsule 0    [DISCONTINUED] topiramate (Topamax) 25 mg tablet Take 25 mg in the AM and 50 mg in the PM 90 tablet 0     Current Facility-Administered Medications on File Prior to Visit   Medication Dose Route Frequency Provider Last Rate Last Admin    medroxyPROGESTERone acetate (DEPO-PROVERA SYRINGE) IM injection 150 mg  150 mg Intramuscular Q3 Months KARTIK Abraham   150 mg at 02/02/22 0595       Review of Systems   Constitutional: Negative for chills and fever  HENT: Negative for ear pain and sore throat  Eyes: Negative for pain and visual disturbance  Respiratory: Negative for cough and shortness of breath  Cardiovascular: Negative for chest pain and palpitations  Gastrointestinal: Negative for abdominal pain and vomiting  Genitourinary: Negative for dysuria and hematuria  Musculoskeletal: Negative for arthralgias and back pain  Skin: Negative for color change and rash  Neurological: Positive for headaches  Negative for seizures and syncope  All other systems reviewed and are negative  Objective:    BP 98/68 (BP Location: Left arm, Patient Position: Sitting, Cuff Size: Standard)   Pulse 72 Comment: unable to obtain  Temp 98 1 °F (36 7 °C) (Temporal)   Resp 16   Ht 5' 5" (1 651 m)   Wt 53 1 kg (117 lb 1 6 oz)   BMI 19 49 kg/m²     Physical Exam  Vitals and nursing note reviewed  Constitutional:       General: She is not in acute distress  Appearance: She is well-developed  She is not diaphoretic  HENT:      Head: Normocephalic and atraumatic        Right Ear: External ear normal       Left Ear: External ear normal    Eyes:      Extraocular Movements: Extraocular movements intact  Conjunctiva/sclera: Conjunctivae normal       Pupils: Pupils are equal, round, and reactive to light  Cardiovascular:      Rate and Rhythm: Normal rate and regular rhythm  Pulmonary:      Effort: Pulmonary effort is normal  No respiratory distress  Breath sounds: Normal breath sounds  No wheezing  Abdominal:      General: Bowel sounds are normal  There is no distension  Palpations: Abdomen is soft  Tenderness: There is no abdominal tenderness  Musculoskeletal:         General: No deformity  Normal range of motion  Cervical back: Normal range of motion and neck supple  Lymphadenopathy:      Cervical: No cervical adenopathy  Skin:     General: Skin is warm and dry  Capillary Refill: Capillary refill takes less than 2 seconds  Findings: No rash  Neurological:      General: No focal deficit present  Mental Status: She is alert and oriented to person, place, and time  Sensory: No sensory deficit        Coordination: Coordination normal       Deep Tendon Reflexes: Reflexes normal    Psychiatric:         Mood and Affect: Mood normal          Behavior: Behavior normal          KARTIK Acevedo  03/29/22  2:17 PM

## 2022-03-29 NOTE — ASSESSMENT & PLAN NOTE
Migraines have improved substantially since taking Topamax 75 mg daily, increase to 100 mg daily ( 50 mg in a m  and 50 mg in p m )   continue with Imitrex p r n  and magnesium supplement

## 2022-04-01 ENCOUNTER — OFFICE VISIT (OUTPATIENT)
Dept: PHYSICAL THERAPY | Facility: CLINIC | Age: 32
End: 2022-04-01
Payer: MEDICARE

## 2022-04-01 DIAGNOSIS — M25.511 RIGHT SHOULDER PAIN, UNSPECIFIED CHRONICITY: Primary | ICD-10-CM

## 2022-04-01 DIAGNOSIS — M54.12 RADICULOPATHY, CERVICAL REGION: ICD-10-CM

## 2022-04-01 PROCEDURE — 97110 THERAPEUTIC EXERCISES: CPT

## 2022-04-01 PROCEDURE — 97112 NEUROMUSCULAR REEDUCATION: CPT

## 2022-04-01 PROCEDURE — 97530 THERAPEUTIC ACTIVITIES: CPT

## 2022-04-01 NOTE — PROGRESS NOTES
Daily Note     Today's date: 2022  Patient name: Trini Bledsoe  : 1990  MRN: [id number]  Referring provider: Lizbeth Brown MD  Dx:   Encounter Diagnosis     ICD-10-CM    1  Right shoulder pain, unspecified chronicity  M25 511    2  Radiculopathy, cervical region  M54 12        Start Time: 0820  Stop Time: 09  Total time in clinic (min): 42 minutes  Subjective: Pt denies adverse Sx following additions to/progressions of exercise diary LV  Pt notes that she has been having difficulty making a fist in her (R) hand - demonstrates limited DIP joint hypomobility digits 2-5  Pt notes that the pain in her (R)UE/shldr is getting lower but her     Objective: See treatment diary below    Assessment: Tolerated treatment well  Pt denies pain /c additions to exercise diary - notes some fatigue /c today's program   Pt demonstrated fatigue post treatment, exhibited good technique with therapeutic exercises and would benefit from continued PT to progress DNF strength and cervicothoracic mobility to reduce stresses on the C/S and improve pt's tolerance to reaching OH/using the (R) UE while completing ADLs such as cooking/cleaning  Plan: Cont /c PT POC  Progress as tolerated        Precautions: C/S Radiculopathy; migraines; KEREN hearing loss; bipolar; OCD  Date 3/2 3/8 03/16 03/18 03/25 3/28 04/01      Visit 1 2 3 4 5 6 7      FOTO IE      xx       Re-eval IE                Manuals 3/2 3/8 03/16 03/18 03/25 3/28 04/01      C/S Manual traction Trial nv            (R) pec cross friction   SP                       Neuro Re-Ed 3/2 3/8 03/16 03/18 03/25 3/28 04/01      Scap retractions nv 20x3" 5"x15          No moneys nv Pink TB 20x3" BTBx20 BTBx30         Shldr Row nv  6Rx20 10Rx30 11Rx30 11R 20x       Shldr Ext    6Rx30 7Rx30 7R 20x       Seated chin tucks 2x10 HEP 2x10 5"x15   Self OP 2x10       FRO       5"x15      Spidermans       [id number]      Wall ABCs (R)    RMB 2x RMB 2x  RMB 5x      Swimmer's n glide nv nv    15x        Supine chin tucks nv 20x3"    20x3" 5"x15                   SA punches     3#bar 5"x20 3# bar 20x5" 3# DBs 5"x20                                Ther Ex 3/2 3/8 03/16 03/18 03/25 3/28 04/01      (R) shldr IR   3Rx20 3Rx30 3 5Rx30        (R) shldr ER   1Rx20 1Rx30 1 5Rx30        Digi              Corner pec (S)  4x15"           T/S ext over ball nv 20x3"           T/S rot with cane/ball nv 15x3" ea           Scap, FF             Supine pec (S)   3' 1/2 foam   2'       Open books nv 10x5" ea           Supine cane flex nv 20x3"  3#x30 3# 5"x30        Supine hor abd w/ tband nv Pink TB 20x3" BTBx20 BTBx30 BTBx30 BTB 30x BTBx30  seated      Prone Y, Ts      15x ea 20ea (R)                   Ther Activity 3/2 3/8 03/16 03/18 03/25 3/28 04/01      Retro UBE nv nv 2'/2' L1   3'/3' L2 twn pks 3'/3' L2 twn pks 3'/3' L2 twn peaks 2'/4' L2 twn peaks      Pulleys - Flex, Scap nv 5'  5'  3'   3'       Seated SWB flex nv 3'    3' 3'                                 Pt Edu AL AL SP HEP   AL SP                   Modalities 3/2 3/8 03/16 03/18 03/25 3/28 04/01      MH or ice, prn                              Pina Banegas, PTA

## 2022-04-05 ENCOUNTER — OFFICE VISIT (OUTPATIENT)
Dept: PHYSICAL THERAPY | Facility: CLINIC | Age: 32
End: 2022-04-05
Payer: MEDICARE

## 2022-04-05 DIAGNOSIS — M25.511 RIGHT SHOULDER PAIN, UNSPECIFIED CHRONICITY: Primary | ICD-10-CM

## 2022-04-05 DIAGNOSIS — M54.12 RADICULOPATHY, CERVICAL REGION: ICD-10-CM

## 2022-04-05 PROCEDURE — 97140 MANUAL THERAPY 1/> REGIONS: CPT

## 2022-04-05 PROCEDURE — 97110 THERAPEUTIC EXERCISES: CPT

## 2022-04-05 PROCEDURE — 97530 THERAPEUTIC ACTIVITIES: CPT

## 2022-04-05 PROCEDURE — 97112 NEUROMUSCULAR REEDUCATION: CPT

## 2022-04-05 NOTE — PROGRESS NOTES
Daily Note     Today's date: 2022  Patient name: Judy Rendon  : 1990  MRN: [id number]  Referring provider: Petr Snow MD  Dx:   Encounter Diagnosis     ICD-10-CM    1  Right shoulder pain, unspecified chronicity  M25 511    2  Radiculopathy, cervical region  M54 12        Start Time: 1030  Stop Time: 1112  Total time in clinic (min): 42 minutes  Subjective: Pt arrives to Tx reporting cco (R)UE weakness + pain Sx - pt describes pain Sx as nerve pain in (R) anterior elbow region and pinching pain along lateral aspect of prox (R)UE  Pt originally notes no change in activity since LV; however, later notes that she has been moving into a new home and will hopefully be done this weekend  Pt denies adverse Sx following LV  Objective: See treatment diary below    Assessment: Tolerated treatment well  Tx focused on mobility of UE's and scapular muscles /c low resistance exercises - pt tolerated well and notes improved tolerance /c decreased resistance  Pt reports (+) response to manual Tx noting that pan in (R) anterior elbow region is unchanged; however, the weakness she previously reported has dissipated  Followed /c UT and LS (S) which pt reports significant stretching that "feels good"  - provided pt /c HEP to include new stretches and discussed benefit of stretchign daily to help reduce pain Sx and improve C/S + UE mobility  Pt demonstrated fatigue post treatment, exhibited good technique with therapeutic exercises and would benefit from continued PT to progress DNF strength and cervicothoracic mobility to reduce stresses on the C/S and improve pt's tolerance to reaching [place]/using the (R) UE while completing ADLs such as cooking/cleaning  Plan: Cont /c PT POC  Progress as tolerated        Precautions: C/S Radiculopathy; migraines; KEREN hearing loss; bipolar; OCD  Date 3/2 3/8 03/16 03/18 03/25 3/28 04/01 04/05     Visit 1 2 3 4 5 6 7 8     FOTO IE      xx       Re-eval IE Manuals 3/2 3/8 03/16 03/18 03/25 3/28 04/01 04/05     C/S Manual traction Trial nv       SP     (R) pec cross friction   SP                       Neuro Re-Ed 3/2 3/8 03/16 03/18 03/25 3/28 04/01 04/05     Scap retractions nv 20x3" 5"x15          No moneys nv Pink TB 20x3" BTBx20 BTBx30    GTBx30     Shldr Row nv  6Rx20 10Rx30 11Rx30 11R 20x  4 5Rx30     Shldr Ext    6Rx30 7Rx30 7R 20x  2Rx30     Seated chin tucks 2x10 HEP 2x10 5"x15   Self OP 2x10       FRO       5"x15      Spidermans       [id number]      Wall ABCs (R)    RMB 2x RMB 2x  RMB 5x      Swimmer's n glide nv nv    15x        Supine chin tucks nv 20x3"    20x3" 5"x15                   SA punches     3#bar 5"x20 3# bar 20x5" 3# DBs 5"x20                                Ther Ex 3/2 3/8 03/16 03/18 03/25 3/28 04/01 04/05     (R) shldr IR   3Rx20 3Rx30 3 5Rx30        (R) shldr ER   1Rx20 1Rx30 1 5Rx30        Digi              Corner pec (S)  4x15"           T/S ext over ball nv 20x3"           T/S rot with cane/ball nv 15x3" ea           Scap, FF             Supine pec (S)   3' 1/2 foam   2'       Open books nv 10x5" ea           Supine cane flex nv 20x3"  3#x30 3# 5"x30        Supine hor abd w/ tband nv Pink TB 20x3" BTBx20 BTBx30 BTBx30 BTB 30x BTBx30  seated GTBx30     Prone Y, Ts      15x ea 20ea (R)      (R) UT + LS  (S)        20"x5ea                  Ther Activity 3/2 3/8 03/16 03/18 03/25 3/28 04/01 04/05     Retro UBE nv nv 2'/2' L1   3'/3' L2 twn pks 3'/3' L2 twn pks 3'/3' L2 twn peaks 2'/4' L2 twn peaks 3'/3' L1     Pulleys - Flex, Scap nv 5'  5'  3'   3'       Seated SWB flex nv 3'    3' 3'                                 Pt Edu AL AL SP HEP   AL SP SP HEP                  Modalities 3/2 3/8 03/16 03/18 03/25 3/28 04/01 04/05     MH or ice, prn                              Alexandra Gloss, PTA

## 2022-04-13 ENCOUNTER — OFFICE VISIT (OUTPATIENT)
Dept: PHYSICAL THERAPY | Facility: CLINIC | Age: 32
End: 2022-04-13
Payer: MEDICARE

## 2022-04-13 DIAGNOSIS — M54.12 RADICULOPATHY, CERVICAL REGION: ICD-10-CM

## 2022-04-13 DIAGNOSIS — M25.511 RIGHT SHOULDER PAIN, UNSPECIFIED CHRONICITY: Primary | ICD-10-CM

## 2022-04-13 PROCEDURE — 97530 THERAPEUTIC ACTIVITIES: CPT

## 2022-04-13 PROCEDURE — 97110 THERAPEUTIC EXERCISES: CPT

## 2022-04-13 PROCEDURE — 97112 NEUROMUSCULAR REEDUCATION: CPT

## 2022-04-13 PROCEDURE — 97140 MANUAL THERAPY 1/> REGIONS: CPT

## 2022-04-13 NOTE — PROGRESS NOTES
Daily Note     Today's date: 2022  Patient name: Marsha Brandt  : 1990  MRN: [id number]  Referring provider: Domenic Pompa MD  Dx:   Encounter Diagnosis     ICD-10-CM    1  Right shoulder pain, unspecified chronicity  M25 511    2  Radiculopathy, cervical region  M54 12        Start Time: 69  Stop Time: 1044  Total time in clinic (min): 49 minutes  Subjective: Pt arrives to Tx reporting frustration at lack of progress recently re: (R) shldr and radicular pain Sx  Pt notes that "in the beginning I felt like I was getting better, but it hasn't changed recently "   Today pt reports discomfort in (R) elbow - denies pain Sx  Pt denies adverse Sx following LV  Objective: See treatment diary below    Assessment: Tolerated treatment well  During cervical PROM pt demonstrates tightness (L) UT + LS > (R) UT + LS - given this finding in junction /c pt's resting position of elevated shoulders, performed STM to C/S PSP, (B/L) UT's, and (B/L) LS - no signficant soft tissue restrictions palpable  Progressed C/S retractions to clinician overpressure as well as self-over pressure - pt notes increased C/S PSP muscle activation; however, notes no significant changes in (R)UE discomfort immediately; however, post Tx pt notes C/S "felt good" and her discomfort dissipated  Advised pt to perform C/S retractions /c self OP at home and to continue /c T/S rotation + ext exercises  Pt demonstrated fatigue post treatment, exhibited good technique with therapeutic exercises and would benefit from continued PT to progress DNF strength and cervicothoracic mobility to reduce stresses on the C/S and improve pt's tolerance to reaching [place]/using the (R) UE while completing ADLs such as cooking/cleaning  Plan: Cont /c PT POC  Progress as tolerated        Precautions: C/S Radiculopathy; migraines; KEREN hearing loss; bipolar; OCD  Date 3/2 3/8 03/16 03/18 03/25 3/28 04/01 04/05     Visit 1 2 3 4 5 6 7 8     FOTO IE      xx       Re-eval IE                Manuals 3/2 3/8 03/16 03/18 03/25 3/28 04/01 04/05 04/13    C/S Manual traction Trial nv       SP SP    C/S PROM         SP    STM C/S PSP, UT, LS         SP    (R) pec cross friction   SP                       Neuro Re-Ed 3/2 3/8 03/16 03/18 03/25 3/28 04/01 04/05 04/13    Scap retractions nv 20x3" 5"x15          No moneys nv Pink TB 20x3" BTBx20 BTBx30    GTBx30     Shldr Row nv  6Rx20 10Rx30 11Rx30 11R 20x  4 5Rx30     Shldr Ext    6Rx30 7Rx30 7R 20x  2Rx30     Seated chin tucks 2x10 HEP 2x10 5"x15   Self OP 2x10   5"x10ea self OP + PTA OP    FRO       5"x15      Spidermans       [id number]      Wall ABCs (R)    RMB 2x RMB 2x  RMB 5x      Swimmer's n glide nv nv    15x        Supine chin tucks nv 20x3"    20x3" 5"x15  10"x10                 SA punches     3#bar 5"x20 3# bar 20x5" 3# DBs 5"x20  5"x20                              Ther Ex 3/2 3/8 03/16 03/18 03/25 3/28 04/01 04/05 04/13    (R) shldr IR   3Rx20 3Rx30 3 5Rx30        (R) shldr ER   1Rx20 1Rx30 1 5Rx30        Digi              Corner pec (S)  4x15"           T/S ext over ball nv 20x3"       2'     T/S rot with cane/ball nv 15x3" ea       3'    Scap, FF             Supine pec (S)   3' 1/2 foam   2'       Open books nv 10x5" ea           Supine cane flex nv 20x3"  3#x30 3# 5"x30        Supine hor abd w/ tband nv Pink TB 20x3" BTBx20 BTBx30 BTBx30 BTB 30x BTBx30  seated GTBx30     Prone Y, Ts      15x ea 20ea (R)      (R) UT + LS  (S)        20"x5ea                  Ther Activity 3/2 3/8 03/16 03/18 03/25 3/28 04/01 04/05 04/13    Retro UBE nv nv 2'/2' L1   3'/3' L2 twn pks 3'/3' L2 twn pks 3'/3' L2 twn peaks 2'/4' L2 twn peaks 3'/3' L1 3'/3' L1    Pulleys - Flex, Scap nv 5'  5'  3'   3'   5'     Seated SWB flex nv 3'    3' 3'                                 Pt Edu AL AL SP HEP   AL SP SP HEP SP                 Modalities 3/2 3/8 03/16 03/18 03/25 3/28 04/01 04/05 04/13    MH or ice, prn Charlie Franco, PTA

## 2022-04-20 ENCOUNTER — CLINICAL SUPPORT (OUTPATIENT)
Dept: OBGYN CLINIC | Facility: CLINIC | Age: 32
End: 2022-04-20

## 2022-04-20 VITALS
DIASTOLIC BLOOD PRESSURE: 73 MMHG | BODY MASS INDEX: 19.27 KG/M2 | SYSTOLIC BLOOD PRESSURE: 104 MMHG | WEIGHT: 115.8 LBS | HEART RATE: 98 BPM

## 2022-04-20 DIAGNOSIS — Z30.42 ENCOUNTER FOR SURVEILLANCE OF INJECTABLE CONTRACEPTIVE: Primary | ICD-10-CM

## 2022-04-20 PROCEDURE — 96372 THER/PROPH/DIAG INJ SC/IM: CPT

## 2022-04-20 RX ORDER — MEDROXYPROGESTERONE ACETATE 150 MG/ML
150 INJECTION, SUSPENSION INTRAMUSCULAR ONCE
Status: COMPLETED | OUTPATIENT
Start: 2022-04-20 | End: 2022-04-20

## 2022-04-20 RX ADMIN — MEDROXYPROGESTERONE ACETATE 150 MG: 150 INJECTION, SUSPENSION INTRAMUSCULAR at 13:10

## 2022-04-20 NOTE — PROGRESS NOTES
Pt here today for Depo injection given in the left arm       T#[id number]  [facility]#[id number]  Exp#5/31/2026

## 2022-04-21 ENCOUNTER — EVALUATION (OUTPATIENT)
Dept: PHYSICAL THERAPY | Facility: CLINIC | Age: 32
End: 2022-04-21
Payer: MEDICARE

## 2022-04-21 DIAGNOSIS — M54.12 RADICULOPATHY, CERVICAL REGION: ICD-10-CM

## 2022-04-21 DIAGNOSIS — M25.511 RIGHT SHOULDER PAIN, UNSPECIFIED CHRONICITY: Primary | ICD-10-CM

## 2022-04-21 PROCEDURE — 97110 THERAPEUTIC EXERCISES: CPT

## 2022-04-21 PROCEDURE — 97530 THERAPEUTIC ACTIVITIES: CPT

## 2022-04-21 PROCEDURE — 97140 MANUAL THERAPY 1/> REGIONS: CPT

## 2022-04-21 NOTE — PROGRESS NOTES
PT Re-Evaluation     Today's date: 2022  Patient name: Merna Nam  : 1990  MRN: [id number]  Referring provider: Naomy Diaz MD  Dx:   Encounter Diagnosis     ICD-10-CM    1  Right shoulder pain, unspecified chronicity  M25 511    2  Radiculopathy, cervical region  M54 12        Start Time: 5545  Stop Time: 0946  Total time in clinic (min): 54 minutes    Assessment  Assessment details: Merna Nam is a 28 y o  female who presents today to outpatient therapy for RE-evaluation of Radiculopathy, cervical region and right shoulder pain, unspecified chronicity  Today it is pt's 10th visit to date  Upon assessment today, pt exhibits continued postural deviations; improved c-spine ROM, decreased/painful (R) Shoulder AROM but improved from IE; decreased sensation thru the (R) UE; decreased (R) UE strength but slightly improved from IE; and TTP thru the (R) upper quarter  Pt responded (+) to trial of manual cervical traction along with patient assisted C/S retractions, reporting centralization of (R) UE pain to the (R) posterolateral neck afterwards  These impairments are contributing to functional limitations with writing; gripping; reaching [place]; and sleeping comfortably at night  Pt would therefore benefit from continued PT intervention in order to address the aforementioned deficits so that she can return to her PLOF and function comfortably/safely in her home and surrounding environment  Thank you for the referral!      Impairments: abnormal or restricted ROM, abnormal movement, impaired balance, impaired physical strength, pain with function and poor posture   Understanding of Dx/Px/POC: good   Prognosis: good    Goals  STG 1: Pt will demonstrate compliance w/ HEP to supplement therapy in 1-2 weeks  Met  Comes to therapy once/week and does HEP 2x/week  STG 2: Pt will report centralization of (R) UE pain by 25% in 2-4 weeks  Not Met  Ongoing     STG 3: Pt will demonstrate increased  strength by 5-10# in 2-4 weeks  Not Met  Ongoing  LTG 1: Pt will be able to reach [facility] with the (R) UE with min difficulty in 6-8 weeks  Not Met  Ongoing  LTG 2: Pt will be able to write with min to no difficulty in 6-8 weeks  Not Met  Ongoing  LTG 3: Pt will demonstrate increased  strength to 90% within contralateral UE in 6-8 weeks to assist pt with completing ADLs such as cooking/cleaning  Not Met  Ongoing  LTG 4: Pt will be able to find a comfortable sleeping position with min to no difficulty related to the (R) UE in 6-8 weeks  Partially met; sleeps on her back or her L side but prefers to sleep on her R side and hasn't yet been able to sleep on her R side  Gabapentin helps with sleeping  Plan  Plan details: Educated pt today regarding POC - with recommendation for follow up with referring MD and to recommend EMG nerve conduction study as well as possible MRI, if MD agrees  Educated pt also on progress made despite still "feeling worse" (with improved ROM) and decreased pain with certain movements  Patient would benefit from: skilled physical therapy  Planned modality interventions: cryotherapy, TENS, traction and unattended electrical stimulation  Planned therapy interventions: abdominal trunk stabilization, self care, postural training, patient education, neuromuscular re-education, joint mobilization, manual therapy, massage, activity modification, balance, body mechanics training, breathing training, Merino taping, strengthening, stretching, therapeutic activities, coordination, flexibility, home exercise program, therapeutic exercise and functional ROM exercises  Frequency: 2x week  Duration in weeks: 4  Treatment plan discussed with: patient        Subjective Evaluation    History of Present Illness  Mechanism of injury: RE on 4/21/22: Pt reports that initially she was getting better but then she started to get worse   States she is still taking gabapentin which is helping her sleep and with the pain at night  Pt prefers to sleep on her R side but hasn't been able to do so  Is still taking tylenol/motrin as instructed by her MD but doesn't really notice too much difference with her pain  Pt notes she will wake up mainly with discomfort in the mornings and her pain will get worse as the day goes on (starting around 1 pm)  Pt also notes that her R shoulder will click with certain movements (painlessly)  Pt is seeing her referring MD tomorrow for a follow up  Patient reports she is still feeling weak  Reports that she has numbness/tingling/weakness often in her RUE and has significant difficulty with gripping objects, usually with pain  Still having difficulty with writing (is R handed)  However, pt is sleeping better at night due to taking gabapentin  Pt reports onset of (R) shoulder/neck pain which began about a month ago in February insidiously  When she f/u with her doctor, she obtained x-rays and was diagnosed with a pinched nerve - she agrees with this dx  Her doctor also advised her to perform physical therapy and prescribed her Gabapentin which is helping "a little " Currently pt reports difficulty writing; gripping; reaching [place]; and finding a comfortable sleeping at night  Eases: Rest   Aggs: Moving too much  Pain  Current pain ratin  At best pain ratin (usually when she wakes up in the morning, it is a 0 )  At worst pain ratin  Location: Pt reports a "pinching" type pain thru the (R) anterolateral shoulder, elbow, and 5th digit  Pt also reports N/T  Quality: radiating and discomfort  Relieving factors: heat, ice and medications  Aggravating factors: overhead activity and lifting  Progression: worsening    Patient Goals  Patient goals for therapy: decreased pain  Patient goal: Pt would like to be able to clean  Objective     Static Posture     Head  Forward  Shoulders  Rounded      Postural Observations    Additional Postural Observation Details  Inc hiking thru KEREN UTs, (L) > (R)  Palpation     Additional Palpation Details  Mod TTP thru (R) UT, rhomboids, C/S PS  LTG: Min TTP  Neurological Testing     Sensation     Shoulder   Left Shoulder   Intact: light touch    Right Shoulder   Diminished: light touch    Comments   Right light touch: Diminished thru C5, C6, T1    Active Range of Motion   Cervical/Thoracic Spine       Cervical    Left lateral flexion:  Restriction level: minimal  Left rotation:  WFL and with pain  Right rotation:  [facility]    Thoracic    Flexion:  WFL  Extension:  WFL  Left rotation:  Restriction level: moderate  Right rotation:  Restriction level: moderate  Left Shoulder   Flexion: 150 degrees   Abduction: 158 degrees   External rotation BTH: T4   Internal rotation BTB: T9     Right Shoulder   Flexion: 130 degrees with pain  Abduction: 100 degrees with pain  External rotation BTH: C7 (Slowed speed)   Internal rotation BTB: T8   Mechanical Assessment    Cervical    Seated retraction: repeated movements   Pain location: no change    Thoracic      Lumbar      Strength/Myotome Testing     Left Shoulder     Planes of Motion   Flexion: 5   Abduction: 5   External rotation at 0°: 5   Internal rotation at 0°: 5     Isolated Muscles   Biceps: 4+   Triceps: 4+     Right Shoulder     Planes of Motion   Flexion: 4+ (pain)   Abduction: 4+   External rotation at 0°: 4+ (pain)   Internal rotation at 0°: 4+     Isolated Muscles   Biceps: 4   Triceps: 4 (pain)     Additional Strength Details   strength:   IE:54# on (L), 14# on (R)  LT# on (R)       RE: 5# on (R) in 2nd notch     Pinch strength: Unremarkable      Flowsheet Rows      Most Recent Value   PT/OT G-Codes    Current Score 41   Projected Score 70             Precautions: C/S Radiculopathy; migraines; KEREN hearing loss; bipolar; OCD  Date 3/2 3/8 03/16 03/18 03/25 3/28 04/01 04/05 4/13 4/21   Visit 1 2 3 4 5 6 7 8 9 10   FOTO IE      xx       Re-eval IE         RE       Manuals 3/2 3/8 03/16 03/18 03/25 3/28 04/01 04/05 04/13 4/21   C/S Manual traction Trial nv       SP SP NA with pt assisted chin tucks   C/S PROM         SP NA along with UT/Levator stretching   STM C/S PSP, UT, LS         SP NA   (R) pec cross friction   SP          R First rib mobs           In seated NA   Neuro Re-Ed 3/2 3/8 03/16 03/18 03/25 3/28 04/01 04/05 04/13 4/21   Scap retractions nv 20x3" 5"x15          No moneys nv Pink TB 20x3" BTBx20 BTBx30    GTBx30     Shldr Row nv  6Rx20 10Rx30 11Rx30 11R 20x  4 5Rx30     Shldr Ext    6Rx30 7Rx30 7R 20x  2Rx30     Seated chin tucks 2x10 HEP 2x10 5"x15   Self OP 2x10   5"x10ea self OP + PTA OP    FRO       5"x15      Spidermans       [id number]      Wall ABCs (R)    RMB 2x RMB 2x  RMB 5x      Swimmer's n glide nv nv    15x        Supine chin tucks nv 20x3"    20x3" 5"x15  10"x10                 SA punches     3#bar 5"x20 3# bar 20x5" 3# DBs 5"x20  5"x20                              Ther Ex 3/2 3/8 03/16 03/18 03/25 3/28 04/01 04/05 04/13 4/21   (R) shldr IR   3Rx20 3Rx30 3 5Rx30        (R) shldr ER   1Rx20 1Rx30 1 5Rx30        Digi              Corner pec (S)  4x15"           T/S ext over ball nv 20x3"       2'     T/S rot with cane/ball nv 15x3" ea       3'    Scap, FF             Supine pec (S)   3' 1/2 foam   2'       Open books nv 10x5" ea           Supine cane flex nv 20x3"  3#x30 3# 5"x30        Supine hor abd w/ tband nv Pink TB 20x3" BTBx20 BTBx30 BTBx30 BTB 30x BTBx30  seated GTBx30     Prone Y, Ts      15x ea 20ea (R)      (R) UT + LS  (S)        20"x5ea                  Ther Activity 3/2 3/8 03/16 03/18 03/25 3/28 04/01 04/05 04/13 4/21   Retro UBE nv nv 2'/2' L1   3'/3' L2 twn pks 3'/3' L2 twn pks 3'/3' L2 twn peaks 2'/4' L2 twn peaks 3'/3' L1 3'/3' L1    Pulleys - Flex, Scap nv 5'  5'  3'   3'   5'     Seated SWB flex nv 3'    3' 3'                                 Pt Edu AL AL SP HEP   AL SP SP HEP SP NA   Re-eval          NA   Modalities 3/2 3/8 03/16 03/18 03/25 3/28 04/01 04/05 04/13 4/21   TRINITY or ice, prn

## 2022-04-22 ENCOUNTER — OFFICE VISIT (OUTPATIENT)
Dept: OBGYN CLINIC | Facility: MEDICAL CENTER | Age: 32
End: 2022-04-22
Payer: MEDICARE

## 2022-04-22 ENCOUNTER — APPOINTMENT (OUTPATIENT)
Dept: RADIOLOGY | Facility: MEDICAL CENTER | Age: 32
End: 2022-04-22
Payer: MEDICARE

## 2022-04-22 VITALS
BODY MASS INDEX: 19.16 KG/M2 | HEART RATE: 102 BPM | HEIGHT: 65 IN | DIASTOLIC BLOOD PRESSURE: 68 MMHG | SYSTOLIC BLOOD PRESSURE: 104 MMHG | WEIGHT: 115 LBS

## 2022-04-22 DIAGNOSIS — M54.2 NECK PAIN: ICD-10-CM

## 2022-04-22 DIAGNOSIS — G89.29 CHRONIC RIGHT SHOULDER PAIN: Primary | ICD-10-CM

## 2022-04-22 DIAGNOSIS — M25.511 RIGHT SHOULDER PAIN, UNSPECIFIED CHRONICITY: ICD-10-CM

## 2022-04-22 DIAGNOSIS — M25.511 CHRONIC RIGHT SHOULDER PAIN: Primary | ICD-10-CM

## 2022-04-22 DIAGNOSIS — M54.12 RADICULOPATHY, CERVICAL REGION: ICD-10-CM

## 2022-04-22 PROCEDURE — 20610 DRAIN/INJ JOINT/BURSA W/O US: CPT | Performed by: EMERGENCY MEDICINE

## 2022-04-22 PROCEDURE — 99214 OFFICE O/P EST MOD 30 MIN: CPT | Performed by: EMERGENCY MEDICINE

## 2022-04-22 PROCEDURE — 73030 X-RAY EXAM OF SHOULDER: CPT

## 2022-04-22 RX ORDER — LIDOCAINE HYDROCHLORIDE 10 MG/ML
4 INJECTION, SOLUTION INFILTRATION; PERINEURAL
Status: COMPLETED | OUTPATIENT
Start: 2022-04-22 | End: 2022-04-22

## 2022-04-22 RX ORDER — TRIAMCINOLONE ACETONIDE 40 MG/ML
40 INJECTION, SUSPENSION INTRA-ARTICULAR; INTRAMUSCULAR
Status: COMPLETED | OUTPATIENT
Start: 2022-04-22 | End: 2022-04-22

## 2022-04-22 RX ADMIN — TRIAMCINOLONE ACETONIDE 40 MG: 40 INJECTION, SUSPENSION INTRA-ARTICULAR; INTRAMUSCULAR at 10:20

## 2022-04-22 RX ADMIN — LIDOCAINE HYDROCHLORIDE 4 ML: 10 INJECTION, SOLUTION INFILTRATION; PERINEURAL at 10:20

## 2022-04-22 NOTE — PROGRESS NOTES
Assessment/Plan:    Diagnoses and all orders for this visit:    Chronic right shoulder pain  -     XR shoulder 2+ vw right; Future  -     MRI cervical spine wo contrast; Future  -     Large joint arthrocentesis: R subacromial bursa    Neck pain  -     XR shoulder 2+ vw right; Future  -     MRI cervical spine wo contrast; Future    Radiculopathy, cervical region  -     MRI cervical spine wo contrast; Future    MRI Cervical spine for symptoms suggestive of cervical radiculopathy resistant to Prednisone, gabapentin and motrin, as well as formal PT  MRI was recommended by PT  We have performed a diagnostic and therapeutic steroid injection of the right subacromial space  Xrays Right shoulder obtained and wnl    Return for Follow Up After Imaging Study  Chief Complaint:     Chief Complaint   Patient presents with    Right Shoulder - Follow-up       Subjective:   Patient ID: Luci Gaona is a 28 y o  female  Patient returns with worsening symptoms with pain and N/T of the shoulder down to hand  Trouble driving with arms bent she finds relief if she keeps her arm straight out in front of her  S/p prednisone on Gabapentin qHS, taking motrin / tylenol PRN  Xray C spine reviewed    Initial note:  NP presents for Was evaluated neck and arm pain she notes pain on the right side of the neck in the trapezius as well as the shoulder down to the hand she does note right arm numbness tingling  She was evaluated at urgent care placed on prednisone currently taking motrin  Review of Systems    The following portions of the patient's chart were reviewed and updated as appropriate:    Allergy:  No Known Allergies      Past Medical History:   Diagnosis Date    Abnormal Pap smear of cervix     2013 ASCUS    ADHD     Bipolar 1 disorder (HCC)     Depression     GERD (gastroesophageal reflux disease)     Migraine     OCD (obsessive compulsive disorder)     Wears glasses        Past Surgical History:   Procedure Laterality Date    GYNECOLOGIC CRYOSURGERY      patient was 26years old    NO PAST SURGERIES      [place] COLPOSCOPY,CERVIX W/ADJ VAG,W/LOOP BX N/A 8/9/2019    Procedure: BIOPSY LEEP CERVIX;  Surgeon: Lester Garza MD;  Location: [facility] OR;  Service: Gynecology       Social History     Socioeconomic History    Marital status: Single     Spouse name: Not on file    Number of children: Not on file    Years of education: Not on file    Highest education level: Not on file   Occupational History    Not on file   Tobacco Use    Smoking status: Current Every Day Smoker     Packs/day: 1 00     Types: Cigarettes    Smokeless tobacco: Never Used   Vaping Use    Vaping Use: Never used   Substance and Sexual Activity    Alcohol use: Yes    Drug use: No    Sexual activity: Yes     Partners: Male     Birth control/protection: Injection   Other Topics Concern    Not on file   Social History Narrative    Not on file     Social Determinants of Health     Financial Resource Strain: Low Risk     Difficulty of Paying Living Expenses: Not hard at all   Food Insecurity: No Food Insecurity    Worried About Running Out of Food in the Last Year: Never true    [address] in the Last Year: Never true   Transportation Needs: No Transportation Needs    Lack of Transportation (Medical): No    Lack of Transportation (Non-Medical): No   Physical Activity: Inactive    Days of Exercise per Week: 0 days    Minutes of Exercise per Session: 0 min   Stress: No Stress Concern Present    Feeling of Stress :  Only a little   Social Connections: Not on file   Intimate Partner Violence: Not At Risk    Fear of Current or Ex-Partner: No    Emotionally Abused: No    Physically Abused: No    Sexually Abused: No   Housing Stability: Unknown    Unable to Pay for Housing in the Last Year: No    Number of Places Lived in the Last Year: Not on file    Unstable Housing in the Last Year: No       Family History   Problem Relation Age of Onset    Bipolar disorder Mother     No Known Problems Father     Depression Sister        Medications:    Current Outpatient Medications:     dexamethasone (DECADRON) 2 mg tablet, Take 3 tabs x 1 day, 2 tabs x 1 day, 1 tab x 1 day, Disp: 6 tablet, Rfl: 0    ergocalciferol (VITAMIN D2) 50,000 units, Take 1 capsule (50,000 Units total) by mouth 3 (three) times a week, Disp: 21 capsule, Rfl: 0    fluticasone (FLONASE) 50 mcg/act nasal spray, SHAKE LIQUID AND USE 1 SPRAY IN EACH NOSTRIL TWICE DAILY, Disp: 48 g, Rfl: 1    gabapentin (NEURONTIN) 300 mg capsule, TAKE 1 CAPSULE(300 MG) BY MOUTH DAILY AT BEDTIME, Disp: 30 capsule, Rfl: 0    hydrOXYzine HCL (ATARAX) 25 mg tablet, , Disp: , Rfl:     LATUDA 40 MG tablet, take 1 tablet by mouth once daily AFTER DINNER, Disp: , Rfl: 0    medroxyPROGESTERone acetate (DEPO-PROVERA SYRINGE) 150 mg/mL injection, INJECT 1 ML INTO THE MUSCLE EVERY 3 MONTHS, Disp: 1 mL, Rfl: 5    mirtazapine (REMERON) 15 mg tablet, , Disp: , Rfl:     omeprazole (PriLOSEC) 20 mg delayed release capsule, TAKE 1 CAPSULE(20 MG) BY MOUTH DAILY, Disp: 30 capsule, Rfl: 0    SUMAtriptan (Imitrex) 25 mg tablet, Take 1 tablet (25 mg total) by mouth once as needed for migraine for up to 1 dose, Disp: 9 tablet, Rfl: 0    topiramate (Topamax) 50 MG tablet, Take 50 mg in the AM and 50 mg in the PM, Disp: 180 tablet, Rfl: 0    butalbital-acetaminophen-caffeine (Esgic) -40 mg per tablet, Take 1 tablet by mouth every 6 (six) hours as needed for headaches (Patient not taking: Reported on 3/24/2022 ), Disp: 6 tablet, Rfl: 0    magnesium oxide (MAG-OX) 400 mg, Take 1 tablet (400 mg total) by mouth 2 (two) times a day (Patient not taking: Reported on 3/24/2022 ), Disp: 60 tablet, Rfl: 0    medroxyPROGESTERone (DEPO-PROVERA) 150 mg/mL injection, Inject 1 mL (150 mg total) into a muscle every 3 (three) months (Patient not taking: Reported on 3/24/2022 ), Disp: 1 mL, Rfl: 5    predniSONE 10 mg tablet, Take 6 pills day one, 5 pills day 2, 4 pills day 3, 3 pills day 4, 2 pills day 5, and 1 pill day 6  (Patient not taking: Reported on 3/24/2022 ), Disp: 21 tablet, Rfl: 0    Current Facility-Administered Medications:     medroxyPROGESTERone acetate (DEPO-PROVERA SYRINGE) IM injection 150 mg, 150 mg, Intramuscular, Q3 Months, Mary Lou KARTIK Silverman, 150 mg at 02/02/22 4805    Patient Active Problem List   Diagnosis    Nicotine use disorder    Cervical cancer screening    LGSIL on Pap smear of cervix    Atypical glandular cells of undetermined significance (MARIA E) on cervical Pap smear    ADHD (attention deficit hyperactivity disorder)    Bipolar 1 disorder (HCC)    OCD (obsessive compulsive disorder)    S/P LEEP    Encounter for surveillance of contraceptive pills    Vaginal odor    Yeast infection    Dizziness    Screen for STD (sexually transmitted disease)    Encounter for annual routine gynecological examination    Breast lump    Acute gastritis without hemorrhage    Suspected COVID-19 virus infection    Migraine    COVID-19    Bilateral hearing loss       Objective:  /68   Pulse 102   Ht 5' 5" (1 651 m)   Wt 52 2 kg (115 lb)   BMI 19 14 kg/m²     Right Shoulder Exam     Range of Motion   Active abduction: abnormal   Forward flexion: abnormal   Internal rotation 0 degrees: abnormal     Other   Erythema: absent    Comments:  Neg Spurligns Right            Physical Exam      Neurologic Exam    Large joint arthrocentesis: R subacromial bursa  Universal Protocol:  Consent: Verbal consent obtained  Risks and benefits: risks, benefits and alternatives were discussed  Consent given by: patient  Time out: Immediately prior to procedure a "time out" was called to verify the correct patient, procedure, equipment, support staff and site/side marked as required    Timeout called at: 4/22/2022 10:19 AM   Patient understanding: patient states understanding of the procedure being performed  Test results: test results available and properly labeled  Site marked: the operative site was marked  Patient identity confirmed: verbally with patient    Supporting Documentation  Indications: pain   Procedure Details  Location: shoulder - R subacromial bursa  Preparation: Patient was prepped and draped in the usual sterile fashion  Needle size: 22 G  Ultrasound guidance: no  Approach: posterolateral  Medications administered: 4 mL lidocaine 1 %; 40 mg triamcinolone acetonide 40 mg/mL    Patient tolerance: patient tolerated the procedure well with no immediate complications  Dressing:  Sterile dressing applied          I have personally reviewed pertinent films in PACS and my interpretation is Xrays Right shoulder no acute findings, no degenerative changes noted  Normal alignment

## 2022-04-27 ENCOUNTER — OFFICE VISIT (OUTPATIENT)
Dept: PHYSICAL THERAPY | Facility: CLINIC | Age: 32
End: 2022-04-27
Payer: MEDICARE

## 2022-04-27 ENCOUNTER — TELEPHONE (OUTPATIENT)
Dept: OBGYN CLINIC | Facility: HOSPITAL | Age: 32
End: 2022-04-27

## 2022-04-27 DIAGNOSIS — M25.511 RIGHT SHOULDER PAIN, UNSPECIFIED CHRONICITY: ICD-10-CM

## 2022-04-27 DIAGNOSIS — M54.12 RADICULOPATHY, CERVICAL REGION: Primary | ICD-10-CM

## 2022-04-27 PROCEDURE — 97112 NEUROMUSCULAR REEDUCATION: CPT

## 2022-04-27 PROCEDURE — 97530 THERAPEUTIC ACTIVITIES: CPT

## 2022-04-27 PROCEDURE — 97110 THERAPEUTIC EXERCISES: CPT

## 2022-04-27 NOTE — PROGRESS NOTES
Daily Note     Today's date: 2022  Patient name: Jose Jimenez  : 1990  MRN: [id number]  Referring provider: Cathy Roldan MD  Dx:   Encounter Diagnosis     ICD-10-CM    1  Radiculopathy, cervical region  M54 12    2  Right shoulder pain, unspecified chronicity  M25 511                   Subjective: Pt states that her neck feels "worse," reporting continued pain and weakness thru the (R) UE  She f/u with the doctor last week and obtained x-rays which she believes say that she has arthritis, if she read it correctly  She is also scheduled for an MRI and will f/u with MD again on May 13th  Finally, she obtained an injection in her (R) shoulder but this did not provide any relief  In the meantime, she would like to continue with therapy  Objective: See treatment diary below      Assessment: Tolerated treatment well  Pt responded well to trial of repeated C/S retraction + ext in supine, reporting improved "strength" thru the (R) UE afterwards (she was not experiencing radicular symptoms prior to repeated movements)  Mod-severe VC provided during cat/camel to perform correctly  Progressed DNF endurance with addition of supine and quadruped C/S retractions - fatigue noted in quadruped position  Educated pt today about goals of repeated movements; POC; DOMs; and role of DNF strength  Patient demonstrated fatigue post treatment, exhibited good technique with therapeutic exercises and would benefit from continued PT to progress DNF strength and cervicothoracic AROM to reduce stresses on the C/S, reduce radicular symptoms, and improve pt's tolerance to using the (R) UE daily  Plan: Continue per plan of care  Progress treatment as tolerated         Precautions: C/S Radiculopathy; migraines; KEREN hearing loss; bipolar; OCD  Date 4/27   03/18 03/25 3/28 04/01 04/05 4/13 4/21   Visit 11   4 5 6 7 8 9 10   FOTO       xx       Re-eval          RE       Manuals 4/27   03/18 03/25 3/28 04/01 04/05 04/13 4/21   C/S Manual traction        SP SP NA with pt assisted chin tucks   C/S PROM         SP NA along with UT/Levator stretching   STM C/S PSP, UT, LS         SP NA   (R) pec cross friction             Repeated C/S retraction + ext in supine AL             R First rib mobs           In seated NA   Neuro Re-Ed 4/27   03/18 03/25 3/28 04/01 04/05 04/13 4/21   Scap retractions             No moneys    BTBx30    GTBx30     Shldr Row    10Rx30 11Rx30 11R 20x  4 5Rx30     Shldr Ext    6Rx30 7Rx30 7R 20x  2Rx30     Seated chin tucks 20x3" with self OP     Self OP 2x10   5"x10ea self OP + PTA OP    FRO       5"x15      Spidermans       [id number]      Wall ABCs (R)    RMB 2x RMB 2x  RMB 5x      Swimmer's n glide      15x        Supine chin tuck + Lift 10x10"     20x3" 5"x15  10"x10                 SA punches     3#bar 5"x20 3# bar 20x5" 3# DBs 5"x20  5"x20    Quadruped Chin Tucks 20x5"                         Ther Ex 4/27   03/18 03/25 3/28 04/01 04/05 04/13 4/21   (R) shldr IR    3Rx30 3 5Rx30        (R) shldr ER    1Rx30 1 5Rx30        Digi              Corner pec (S)             T/S ext over ball         2'     T/S rot with cane/ball         3'    Wall [place]  15x            Scap, FF             Supine pec (S)      2'       Open books             Cat/Camel 10x5"            Supine cane flex    3#x30 3# 5"x30        Supine hor abd w/ tband    BTBx30 BTBx30 BTB 30x BTBx30  seated GTBx30     Prone Y, Ts      15x ea 20ea (R)      (R) UT + LS  (S)        20"x5ea                  Ther Activity 4/27   03/18 03/25 3/28 04/01 04/05 04/13 4/21   Retro UBE 3'/3'   3'/3' L2 twn pks 3'/3' L2 twn pks 3'/3' L2 twn peaks 2'/4' L2 twn peaks 3'/3' L1 3'/3' L1    Pulleys - Flex, Scap    3'   3'   5'     Seated SWB flex     3' 3'                                 Pt Edu AL     AL SP SP HEP SP NA   Re-eval          NA   Modalities 4/27   03/18 03/25 3/28 04/01 04/05 04/13 4/21   MH or ice, prn

## 2022-04-27 NOTE — TELEPHONE ENCOUNTER
Dr Hardy Spurling:  Aldair Yandel results    Patient has questions on her results from her Xrays  Patient states she sees them in [address]  Patient states it says she has arthritis on her arm but the Dr never mentioned this to her

## 2022-04-28 ENCOUNTER — OFFICE VISIT (OUTPATIENT)
Dept: FAMILY MEDICINE CLINIC | Facility: CLINIC | Age: 32
End: 2022-04-28

## 2022-04-28 ENCOUNTER — TELEPHONE (OUTPATIENT)
Dept: OBGYN CLINIC | Facility: HOSPITAL | Age: 32
End: 2022-04-28

## 2022-04-28 VITALS
DIASTOLIC BLOOD PRESSURE: 86 MMHG | OXYGEN SATURATION: 98 % | BODY MASS INDEX: 19.49 KG/M2 | SYSTOLIC BLOOD PRESSURE: 120 MMHG | HEIGHT: 65 IN | HEART RATE: 92 BPM | WEIGHT: 117 LBS | RESPIRATION RATE: 16 BRPM | TEMPERATURE: 97.8 F

## 2022-04-28 DIAGNOSIS — M79.601 RIGHT ARM PAIN: Primary | ICD-10-CM

## 2022-04-28 PROBLEM — B37.9 YEAST INFECTION: Status: RESOLVED | Noted: 2019-12-09 | Resolved: 2022-04-28

## 2022-04-28 PROBLEM — N89.8 VAGINAL ODOR: Status: RESOLVED | Noted: 2019-09-25 | Resolved: 2022-04-28

## 2022-04-28 PROCEDURE — 96372 THER/PROPH/DIAG INJ SC/IM: CPT | Performed by: FAMILY MEDICINE

## 2022-04-28 PROCEDURE — 99214 OFFICE O/P EST MOD 30 MIN: CPT | Performed by: FAMILY MEDICINE

## 2022-04-28 RX ORDER — MELOXICAM 15 MG/1
15 TABLET ORAL DAILY
Qty: 30 TABLET | Refills: 0 | Status: SHIPPED | OUTPATIENT
Start: 2022-04-28 | End: 2022-05-27

## 2022-04-28 RX ORDER — GABAPENTIN 100 MG/1
100 CAPSULE ORAL 2 TIMES DAILY
Qty: 60 CAPSULE | Refills: 0 | Status: SHIPPED | OUTPATIENT
Start: 2022-04-28

## 2022-04-28 RX ORDER — KETOROLAC TROMETHAMINE 30 MG/ML
30 INJECTION, SOLUTION INTRAMUSCULAR; INTRAVENOUS ONCE
Status: COMPLETED | OUTPATIENT
Start: 2022-04-28 | End: 2022-04-28

## 2022-04-28 RX ADMIN — KETOROLAC TROMETHAMINE 30 MG: 30 INJECTION, SOLUTION INTRAMUSCULAR; INTRAVENOUS at 10:32

## 2022-04-28 NOTE — ASSESSMENT & PLAN NOTE
- Give OT dose of toradol IM 30mg   - Stop Ibuprofen and start meloxicam 15mg QD with food  - Start gabapentin 100mg BID and continue with 300mg HS  - Await MRI results  - Follow up with sports medicine

## 2022-04-28 NOTE — PROGRESS NOTES
Assessment/Plan:    Right arm pain  - Give OT dose of toradol IM 30mg   - Stop Ibuprofen and start meloxicam 15mg QD with food  - Start gabapentin 100mg BID and continue with 300mg HS  - Await MRI results  - Follow up with sports medicine       Diagnoses and all orders for this visit:    Right arm pain  -     gabapentin (Neurontin) 100 mg capsule; Take 1 capsule (100 mg total) by mouth 2 (two) times a day  -     meloxicam (Mobic) 15 mg tablet; Take 1 tablet (15 mg total) by mouth daily  -     ketorolac (TORADOL) 60 mg/2 mL IM injection 30 mg          Subjective:      Patient ID: Joanna Lira is a 28 y o  female  This is a pleasant 16-year-old female who presents to the office today for right arm pain  Patient has been seeing Orthopedics with recent corticosteroid injection the subacromial bursa, has taken cortical steroids orally and ibuprofen and continues to have pain that is her reason for coming to the office today  Patient reports worsening weakness of her right arm and pain since last night and would like to know she can have something else for her pain  Patient does report she has MRI scheduled for May  No other associated symptoms or other complaints  The following portions of the patient's history were reviewed and updated as appropriate: She  has a past medical history of Abnormal Pap smear of cervix, ADHD, Bipolar 1 disorder ([address] ), Depression, GERD (gastroesophageal reflux disease), Migraine, OCD (obsessive compulsive disorder), and Wears glasses    She   Patient Active Problem List    Diagnosis Date Noted    Right arm pain 04/28/2022    Bilateral hearing loss 03/01/2022    Migraine     Suspected COVID-19 virus infection 12/30/2021    Acute gastritis without hemorrhage 07/19/2021    Screen for STD (sexually transmitted disease) 06/10/2020    Encounter for annual routine gynecological examination 06/10/2020    Breast lump 06/10/2020    Dizziness 05/12/2020    Encounter for surveillance of contraceptive pills 09/16/2019    S/P LEEP 08/09/2019    LGSIL on Pap smear of cervix 06/12/2019    Atypical glandular cells of undetermined significance (MARIA E) on cervical Pap smear 06/12/2019    ADHD (attention deficit hyperactivity disorder) 05/20/2019    Bipolar 1 disorder ([facility] [address] ) 05/20/2019    OCD (obsessive compulsive disorder) 05/20/2019    Nicotine use disorder 07/24/2018    Cervical cancer screening 07/24/2018     She  has a past surgical history that includes No past surgeries; Gynecologic cryosurgery; and pr colposcopy,cervix w/adj vag,w/loop bx (N/A, 8/9/2019)  Her family history includes Bipolar disorder in her mother; Depression in her sister; No Known Problems in her father  She  reports that she has been smoking cigarettes  She has been smoking about 1 00 pack per day  She has never used smokeless tobacco  She reports current alcohol use  She reports that she does not use drugs    Current Outpatient Medications   Medication Sig Dispense Refill    dexamethasone (DECADRON) 2 mg tablet Take 3 tabs x 1 day, 2 tabs x 1 day, 1 tab x 1 day 6 tablet 0    ergocalciferol (VITAMIN D2) 50,000 units Take 1 capsule (50,000 Units total) by mouth 3 (three) times a week 21 capsule 0    fluticasone (FLONASE) 50 mcg/act nasal spray SHAKE LIQUID AND USE 1 SPRAY IN EACH NOSTRIL TWICE DAILY 48 g 1    gabapentin (Neurontin) 100 mg capsule Take 1 capsule (100 mg total) by mouth 2 (two) times a day 60 capsule 0    gabapentin (NEURONTIN) 300 mg capsule TAKE 1 CAPSULE(300 MG) BY MOUTH DAILY AT BEDTIME 30 capsule 0    hydrOXYzine HCL (ATARAX) 25 mg tablet       LATUDA 40 MG tablet take 1 tablet by mouth once daily AFTER DINNER  0    medroxyPROGESTERone acetate (DEPO-PROVERA SYRINGE) 150 mg/mL injection INJECT 1 ML INTO THE MUSCLE EVERY 3 MONTHS 1 mL 5    meloxicam (Mobic) 15 mg tablet Take 1 tablet (15 mg total) by mouth daily 30 tablet 0    mirtazapine (REMERON) 15 mg tablet       omeprazole (PriLOSEC) 20 mg delayed release capsule TAKE 1 CAPSULE(20 MG) BY MOUTH DAILY 30 capsule 0    SUMAtriptan (Imitrex) 25 mg tablet Take 1 tablet (25 mg total) by mouth once as needed for migraine for up to 1 dose 9 tablet 0    topiramate (Topamax) 50 MG tablet Take 50 mg in the AM and 50 mg in the  tablet 0     Current Facility-Administered Medications   Medication Dose Route Frequency Provider Last Rate Last Admin    medroxyPROGESTERone acetate (DEPO-PROVERA SYRINGE) IM injection 150 mg  150 mg Intramuscular Q3 Months Mary Lou Silverman, KARTIK   150 mg at 02/02/22 0837     Review of Systems   Constitutional: Negative for appetite change, chills and fatigue  HENT: Negative for congestion, ear pain, hearing loss, sore throat and trouble swallowing  Respiratory: Negative for cough, chest tightness and shortness of breath  Cardiovascular: Negative for palpitations and leg swelling  Gastrointestinal: Negative for abdominal pain, anal bleeding, nausea and vomiting  Endocrine: Negative for cold intolerance, heat intolerance, polydipsia, polyphagia and polyuria  Genitourinary: Negative for difficulty urinating, pelvic pain, vaginal bleeding and vaginal discharge  Musculoskeletal: Positive for arthralgias  Negative for back pain, joint swelling and neck stiffness  Neurological: Negative for dizziness, syncope and headaches  Objective:      /86 (BP Location: Left arm, Patient Position: Sitting, Cuff Size: Standard)   Pulse 92   Temp 97 8 °F (36 6 °C) (Temporal)   Resp 16   Ht 5' 5" (1 651 m)   Wt 53 1 kg (117 lb)   SpO2 98%   Breastfeeding No   BMI 19 47 kg/m²          Physical Exam  Vitals reviewed  Constitutional:       General: She is not in acute distress  Appearance: She is well-developed  HENT:      Head: Normocephalic and atraumatic  Eyes:      Pupils: Pupils are equal, round, and reactive to light     Cardiovascular:      Rate and Rhythm: Normal rate and regular rhythm  Heart sounds: Normal heart sounds  No murmur heard  No friction rub  No gallop  Pulmonary:      Effort: Pulmonary effort is normal  No respiratory distress  Breath sounds: Normal breath sounds  Abdominal:      General: Bowel sounds are normal  There is no distension  Palpations: Abdomen is soft  Musculoskeletal:         General: Normal range of motion  Right upper arm: Tenderness present  No swelling, edema, deformity, lacerations or bony tenderness  Cervical back: Normal range of motion and neck supple  Skin:     General: Skin is warm  Neurological:      Mental Status: She is alert and oriented to person, place, and time  Sensory: Sensation is intact  Motor: Weakness present  No atrophy or seizure activity

## 2022-05-04 ENCOUNTER — APPOINTMENT (OUTPATIENT)
Dept: PHYSICAL THERAPY | Facility: CLINIC | Age: 32
End: 2022-05-04
Payer: MEDICARE

## 2022-05-05 ENCOUNTER — HOSPITAL ENCOUNTER (EMERGENCY)
Facility: HOSPITAL | Age: 32
Discharge: HOME | End: 2022-05-06
Attending: EMERGENCY MEDICINE
Payer: MEDICARE

## 2022-05-05 VITALS
BODY MASS INDEX: 19.7 KG/M2 | DIASTOLIC BLOOD PRESSURE: 64 MMHG | HEART RATE: 88 BPM | SYSTOLIC BLOOD PRESSURE: 119 MMHG | WEIGHT: 118.39 LBS | RESPIRATION RATE: 18 BRPM | OXYGEN SATURATION: 100 % | TEMPERATURE: 97.8 F

## 2022-05-05 DIAGNOSIS — G43.109 MIGRAINE WITH AURA AND WITHOUT STATUS MIGRAINOSUS, NOT INTRACTABLE: Primary | ICD-10-CM

## 2022-05-05 PROCEDURE — 99283 EMERGENCY DEPT VISIT LOW MDM: CPT

## 2022-05-05 PROCEDURE — 99284 EMERGENCY DEPT VISIT MOD MDM: CPT | Performed by: PHYSICIAN ASSISTANT

## 2022-05-05 NOTE — Clinical Note
Trevor Muhammad was seen and treated in our emergency department on 5/5/2022  Diagnosis: Migraine headache    Yumiko Haque    She may return on this date: May return symptomatically feeling better/cleared by primary care  If you have any questions or concerns, please don't hesitate to call        Thu Tapia PA-C    ______________________________           _______________          _______________  Hospital Representative                              Date                                Time

## 2022-05-06 LAB
EXT PREG TEST URINE: NEGATIVE
EXT. CONTROL ED NAV: NORMAL

## 2022-05-06 PROCEDURE — 96374 THER/PROPH/DIAG INJ IV PUSH: CPT

## 2022-05-06 PROCEDURE — 81025 URINE PREGNANCY TEST: CPT | Performed by: PHYSICIAN ASSISTANT

## 2022-05-06 PROCEDURE — 96375 TX/PRO/DX INJ NEW DRUG ADDON: CPT

## 2022-05-06 PROCEDURE — 96361 HYDRATE IV INFUSION ADD-ON: CPT

## 2022-05-06 RX ORDER — DIPHENHYDRAMINE HYDROCHLORIDE 50 MG/ML
25 INJECTION INTRAMUSCULAR; INTRAVENOUS ONCE
Status: COMPLETED | OUTPATIENT
Start: 2022-05-06 | End: 2022-05-06

## 2022-05-06 RX ORDER — ACETAMINOPHEN 325 MG/1
975 TABLET ORAL ONCE
Status: COMPLETED | OUTPATIENT
Start: 2022-05-06 | End: 2022-05-06

## 2022-05-06 RX ORDER — METOCLOPRAMIDE HYDROCHLORIDE 5 MG/ML
10 INJECTION INTRAMUSCULAR; INTRAVENOUS ONCE
Status: COMPLETED | OUTPATIENT
Start: 2022-05-06 | End: 2022-05-06

## 2022-05-06 RX ORDER — KETOROLAC TROMETHAMINE 30 MG/ML
15 INJECTION, SOLUTION INTRAMUSCULAR; INTRAVENOUS ONCE
Status: COMPLETED | OUTPATIENT
Start: 2022-05-06 | End: 2022-05-06

## 2022-05-06 RX ADMIN — METOCLOPRAMIDE 10 MG: 5 INJECTION, SOLUTION INTRAMUSCULAR; INTRAVENOUS at 00:21

## 2022-05-06 RX ADMIN — DIPHENHYDRAMINE HYDROCHLORIDE 25 MG: 50 INJECTION, SOLUTION INTRAMUSCULAR; INTRAVENOUS at 00:15

## 2022-05-06 RX ADMIN — KETOROLAC TROMETHAMINE 15 MG: 30 INJECTION, SOLUTION INTRAMUSCULAR at 00:20

## 2022-05-06 RX ADMIN — ACETAMINOPHEN 975 MG: 325 TABLET, FILM COATED ORAL at 00:11

## 2022-05-06 RX ADMIN — SODIUM CHLORIDE 1000 ML: 0.9 INJECTION, SOLUTION INTRAVENOUS at 00:16

## 2022-05-06 NOTE — ED PROVIDER NOTES
History  Chief Complaint   Patient presents with    Headache - Recurrent or Known Dx Migraines     pt started with migraine around 2000 tonight, pt states took home medications for her migraines with no relief      Rossy Daniels is a 33 y o   female with PMH of migraine, OCD, bipolar 1, GERD who presents to the emergency department with reported headache  The patient states she has a known history of migraines and this feels similar  She states that around 8:00 p m  This evening (3 hours prior to arrival) she had a gradual onset headache  Rates the pain as a 4/10 pain at its worst   She states she had some and associated nausea as well as lightheadedness no  She also endorses some photophobia  She describes her headache as right-sided behind the forehead and throbbing  She denies any sensitivity to sound, vision changes, blurry vision, stiff neck, trauma, weakness, numbness, paresthesias  The patient states she took her Imitrex with no relief  She does any other systemic symptoms  Headaches are not in increasing frequency of were severity                History provided by:  Patient   used: No    Headache - Recurrent or Known Dx Migraines  Pain location:  R temporal and frontal  Quality:  Dull  Radiates to:  Does not radiate  Severity currently:  4/10  Severity at highest:  4/10  Onset quality:  Gradual  Duration:  3 hours  Timing:  Constant  Progression:  Unchanged  Chronicity:  Recurrent  Similar to prior headaches: yes    Context: bright light    Context: not activity, not caffeine, not coughing, not defecating, not eating, not stress, not exposure to cold air, not intercourse, not loud noise and not straining    Relieved by:  Nothing  Worsened by:  Nothing  Ineffective treatments:  Prescription medications  Associated symptoms: photophobia    Associated symptoms: no abdominal pain, no back pain, no blurred vision, no congestion, no cough, no diarrhea, no dizziness, no drainage, no ear pain, no eye pain, no facial pain, no fatigue, no fever, no focal weakness, no hearing loss, no loss of balance, no myalgias, no nausea, no near-syncope, no neck pain, no neck stiffness, no numbness, no paresthesias, no seizures, no sinus pressure, no sore throat, no swollen glands, no syncope, no tingling, no URI, no visual change, no vomiting and no weakness        Prior to Admission Medications   Prescriptions Last Dose Informant Patient Reported? Taking?    LATUDA 40 MG tablet  Self Yes No   Sig: take 1 tablet by mouth once daily AFTER DINNER   SUMAtriptan (Imitrex) 25 mg tablet   No No   Sig: Take 1 tablet (25 mg total) by mouth once as needed for migraine for up to 1 dose   dexamethasone (DECADRON) 2 mg tablet   No No   Sig: Take 3 tabs x 1 day, 2 tabs x 1 day, 1 tab x 1 day   ergocalciferol (VITAMIN D2) 50,000 units   No No   Sig: Take 1 capsule (50,000 Units total) by mouth 3 (three) times a week   fluticasone (FLONASE) 50 mcg/act nasal spray   No No   Sig: SHAKE LIQUID AND USE 1 SPRAY IN EACH NOSTRIL TWICE DAILY   gabapentin (NEURONTIN) 300 mg capsule   No No   Sig: TAKE 1 CAPSULE(300 MG) BY MOUTH DAILY AT BEDTIME   gabapentin (Neurontin) 100 mg capsule   No No   Sig: Take 1 capsule (100 mg total) by mouth 2 (two) times a day   hydrOXYzine HCL (ATARAX) 25 mg tablet   Yes No   medroxyPROGESTERone acetate (DEPO-PROVERA SYRINGE) 150 mg/mL injection   No No   Sig: INJECT 1 ML INTO THE MUSCLE EVERY 3 MONTHS   meloxicam (Mobic) 15 mg tablet   No No   Sig: Take 1 tablet (15 mg total) by mouth daily   mirtazapine (REMERON) 15 mg tablet   Yes No   omeprazole (PriLOSEC) 20 mg delayed release capsule   No No   Sig: TAKE 1 CAPSULE(20 MG) BY MOUTH DAILY   topiramate (Topamax) 50 MG tablet   No No   Sig: Take 50 mg in the AM and 50 mg in the PM      Facility-Administered Medications Last Administration Doses Remaining   medroxyPROGESTERone acetate (DEPO-PROVERA SYRINGE) IM injection 150 mg 2/2/2022  8:37 AM           Past Medical History:   Diagnosis Date    Abnormal Pap smear of cervix     2013 ASCUS    ADHD     Bipolar 1 disorder (HCC)     Depression     GERD (gastroesophageal reflux disease)     Migraine     OCD (obsessive compulsive disorder)     Wears glasses        Past Surgical History:   Procedure Laterality Date    GYNECOLOGIC CRYOSURGERY      patient was 26years old    NO PAST SURGERIES      AR COLPOSCOPY,CERVIX W/ADJ VAG,W/LOOP BX N/A 8/9/2019    Procedure: BIOPSY LEEP CERVIX;  Surgeon: Lester Garza MD;  Location: [facility] OR;  Service: Gynecology       Family History   Problem Relation Age of Onset    Bipolar disorder Mother     No Known Problems Father     Depression Sister      I have reviewed and agree with the history as documented  E-Cigarette/Vaping    E-Cigarette Use Never User      E-Cigarette/Vaping Substances    Nicotine No     THC No     CBD No     Flavoring No     Other No     Unknown No      Social History     Tobacco Use    Smoking status: Current Every Day Smoker     Packs/day: 1 00     Types: Cigarettes    Smokeless tobacco: Never Used   Vaping Use    Vaping Use: Never used   Substance Use Topics    Alcohol use: Yes    Drug use: No       Review of Systems   Constitutional: Negative for activity change, appetite change, chills, diaphoresis, fatigue, fever and unexpected weight change  HENT: Negative for congestion, dental problem, ear pain, hearing loss, mouth sores, nosebleeds, postnasal drip, sinus pressure, sinus pain, sneezing, sore throat and trouble swallowing  Eyes: Positive for photophobia  Negative for blurred vision and pain  Respiratory: Negative for apnea, cough, choking, chest tightness, shortness of breath, wheezing and stridor  Cardiovascular: Negative for chest pain, palpitations, leg swelling, syncope and near-syncope  Gastrointestinal: Negative for abdominal pain, constipation, diarrhea, nausea and vomiting  Genitourinary: Negative for dysuria, flank pain, frequency and urgency  Musculoskeletal: Negative for arthralgias, back pain, joint swelling, myalgias, neck pain and neck stiffness  Skin: Negative for color change, pallor and rash  Neurological: Positive for headaches  Negative for dizziness, tremors, focal weakness, seizures, syncope, speech difficulty, weakness, light-headedness, numbness, paresthesias and loss of balance  All other systems reviewed and are negative  Physical Exam  Physical Exam  Vitals and nursing note reviewed  Constitutional:       General: She is not in acute distress  Appearance: Normal appearance  She is normal weight  She is not ill-appearing or toxic-appearing  HENT:      Head: Normocephalic and atraumatic  Right Ear: Tympanic membrane, ear canal and external ear normal       Left Ear: Tympanic membrane, ear canal and external ear normal       Mouth/Throat:      Mouth: Mucous membranes are moist       Pharynx: Oropharynx is clear  Eyes:      Extraocular Movements: Extraocular movements intact  Pupils: Pupils are equal, round, and reactive to light  Cardiovascular:      Rate and Rhythm: Normal rate and regular rhythm  Pulses: Normal pulses  Heart sounds: Normal heart sounds  No murmur heard  No friction rub  No gallop  Pulmonary:      Effort: Pulmonary effort is normal       Breath sounds: Normal breath sounds  Abdominal:      General: Abdomen is flat  Bowel sounds are normal       Palpations: Abdomen is soft  Tenderness: There is no abdominal tenderness  Musculoskeletal:         General: No swelling, tenderness, deformity or signs of injury  Normal range of motion  Cervical back: Normal range of motion  No rigidity or tenderness  Right lower leg: No edema  Left lower leg: No edema  Skin:     General: Skin is warm and dry  Capillary Refill: Capillary refill takes less than 2 seconds     Neurological: General: No focal deficit present  Mental Status: She is alert and oriented to person, place, and time  Mental status is at baseline  Cranial Nerves: No cranial nerve deficit  Sensory: No sensory deficit  Motor: No weakness  Coordination: Coordination normal       Comments: GCS 15  CN 2-12 intact  PERRLA  Bilateral upper and lower extremities have 5/5 strength and sensation is intact  Finger to Nose and Heel to shin are intact   Speech normal   Gait intact           Vital Signs  ED Triage Vitals   Temperature Pulse Respirations Blood Pressure SpO2   05/05/22 2248 05/05/22 2248 05/05/22 2248 05/05/22 2248 05/05/22 2248   97 8 °F (36 6 °C) 88 18 119/64 100 %      Temp Source Heart Rate Source Patient Position - Orthostatic VS BP Location FiO2 (%)   05/05/22 2248 05/05/22 2248 05/05/22 2248 05/05/22 2248 --   Oral Monitor Sitting Right arm       Pain Score       05/06/22 0011       10 - Worst Possible Pain           Vitals:    05/05/22 2248   BP: 119/64   Pulse: 88   Patient Position - Orthostatic VS: Sitting         Visual Acuity      ED Medications  Medications   acetaminophen (TYLENOL) tablet 975 mg (975 mg Oral Given 5/6/22 0011)   ketorolac (TORADOL) injection 15 mg (15 mg Intravenous Given 5/6/22 0020)   metoclopramide (REGLAN) injection 10 mg (10 mg Intravenous Given 5/6/22 0021)   diphenhydrAMINE (BENADRYL) injection 25 mg (25 mg Intravenous Given 5/6/22 0015)   sodium chloride 0 9 % bolus 1,000 mL (0 mL Intravenous Stopped 5/6/22 0136)       Diagnostic Studies  Results Reviewed     Procedure Component Value Units Date/Time    POCT pregnancy, urine [[id number]]  (Normal) Resulted: 05/06/22 0010    Lab Status: Final result Updated: 05/06/22 0010     EXT PREG TEST UR (Ref: Negative) Negative     Control Valid                 No orders to display              Procedures  Procedures         ED Course                               SBIRT 20yo+      Most Recent Value   SBIRT (25 yo +)    In order to provide better care to our patients, we are screening all of our patients for alcohol and drug use  Would it be okay to ask you these screening questions? No Filed at: 05/05/2022 2353                    MDM  Number of Diagnoses or Management Options  Migraine with aura and without status migrainosus, not intractable: new and does not require workup  Diagnosis management comments: Patient was seen and examined  in the emergency department for chief complaint of headache  The patient presented 3 hours of gradual onset headache  Like previous headaches  Did not get better after Imitrex  Came to the emergency department for evaluation  Nonfocal neurologic exam     HA was gradual onset  No f/c/s  No neck stiffness  No focal neurological symptoms  No temporal artery pain/tenderness  No vision changes  Headaches are not increasing in severity or frequency  Not worse in the AM  No head trauma  Doubt IIH  Patient is well appearing and neurologically intact  Headache was not acute or maximal in onset  Do not suspect SAH, temporal arteritis, meningitis, encephalitis, CO poisoning, acute angle closure glaucoma, dural venous sinus thrombosis as cause of headache  Do not feel that further imaging or workup (including LP) are warranted at this time  Workup: Will treat symptomatic this time suspect primary headache/migraine  Reassessment  On reassessment patient is neurologically intact feeling much better  She is requesting discharge his diet  Will recommend follow-up with neurologist/PCP for further management  Strict precautions discussed and PCP follow-up was discussed  The patient was discharged in stable condition  Patient ambulated off the department  Extensive return to emergency department precautions were discussed  Follow up with appropriate providers including primary care physician was discussed  Patient and/or their  primary decision maker expressed understanding    Patient remained stable during entire emergency department stay  Amount and/or Complexity of Data Reviewed  Review and summarize past medical records: yes  Independent visualization of images, tracings, or specimens: yes    Risk of Complications, Morbidity, and/or Mortality  Presenting problems: moderate  Diagnostic procedures: low  Management options: low    Patient Progress  Patient progress: stable      Disposition  Final diagnoses:   Migraine with aura and without status migrainosus, not intractable     Time reflects when diagnosis was documented in both MDM as applicable and the Disposition within this note     Time User Action Codes Description Comment    5/6/2022  1:28 AM Rashard Garcia Add [G43 109] Migraine with aura and without status migrainosus, not intractable       ED Disposition     ED Disposition Condition Date/Time Comment    Discharge Stable Fri May 6, 2022  1:28 AM Sandeep Purcell discharge to home/self care              Follow-up Information     Follow up With Specialties Details Why Contact Info Additional [address] [facility] Emergency Department Emergency Medicine Go to  As needed, If symptoms worsen [place] [id number] [address] [facility] Emergency Department, [address] [place] [address] 200  Call  To schedule an appointment with a primary care physician [phone]       Jose C Hill, [address] [facility], Nurse Practitioner Schedule an appointment as soon as possible for a visit   [address]              Discharge Medication List as of 5/6/2022  1:29 AM      CONTINUE these medications which have NOT CHANGED    Details   dexamethasone (DECADRON) 2 mg tablet Take 3 tabs x 1 day, 2 tabs x 1 day, 1 tab x 1 day, Normal      ergocalciferol (VITAMIN D2) 50,000 units Take 1 capsule (50,000 Units total) by mouth 3 (three) times a week, Starting Wed 3/30/2022, Normal      fluticasone (FLONASE) 50 mcg/act nasal spray SHAKE LIQUID AND USE 1 SPRAY IN EACH NOSTRIL TWICE DAILY, Normal      !! gabapentin (Neurontin) 100 mg capsule Take 1 capsule (100 mg total) by mouth 2 (two) times a day, Starting Thu 4/28/2022, Normal      !! gabapentin (NEURONTIN) 300 mg capsule TAKE 1 CAPSULE(300 MG) BY MOUTH DAILY AT BEDTIME, Normal      hydrOXYzine HCL (ATARAX) 25 mg tablet Starting Mon 1/17/2022, Historical Med      LATUDA 40 MG tablet take 1 tablet by mouth once daily AFTER DINNER, Historical Med      medroxyPROGESTERone acetate (DEPO-PROVERA SYRINGE) 150 mg/mL injection INJECT 1 ML INTO THE MUSCLE EVERY 3 MONTHS, Normal      meloxicam (Mobic) 15 mg tablet Take 1 tablet (15 mg total) by mouth daily, Starting Thu 4/28/2022, Normal      mirtazapine (REMERON) 15 mg tablet Starting Mon 1/17/2022, Historical Med      omeprazole (PriLOSEC) 20 mg delayed release capsule TAKE 1 CAPSULE(20 MG) BY MOUTH DAILY, Normal      SUMAtriptan (Imitrex) 25 mg tablet Take 1 tablet (25 mg total) by mouth once as needed for migraine for up to 1 dose, Starting Tue 3/1/2022, Normal      topiramate (Topamax) 50 MG tablet Take 50 mg in the AM and 50 mg in the PM, Normal       !! - Potential duplicate medications found  Please discuss with provider  No discharge procedures on file      PDMP Review       Value Time User    PDMP Reviewed  Yes 1/14/2022 11:43 AM Jacinto Everett PA-C          ED Provider  Electronically Signed by           Destiney Anderson PA-C  05/06/22 7092

## 2022-05-11 ENCOUNTER — TELEPHONE (OUTPATIENT)
Dept: OBGYN CLINIC | Facility: HOSPITAL | Age: 32
End: 2022-05-11

## 2022-05-11 ENCOUNTER — OFFICE VISIT (OUTPATIENT)
Dept: PHYSICAL THERAPY | Facility: CLINIC | Age: 32
End: 2022-05-11
Payer: MEDICARE

## 2022-05-11 DIAGNOSIS — M54.12 RADICULOPATHY, CERVICAL REGION: ICD-10-CM

## 2022-05-11 DIAGNOSIS — M25.511 RIGHT SHOULDER PAIN, UNSPECIFIED CHRONICITY: Primary | ICD-10-CM

## 2022-05-11 PROCEDURE — 97530 THERAPEUTIC ACTIVITIES: CPT

## 2022-05-11 PROCEDURE — 97110 THERAPEUTIC EXERCISES: CPT

## 2022-05-11 PROCEDURE — 97140 MANUAL THERAPY 1/> REGIONS: CPT

## 2022-05-11 PROCEDURE — 97112 NEUROMUSCULAR REEDUCATION: CPT

## 2022-05-11 NOTE — PROGRESS NOTES
Daily Note     Today's date: 2022  Patient name: Alexander Morales  : 1990  MRN: [id number]  Referring provider: Brian Castellanos MD  Dx:   Encounter Diagnosis     ICD-10-CM    1  Right shoulder pain, unspecified chronicity  M25 511    2  Radiculopathy, cervical region  M54 12        Start Time: 0930  Stop Time: 1010  Total time in clinic (min): 40 minutes  Subjective: Pt notes her (R)UE and C/S pain Sx were feeling better following LV  Pt reports that following LV she had "no controll" over her (R)UE and was unable to move or lift it - she went to her MD who changed her Rx medication which helped  The following week pt had to cx her apt 2 having a severe migraine that required ED attention  Pt states she has her MRI scheduled for this Saturday  Objective: See treatment diary below    Assessment:  Tx not progressed 2/ subjective statements  Pt was able to complete all interventions today without c/o increased (R)UE pain Sx  Pt reports (R)UE feeling less heavy + weak following manuals  Pt demonstrates good form /c cat/camel  Pt would benefit from continued PT to progress DNF strength and cervicothoracic AROM to reduce stresses on the C/S, reduce radicular symptoms, and improve pt's tolerance to using the (R) UE daily  Plan: Cont /c PT POC  Progress as tolerated        Precautions: C/S Radiculopathy; migraines; KEREN hearing loss; bipolar; OCD  Date 4/27 05/11  03/18 03/25 3/28 04/01 04/05 4/13 4/21   Visit 11 12  4 5 6 7 8 9 10   FOTO       xx       Re-eval          RE       Manuals 4/27 05/11  03/18 03/25 3/28 04/01 04/05 04/13 4/21   C/S Manual traction        SP SP NA with pt assisted chin tucks   C/S PROM         SP NA along with UT/Levator stretching   STM C/S PSP, UT, LS         SP NA   (R) pec cross friction             Repeated C/S retraction + ext in supine AL  SP           R First rib mobs           In seated NA   Neuro Re-Ed 4/27 05/11  03/18 03/25 3/28 04/01 04/05 04/13 4/21   Scap retractions             No moneys    BTBx30    GTBx30     Shldr Row    10Rx30 11Rx30 11R 20x  4 5Rx30     Shldr Ext    6Rx30 7Rx30 7R 20x  2Rx30     Seated chin tucks 20x3" with self OP 20x3" c/ self OP supine    Self OP 2x10   5"x10ea self OP + PTA OP    FRO       5"x15      Spidermans       [id number]      Wall ABCs (R)    RMB 2x RMB 2x  RMB 5x      Swimmer's n glide      15x        Supine chin tuck + Lift 10x10" 5"x15    20x3" 5"x15  10"x10                 SA punches     3#bar 5"x20 3# bar 20x5" 3# DBs 5"x20  5"x20    Quadruped Chin Tucks 20x5"                         Ther Ex 4/27 05/11  03/18 03/25 3/28 04/01 04/05 04/13 4/21   (R) shldr IR    3Rx30 3 5Rx30        (R) shldr ER    1Rx30 1 5Rx30        Digi              Corner pec (S)             T/S ext over ball         2'     T/S rot with cane/ball         3'    Wall [place]  15x 3"x20           Scap, FF             Supine pec (S)      2'       Open books             Cat/Camel 10x5" 5"x10ea           Supine cane flex    3#x30 3# 5"x30        Supine hor abd w/ tband    BTBx30 BTBx30 BTB 30x BTBx30  seated GTBx30     Prone Y, Ts      15x ea 20ea (R)      (R) UT + LS  (S)        20"x5ea                  Ther Activity 4/27 05/11  03/18 03/25 3/28 04/01 04/05 04/13 4/21   Retro UBE 3'/3' 3'/3' L1  3'/3' L2 twn pks 3'/3' L2 twn pks 3'/3' L2 twn peaks 2'/4' L2 twn peaks 3'/3' L1 3'/3' L1    Pulleys - Flex, Scap  5'   3'   3'   5'     Seated SWB flex     3' 3'                                 Pt Edu AL     AL SP SP HEP SP NA   Re-eval          NA   Modalities 4/27 05/11v  03/18 03/25 3/28 04/01 04/05 04/13 4/21   TRINITY or tania kearns, PTA

## 2022-05-14 ENCOUNTER — HOSPITAL ENCOUNTER (OUTPATIENT)
Dept: MRI IMAGING | Facility: HOSPITAL | Age: 32
Discharge: HOME | End: 2022-05-14
Attending: EMERGENCY MEDICINE

## 2022-05-14 DIAGNOSIS — G89.29 CHRONIC RIGHT SHOULDER PAIN: ICD-10-CM

## 2022-05-14 DIAGNOSIS — M54.12 RADICULOPATHY, CERVICAL REGION: ICD-10-CM

## 2022-05-14 DIAGNOSIS — M54.2 NECK PAIN: ICD-10-CM

## 2022-05-14 DIAGNOSIS — M25.511 CHRONIC RIGHT SHOULDER PAIN: ICD-10-CM

## 2022-05-21 ENCOUNTER — HOSPITAL ENCOUNTER (OUTPATIENT)
Dept: MRI IMAGING | Facility: HOSPITAL | Age: 32
Discharge: HOME | End: 2022-05-21
Attending: EMERGENCY MEDICINE
Payer: MEDICARE

## 2022-05-21 PROCEDURE — G1004 CDSM NDSC: HCPCS

## 2022-05-21 PROCEDURE — 72141 MRI NECK SPINE W/O DYE: CPT

## 2022-05-27 DIAGNOSIS — M79.601 RIGHT ARM PAIN: ICD-10-CM

## 2022-05-27 RX ORDER — MELOXICAM 15 MG/1
TABLET ORAL
Qty: 30 TABLET | Refills: 0 | Status: SHIPPED | OUTPATIENT
Start: 2022-05-27

## 2022-06-10 ENCOUNTER — OFFICE VISIT (OUTPATIENT)
Dept: OBGYN CLINIC | Facility: MEDICAL CENTER | Age: 32
End: 2022-06-10
Payer: MEDICARE

## 2022-06-10 VITALS
HEIGHT: 65 IN | SYSTOLIC BLOOD PRESSURE: 98 MMHG | HEART RATE: 97 BPM | BODY MASS INDEX: 19.66 KG/M2 | WEIGHT: 118 LBS | DIASTOLIC BLOOD PRESSURE: 68 MMHG

## 2022-06-10 DIAGNOSIS — R20.0 NUMBNESS AND TINGLING OF RIGHT ARM: ICD-10-CM

## 2022-06-10 DIAGNOSIS — R20.2 NUMBNESS AND TINGLING OF RIGHT ARM: ICD-10-CM

## 2022-06-10 DIAGNOSIS — M54.12 RADICULOPATHY, CERVICAL REGION: Primary | ICD-10-CM

## 2022-06-10 PROCEDURE — 99213 OFFICE O/P EST LOW 20 MIN: CPT | Performed by: EMERGENCY MEDICINE

## 2022-06-10 NOTE — PROGRESS NOTES
Assessment/Plan:    Diagnoses and all orders for this visit:    Radiculopathy, cervical region  -     EMG 1 Limb; Future  -     Ambulatory Referral to Pain Management; Future    Numbness and tingling of right arm  -     EMG 1 Limb; Future  -     Ambulatory Referral to Pain Management; Future    Requesting referral to Pain Management to see if they recommend any intervention for pain relief  MRI wnl  Ordered EMG  No benefit from subacromial steroid injection  Reviewed MRI and PCP note    Return if symptoms worsen or fail to improve  Chief Complaint:     Chief Complaint   Patient presents with    Right Shoulder - Follow-up       Subjective:   Patient ID: Connie Johnson is a 33 y o  female  Patient returns to review MRI C spine, she is s/p diagnostic and therapeutic steroid injection right Subacromial space with NO improvement  After the subacromial steroid injection several days later she noticed increased pain and was evaluated by PCP who provided a Toradol injection and placed her on meloxicam with subsequent no significant improvement  She continues with pain of the right arm as well as her posterior neck  She will experience numbness tingling of the right arm and symptoms are typically worse while driving if she has her arm bent or if she is reaching above or pressing down  Previous note:  Patient returns with worsening symptoms with pain and N/T of the shoulder down to hand  Trouble driving with arms bent she finds relief if she keeps her arm straight out in front of her  S/p prednisone on Gabapentin qHS, taking motrin / tylenol PRN  Xray C spine reviewed    Initial note:  NP presents for Was evaluated neck and arm pain she notes pain on the right side of the neck in the trapezius as well as the shoulder down to the hand she does note right arm numbness tingling  She was evaluated at urgent care placed on prednisone currently taking motrin          Review of Systems    The following portions of the patient's chart were reviewed and updated as appropriate: Allergy:  No Known Allergies      Past Medical History:   Diagnosis Date    Abnormal Pap smear of cervix     2013 ASCUS    ADHD     Bipolar 1 disorder (HCC)     Depression     GERD (gastroesophageal reflux disease)     Migraine     OCD (obsessive compulsive disorder)     Wears glasses        Past Surgical History:   Procedure Laterality Date    GYNECOLOGIC CRYOSURGERY      patient was 26years old    NO PAST SURGERIES      AR COLPOSCOPY,CERVIX W/ADJ VAG,W/LOOP BX N/A 8/9/2019    Procedure: BIOPSY LEEP CERVIX;  Surgeon: Cora Garcia MD;  Location[facility];  Service: Gynecology       Social History     Socioeconomic History    Marital status: Single     Spouse name: Not on file    Number of children: Not on file    Years of education: Not on file    Highest education level: Not on file   Occupational History    Not on file   Tobacco Use    Smoking status: Current Every Day Smoker     Packs/day: 1 00     Types: Cigarettes    Smokeless tobacco: Never Used   Vaping Use    Vaping Use: Never used   Substance and Sexual Activity    Alcohol use: Yes    Drug use: No    Sexual activity: Yes     Partners: Male     Birth control/protection: Injection   Other Topics Concern    Not on file   Social History Narrative    Not on file     Social Determinants of Health     Financial Resource Strain: Low Risk     Difficulty of Paying Living Expenses: Not hard at all   Food Insecurity: No Food Insecurity    Worried About Running Out of Food in the Last Year: Never true    [address] in the Last Year: Never true   Transportation Needs: No Transportation Needs    Lack of Transportation (Medical): No    Lack of Transportation (Non-Medical):  No   Physical Activity: Inactive    Days of Exercise per Week: 0 days    Minutes of Exercise per Session: 0 min   Stress: No Stress Concern Present    Feeling of Stress : Only a little   Social Connections: Not on file   Intimate Partner Violence: Not At Risk    Fear of Current or Ex-Partner: No    Emotionally Abused: No    Physically Abused: No    Sexually Abused: No   Housing Stability: Unknown    Unable to Pay for Housing in the Last Year: No    Number of Places Lived in the Last Year: Not on file    Unstable Housing in the Last Year: No       Family History   Problem Relation Age of Onset    Bipolar disorder Mother     No Known Problems Father     Depression Sister        Medications:    Current Outpatient Medications:     dexamethasone (DECADRON) 2 mg tablet, Take 3 tabs x 1 day, 2 tabs x 1 day, 1 tab x 1 day, Disp: 6 tablet, Rfl: 0    ergocalciferol (VITAMIN D2) 50,000 units, Take 1 capsule (50,000 Units total) by mouth 3 (three) times a week, Disp: 21 capsule, Rfl: 0    fluticasone (FLONASE) 50 mcg/act nasal spray, SHAKE LIQUID AND USE 1 SPRAY IN EACH NOSTRIL TWICE DAILY, Disp: 48 g, Rfl: 1    gabapentin (Neurontin) 100 mg capsule, Take 1 capsule (100 mg total) by mouth 2 (two) times a day, Disp: 60 capsule, Rfl: 0    gabapentin (NEURONTIN) 300 mg capsule, TAKE 1 CAPSULE(300 MG) BY MOUTH DAILY AT BEDTIME, Disp: 30 capsule, Rfl: 0    hydrOXYzine HCL (ATARAX) 25 mg tablet, , Disp: , Rfl:     LATUDA 40 MG tablet, take 1 tablet by mouth once daily AFTER DINNER, Disp: , Rfl: 0    medroxyPROGESTERone acetate (DEPO-PROVERA SYRINGE) 150 mg/mL injection, INJECT 1 ML INTO THE MUSCLE EVERY 3 MONTHS, Disp: 1 mL, Rfl: 5    meloxicam (MOBIC) 15 mg tablet, TAKE 1 TABLET(15 MG) BY MOUTH DAILY, Disp: 30 tablet, Rfl: 0    mirtazapine (REMERON) 15 mg tablet, , Disp: , Rfl:     omeprazole (PriLOSEC) 20 mg delayed release capsule, TAKE 1 CAPSULE(20 MG) BY MOUTH DAILY, Disp: 30 capsule, Rfl: 0    SUMAtriptan (Imitrex) 25 mg tablet, Take 1 tablet (25 mg total) by mouth once as needed for migraine for up to 1 dose, Disp: 9 tablet, Rfl: 0    topiramate (Topamax) 50 MG tablet, Take 50 mg in the AM and 50 mg in the PM, Disp: 180 tablet, Rfl: 0    Current Facility-Administered Medications:     medroxyPROGESTERone acetate (DEPO-PROVERA SYRINGE) IM injection 150 mg, 150 mg, Intramuscular, Q3 Months, GavinKARTIK Graham, 150 mg at 02/02/22 3862    Patient Active Problem List   Diagnosis    Nicotine use disorder    Cervical cancer screening    LGSIL on Pap smear of cervix    Atypical glandular cells of undetermined significance (MARIA E) on cervical Pap smear    ADHD (attention deficit hyperactivity disorder)    Bipolar 1 disorder (HCC)    OCD (obsessive compulsive disorder)    S/P LEEP    Encounter for surveillance of contraceptive pills    Dizziness    Screen for STD (sexually transmitted disease)    Encounter for annual routine gynecological examination    Breast lump    Acute gastritis without hemorrhage    Suspected COVID-19 virus infection    Migraine    Bilateral hearing loss    Right arm pain       Objective:  BP 98/68   Pulse 97   Ht 5' 5" (1 651 m)   Wt 53 5 kg (118 lb)   BMI 19 64 kg/m²     Right Shoulder Exam     Other   Erythema: absent            Physical Exam      Neurologic Exam    Procedures    I have personally reviewed the written report of the pertinent studies  MRI C spine  IMPRESSION:     Mild left foraminal stenosis at C6-7  No disc herniation, canal or foraminal stenosis        Prior Xrays C Spine and Shoulder wnl

## 2022-06-17 ENCOUNTER — ANNUAL EXAM (OUTPATIENT)
Dept: OBGYN CLINIC | Facility: CLINIC | Age: 32
End: 2022-06-17

## 2022-06-17 VITALS
SYSTOLIC BLOOD PRESSURE: 103 MMHG | DIASTOLIC BLOOD PRESSURE: 76 MMHG | BODY MASS INDEX: 18.97 KG/M2 | WEIGHT: 114 LBS | HEART RATE: 106 BPM

## 2022-06-17 DIAGNOSIS — R39.9 UTI SYMPTOMS: ICD-10-CM

## 2022-06-17 DIAGNOSIS — Z01.419 ENCOUNTER FOR ANNUAL ROUTINE GYNECOLOGICAL EXAMINATION: Primary | ICD-10-CM

## 2022-06-17 LAB
SL AMB  POCT GLUCOSE, UA: NEGATIVE
SL AMB LEUKOCYTE ESTERASE,UA: NEGATIVE
SL AMB POCT BILIRUBIN,UA: NEGATIVE
SL AMB POCT BLOOD,UA: ABNORMAL
SL AMB POCT CLARITY,UA: ABNORMAL
SL AMB POCT COLOR,UA: ABNORMAL
SL AMB POCT KETONES,UA: NEGATIVE
SL AMB POCT NITRITE,UA: NEGATIVE
SL AMB POCT PH,UA: 5
SL AMB POCT SPECIFIC GRAVITY,UA: 1.03
SL AMB POCT URINE PROTEIN: ABNORMAL
SL AMB POCT UROBILINOGEN: NEGATIVE

## 2022-06-17 PROCEDURE — 81002 URINALYSIS NONAUTO W/O SCOPE: CPT | Performed by: NURSE PRACTITIONER

## 2022-06-17 PROCEDURE — 87086 URINE CULTURE/COLONY COUNT: CPT | Performed by: NURSE PRACTITIONER

## 2022-06-17 PROCEDURE — G0101 CA SCREEN;PELVIC/BREAST EXAM: HCPCS | Performed by: NURSE PRACTITIONER

## 2022-06-17 RX ORDER — IBUPROFEN 600 MG/1
TABLET ORAL
COMMUNITY
Start: 2022-05-05

## 2022-06-17 RX ORDER — NITROFURANTOIN 25; 75 MG/1; MG/1
100 CAPSULE ORAL 2 TIMES DAILY
Qty: 6 CAPSULE | Refills: 0 | Status: SHIPPED | OUTPATIENT
Start: 2022-06-17 | End: 2022-06-20

## 2022-06-17 RX ORDER — LURASIDONE HYDROCHLORIDE 20 MG/1
TABLET, FILM COATED ORAL
COMMUNITY
Start: 2022-05-29

## 2022-06-17 NOTE — PATIENT INSTRUCTIONS
Take Macrobid as directed  Increase water intake  Call with needs or concerns  Return in 1 year    COVID-19 Instructions    If you are having any of the following:  Cough   Shortness of breath   Fever  If traveled within past 2 weeks internationally or to high risk [place] states  Or been in contact with someone that has     Please call either:   Your PCP office  [phone], option [phone]    They will screen you over the phone and direct you to the nearest appropriate testing location    DO NOT go to your PCP or OB office without calling first       Calljulito Padilla

## 2022-06-17 NOTE — PROGRESS NOTES
Annual Exam    Assessment   1  Encounter for annual routine gynecological examination     2  UTI symptoms  nitrofurantoin (MACROBID) 100 mg capsule    UA (URINE) with reflex to Scope    Urine culture    POCT urine dip    CANCELED: Urine culture    CANCELED: UA (URINE) with reflex to Scope     well woman       Plan       All questions answered  Breast self exam technique reviewed and patient encouraged to perform self-exam monthly  Contraception: Depo-Provera injections  Discussed healthy lifestyle modifications  Patient Instructions   Take Macrobid as directed  Increase water intake  Call with needs or concerns  Return in 1 year    Pt verbalized understanding of all discussed  Subjective      Connie Johnson is a 33 y o   female who presents for annual well woman exam  Periods are irregular on depot, reports occasional spotting since starting depot  No intermenstrual bleeding, or discharge  Report dysuria, frequency, urgency for the past 3 days  Denies fever, chills, abdominal or back pain  Sexually active with 1 male partner for >3 years  Pt is interested in STI testing today  Denies domestic violence  Walks for exercise >1 hour every day  Explained urine dip noted blood and specimen would be sent to lab  Safe and effective use of Macrobid provided  Advised to increase water intake      Depression Screening Follow-up Plan: Patient's depression screening was negative with a PHQ-2 score of 0  Their PHQ-9 score was 5  Clinically patient does not have depression  No treatment is required  Current contraception: Depo-Provera injections  History of abnormal Pap smear: yes - 2019 LGSIL, colpo BROCK I   Family history of uterine or ovarian cancer: no  Regular self breast exam: no  History of abnormal mammogram: N/A  Family history of breast cancer: yes - Grandmother HOPE line provided previously    History of abnormal lipids: no  Menstrual History:  OB History        0    Para   0 Term   0       0    AB   0    Living   0       SAB   0    IAB   0    Ectopic   0    Multiple   0    Live Births   0                Menarche age: 14  No LMP recorded  No menstrual periods since starting depo  The following portions of the patient's history were reviewed and updated as appropriate: allergies, current medications, past family history, past medical history, past social history, past surgical history and problem list     Review of Systems  Pertinent items are noted in HPI        Objective      /76   Pulse (!) 106   Wt 51 7 kg (114 lb)   BMI 18 97 kg/m²     General: alert and oriented, in no acute distress, alert, appears stated age and cooperative   Heart: regular rate and rhythm, S1, S2 normal, no murmur, click, rub or gallop   Lungs: clear to auscultation bilaterally, WNL respiratory effort, negative cough or SOB   Thyroid: Negative masses   Abdomen: soft, non-tender, without masses or organomegaly   Vulva: normal   Vagina: normal mucosa   Cervix: no cervical motion tenderness and no lesions   Uterus: normal size, non-tender, normal shape and consistency   Adnexa: normal adnexa   Urethra: normal   Breasts: NT,negative masses, discharge, or dimpling

## 2022-06-19 LAB — BACTERIA UR CULT: NORMAL

## 2022-07-07 ENCOUNTER — CLINICAL SUPPORT (OUTPATIENT)
Dept: OBGYN CLINIC | Facility: CLINIC | Age: 32
End: 2022-07-07

## 2022-07-07 ENCOUNTER — HOSPITAL ENCOUNTER (OUTPATIENT)
Dept: NEUROLOGY | Facility: CLINIC | Age: 32
Discharge: HOME | End: 2022-07-07
Payer: MEDICARE

## 2022-07-07 VITALS
HEIGHT: 65 IN | BODY MASS INDEX: 19.29 KG/M2 | SYSTOLIC BLOOD PRESSURE: 114 MMHG | DIASTOLIC BLOOD PRESSURE: 80 MMHG | HEART RATE: 86 BPM | WEIGHT: 115.8 LBS

## 2022-07-07 DIAGNOSIS — R20.0 NUMBNESS AND TINGLING OF RIGHT ARM: ICD-10-CM

## 2022-07-07 DIAGNOSIS — M54.12 RADICULOPATHY, CERVICAL REGION: ICD-10-CM

## 2022-07-07 DIAGNOSIS — Z30.42 ENCOUNTER FOR SURVEILLANCE OF INJECTABLE CONTRACEPTIVE: Primary | ICD-10-CM

## 2022-07-07 DIAGNOSIS — R20.2 NUMBNESS AND TINGLING OF RIGHT ARM: ICD-10-CM

## 2022-07-07 PROCEDURE — 95886 MUSC TEST DONE W/N TEST COMP: CPT | Performed by: PSYCHIATRY & NEUROLOGY

## 2022-07-07 PROCEDURE — 95909 NRV CNDJ TST 5-6 STUDIES: CPT | Performed by: PSYCHIATRY & NEUROLOGY

## 2022-07-07 PROCEDURE — 96372 THER/PROPH/DIAG INJ SC/IM: CPT

## 2022-07-07 RX ADMIN — MEDROXYPROGESTERONE ACETATE 150 MG: 150 INJECTION, SUSPENSION INTRAMUSCULAR at 09:03

## 2022-07-07 NOTE — PROGRESS NOTES
Pt here today for depo injection  Pt does not have periods with depo  Last depo administered 4/20/22  Depo given in: L deltoid  Pt tolerated injection well, no concerns    NDC#: 67156-161-10  Lot# [id number]  Exp: 9/30/2026

## 2022-07-13 ENCOUNTER — OFFICE VISIT (OUTPATIENT)
Dept: OBGYN CLINIC | Facility: CLINIC | Age: 32
End: 2022-07-13

## 2022-07-13 VITALS
WEIGHT: 114.4 LBS | DIASTOLIC BLOOD PRESSURE: 79 MMHG | HEART RATE: 102 BPM | SYSTOLIC BLOOD PRESSURE: 117 MMHG | BODY MASS INDEX: 19.04 KG/M2

## 2022-07-13 DIAGNOSIS — B37.31 VAGINAL CANDIDIASIS: ICD-10-CM

## 2022-07-13 DIAGNOSIS — N89.8 VAGINAL DISCHARGE: ICD-10-CM

## 2022-07-13 DIAGNOSIS — N76.0 BV (BACTERIAL VAGINOSIS): ICD-10-CM

## 2022-07-13 DIAGNOSIS — B96.89 BV (BACTERIAL VAGINOSIS): ICD-10-CM

## 2022-07-13 DIAGNOSIS — R30.0 BURNING WITH URINATION: Primary | ICD-10-CM

## 2022-07-13 PROBLEM — Z11.3 SCREEN FOR STD (SEXUALLY TRANSMITTED DISEASE): Status: RESOLVED | Noted: 2020-06-10 | Resolved: 2022-07-13

## 2022-07-13 PROBLEM — F90.9 ADHD (ATTENTION DEFICIT HYPERACTIVITY DISORDER): Status: RESOLVED | Noted: 2019-05-20 | Resolved: 2022-07-13

## 2022-07-13 PROBLEM — Z12.4 CERVICAL CANCER SCREENING: Status: RESOLVED | Noted: 2018-07-24 | Resolved: 2022-07-13

## 2022-07-13 PROBLEM — N63.0 BREAST LUMP: Status: RESOLVED | Noted: 2020-06-10 | Resolved: 2022-07-13

## 2022-07-13 PROBLEM — R87.612 LGSIL ON PAP SMEAR OF CERVIX: Status: RESOLVED | Noted: 2019-06-12 | Resolved: 2022-07-13

## 2022-07-13 PROBLEM — F42.9 OCD (OBSESSIVE COMPULSIVE DISORDER): Status: RESOLVED | Noted: 2019-05-20 | Resolved: 2022-07-13

## 2022-07-13 PROBLEM — F17.200 NICOTINE USE DISORDER: Status: RESOLVED | Noted: 2018-07-24 | Resolved: 2022-07-13

## 2022-07-13 PROBLEM — R87.619 ATYPICAL GLANDULAR CELLS OF UNDETERMINED SIGNIFICANCE (AGUS) ON CERVICAL PAP SMEAR: Status: RESOLVED | Noted: 2019-06-12 | Resolved: 2022-07-13

## 2022-07-13 PROBLEM — K29.00 ACUTE GASTRITIS WITHOUT HEMORRHAGE: Status: RESOLVED | Noted: 2021-07-19 | Resolved: 2022-07-13

## 2022-07-13 PROBLEM — Z30.41 ENCOUNTER FOR SURVEILLANCE OF CONTRACEPTIVE PILLS: Status: RESOLVED | Noted: 2019-09-16 | Resolved: 2022-07-13

## 2022-07-13 PROBLEM — F31.9 BIPOLAR 1 DISORDER (HCC): Status: RESOLVED | Noted: 2019-05-20 | Resolved: 2022-07-13

## 2022-07-13 PROBLEM — Z20.822 SUSPECTED COVID-19 VIRUS INFECTION: Status: RESOLVED | Noted: 2021-12-30 | Resolved: 2022-07-13

## 2022-07-13 PROBLEM — H91.93 BILATERAL HEARING LOSS: Status: RESOLVED | Noted: 2022-03-01 | Resolved: 2022-07-13

## 2022-07-13 PROBLEM — Z01.419 ENCOUNTER FOR ANNUAL ROUTINE GYNECOLOGICAL EXAMINATION: Status: RESOLVED | Noted: 2020-06-10 | Resolved: 2022-07-13

## 2022-07-13 PROBLEM — M79.601 RIGHT ARM PAIN: Status: RESOLVED | Noted: 2022-04-28 | Resolved: 2022-07-13

## 2022-07-13 PROBLEM — R42 DIZZINESS: Status: RESOLVED | Noted: 2020-05-12 | Resolved: 2022-07-13

## 2022-07-13 PROBLEM — Z98.890 S/P LEEP: Status: RESOLVED | Noted: 2019-08-09 | Resolved: 2022-07-13

## 2022-07-13 LAB
BV WHIFF TEST VAG QL: ABNORMAL
CLUE CELLS SPEC QL WET PREP: ABNORMAL
PH SMN: 4.5 [PH]
SL AMB  POCT GLUCOSE, UA: ABNORMAL
SL AMB LEUKOCYTE ESTERASE,UA: ABNORMAL
SL AMB POCT BILIRUBIN,UA: ABNORMAL
SL AMB POCT BLOOD,UA: ABNORMAL
SL AMB POCT CLARITY,UA: CLEAR
SL AMB POCT COLOR,UA: YELLOW
SL AMB POCT KETONES,UA: ABNORMAL
SL AMB POCT NITRITE,UA: ABNORMAL
SL AMB POCT PH,UA: 5
SL AMB POCT SPECIFIC GRAVITY,UA: 1.03
SL AMB POCT URINE PROTEIN: ABNORMAL
SL AMB POCT UROBILINOGEN: ABNORMAL
SL AMB POCT WET MOUNT: ABNORMAL
T VAGINALIS VAG QL WET PREP: ABNORMAL
YEAST VAG QL WET PREP: ABNORMAL

## 2022-07-13 PROCEDURE — 87210 SMEAR WET MOUNT SALINE/INK: CPT | Performed by: NURSE PRACTITIONER

## 2022-07-13 PROCEDURE — 81002 URINALYSIS NONAUTO W/O SCOPE: CPT | Performed by: NURSE PRACTITIONER

## 2022-07-13 PROCEDURE — 99213 OFFICE O/P EST LOW 20 MIN: CPT | Performed by: NURSE PRACTITIONER

## 2022-07-13 PROCEDURE — 87086 URINE CULTURE/COLONY COUNT: CPT | Performed by: NURSE PRACTITIONER

## 2022-07-13 RX ORDER — METRONIDAZOLE 500 MG/1
500 TABLET ORAL EVERY 12 HOURS SCHEDULED
Qty: 14 TABLET | Refills: 0 | Status: SHIPPED | OUTPATIENT
Start: 2022-07-13 | End: 2022-07-20

## 2022-07-13 RX ORDER — FLUCONAZOLE 150 MG/1
150 TABLET ORAL ONCE
Qty: 1 TABLET | Refills: 0 | Status: SHIPPED | OUTPATIENT
Start: 2022-07-13 | End: 2022-07-13

## 2022-07-13 NOTE — PROGRESS NOTES
PROBLEM GYNECOLOGICAL VISIT    Amy Waters is a 33 y o  female who presents today with complaint of burning with urination and vaginal discharge    Her general medical history has been reviewed and she reports it as follows:    Past Medical History:   Diagnosis Date    Abnormal Pap smear of cervix      cryo; 2019 LSIL/AGC pap; 2019 colpo HSIL; 2019 LEEP HSIL w/neg margins; 2020 NILM pap/neg HPV; 2020 NILM pap/neg HPV    ADHD     Bipolar 1 disorder ([facility] )     Depression     Epilepsy ([facility] )     last episode     GERD (gastroesophageal reflux disease)     Migraine     OCD (obsessive compulsive disorder)     Urogenital trichomoniasis      Past Surgical History:   Procedure Laterality Date    GYNECOLOGIC CRYOSURGERY      SD COLPOSCOPY,CERVIX W/ADJ VAG,W/LOOP BX N/A 2019    Procedure: BIOPSY LEEP CERVIX;  Surgeon: Courtney Najera MD;  Location: [facility] OR;  Service: Gynecology     OB History        0    Para   0    Term   0       0    AB   0    Living   0       SAB   0    IAB   0    Ectopic   0    Multiple   0    Live Births   0               Social History     Tobacco Use    Smoking status: Current Every Day Smoker     Packs/day: 2 00     Years: 11 00     Pack years: 22 00     Types: Cigarettes    Smokeless tobacco: Never Used   Vaping Use    Vaping Use: Never used   Substance Use Topics    Alcohol use: Yes     Comment: couple times/month    Drug use: Never     Social History     Substance and Sexual Activity   Sexual Activity Yes    Partners: Male    Birth control/protection: Injection       Current Outpatient Medications   Medication Instructions    dexamethasone (DECADRON) 2 mg tablet Take 3 tabs x 1 day, 2 tabs x 1 day, 1 tab x 1 day    ergocalciferol (VITAMIN D2) 50,000 Units, Oral, 3 times weekly    fluticasone (FLONASE) 50 mcg/act nasal spray SHAKE LIQUID AND USE 1 SPRAY IN EACH NOSTRIL TWICE DAILY    gabapentin (NEURONTIN) 100 mg, Oral, 2 times daily    ibuprofen (MOTRIN) 600 mg tablet No dose, route, or frequency recorded   Latuda 20 MG tablet No dose, route, or frequency recorded   medroxyPROGESTERone acetate (DEPO-PROVERA SYRINGE) 150 mg/mL injection INJECT 1 ML INTO THE MUSCLE EVERY 3 MONTHS    meloxicam (MOBIC) 15 mg tablet TAKE 1 TABLET(15 MG) BY MOUTH DAILY    mirtazapine (REMERON) 15 mg tablet No dose, route, or frequency recorded   omeprazole (PriLOSEC) 20 mg delayed release capsule TAKE 1 CAPSULE(20 MG) BY MOUTH DAILY    SUMAtriptan (IMITREX) 25 mg, Oral, Once as needed    topiramate (Topamax) 50 MG tablet Take 50 mg in the AM and 50 mg in the PM       History of Present Illness:   Patient was seen last month for annual GYN exam   At that time she was reporting burning with urination and was treated empirically with Macrobid for UTI based on hematuria noted on dipstick  Subsequent urine culture was negative  Today she reports that symptoms never resolved and that she now also has thick white vaginal discharge  She denies vaginal odor or itching/irritation  Denies pelvic pain  Menses are absent due to [address] contraception  Review of Systems:  Review of Systems   Constitutional: Negative  Gastrointestinal: Negative  Genitourinary: Positive for dysuria and vaginal discharge  Negative for pelvic pain and vaginal pain  Physical Exam:  /79   Pulse 102   Wt 51 9 kg (114 lb 6 4 oz)   LMP  (LMP Unknown)   BMI 19 04 kg/m²   Physical Exam  Constitutional:       General: She is not in acute distress  Genitourinary:      Vulva exam comments: normal       Vaginal discharge and erythema present  Abdominal:      Palpations: Abdomen is soft  Tenderness: There is no abdominal tenderness  Neurological:      Mental Status: She is alert  Skin:     General: Skin is warm and dry  Vitals reviewed         Point of Care Testing:   -Wet mount: + clue cells, no trichomonads, moderate WBC's, pH=4 5   -KOH mount: + hyphae   -Whiff: positive   -urinalysis: neg leuks, neg nitrites, +++ blood    Assessment:   1  Hematuria  2  BV    3  Vaginal candidiasis  Plan:   1  Cultures ordered: urine  2  Given Rx Flagyl and Diflucan   3  Return to office in 2 weeks for follow up  If symptoms not resolved and urine culture negative, will refer to PCP for further workup of hematuria

## 2022-07-13 NOTE — PATIENT INSTRUCTIONS
Thank you for your confidence in our team    We appreciate you and welcome your feedback  If you receive a survey from us, please take a few moments to let us know how we are doing     Sincerely,  KARTIK Israel

## 2022-07-14 LAB — BACTERIA UR CULT: NORMAL

## 2022-07-29 ENCOUNTER — OFFICE VISIT (OUTPATIENT)
Dept: OBGYN CLINIC | Facility: CLINIC | Age: 32
End: 2022-07-29

## 2022-07-29 VITALS
BODY MASS INDEX: 19.14 KG/M2 | HEART RATE: 112 BPM | WEIGHT: 115 LBS | SYSTOLIC BLOOD PRESSURE: 100 MMHG | DIASTOLIC BLOOD PRESSURE: 68 MMHG

## 2022-07-29 DIAGNOSIS — R31.9 HEMATURIA, UNSPECIFIED TYPE: Primary | ICD-10-CM

## 2022-07-29 DIAGNOSIS — B37.31 VAGINAL CANDIDIASIS: ICD-10-CM

## 2022-07-29 DIAGNOSIS — N89.8 VAGINAL DISCHARGE: ICD-10-CM

## 2022-07-29 LAB
SL AMB  POCT GLUCOSE, UA: ABNORMAL
SL AMB LEUKOCYTE ESTERASE,UA: ABNORMAL
SL AMB POCT BILIRUBIN,UA: ABNORMAL
SL AMB POCT BLOOD,UA: ABNORMAL
SL AMB POCT CLARITY,UA: ABNORMAL
SL AMB POCT COLOR,UA: YELLOW
SL AMB POCT KETONES,UA: ABNORMAL
SL AMB POCT NITRITE,UA: ABNORMAL
SL AMB POCT PH,UA: 5
SL AMB POCT SPECIFIC GRAVITY,UA: 1.03
SL AMB POCT URINE PROTEIN: ABNORMAL
SL AMB POCT UROBILINOGEN: ABNORMAL

## 2022-07-29 PROCEDURE — 99213 OFFICE O/P EST LOW 20 MIN: CPT | Performed by: NURSE PRACTITIONER

## 2022-07-29 PROCEDURE — 81002 URINALYSIS NONAUTO W/O SCOPE: CPT | Performed by: NURSE PRACTITIONER

## 2022-07-29 PROCEDURE — 87491 CHLMYD TRACH DNA AMP PROBE: CPT | Performed by: NURSE PRACTITIONER

## 2022-07-29 PROCEDURE — 87591 N.GONORRHOEAE DNA AMP PROB: CPT | Performed by: NURSE PRACTITIONER

## 2022-07-29 RX ORDER — FLUCONAZOLE 150 MG/1
150 TABLET ORAL ONCE
Qty: 1 TABLET | Refills: 0 | Status: SHIPPED | OUTPATIENT
Start: 2022-07-29 | End: 2022-07-29

## 2022-07-29 NOTE — LETTER
2022    To Sarah Vega  : 1990      This letter is to advise you that your recent CULTURES for gonorrhea, chlamydia results were reviewed by me and are NORMAL  Please contact the office for an appointment if you have any additional concerns      KARTIK Damico

## 2022-07-29 NOTE — PROGRESS NOTES
Daniela Chen presents today in follow up for burning with urination and vaginal discharge  She was seen here by me 2 weeks ago and treated with Flagyl and Diflucan for BV and vaginal yeast   Her urine culture was negative but with 3+ blood on u/a  She does not have menses at all due to [address] contraception  She reports that she continues with suprapubic burning sensation with urination  Urine dipstick today notes 3+ blood again  Neg nitrites and leuks  Will refer to her PCP for further evaluation and workup  She reports that vaginal discharge has decreased but become thicker and also has associated vaginal dryness  Reports vaginal odor resolved  On exam, note vaginal candidiasis  Given Rx Diflucan again  Return prn

## 2022-07-29 NOTE — PATIENT INSTRUCTIONS
Thank you for your confidence in our team    We appreciate you and welcome your feedback  If you receive a survey from us, please take a few moments to let us know how we are doing     Sincerely,  KARTIK Caldwell

## 2022-07-30 LAB
C TRACH DNA SPEC QL NAA+PROBE: NEGATIVE
N GONORRHOEA DNA SPEC QL NAA+PROBE: NEGATIVE

## 2022-08-02 ENCOUNTER — HOSPITAL ENCOUNTER (EMERGENCY)
Facility: HOSPITAL | Age: 32
Discharge: HOME | End: 2022-08-02
Attending: EMERGENCY MEDICINE | Admitting: EMERGENCY MEDICINE
Payer: MEDICARE

## 2022-08-02 VITALS
BODY MASS INDEX: 19.28 KG/M2 | RESPIRATION RATE: 16 BRPM | TEMPERATURE: 98.6 F | HEIGHT: 65 IN | DIASTOLIC BLOOD PRESSURE: 73 MMHG | SYSTOLIC BLOOD PRESSURE: 124 MMHG | HEART RATE: 88 BPM | WEIGHT: 115.74 LBS | OXYGEN SATURATION: 100 %

## 2022-08-02 DIAGNOSIS — M54.12 CERVICAL RADICULOPATHY: Primary | ICD-10-CM

## 2022-08-02 DIAGNOSIS — M54.2 NECK PAIN ON RIGHT SIDE: ICD-10-CM

## 2022-08-02 PROCEDURE — 99284 EMERGENCY DEPT VISIT MOD MDM: CPT | Performed by: EMERGENCY MEDICINE

## 2022-08-02 PROCEDURE — 99283 EMERGENCY DEPT VISIT LOW MDM: CPT

## 2022-08-02 RX ORDER — METHOCARBAMOL 500 MG/1
500 TABLET, FILM COATED ORAL 2 TIMES DAILY
Qty: 20 TABLET | Refills: 0 | Status: SHIPPED | OUTPATIENT
Start: 2022-08-02

## 2022-08-03 NOTE — ED PROVIDER NOTES
History  Chief Complaint   Patient presents with    Neck Pain     Pt reports neck pain and stiffness that started last night   +known hx of same  +headache  +tingling down arm      HPI  Patient is a 36-year-old female presenting with right-sided neck pain as well as paresthesia in her arm as well as radiating pain  Patient states that she has had radiculopathy since beginning of February  Has been seen by Orthopedics has gotten MRIs which showed mild left foraminal stenosis with no disc herniation  EMG was done in June which showed no signs of cervical radiculopathy  Patient states that symptom has been constant with waxing and waning severity  Patient uses gabapentin with no relief  Currently denies weakness and is able to perform daily tasks without difficulty  Mild headache  Notice symptoms  Prior to Admission Medications   Prescriptions Last Dose Informant Patient Reported? Taking?    Latuda 20 MG tablet   Yes No   SUMAtriptan (Imitrex) 25 mg tablet   No No   Sig: Take 1 tablet (25 mg total) by mouth once as needed for migraine for up to 1 dose   dexamethasone (DECADRON) 2 mg tablet   No No   Sig: Take 3 tabs x 1 day, 2 tabs x 1 day, 1 tab x 1 day   ergocalciferol (VITAMIN D2) 50,000 units   No No   Sig: Take 1 capsule (50,000 Units total) by mouth 3 (three) times a week   fluticasone (FLONASE) 50 mcg/act nasal spray   No No   Sig: SHAKE LIQUID AND USE 1 SPRAY IN EACH NOSTRIL TWICE DAILY   gabapentin (Neurontin) 100 mg capsule   No No   Sig: Take 1 capsule (100 mg total) by mouth 2 (two) times a day   ibuprofen (MOTRIN) 600 mg tablet   Yes No   medroxyPROGESTERone acetate (DEPO-PROVERA SYRINGE) 150 mg/mL injection   No No   Sig: INJECT 1 ML INTO THE MUSCLE EVERY 3 MONTHS   meloxicam (MOBIC) 15 mg tablet   No No   Sig: TAKE 1 TABLET(15 MG) BY MOUTH DAILY   mirtazapine (REMERON) 15 mg tablet   Yes No   omeprazole (PriLOSEC) 20 mg delayed release capsule   No No   Sig: TAKE 1 CAPSULE(20 MG) BY MOUTH DAILY   topiramate (Topamax) 50 MG tablet   No No   Sig: Take 50 mg in the AM and 50 mg in the PM      Facility-Administered Medications: None       Past Medical History:   Diagnosis Date    Abnormal Pap smear of cervix     2009 cryo; 6/2019 LSIL/AGC pap; 6/2019 colpo HSIL; 8/2019 LEEP HSIL w/neg margins; 6/2020 NILM pap/neg HPV; 6/2020 NILM pap/neg HPV    ADHD     Bipolar 1 disorder ([facility] )     Depression     Epilepsy ([facility] )     last episode 2000    GERD (gastroesophageal reflux disease)     Migraine     OCD (obsessive compulsive disorder)     Urogenital trichomoniasis 2019       Past Surgical History:   Procedure Laterality Date    GYNECOLOGIC CRYOSURGERY  2009    [place] COLPOSCOPY,CERVIX W/ADJ VAG,W/LOOP BX N/A 08/09/2019    Procedure: BIOPSY LEEP CERVIX;  Surgeon: Jolly Lennox, MD;  Location: [facility] OR;  Service: Gynecology       Family History   Problem Relation Age of Onset    Bipolar disorder Mother     Diabetes Mother     Migraines Mother    Curlee Mess / [place] Mother     No Known Problems Father     Depression Sister     Asthma Sister     Osteoporosis Maternal Grandmother     Cancer Maternal Grandfather     Breast cancer Neg Hx     Colon cancer Neg Hx     Ovarian cancer Neg Hx      I have reviewed and agree with the history as documented  E-Cigarette/Vaping    E-Cigarette Use Never User      E-Cigarette/Vaping Substances     Social History     Tobacco Use    Smoking status: Current Every Day Smoker     Packs/day: 1 00     Years: 11 00     Pack years: 11 00     Types: Cigarettes    Smokeless tobacco: Never Used   Vaping Use    Vaping Use: Never used   Substance Use Topics    Alcohol use: Yes     Comment: couple times/month    Drug use: Never        Review of Systems   Constitutional: Negative for chills, diaphoresis, fever and unexpected weight change  HENT: Negative for ear pain and sore throat  Eyes: Negative for visual disturbance     Respiratory: Negative for cough, chest tightness and shortness of breath  Cardiovascular: Negative for chest pain and leg swelling  Gastrointestinal: Negative for abdominal distention, abdominal pain, constipation, diarrhea, nausea and vomiting  Endocrine: Negative  Genitourinary: Negative for difficulty urinating and dysuria  Musculoskeletal: Positive for neck pain  Radiating arm pain   Skin: Negative  Allergic/Immunologic: Negative  Neurological:        Paresthesia   Hematological: Negative  Psychiatric/Behavioral: Negative  All other systems reviewed and are negative  Physical Exam  ED Triage Vitals [08/02/22 2025]   Temperature Pulse Respirations Blood Pressure SpO2   98 6 °F (37 °C) 95 16 117/76 100 %      Temp Source Heart Rate Source Patient Position - Orthostatic VS BP Location FiO2 (%)   Oral Monitor Sitting Left arm --      Pain Score       10 - Worst Possible Pain             Orthostatic Vital Signs  Vitals:    08/02/22 2025 08/02/22 2244   BP: 117/76 124/73   Pulse: 95 88   Patient Position - Orthostatic VS: Sitting Sitting       Physical Exam  Vitals and nursing note reviewed  Constitutional:       General: She is not in acute distress  Appearance: Normal appearance  She is not ill-appearing  HENT:      Head: Normocephalic and atraumatic  Right Ear: External ear normal       Left Ear: External ear normal       Nose: Nose normal       Mouth/Throat:      Mouth: Mucous membranes are moist       Pharynx: Oropharynx is clear  Eyes:      General: No scleral icterus  Right eye: No discharge  Left eye: No discharge  Extraocular Movements: Extraocular movements intact  Conjunctiva/sclera: Conjunctivae normal       Pupils: Pupils are equal, round, and reactive to light  Cardiovascular:      Rate and Rhythm: Normal rate and regular rhythm  Pulses: Normal pulses  Heart sounds: Normal heart sounds     Pulmonary:      Effort: Pulmonary effort is normal  Breath sounds: Normal breath sounds  Abdominal:      General: Abdomen is flat  Bowel sounds are normal  There is no distension  Palpations: Abdomen is soft  Tenderness: There is no abdominal tenderness  There is no guarding or rebound  Musculoskeletal:         General: Tenderness (right upper back extending to right side of neck ) present  Normal range of motion  Cervical back: Normal range of motion and neck supple  Skin:     General: Skin is warm and dry  Capillary Refill: Capillary refill takes less than 2 seconds  Neurological:      General: No focal deficit present  Mental Status: She is alert and oriented to person, place, and time  Mental status is at baseline  Psychiatric:         Mood and Affect: Mood normal          Behavior: Behavior normal          Thought Content: Thought content normal          Judgment: Judgment normal          ED Medications  Medications - No data to display    Diagnostic Studies  Results Reviewed     None                 No orders to display         Procedures  Procedures      ED Course                             SBIRT 20yo+    Flowsheet Row Most Recent Value   SBIRT (25 yo +)    In order to provide better care to our patients, we are screening all of our patients for alcohol and drug use  Would it be okay to ask you these screening questions?  No Filed at: 08/02/2022 2245                [facility]  Number of Diagnoses or Management Options  Cervical radiculopathy  Neck pain on right side  Diagnosis management comments:     Patient 33year old female presenting with right neck pain migrating to arm and paresthesia   Symptom is chronic with prior workup including EMG last month that was normal   Patient with no significant changes   Patient given robaxin upon discharge and instructed to follow up with comprehensive spine and pain management     Disposition  Final diagnoses:   Cervical radiculopathy   Neck pain on right side     Time reflects when diagnosis was documented in both MDM as applicable and the Disposition within this note     Time User Action Codes Description Comment    8/2/2022 10:35 PM Papa Salgado Add [H35 44] Cervical radiculopathy     8/2/2022 10:41 PM Papa Salgado Add [M54 2] Neck pain on right side       ED Disposition     ED Disposition   Discharge    Condition   Stable    Date/Time   Tue Aug 2, 2022 10:35 PM    Comment   Jossy Simpler discharge to home/self care                 Follow-up Information     Follow up With Specialties Details Why Contact Info Additional Information    [address] [facility] Emergency Department Emergency Medicine   [facility] [id number]  [address] [facility] Emergency Department, [address]  , [place], [place], [facility] 23 Program Physical Therapy Schedule an appointment as soon as possible for a visit   [phone]    [address] [facility] Emergency Department Emergency Medicine Go to  If symptoms worsen [facility] [id number]  [address] [facility] Emergency Department, [address] [place], [place], [facility]          Discharge Medication List as of 8/2/2022 10:41 PM      START taking these medications    Details   methocarbamol (ROBAXIN) 500 mg tablet Take 1 tablet (500 mg total) by mouth 2 (two) times a day, Starting Tue 8/2/2022, Normal         CONTINUE these medications which have NOT CHANGED    Details   dexamethasone (DECADRON) 2 mg tablet Take 3 tabs x 1 day, 2 tabs x 1 day, 1 tab x 1 day, Normal      ergocalciferol (VITAMIN D2) 50,000 units Take 1 capsule (50,000 Units total) by mouth 3 (three) times a week, Starting Wed 3/30/2022, Normal      fluticasone (FLONASE) 50 mcg/act nasal spray SHAKE LIQUID AND USE 1 SPRAY IN EACH NOSTRIL TWICE DAILY, Normal      gabapentin (Neurontin) 100 mg capsule Take 1 capsule (100 mg total) by mouth 2 (two) times a day, Starting Thu 4/28/2022, Normal      ibuprofen (MOTRIN) 600 mg tablet Starting Thu 5/5/2022, Historical Med      Latuda 20 MG tablet Starting Sun 5/29/2022, Historical Med      medroxyPROGESTERone acetate (DEPO-PROVERA SYRINGE) 150 mg/mL injection INJECT 1 ML INTO THE MUSCLE EVERY 3 MONTHS, Normal      meloxicam (MOBIC) 15 mg tablet TAKE 1 TABLET(15 MG) BY MOUTH DAILY, Normal      mirtazapine (REMERON) 15 mg tablet Starting Mon 1/17/2022, Historical Med      omeprazole (PriLOSEC) 20 mg delayed release capsule TAKE 1 CAPSULE(20 MG) BY MOUTH DAILY, Normal      SUMAtriptan (Imitrex) 25 mg tablet Take 1 tablet (25 mg total) by mouth once as needed for migraine for up to 1 dose, Starting Tue 3/1/2022, Normal      topiramate (Topamax) 50 MG tablet Take 50 mg in the AM and 50 mg in the PM, Normal           No discharge procedures on file  PDMP Review       Value Time User    PDMP Reviewed  Yes 1/14/2022 11:43 AM Jacinto Everett PA-C           ED Provider  Attending physically available and evaluated Rose Mary Red I managed the patient along with the ED Attending      Electronically Signed by         Vince Mcmahon MD  08/05/22 6803

## 2022-08-03 NOTE — ED ATTENDING ATTESTATION
8/2/2022  IAlonzo MD, saw and evaluated the patient  I have discussed the patient with the resident/non-physician practitioner and agree with the resident's/non-physician practitioner's findings, Plan of Care, and MDM as documented in the resident's/non-physician practitioner's note, except where noted  All available labs and Radiology studies were reviewed  I was present for key portions of any procedure(s) performed by the resident/non-physician practitioner and I was immediately available to provide assistance  At this point I agree with the current assessment done in the Emergency Department  I have conducted an independent evaluation of this patient a history and physical is as follows:    32 y/o F presents for evaluation of 6 months of R neck pain radiating into the R arm  Reported as tingling pain and pins and needles  Pt states exact same pain that waxes and wanes in intensity, is worse with movement  Pt has had an outpatient MRI and EMG  10 systems reviewed and otherwise neg  On exam no distress, nttp over ctl spines+ttp R neck/trap, painful Rom R shoulder w/o swelling/redness/warmth, nvi RUE, cardiac nml, lungs nml, abd nml   MDM:  Chronic radiculopathy-will reassure, , tx symptoms, comp spine referral         Critical Care Time  Procedures

## 2022-08-12 ENCOUNTER — OFFICE VISIT (OUTPATIENT)
Dept: OBGYN CLINIC | Facility: CLINIC | Age: 32
End: 2022-08-12

## 2022-08-12 VITALS
BODY MASS INDEX: 18.99 KG/M2 | HEART RATE: 94 BPM | WEIGHT: 114 LBS | DIASTOLIC BLOOD PRESSURE: 68 MMHG | HEIGHT: 65 IN | SYSTOLIC BLOOD PRESSURE: 100 MMHG

## 2022-08-12 DIAGNOSIS — B37.31 YEAST VAGINITIS: Primary | ICD-10-CM

## 2022-08-12 DIAGNOSIS — Z13.1 SCREENING FOR DIABETES MELLITUS: ICD-10-CM

## 2022-08-12 PROBLEM — B37.3 YEAST VAGINITIS: Status: ACTIVE | Noted: 2022-08-12

## 2022-08-12 LAB
SL AMB  POCT GLUCOSE, UA: NEGATIVE
SL AMB LEUKOCYTE ESTERASE,UA: NEGATIVE
SL AMB POCT BILIRUBIN,UA: NEGATIVE
SL AMB POCT BLOOD,UA: NEGATIVE
SL AMB POCT CLARITY,UA: ABNORMAL
SL AMB POCT COLOR,UA: ABNORMAL
SL AMB POCT KETONES,UA: NEGATIVE
SL AMB POCT NITRITE,UA: NEGATIVE
SL AMB POCT PH,UA: 5
SL AMB POCT SPECIFIC GRAVITY,UA: 1.03
SL AMB POCT URINE PROTEIN: NEGATIVE
SL AMB POCT UROBILINOGEN: NEGATIVE

## 2022-08-12 PROCEDURE — 99213 OFFICE O/P EST LOW 20 MIN: CPT | Performed by: NURSE PRACTITIONER

## 2022-08-12 PROCEDURE — 81002 URINALYSIS NONAUTO W/O SCOPE: CPT | Performed by: NURSE PRACTITIONER

## 2022-08-12 RX ORDER — FLUCONAZOLE 150 MG/1
150 TABLET ORAL ONCE
Qty: 2 TABLET | Refills: 0 | Status: SHIPPED | OUTPATIENT
Start: 2022-08-12 | End: 2022-08-12

## 2022-08-12 NOTE — PATIENT INSTRUCTIONS
Yeast Infection   WHAT YOU NEED TO KNOW:   A yeast infection, or vaginal candidiasis, is a common vaginal infection  A yeast infection is caused by a fungus, or yeast-like germ  Fungi are normally found in your vagina  Too many fungi can cause an infection  DISCHARGE INSTRUCTIONS:   Call your doctor or gynecologist if:   You have a fever and chills  You develop abdominal or pelvic pain  Your discharge is bloody and it is not your monthly period  Your signs and symptoms get worse, even after treatment  You have questions or concerns about your condition or care  Medicines:   Medicines  help treat the fungal infection and decrease inflammation  The medicine may be a pill, cream, ointment, or vaginal tablet or suppository  Take your medicine as directed  Contact your healthcare provider if you think your medicine is not helping or if you have side effects  Tell him of her if you are allergic to any medicine  Keep a list of the medicines, vitamins, and herbs you take  Include the amounts, and when and why you take them  Bring the list or the pill bottles to follow-up visits  Carry your medicine list with you in case of an emergency  Keep your vagina healthy:   Clean your genital area with mild soap and warm water each day  Do not get soap inside your vagina  Gently dry the area after washing  Do not use hot tubs  The heat and moisture from hot tubs can increase your risk for another yeast infection  Always wipe from front to back  after you use the toilet  This prevents spreading bacteria from your rectal area into your vagina  Do not wear tight-fitting clothes or undergarments  for long periods of time  Wear cotton underwear during the day  Cotton helps keep your genital area dry and does not hold in warmth or moisture  Do not wear underwear at night  Do not douche  or use feminine hygiene sprays or bubble bath   Do not use pads or tampons that are scented, or colored or perfumed toilet paper  Do not have sex until your symptoms go away  Have your partner wear a condom until you complete your course of medication  Ask your healthcare provider about birth control options if necessary  Condoms have latex and diaphragms have gel that kills sperm  Both of these may irritate your genital area  Follow up with your doctor or gynecologist as directed:  Write down your questions so you remember to ask them during your visits  © Copyright [employer] 2022 Information is for End User's use only and may not be sold, redistributed or otherwise used for commercial purposes  All illustrations and images included in CareNotes® are the copyrighted property of [employer]  or [facility] Rigoberto English   The above information is an  only  It is not intended as medical advice for individual conditions or treatments  Talk to your doctor, nurse or pharmacist before following any medical regimen to see if it is safe and effective for you

## 2022-08-12 NOTE — ASSESSMENT & PLAN NOTE
POCT urine dip today is negative for infection and blood  Pelvic exam with thick discharge in the vaginal vault and around the ectocervix; no cervical motion tenderness  Whiff test negative, Yeast cells present on microscopic slides  Will send diflucan 150 mg once, extra pill sent if unresolving  Given repeated yeast infection, will send for HbA1C   RTC p r n

## 2022-08-12 NOTE — PROGRESS NOTES
Assessment/Plan:    Yeast vaginitis  POCT urine dip today is negative for infection and blood  Pelvic exam with thick discharge in the vaginal vault and around the ectocervix; no cervical motion tenderness  Whiff test negative, Yeast cells present on microscopic slides  Will send diflucan 150 mg once, extra pill sent if unresolving  Given repeated yeast infection, will send for HbA1C   RTC p r n  Diagnoses and all orders for this visit:    Yeast vaginitis  -     HEMOGLOBIN A1C W/ EAG ESTIMATION; Future  -     POCT urine dip  -     fluconazole (DIFLUCAN) 150 mg tablet; Take 1 tablet (150 mg total) by mouth once for 1 dose Take second tablet if unresolved in 2-3 days    Screening for diabetes mellitus  -     HEMOGLOBIN A1C W/ EAG ESTIMATION; Future        Subjective:      Patient ID: Marleen Sloan is a 33 y o  female  HPI     Ms Mary Singh is a 80-year-old female presenting today with abnormal vaginal discharge  She reports that about a week ago she was here with vaginitis, was found to have yeast infection  Patient has presented multiple times, this is the 3rd time presenting with similar symptoms  She was previously treated with Flagyl and Diflucan for BV and yeast infection  Previous urine did send culture has been negative for urinary infection and however shows hematuria  Today, she reports vaginal discharge again which restarted 4 days ago, it is white, thick and with mild odor and experiences pain with intercourse and burning with urination  Denies nausea, vomiting, fever, abdominal pain  She is currently sexually active and uses condoms on and off  Recent GC/Chlamydia was negative  She is prediabetic, with family hx of diabetes in mother  She drinks soda everyday      The following portions of the patient's history were reviewed and updated as appropriate: allergies, current medications, past family history, past medical history, past social history, past surgical history and problem list     Review of Systems   Constitutional: Negative for chills and fever  Cardiovascular: Negative for chest pain  Gastrointestinal: Negative for abdominal pain, nausea and vomiting  Genitourinary: Positive for dysuria and vaginal discharge  All other systems reviewed and are negative  Objective:    /68   Pulse 94   Ht 5' 5" (1 651 m)   Wt 51 7 kg (114 lb)   BMI 18 97 kg/m²      Physical Exam  Vitals reviewed  Constitutional:       General: She is not in acute distress  Eyes:      General:         Right eye: No discharge  Left eye: No discharge  Conjunctiva/sclera: Conjunctivae normal    Abdominal:      Tenderness: There is no abdominal tenderness  There is no right CVA tenderness or left CVA tenderness  Genitourinary:     Vagina: Vaginal discharge (thick, curdy, white) present  Musculoskeletal:         General: No tenderness  Right lower leg: No edema  Left lower leg: No edema  Skin:     General: Skin is warm  Neurological:      Mental Status: She is oriented to person, place, and time  Psychiatric:         Thought Content:  Thought content normal

## 2022-08-23 ENCOUNTER — TELEPHONE (OUTPATIENT)
Dept: PAIN MEDICINE | Facility: CLINIC | Age: 32
End: 2022-08-23

## 2022-08-23 NOTE — TELEPHONE ENCOUNTER
Patient tested positive for covid yesterday, she currently has symptoms, when should patient chayo to? [phone]

## 2022-08-23 NOTE — TELEPHONE ENCOUNTER
S/w the patient and reviewed that she has to be symptom free or covid negative and everyone reacts differently. Hopefully she will resolve or test negative over the next two weeks. Please reschedule CON c/ BOB.  Thanks

## 2022-08-24 ENCOUNTER — TELEMEDICINE (OUTPATIENT)
Dept: FAMILY MEDICINE CLINIC | Facility: CLINIC | Age: 32
End: 2022-08-24

## 2022-08-24 DIAGNOSIS — R42 DIZZINESS: Primary | ICD-10-CM

## 2022-08-24 DIAGNOSIS — R31.9 HEMATURIA, UNSPECIFIED TYPE: ICD-10-CM

## 2022-08-24 DIAGNOSIS — U07.1 COVID-19 VIRUS INFECTION: ICD-10-CM

## 2022-08-24 PROCEDURE — G0071 COMM SVCS BY RHC/FQHC 5 MIN: HCPCS | Performed by: FAMILY MEDICINE

## 2022-08-24 PROCEDURE — G2012 BRIEF CHECK IN BY MD/QHP: HCPCS | Performed by: FAMILY MEDICINE

## 2022-08-24 RX ORDER — MECLIZINE HYDROCHLORIDE 25 MG/1
25 TABLET ORAL 3 TIMES DAILY PRN
Qty: 30 TABLET | Refills: 0 | Status: SHIPPED | OUTPATIENT
Start: 2022-08-24

## 2022-08-24 NOTE — PROGRESS NOTES
COVID-19 Outpatient Progress Note    Assessment/Plan:    Problem List Items Addressed This Visit        Other    Dizziness - Primary     -Patient currently recovering from COVID infection  -Symptoms are improving but she is having some vertigo  -Will recommend trial of meclizine for symptom relief  -For persistent symptoms recommend in-person office evaluation  -May benefit from vestibular therapy         Relevant Medications    meclizine (ANTIVERT) 25 mg tablet    COVID-19 virus infection     -Recently diagnosed with COVID  -Symptoms are improving  -She is vaccinated and and boosted against COVID-19  Recommend 5 day of total isolation and 5 days aware mask in public  -Continue supportive care(increase fluid hydration, Tylenol or NSAIDs for fever)  -ED precautions provided for worsening symptoms or any shortness of breath              Disposition:     Patient has asymptomatic or mild COVID-19 infection  Based off CDC guidelines, they were recommended to isolate for 5 days  If they are asymptomatic or symptoms are improving with no fevers in the past 24 hours, isolation may be ended followed by 5 days of wearing a mask when around othes to minimize risk of infecting others  If still have a fever or other symptoms have not improved, continue to isolate until they improve  Regardless of when they end isolation, avoid being around people who are more likely to get very sick from COVID-19 until at least day 11  I have spent 20 minutes directly with the patient  Greater than 50% of this time was spent in counseling/coordination of care regarding: instructions for management, patient and family education and impressions  Encounter provider: Choco Young MD     Provider located at: [address] [address] [phone]     Recent Visits  No visits were found meeting these conditions    Showing recent visits within past 7 days and meeting all other requirements  Today's Visits  Date Type Provider Dept   08/24/22 MD Sonja Nunez   Showing today's visits and meeting all other requirements  Future Appointments  No visits were found meeting these conditions  Showing future appointments within next 150 days and meeting all other requirements     This virtual check-in was done via telephone and she agrees to proceed  Patient agrees to participate in a virtual check in via telephone or video visit instead of presenting to the office to address urgent/immediate medical needs  Patient is aware this is a billable service  She acknowledged consent and understanding of privacy and security of the video platform  The patient has agreed to participate and understands they can discontinue the visit at any time  After connecting through Telephone, the patient was identified by name and date of birth  Renee Steen was informed that this was a telemedicine visit and that the exam was being conducted confidentially over secure lines  My office door was closed  No one else was in the room  Renee Steen acknowledged consent and understanding of privacy and security of the telemedicine visit  I informed the patient that I have reviewed her record in Epic and presented the opportunity for her to ask any questions regarding the visit today  The patient agreed to participate  It was my intent to perform this visit via video technology but the patient was not able to do a video connection so the visit was completed via audio telephone only  Verification of patient location:  Patient is located in the following state in which I hold an active license: [place]    Subjective: Renee Steen is a 33 y o  female who has been screened for COVID-19  Patient's symptoms include diarrhea and myalgias   Patient denies fever, chills, cough, shortness of breath, nausea, vomiting and headaches  - Date of symptom onset: 8/19/2022  - Date of positive COVID-19 test: 8/22/2014  Type of test: Home antigen  COVID-19 vaccination status: Fully vaccinated with booster    Wilbur Stephenson has been staying home and has isolated themselves in her home  She is taking care to not share personal items and is cleaning all surfaces that are touched often, like counters, tabletops, and doorknobs using household cleaning sprays or wipes  She is wearing a mask when she leaves her room       Lab Results   Component Value Date    SARSCOV2 Positive (A) 12/31/2021    SARSCORONAVI Detected ([facility]) 01/07/2021     Past Medical History:   Diagnosis Date    Abnormal Pap smear of cervix     2009 cryo; 6/2019 LSIL/AGC pap; 6/2019 colpo HSIL; 8/2019 LEEP HSIL w/neg margins; 6/2020 NILM pap/neg HPV; 6/2020 NILM pap/neg HPV    ADHD     [facility]     Depression     [facility]     last episode 2000    GERD (gastroesophageal reflux disease)     Migraine     OCD (obsessive compulsive disorder)     Urogenital trichomoniasis 2019     Past Surgical History:   Procedure Laterality Date    GYNECOLOGIC CRYOSURGERY  2009    [place] COLPOSCOPY,CERVIX W/ADJ VAG,W/LOOP BX N/A 08/09/2019    Procedure: BIOPSY LEEP CERVIX;  Surgeon: Meenu Nascimento MD;  Location: [facility];  Service: Gynecology     Current Outpatient Medications   Medication Sig Dispense Refill    meclizine (ANTIVERT) 25 mg tablet Take 1 tablet (25 mg total) by mouth 3 (three) times a day as needed for dizziness 30 tablet 0    dexamethasone (DECADRON) 2 mg tablet Take 3 tabs x 1 day, 2 tabs x 1 day, 1 tab x 1 day 6 tablet 0    ergocalciferol (VITAMIN D2) 50,000 units Take 1 capsule (50,000 Units total) by mouth 3 (three) times a week 21 capsule 0    fluticasone (FLONASE) 50 mcg/act nasal spray SHAKE LIQUID AND USE 1 SPRAY IN EACH NOSTRIL TWICE DAILY 48 g 0    gabapentin (Neurontin) 100 mg capsule Take 1 capsule (100 mg total) by mouth 2 (two) times a day 60 capsule 0    ibuprofen (MOTRIN) 600 mg tablet       Latuda 20 MG tablet       medroxyPROGESTERone acetate (DEPO-PROVERA SYRINGE) 150 mg/mL injection INJECT 1 ML INTO THE MUSCLE EVERY 3 MONTHS 1 mL 5    meloxicam (MOBIC) 15 mg tablet TAKE 1 TABLET(15 MG) BY MOUTH DAILY (Patient not taking: Reported on 8/12/2022) 30 tablet 0    methocarbamol (ROBAXIN) 500 mg tablet Take 1 tablet (500 mg total) by mouth 2 (two) times a day (Patient not taking: Reported on 8/12/2022) 20 tablet 0    mirtazapine (REMERON) 15 mg tablet       omeprazole (PriLOSEC) 20 mg delayed release capsule TAKE 1 CAPSULE(20 MG) BY MOUTH DAILY (Patient not taking: Reported on 8/12/2022) 30 capsule 0    SUMAtriptan (Imitrex) 25 mg tablet Take 1 tablet (25 mg total) by mouth once as needed for migraine for up to 1 dose 9 tablet 0    topiramate (Topamax) 50 MG tablet Take 50 mg in the AM and 50 mg in the  tablet 0     No current facility-administered medications for this visit  No Known Allergies    Review of Systems   Constitutional: Negative for activity change, appetite change, chills and fever  Respiratory: Negative for cough and shortness of breath  Gastrointestinal: Positive for diarrhea  Negative for nausea and vomiting  Musculoskeletal: Positive for myalgias  Neurological: Positive for dizziness  Negative for tremors, seizures, syncope, numbness and headaches  Objective: There were no vitals filed for this visit      Physical Exam

## 2022-08-24 NOTE — ASSESSMENT & PLAN NOTE
-Recently diagnosed with COVID  -Symptoms are improving  -She is vaccinated and and boosted against COVID-19  Recommend 5 day of total isolation and 5 days aware mask in public  -Continue supportive care(increase fluid hydration, Tylenol or NSAIDs for fever)  -ED precautions provided for worsening symptoms or any shortness of breath

## 2022-09-17 DIAGNOSIS — R79.89 LOW VITAMIN D LEVEL: ICD-10-CM

## 2022-09-20 RX ORDER — ERGOCALCIFEROL 1.25 MG/1
CAPSULE ORAL
Qty: 21 CAPSULE | Refills: 0 | Status: SHIPPED | OUTPATIENT
Start: 2022-09-20

## 2022-09-22 ENCOUNTER — CLINICAL SUPPORT (OUTPATIENT)
Dept: OBGYN CLINIC | Facility: CLINIC | Age: 32
End: 2022-09-22

## 2022-09-22 VITALS
WEIGHT: 115 LBS | DIASTOLIC BLOOD PRESSURE: 76 MMHG | BODY MASS INDEX: 19.16 KG/M2 | HEART RATE: 98 BPM | SYSTOLIC BLOOD PRESSURE: 109 MMHG | HEIGHT: 65 IN

## 2022-09-22 DIAGNOSIS — Z30.09 UNWANTED FERTILITY: Primary | ICD-10-CM

## 2022-09-22 PROCEDURE — 96372 THER/PROPH/DIAG INJ SC/IM: CPT

## 2022-09-22 RX ORDER — MEDROXYPROGESTERONE ACETATE 150 MG/ML
150 INJECTION, SUSPENSION INTRAMUSCULAR ONCE
Status: COMPLETED | OUTPATIENT
Start: 2022-09-22 | End: 2022-09-22

## 2022-09-22 RX ADMIN — MEDROXYPROGESTERONE ACETATE 150 MG: 150 INJECTION, SUSPENSION INTRAMUSCULAR at 11:53

## 2022-09-22 NOTE — PROGRESS NOTES
Pt given depo injection 9/22/22 in left deltoid       NDC# 49044-348-49  LOT# [id number]  EXP# 2026 APR 30

## 2022-09-30 ENCOUNTER — CONSULT (OUTPATIENT)
Dept: PAIN MEDICINE | Facility: CLINIC | Age: 32
End: 2022-09-30
Payer: MEDICARE

## 2022-09-30 VITALS
SYSTOLIC BLOOD PRESSURE: 109 MMHG | BODY MASS INDEX: 19.99 KG/M2 | WEIGHT: 120 LBS | HEIGHT: 65 IN | HEART RATE: 92 BPM | DIASTOLIC BLOOD PRESSURE: 76 MMHG

## 2022-09-30 DIAGNOSIS — M47.812 CERVICAL SPONDYLOSIS: Primary | ICD-10-CM

## 2022-09-30 DIAGNOSIS — M54.12 CERVICAL RADICULOPATHY: ICD-10-CM

## 2022-09-30 DIAGNOSIS — M48.02 FORAMINAL STENOSIS OF CERVICAL REGION: ICD-10-CM

## 2022-09-30 PROCEDURE — 99204 OFFICE O/P NEW MOD 45 MIN: CPT | Performed by: ANESTHESIOLOGY

## 2022-09-30 NOTE — PROGRESS NOTES
Assessment  1  Cervical spondylosis    2  Foraminal stenosis of cervical region    3  Cervical radiculopathy        Plan  50-year-old female referred by Dr Debby Lee, presenting for initial consultation regarding neck pain radiating into bilateral upper extremities to the 2nd through 4th digits worse on the right than left  She does have numbness, paresthesias, and subjective weakness of the right arm, however not of the left  She denies any trauma or inciting event and has been dealing with the symptoms for the past 6 months  MRI of the cervical spine shows mild left foraminal stenosis at C6-7 secondary to small disc bulge and facet arthrosis  EMG of the right arm was normal     The patient did have a right subacromial bursa injection without relief  She has tried Tylenol, ibuprofen, low-dose gabapentin, meloxicam, and methocarbamol without any significant relief  Physical therapy did not provide any sustainable relief  Patient's neck pain does have a myofascial component  Upper extremity symptoms may be radicular in nature verses peripheral neuropathy verses muscular  1  I will schedule the patient for C6-7 cervical epidural steroid injection for diagnostic and therapeutic purposes  2  Patient may continue with ibuprofen up to 800 mg q 8 hours p r n   3  Patient will continue with her home exercise program  4  I will follow up the patient in 6 weeks  5  May consider restarting gabapentin in the future pending results of MILENA    Complete risks and benefits including bleeding, infection, tissue reaction, nerve injury and allergic reaction were discussed  The approach was demonstrated using models and literature was provided  Verbal and written consent was obtained  My impressions and treatment recommendations were discussed in detail with the patient who verbalized understanding and had no further questions  Discharge instructions were provided   I personally saw and examined the patient and I agree with the above discussed plan of care  No orders of the defined types were placed in this encounter  No orders of the defined types were placed in this encounter  History of Present Illness    Colin Pisano is a 33 y o  female referred by Dr Irene Contreras, presenting for initial consultation regarding neck pain radiating into bilateral upper extremities to the 2nd through 4th digits worse on the right than left  She does have numbness, paresthesias, and subjective weakness of the right arm, however not of the left  She denies any trauma or inciting event and has been dealing with the symptoms for the past 6 months  She denies any bladder or bowel incontinence or balance issues  MRI of the cervical spine shows mild left foraminal stenosis at C6-7 secondary to small disc bulge and facet arthrosis  EMG of the right arm was normal     The patient did have a right subacromial bursa injection without relief  She has tried Tylenol, ibuprofen, low-dose gabapentin, meloxicam, and methocarbamol without any significant relief  Physical therapy did not provide any sustainable relief  The patient rates her pain a 10/10 on the pain is constant  The pain is not follow any particular pattern throughout the day  The pain is described as burning, cramping, numbness, pins and needles, pressure-like, dull, and aching  The pain is increased with lying down and relaxation  The pain is decreased with heat and ice  She does find some transient relief with PT  Other than as stated above, the patient denies any interval changes in medications, medical condition, mental condition, symptoms, or allergies since the last office visit  I have personally reviewed and/or updated the patient's past medical history, past surgical history, family history, social history, current medications, allergies, and vital signs today       Review of Systems   Constitutional: Negative for fever and unexpected weight change  HENT: Positive for hearing loss  Negative for trouble swallowing  Eyes: Negative for visual disturbance  Respiratory: Negative for shortness of breath and wheezing  Cardiovascular: Negative for chest pain and palpitations  Gastrointestinal: Negative for constipation, diarrhea, nausea and vomiting  Endocrine: Negative for cold intolerance, heat intolerance and polydipsia  Genitourinary: Positive for hematuria  Negative for difficulty urinating and frequency  Musculoskeletal: Negative for arthralgias, gait problem, joint swelling and myalgias  Skin: Negative for rash  Neurological: Positive for dizziness and headaches  Negative for seizures, syncope and weakness  Hematological: Does not bruise/bleed easily  Psychiatric/Behavioral: Positive for decreased concentration  Negative for dysphoric mood  Anxiety   All other systems reviewed and are negative        Patient Active Problem List   Diagnosis    Dizziness    Yeast vaginitis    COVID-19 virus infection       Past Medical History:   Diagnosis Date    Abnormal Pap smear of cervix     2009 cryo; 6/2019 LSIL/AGC pap; 6/2019 colpo HSIL; 8/2019 LEEP HSIL w/neg margins; 6/2020 NILM pap/neg HPV; 6/2020 NILM pap/neg HPV    ADHD     [facility] disorder [facility] )     Depression     Epilepsy ([facility] )     last episode 2000    GERD (gastroesophageal reflux disease)     Migraine     OCD (obsessive compulsive disorder)     Urogenital trichomoniasis 2019       Past Surgical History:   Procedure Laterality Date    GYNECOLOGIC CRYOSURGERY  2009    [place] COLPOSCOPY,CERVIX W/ADJ VAG,W/LOOP BX N/A 08/09/2019    Procedure: BIOPSY LEEP CERVIX;  Surgeon: Karly Oseguera MD;  Location: [facility] OR;  Service: Gynecology       Family History   Problem Relation Age of Onset    Bipolar disorder Mother     Diabetes Mother    Marcy Laurier Migraines Mother    Claudetta Pap / [place] Mother     No Known Problems Father     Depression Sister     Asthma Sister     Osteoporosis Maternal Grandmother     Cancer Maternal Grandfather     Breast cancer Neg Hx     Colon cancer Neg Hx     Ovarian cancer Neg Hx        Social History     Occupational History    Not on file   Tobacco Use    Smoking status: Current Every Day Smoker     Packs/day: 1 00     Years: 11 00     Pack years: 11 00     Types: Cigarettes    Smokeless tobacco: Never Used   Vaping Use    Vaping Use: Never used   Substance and Sexual Activity    Alcohol use: Yes     Comment: couple times/month    Drug use: Never    Sexual activity: Yes     Partners: Male     Birth control/protection: Injection       Current Outpatient Medications on File Prior to Visit   Medication Sig    dexamethasone (DECADRON) 2 mg tablet Take 3 tabs x 1 day, 2 tabs x 1 day, 1 tab x 1 day    ergocalciferol (VITAMIN D2) 50,000 units TAKE 1 CAPSULE BY MOUTH 3 TIMES A WEEK    fluticasone (FLONASE) 50 mcg/act nasal spray SHAKE LIQUID AND USE 1 SPRAY IN EACH NOSTRIL TWICE DAILY    gabapentin (Neurontin) 100 mg capsule Take 1 capsule (100 mg total) by mouth 2 (two) times a day (Patient not taking: Reported on 9/22/2022)    ibuprofen (MOTRIN) 600 mg tablet     Latuda 20 MG tablet     meclizine (ANTIVERT) 25 mg tablet Take 1 tablet (25 mg total) by mouth 3 (three) times a day as needed for dizziness    medroxyPROGESTERone acetate (DEPO-PROVERA SYRINGE) 150 mg/mL injection INJECT 1 ML INTO THE MUSCLE EVERY 3 MONTHS    meloxicam (MOBIC) 15 mg tablet TAKE 1 TABLET(15 MG) BY MOUTH DAILY (Patient not taking: No sig reported)    methocarbamol (ROBAXIN) 500 mg tablet Take 1 tablet (500 mg total) by mouth 2 (two) times a day (Patient not taking: No sig reported)    mirtazapine (REMERON) 15 mg tablet     omeprazole (PriLOSEC) 20 mg delayed release capsule TAKE 1 CAPSULE(20 MG) BY MOUTH DAILY (Patient not taking: No sig reported)    SUMAtriptan (Imitrex) 25 mg tablet Take 1 tablet (25 mg total) by mouth once as needed for migraine for up to 1 dose (Patient not taking: Reported on 9/22/2022)    topiramate (Topamax) 50 MG tablet Take 50 mg in the AM and 50 mg in the PM (Patient not taking: Reported on 9/22/2022)     No current facility-administered medications on file prior to visit  No Known Allergies    Physical Exam    There were no vitals taken for this visit  Constitutional: normal, well developed, well nourished, alert, in no distress and non-toxic and no overt pain behavior  Eyes: anicteric  HEENT: grossly intact  Neck: supple, symmetric, trachea midline and no masses   Pulmonary:even and unlabored  Cardiovascular:No edema or pitting edema present  Skin:Normal without rashes or lesions and well hydrated  Psychiatric:Mood and affect appropriate  Neurologic:Cranial Nerves II-XII grossly intact  Musculoskeletal:normal gait  Bilateral cervical paraspinals and trapezii tender to palpation  Bilateral biceps, triceps, and brachioradialis reflexes were 2/4 and symmetrical   Negative Ingram's reflex bilaterally  Bilateral upper extremity strength 5/5 in all muscle groups  Sensation intact to light touch in C5 through T1 dermatomes bilaterally  Negative Spurling's bilaterally  Imaging    PACS Images     Show images for MRI cervical spine wo contrast    Study Result    Narrative & Impression   MRI CERVICAL SPINE WITHOUT CONTRAST     INDICATION: M25 511: Pain in right shoulder  G89 29: Other chronic pain  M54 2: Cervicalgia  M54 12: Radiculopathy, cervical region      COMPARISON:  X-rays dated 2/17/2022      TECHNIQUE:  Sagittal T1, sagittal T2, sagittal inversion recovery, axial T2, axial  2D merge     IMAGE QUALITY:  Axial GRE sequence is somewhat degraded by motion      FINDINGS:     ALIGNMENT:  Normal alignment of the cervical spine  No compression fracture  No subluxation  No scoliosis      MARROW SIGNAL:  Normal marrow signal is identified within the visualized bony structures    No discrete marrow lesion      CERVICAL AND VISUALIZED THORACIC CORD:  Normal signal within the visualized cord      PREVERTEBRAL AND PARASPINAL SOFT TISSUES:  Normal      VISUALIZED POSTERIOR FOSSA:  The visualized posterior fossa demonstrates no abnormal signal      CERVICAL DISC SPACES:     C2-C3:  Normal      C3-C4:  Normal      C4-C5:  Normal      C5-C6:  Normal      C6-C7: No disc herniation or canal stenosis  Mild left uncovertebral hypertrophy with mild foraminal stenosis      C7-T1:  Normal      UPPER THORACIC DISC SPACES:  Normal      IMPRESSION:     Mild left foraminal stenosis at C6-7  No disc herniation, canal or foraminal stenosis        Workstation performed: [id number]       PACS Images     Show images for XR shoulder 2+ vw right    Study Result    Narrative & Impression   RIGHT SHOULDER     INDICATION:   M25 511: Pain in right shoulder  M54 2: Cervicalgia      COMPARISON:  None     VIEWS:  XR SHOULDER 2+ VW RIGHT  Images: 4     FINDINGS:     There is no acute fracture or dislocation      No significant degenerative changes      No lytic or blastic osseous lesion      Soft tissues are unremarkable      IMPRESSION:     No acute displaced fracture  Arthritic changes AC joint           Workstation performed: [id number]       PACS Images     Show images for XR spine cervical 2 or 3 vw injury    Study Result    Narrative & Impression   CERVICAL SPINE     INDICATION:   M25 511: Pain in right shoulder  M54 12:  Radiculopathy, cervical region      COMPARISON:  None     VIEWS:  XR SPINE CERVICAL 2 OR 3 VW INJURY         FINDINGS:     No fracture or subluxation       Alignment is anatomic     The intervertebral disc spaces are preserved       The prevertebral soft tissues are within normal limits        The lung apices are clear      IMPRESSION:     No acute osseous abnormality         Workstation performed: [id number]          PACS Images     Show images for XR forearm 2 vw right    Study Result    Narrative & Impression [id number]       Imaging    XR forearm 2 vw right (Order: [id number]) - 5/21/2019    Order Report     Order Details        Study Result    Narrative & Impression   RIGHT HAND     INDICATION:   W19  XXXA: Unspecified fall, initial encounter  M79 641: Pain in right hand      COMPARISON:  None     VIEWS:  XR HAND 3+ VW RIGHT         For the purposes of institution wide universal language the following terms will apply: (thumb=1st digit/finger, index finger=2nd digit/finger, long finger=3rd digit/finger, ring=4th digit/finger and small finger=5th digit/finger)     FINDINGS:     There is no acute fracture or dislocation      No significant degenerative changes      No lytic or blastic osseous lesion      Soft tissues are unremarkable      IMPRESSION:     No acute osseous abnormality      Workstation performed: [id number]

## 2022-10-07 ENCOUNTER — TELEPHONE (OUTPATIENT)
Dept: RADIOLOGY | Facility: CLINIC | Age: 32
End: 2022-10-07

## 2022-10-07 NOTE — TELEPHONE ENCOUNTER
Patient current pain level 10/10, patient has not had any previous injections       Patient continues with HEP since discharge from Physical Therapy on 5/11/2022     Patient is scheduled for C6-7 OSITO with plans to continue HEP after injection

## 2022-10-10 NOTE — TELEPHONE ENCOUNTER
Caller: Patient     Doctor/Office: Dr Maude Dandy asked to speak to:       Call was transferred to: DR PETE ROSALES [facility]

## 2022-10-11 ENCOUNTER — HOSPITAL ENCOUNTER (OUTPATIENT)
Dept: RADIOLOGY | Facility: CLINIC | Age: 32
Discharge: HOME | End: 2022-10-11
Payer: MEDICARE

## 2022-10-11 VITALS
HEART RATE: 72 BPM | DIASTOLIC BLOOD PRESSURE: 85 MMHG | SYSTOLIC BLOOD PRESSURE: 117 MMHG | TEMPERATURE: 98 F | RESPIRATION RATE: 20 BRPM | OXYGEN SATURATION: 98 %

## 2022-10-11 DIAGNOSIS — M54.12 CERVICAL RADICULOPATHY: ICD-10-CM

## 2022-10-11 PROCEDURE — 62321 NJX INTERLAMINAR CRV/THRC: CPT | Performed by: ANESTHESIOLOGY

## 2022-10-11 RX ORDER — PAPAVERINE HCL 150 MG
10 CAPSULE, EXTENDED RELEASE ORAL ONCE
Status: COMPLETED | OUTPATIENT
Start: 2022-10-11 | End: 2022-10-11

## 2022-10-11 RX ADMIN — DEXAMETHASONE SODIUM PHOSPHATE 10 MG: 10 INJECTION, SOLUTION INTRAMUSCULAR; INTRAVENOUS at 09:28

## 2022-10-11 RX ADMIN — IOHEXOL 1 ML: 300 INJECTION, SOLUTION INTRAVENOUS at 09:27

## 2022-10-11 NOTE — H&P
History of Present Illness: The patient is a 33 y o  female who presents with complaints of neck and arm pain      Past Medical History:   Diagnosis Date   • Abnormal Pap smear of cervix     2009 cryo; 6/2019 LSIL/AGC pap; 6/2019 colpo HSIL; 8/2019 LEEP HSIL w/neg margins; 6/2020 NILM pap/neg HPV; 6/2020 NILM pap/neg HPV   • ADHD    • Bipolar 1 disorder ([facility] )    • Depression    • [facility] ([facility] )     last episode 2000   • GERD (gastroesophageal reflux disease)    • Migraine    • OCD (obsessive compulsive disorder)    • Urogenital trichomoniasis 2019       Past Surgical History:   Procedure Laterality Date   • GYNECOLOGIC CRYOSURGERY  2009   • [place] COLPOSCOPY,CERVIX W/ADJ VAG,W/LOOP BX N/A 08/09/2019    Procedure: BIOPSY LEEP CERVIX;  Surgeon: Steve Key MD;  [facility];  Service: Gynecology         Current Outpatient Medications:   •  dexamethasone (DECADRON) 2 mg tablet, Take 3 tabs x 1 day, 2 tabs x 1 day, 1 tab x 1 day, Disp: 6 tablet, Rfl: 0  •  ergocalciferol (VITAMIN D2) 50,000 units, TAKE 1 CAPSULE BY MOUTH 3 TIMES A WEEK, Disp: 21 capsule, Rfl: 0  •  fluticasone (FLONASE) 50 mcg/act nasal spray, SHAKE LIQUID AND USE 1 SPRAY IN EACH NOSTRIL TWICE DAILY, Disp: 48 g, Rfl: 0  •  gabapentin (Neurontin) 100 mg capsule, Take 1 capsule (100 mg total) by mouth 2 (two) times a day (Patient not taking: Reported on 9/22/2022), Disp: 60 capsule, Rfl: 0  •  ibuprofen (MOTRIN) 600 mg tablet, , Disp: , Rfl:   •  Latuda 20 MG tablet, , Disp: , Rfl:   •  meclizine (ANTIVERT) 25 mg tablet, Take 1 tablet (25 mg total) by mouth 3 (three) times a day as needed for dizziness, Disp: 30 tablet, Rfl: 0  •  medroxyPROGESTERone acetate (DEPO-PROVERA SYRINGE) 150 mg/mL injection, INJECT 1 ML INTO THE MUSCLE EVERY 3 MONTHS, Disp: 1 mL, Rfl: 5  •  meloxicam (MOBIC) 15 mg tablet, TAKE 1 TABLET(15 MG) BY MOUTH DAILY (Patient not taking: No sig reported), Disp: 30 tablet, Rfl: 0  •  methocarbamol (ROBAXIN) 500 mg tablet, Take 1 tablet (500 mg total) by mouth 2 (two) times a day (Patient not taking: No sig reported), Disp: 20 tablet, Rfl: 0  •  mirtazapine (REMERON) 15 mg tablet, , Disp: , Rfl:   •  omeprazole (PriLOSEC) 20 mg delayed release capsule, TAKE 1 CAPSULE(20 MG) BY MOUTH DAILY (Patient not taking: No sig reported), Disp: 30 capsule, Rfl: 0  •  SUMAtriptan (Imitrex) 25 mg tablet, Take 1 tablet (25 mg total) by mouth once as needed for migraine for up to 1 dose (Patient not taking: No sig reported), Disp: 9 tablet, Rfl: 0  •  topiramate (Topamax) 50 MG tablet, Take 50 mg in the AM and 50 mg in the PM (Patient not taking: No sig reported), Disp: 180 tablet, Rfl: 0    No Known Allergies    Physical Exam:   Vitals:    10/11/22 0912   BP: 111/76   Pulse: 102   Resp: 20   Temp: 98 °F (36 7 °C)   SpO2: 99%     General: Awake, Alert, Oriented x 3, Mood and affect appropriate  Respiratory: Respirations even and unlabored  Cardiovascular: Peripheral pulses intact; no edema  Musculoskeletal Exam:  Bilateral cervical paraspinals tender to palpation    ASA Score: 2         Assessment:  Cervical radiculopathy    Plan: C6-7 OSITO

## 2022-10-11 NOTE — DISCHARGE INSTRUCTIONS
Epidural Steroid Injection   WHAT YOU NEED TO KNOW:   An epidural steroid injection (MILENA) is a procedure to inject steroid medicine into the epidural space  The epidural space is between your spinal cord and vertebrae  Steroids reduce inflammation and fluid buildup in your spine that may be causing pain  You may be given pain medicine along with the steroids  ACTIVITY  Do not drive or operate machinery today  No strenuous activity today - bending, lifting, etc   You may resume normal activites starting tomorrow - start slowly and as tolerated  You may shower today, but no tub baths or hot tubs  You may have numbness for several hours from the local anesthetic  Please use caution and common sense, especially with weight-bearing activities  CARE OF THE INJECTION SITE  If you have soreness or pain, apply ice to the area today (20 minutes on/20 minutes off)  Starting tomorrow, you may use warm, moist heat or ice if needed  You may have an increase or change in your discomfort for 36-48 hours after your treatment  Apply ice and continue with any pain medication you have been prescribed  Notify the [facility] if you have any of the following: redness, drainage, swelling, headache, stiff neck or fever above 100°F     SPECIAL INSTRUCTIONS  Our office will contact you in approximately 7 days for a progress report  MEDICATIONS  Continue to take all routine medications  Our office may have instructed you to hold some medications  As no general anesthesia was used in today's procedure, you should not experience any side effects related to anesthesia  If you are diabetic, the steroids used in today's injection may temporarily increase your blood sugar levels after the first few days after your injection  Please keep a close eye on your sugars and alert the doctor who manages your diabetes if your sugars are significantly high from your baseline or you are symptomatic       If you have a problem specifically related to your procedure, please call our office at [phone]  Problems not related to your procedure should be directed to your primary care physician

## 2022-10-12 NOTE — TELEPHONE ENCOUNTER
I did explain to the patient that it can take up to 2 weeks for her to notice full effect of injection  Sometimes pain can be a little worse for a few days after the injection until it improves and the steroid starts working  Recommend using ibuprofen 600 mg q 6 hours p r n  and alternate with Tylenol 1000 mg q 8 hours p r n     Will follow-up at the 1 week point to monitor progress

## 2022-10-12 NOTE — TELEPHONE ENCOUNTER
Caller: Patient     Doctor: Dr Zana Castillo    Reason for call: Patient is in extreme pain level 10/10 since her procedure yesterday     Call back#: [phone]

## 2022-10-12 NOTE — TELEPHONE ENCOUNTER
S/w pt who is having increased pain in neck at inj site and b/l arms, 10/10 at times, post C6-7 OSITO on 10/11/22 JW  Pt states she does not take prescr PRN and takes ibuprofen atc for pain  Pt denies arm numbness, chest pain, dizziness or any other contributory sx to pain  RN advised pt that during the first 36-48hrs post inj there can be increased pain at inj site as per AVS and during this time we recommend using PRN medication (ibuprofen, tylenol) as well as ice/heat 20:20 and rest  RN advised pt being that we are working in a small space and injecting a relatively sizeable amount of fluid it is to be expected that there will be increased pressure and pain  Pls advise  Thank you! RN's --> Pls c/b pt to assess pain 10/13/22  Thank you!

## 2022-10-13 NOTE — TELEPHONE ENCOUNTER
S/w the patient to inquire and she stated she feels like her hand is swollen and numb  It feel worse than this am or previously  She stated she cannot life her arm and it causes great pain  She stated it is way worse than before the injection  She denies other symptoms  No blurry vision or numbness anywhere else  I did review the postoperative instructions again with her but she kept saying it feels worse than normal  Denies SOB/RAY  I stated that if she is not feeling "right", then she should seek treatment at the ED to make sure she has nothing else going on  Just FYI  In case they call you   Thanks

## 2022-10-13 NOTE — TELEPHONE ENCOUNTER
Caller: patient    Doctor: Leo Chavez    Reason for call: request to speak to a nurse    Call back#: [phone]

## 2022-10-13 NOTE — TELEPHONE ENCOUNTER
Caller:  Jordy Gilliam     Doctor/Office: Jose Armando     Callmason asked to speak to: nurse     Call was transferred to: nurse

## 2022-10-18 ENCOUNTER — TELEPHONE (OUTPATIENT)
Dept: PAIN MEDICINE | Facility: CLINIC | Age: 32
End: 2022-10-18

## 2022-10-18 NOTE — TELEPHONE ENCOUNTER
Caller: Suad Reyes  Doctor/office: [facility]  #: [phone]    % of improvement: 0  Pain Scale (1-10): 10

## 2022-10-20 NOTE — ADDENDUM NOTE
Addended by: Bart Gaytan on: 1/7/2021 10:13 AM     Modules accepted: Orders Received another incoming call from Teodora with [employer] confirming that PA for [facility] was received; informed that current PA does not  until  and encouraged to provide documentation of significant improvement on medication when PA is submitted; acknowledged ad informed Teodora that I will work on it and submit prior to expiration date

## 2022-10-21 ENCOUNTER — OFFICE VISIT (OUTPATIENT)
Dept: URGENT CARE | Age: 32
End: 2022-10-21
Payer: MEDICARE

## 2022-10-21 VITALS
SYSTOLIC BLOOD PRESSURE: 102 MMHG | DIASTOLIC BLOOD PRESSURE: 70 MMHG | OXYGEN SATURATION: 98 % | BODY MASS INDEX: 20.56 KG/M2 | HEIGHT: 65 IN | WEIGHT: 123.4 LBS | TEMPERATURE: 96.6 F | HEART RATE: 111 BPM | RESPIRATION RATE: 20 BRPM

## 2022-10-21 DIAGNOSIS — B02.9 HERPES ZOSTER WITHOUT COMPLICATION: Primary | ICD-10-CM

## 2022-10-21 PROCEDURE — 99213 OFFICE O/P EST LOW 20 MIN: CPT

## 2022-10-21 RX ORDER — TRIAMCINOLONE ACETONIDE 5 MG/G
OINTMENT TOPICAL 2 TIMES DAILY
Qty: 30 G | Refills: 0 | Status: SHIPPED | OUTPATIENT
Start: 2022-10-21

## 2022-10-21 RX ORDER — VALACYCLOVIR HYDROCHLORIDE 1 G/1
1000 TABLET, FILM COATED ORAL 3 TIMES DAILY
Qty: 21 TABLET | Refills: 0 | Status: SHIPPED | OUTPATIENT
Start: 2022-10-21 | End: 2022-10-28

## 2022-10-21 NOTE — PROGRESS NOTES
[facility]        NAME: German Munguia is a 33 y o  female  : 1990    MRN: [id number]  DATE: 2022  TIME: 11:39 AM    Assessment and Plan   Herpes zoster without complication [K92 5]  1  Herpes zoster without complication  valACYclovir (VALTREX) 1,000 mg tablet    triamcinolone (KENALOG) 0 5 % ointment     Patient presents with complaints of rash to her right chin  Hs been trying to clean it  States its burning, itching and has HA  Denies fevers  Assessment notes blister rash noted to right chin  Appears herpetic  Will treat with valtrex  Patient Instructions       Follow up with PCP as needed    Chief Complaint     Chief Complaint   Patient presents with   • Rash     Pt started Wed with a rash on her right lower chin  States it is itchy, denies pain  Applying vaseline to area  History of Present Illness       Patient presents with complaints of rash to her right chin  Hs been trying to clean it  States its burning, itching and has HA  Denies fevers  Assessment notes blister rash noted to right chin  Appears herpetic  Will treat with valtrex  Review of Systems   Review of Systems   Constitutional: Negative for chills and fever  HENT: Negative for congestion, ear pain, postnasal drip, sinus pain and sore throat  Eyes: Negative for pain and itching  Respiratory: Negative for cough, shortness of breath and wheezing  Cardiovascular: Negative for chest pain and palpitations  Gastrointestinal: Negative for abdominal pain, constipation, diarrhea, nausea and vomiting  Genitourinary: Negative for difficulty urinating and hematuria  Musculoskeletal: Negative for arthralgias and myalgias  Skin: Positive for rash  Neurological: Negative for dizziness, light-headedness and headaches  Psychiatric/Behavioral: Negative for agitation and sleep disturbance  The patient is not nervous/anxious            Current Medications       Current Outpatient Medications:   •  ergocalciferol (VITAMIN D2) 50,000 units, TAKE 1 CAPSULE BY MOUTH 3 TIMES A WEEK, Disp: 21 capsule, Rfl: 0  •  fluticasone (FLONASE) 50 mcg/act nasal spray, SHAKE LIQUID AND USE 1 SPRAY IN EACH NOSTRIL TWICE DAILY, Disp: 48 g, Rfl: 0  •  ibuprofen (MOTRIN) 600 mg tablet, , Disp: , Rfl:   •  Latuda 20 MG tablet, , Disp: , Rfl:   •  medroxyPROGESTERone acetate (DEPO-PROVERA SYRINGE) 150 mg/mL injection, INJECT 1 ML INTO THE MUSCLE EVERY 3 MONTHS, Disp: 1 mL, Rfl: 5  •  triamcinolone (KENALOG) 0 5 % ointment, Apply topically 2 (two) times a day, Disp: 30 g, Rfl: 0  •  valACYclovir (VALTREX) 1,000 mg tablet, Take 1 tablet (1,000 mg total) by mouth 3 (three) times a day for 7 days, Disp: 21 tablet, Rfl: 0  •  dexamethasone (DECADRON) 2 mg tablet, Take 3 tabs x 1 day, 2 tabs x 1 day, 1 tab x 1 day (Patient not taking: Reported on 10/21/2022), Disp: 6 tablet, Rfl: 0  •  gabapentin (Neurontin) 100 mg capsule, Take 1 capsule (100 mg total) by mouth 2 (two) times a day (Patient not taking: Reported on 9/22/2022), Disp: 60 capsule, Rfl: 0  •  meclizine (ANTIVERT) 25 mg tablet, Take 1 tablet (25 mg total) by mouth 3 (three) times a day as needed for dizziness, Disp: 30 tablet, Rfl: 0  •  meloxicam (MOBIC) 15 mg tablet, TAKE 1 TABLET(15 MG) BY MOUTH DAILY (Patient not taking: No sig reported), Disp: 30 tablet, Rfl: 0  •  methocarbamol (ROBAXIN) 500 mg tablet, Take 1 tablet (500 mg total) by mouth 2 (two) times a day (Patient not taking: No sig reported), Disp: 20 tablet, Rfl: 0  •  mirtazapine (REMERON) 15 mg tablet, , Disp: , Rfl:   •  omeprazole (PriLOSEC) 20 mg delayed release capsule, TAKE 1 CAPSULE(20 MG) BY MOUTH DAILY (Patient not taking: No sig reported), Disp: 30 capsule, Rfl: 0  •  SUMAtriptan (Imitrex) 25 mg tablet, Take 1 tablet (25 mg total) by mouth once as needed for migraine for up to 1 dose (Patient not taking: No sig reported), Disp: 9 tablet, Rfl: 0  •  topiramate (Topamax) 50 MG tablet, Take 50 mg in the AM and 50 mg in the PM (Patient not taking: No sig reported), Disp: 180 tablet, Rfl: 0    Current Allergies     Allergies as of 10/21/2022   • (No Known Allergies)            The following portions of the patient's history were reviewed and updated as appropriate: allergies, current medications, past family history, past medical history, past social history, past surgical history and problem list      Past Medical History:   Diagnosis Date   • Abnormal Pap smear of cervix     2009 cryo; 6/2019 LSIL/AGC pap; 6/2019 colpo HSIL; 8/2019 LEEP HSIL w/neg margins; 6/2020 NILM pap/neg HPV; 6/2020 NILM pap/neg HPV   • ADHD    • Bipolar 1 disorder ([facility] [address] )    • Depression    • Epilepsy ([facility] )     last episode 2000   • GERD (gastroesophageal reflux disease)    • Migraine    • OCD (obsessive compulsive disorder)    • Urogenital trichomoniasis 2019       Past Surgical History:   Procedure Laterality Date   • GYNECOLOGIC CRYOSURGERY  2009   • TN COLPOSCOPY,CERVIX W/ADJ VAG,W/LOOP BX N/A 08/09/2019    Procedure: BIOPSY LEEP CERVIX;  Surgeon: Fely Roberts MD;  Location: [facility] OR;  Service: Gynecology       Family History   Problem Relation Age of Onset   • Bipolar disorder Mother    • Diabetes Mother    • Migraines Mother    • Miscarriages / [place] Mother    • No Known Problems Father    • Depression Sister    • Asthma Sister    • Osteoporosis Maternal Grandmother    • Cancer Maternal Grandfather    • Breast cancer Neg Hx    • Colon cancer Neg Hx    • Ovarian cancer Neg Hx          Medications have been verified  Objective   /70   Pulse (!) 111   Temp (!) 96 6 °F (35 9 °C)   Resp 20   Ht 5' 5" (1 651 m)   Wt 56 kg (123 lb 6 4 oz)   SpO2 98%   BMI 20 53 kg/m²   No LMP recorded  Patient has had an injection  Physical Exam     Physical Exam  Vitals reviewed  Constitutional:       General: She is not in acute distress  Appearance: Normal appearance   She is not ill-appearing  HENT:      Head: Normocephalic and atraumatic  Eyes:      Extraocular Movements: Extraocular movements intact  Conjunctiva/sclera: Conjunctivae normal    Pulmonary:      Effort: Pulmonary effort is normal    Skin:     General: Skin is warm  Findings: Rash present  Rash is urticarial and vesicular  Neurological:      General: No focal deficit present  Mental Status: She is alert     Psychiatric:         Mood and Affect: Mood normal          Behavior: Behavior normal          Judgment: Judgment normal

## 2022-11-01 ENCOUNTER — OFFICE VISIT (OUTPATIENT)
Dept: FAMILY MEDICINE CLINIC | Facility: CLINIC | Age: 32
End: 2022-11-01

## 2022-11-01 VITALS
TEMPERATURE: 96.9 F | OXYGEN SATURATION: 99 % | DIASTOLIC BLOOD PRESSURE: 72 MMHG | BODY MASS INDEX: 20.56 KG/M2 | HEART RATE: 104 BPM | SYSTOLIC BLOOD PRESSURE: 118 MMHG | RESPIRATION RATE: 19 BRPM | WEIGHT: 123.4 LBS | HEIGHT: 65 IN

## 2022-11-01 DIAGNOSIS — Z23 ENCOUNTER FOR IMMUNIZATION: Primary | ICD-10-CM

## 2022-11-01 DIAGNOSIS — B02.9 HERPES ZOSTER WITHOUT COMPLICATION: ICD-10-CM

## 2022-11-01 DIAGNOSIS — G43.019 INTRACTABLE MIGRAINE WITHOUT AURA AND WITHOUT STATUS MIGRAINOSUS: ICD-10-CM

## 2022-11-01 DIAGNOSIS — B02.8 HERPES ZOSTER WITH OTHER COMPLICATION: ICD-10-CM

## 2022-11-01 DIAGNOSIS — G43.109 MIGRAINE WITH AURA AND WITHOUT STATUS MIGRAINOSUS, NOT INTRACTABLE: ICD-10-CM

## 2022-11-01 RX ORDER — RIBOFLAVIN (VITAMIN B2) 400 MG
400 TABLET ORAL DAILY
Qty: 90 TABLET | Refills: 3 | Status: SHIPPED | OUTPATIENT
Start: 2022-11-01

## 2022-11-01 RX ORDER — CALCIUM CARBONATE 300MG(750)
400 TABLET,CHEWABLE ORAL DAILY
Qty: 90 TABLET | Refills: 3 | Status: SHIPPED | OUTPATIENT
Start: 2022-11-01

## 2022-11-01 RX ORDER — GABAPENTIN 300 MG/1
300 CAPSULE ORAL
Qty: 30 CAPSULE | Refills: 0 | Status: SHIPPED | OUTPATIENT
Start: 2022-11-01 | End: 2022-11-11 | Stop reason: SDUPTHER

## 2022-11-01 RX ORDER — TOPIRAMATE 50 MG/1
TABLET, FILM COATED ORAL
Qty: 180 TABLET | Refills: 0 | Status: SHIPPED | OUTPATIENT
Start: 2022-11-01

## 2022-11-01 RX ORDER — RIZATRIPTAN BENZOATE 10 MG/1
10 TABLET ORAL AS NEEDED
Qty: 9 TABLET | Refills: 0 | Status: SHIPPED | OUTPATIENT
Start: 2022-11-01

## 2022-11-01 RX ORDER — VALACYCLOVIR HYDROCHLORIDE 1 G/1
1000 TABLET, FILM COATED ORAL 3 TIMES DAILY
Qty: 21 TABLET | Refills: 0 | Status: SHIPPED | OUTPATIENT
Start: 2022-11-01 | End: 2022-11-08

## 2022-11-01 NOTE — ASSESSMENT & PLAN NOTE
She ran out of medications and was not able to get refill, reports migraines have increased in frequency   Restart topiramate 50 mg bid and Maxalt PRN, start magnesium and B2 supplements as well   Referral to neurology

## 2022-11-01 NOTE — PROGRESS NOTES
Assessment/Plan:    Migraine with aura and without status migrainosus, not intractable  She ran out of medications and was not able to get refill, reports migraines have increased in frequency   Restart topiramate 50 mg bid and Maxalt PRN, start magnesium and B2 supplements as well   Referral to neurology     Herpes zoster without complication  Was seen and treated for zoster infection last week, rash is to right face, has improved with treatment but still present   Will send 7 more days of Valtrex and send gabapentin HS to help with neuropathic pain worse at night time     Alicia Kumar was seen today for follow-up  Diagnoses and all orders for this visit:    Encounter for immunization  -     influenza vaccine, quadrivalent, 0 5 mL, preservative-free, for adult and pediatric patients 6 mos+ (AFLURIA, FLUARIX, FLULAVAL, FLUZONE)  -     Pneumococcal Conjugate Vaccine 20-valent (Pcv20)    Intractable migraine without aura and without status migrainosus  -     topiramate (Topamax) 50 MG tablet; Take 50 mg in the AM and 50 mg in the PM    Migraine with aura and without status migrainosus, not intractable  -     Ambulatory Referral to Neurology; Future  -     rizatriptan (MAXALT) 10 mg tablet; Take 1 tablet (10 mg total) by mouth as needed for migraine Take at the onset of migraine; if symptoms continue or return, may take another dose at least 2 hours after first dose  Take no more than 2 doses in a day  -     Magnesium 400 MG TABS; Take 1 tablet (400 mg total) by mouth in the morning  -     Riboflavin 400 MG TABS; Take 1 tablet (400 mg total) by mouth in the morning    Herpes zoster with other complication  -     gabapentin (Neurontin) 300 mg capsule; Take 1 capsule (300 mg total) by mouth daily at bedtime    Herpes zoster without complication  -     valACYclovir (VALTREX) 1,000 mg tablet;  Take 1 tablet (1,000 mg total) by mouth 3 (three) times a day for 7 days        Return in about 6 months (around 5/1/2023) for migraine  Patient Instructions     Migraine Headache   AMBULATORY CARE:   A migraine headache  is a severe headache  The pain can be so severe that it interferes with your daily activities  A migraine can last a few hours up to several days  The exact cause of migraines is not known  A family history of migraines increases your risk  Your risk is also higher if you are a woman or take medicines such as estrogen or a vasodilator  Common warning signs include the following:  Warning signs usually start 15 to 60 minutes before the headache:  · Visual changes (auras), such as blurred vision, temporary blind or bright spots, lines, or hallucinations    · Unusual tiredness or frequent yawning    · Tingling in an arm or leg    Signs and symptoms of a migraine headache:  A migraine headache usually begins as a dull ache around the eye or temple  The pain may get worse with movement  You may also have the following:  · Pain in your head that may increase to the point that you cannot do everyday activities    · Pain on one or both sides of your head    · Throbbing, pulsing, or pounding pain in your head    · Nausea and vomiting    · Sensitivity to light, noise, or smells    Call your local emergency number (911 in the [address] [place] [address],[address]) or have someone call if:   · You feel like you are going to faint, you become confused, or you have a seizure  Seek care immediately if:   · You have a headache that seems different or much worse than your usual migraine headache  · You have a severe headache with a fever or a stiff neck  · You have new problems with speech, vision, balance, or movement  Call your doctor or neurologist if:   · You have a fever  · Your migraines interfere with your daily activities  · Your medicines or treatments stop working  · You have questions about your condition or care  Treatment:  Migraines cannot be cured  The goal of treatment is to reduce your symptoms   Take medicine as soon as you feel a migraine begin, or as directed  The following may be used to manage migraines:  1  Medicines  may be given to prevent or treat migraines  Take medicine to prevent migraines as soon as you feel a migraine begin, or as directed  Your healthcare provider may recommend any of the following:    ? Migraine medicines  are used to help prevent or stop a migraine  ? NSAIDs  help decrease swelling and pain or fever  This medicine is available with or without a doctor's order  NSAIDs can cause stomach bleeding or kidney problems in certain people  If you take blood thinner medicine, always ask your healthcare provider if NSAIDs are safe for you  Always read the medicine label and follow directions  ? Acetaminophen  decreases pain and fever  It is available without a doctor's order  Ask how much to take and how often to take it  Follow directions  Read the labels of all other medicines you are using to see if they also contain acetaminophen, or ask your doctor or pharmacist  Acetaminophen can cause liver damage if not taken correctly  Do not use more than 4 grams (4,000 milligrams) total of acetaminophen in one day  ? Prescription pain medicine  may be given  Ask your healthcare provider how to take this medicine safely  Some prescription pain medicines contain acetaminophen  Do not take other medicines that contain acetaminophen without talking to your healthcare provider  Too much acetaminophen may cause liver damage  Prescription pain medicine may cause constipation  Ask your healthcare provider how to prevent or treat constipation  ? Antinausea medicine  may be given to calm your stomach and to help prevent vomiting  This medicine can also help relieve pain  2  Cognitive behavior therapy (CBT)  can help you learn ways to manage and prevent migraines  A therapist can teach you to relax and to reduce stress   You may learn ways to create healthy nutrition, activity, and sleep habits to prevent migraines  The therapist can also help you manage conditions that can affect migraines, such as anxiety or depression  Common triggers for a migraine include the following:   · Stress, eye strain, oversleeping, or not getting enough sleep    · Hormone changes in women from birth control pills, pregnancy, menopause, or during a monthly period    · Skipping meals, going too long without eating, or not drinking enough liquids    · Certain foods or drinks such as chocolate, hard cheese, alcohol, or drinks that contain caffeine    · Foods that contain gluten, nitrates, MSG, or artificial sweeteners    · Sunlight, bright or flashing lights, loud noises, smoke, or strong smells    · Heat, humidity, or changes in the weather    Manage your symptoms:   · Rest in a dark, quiet room  This will help decrease your pain  Sleep may also help relieve the pain  · Apply ice to decrease pain  Use an ice pack, or put crushed ice in a plastic bag  Cover the ice pack with a towel and place it on your head where it hurts for 15 to 20 minutes every hour  · Apply heat to decrease pain and muscle spasms  Use a small towel dampened with warm water or a heating pad, or sit in a warm bath  Apply heat on the area for 20 to 30 minutes every 2 hours  You may alternate heat and ice  · Keep a migraine record  Write down when your migraines start and stop  Include your symptoms and what you were doing when a migraine began  Record what you ate or drank for 24 hours before the migraine started  Keep track of what you did to treat your migraine and if it worked  Prevent another migraine headache:   · Prevent a medicine overuse headache  Take pain medicines only as long as directed  A medicine may be limited to a certain amount each month  Your healthcare provider can help you create a plan so you get a safe amount each month  · Do not smoke    Nicotine and other chemicals in cigarettes and cigars can trigger a migraine and also cause lung damage  Ask your healthcare provider for information if you currently smoke and need help to quit  E-cigarettes or smokeless tobacco still contain nicotine  Talk to your healthcare provider before you use these products  · Do not drink alcohol  Alcohol can trigger a migraine  It can also interfere with the medicines used to treat your migraine  · Be physically active  Physical activity, such as exercise, may help prevent migraines  Talk to your healthcare provider about the best activity plan for you  Try to get at least 30 minutes of physical activity on most days  · Manage stress  Stress may trigger a migraine  Learn new ways to relax, such as deep breathing  · Follow a sleep schedule  Go to bed and get up at the same time each day  · Eat a variety of healthy foods  Healthy foods include fruit, vegetables, whole-grain breads, low-fat dairy products, beans, lean meats, and fish  Do not have foods or drinks that trigger your migraines  · Drink more liquids to prevent dehydration  Your healthcare provider can tell you how much liquid to drink each day and which liquids are best for you  Follow up with your doctor or neurologist as directed:  Bring your migraine record with you  Write down your questions so you remember to ask them during your visits  © [employer] 2022 Information is for End User's use only and may not be sold, redistributed or otherwise used for commercial purposes  All illustrations and images included in CareNotes® are the copyrighted property of [employer]  or [employer]  The above information is an  only  It is not intended as medical advice for individual conditions or treatments  Talk to your doctor, nurse or pharmacist before following any medical regimen to see if it is safe and effective for you  Subjective:      Go Miller is a 33 y o  female who  has a past medical history of Abnormal Pap smear of cervix, ADHD, Bipolar 1 disorder ([facility] [address] ), Depression, [facility] ), GERD (gastroesophageal reflux disease), Migraine, OCD (obsessive compulsive disorder), and Urogenital trichomoniasis  who presented to the office today for follow up       The following portions of the patient's history were reviewed and updated as appropriate: allergies, current medications, past family history, past medical history, past social history, past surgical history and problem list     Current Outpatient Medications on File Prior to Visit   Medication Sig Dispense Refill   • fluticasone (FLONASE) 50 mcg/act nasal spray SHAKE LIQUID AND USE 1 SPRAY IN EACH NOSTRIL TWICE DAILY 48 g 0   • ibuprofen (MOTRIN) 600 mg tablet      • Latuda 20 MG tablet      • meclizine (ANTIVERT) 25 mg tablet Take 1 tablet (25 mg total) by mouth 3 (three) times a day as needed for dizziness 30 tablet 0   • medroxyPROGESTERone acetate (DEPO-PROVERA SYRINGE) 150 mg/mL injection INJECT 1 ML INTO THE MUSCLE EVERY 3 MONTHS 1 mL 5   • mirtazapine (REMERON) 15 mg tablet  (Patient not taking: Reported on 10/21/2022)     • triamcinolone (KENALOG) 0 5 % ointment Apply topically 2 (two) times a day 30 g 0   • [DISCONTINUED] dexamethasone (DECADRON) 2 mg tablet Take 3 tabs x 1 day, 2 tabs x 1 day, 1 tab x 1 day (Patient not taking: Reported on 10/21/2022) 6 tablet 0   • [DISCONTINUED] ergocalciferol (VITAMIN D2) 50,000 units TAKE 1 CAPSULE BY MOUTH 3 TIMES A WEEK 21 capsule 0   • [DISCONTINUED] gabapentin (Neurontin) 100 mg capsule Take 1 capsule (100 mg total) by mouth 2 (two) times a day (Patient not taking: Reported on 9/22/2022) 60 capsule 0   • [DISCONTINUED] meloxicam (MOBIC) 15 mg tablet TAKE 1 TABLET(15 MG) BY MOUTH DAILY (Patient not taking: No sig reported) 30 tablet 0   • [DISCONTINUED] methocarbamol (ROBAXIN) 500 mg tablet Take 1 tablet (500 mg total) by mouth 2 (two) times a day (Patient not taking: No sig reported) 20 tablet 0   • [DISCONTINUED] omeprazole (PriLOSEC) 20 mg delayed release capsule TAKE 1 CAPSULE(20 MG) BY MOUTH DAILY (Patient not taking: No sig reported) 30 capsule 0   • [DISCONTINUED] SUMAtriptan (Imitrex) 25 mg tablet Take 1 tablet (25 mg total) by mouth once as needed for migraine for up to 1 dose (Patient not taking: No sig reported) 9 tablet 0   • [DISCONTINUED] topiramate (Topamax) 50 MG tablet Take 50 mg in the AM and 50 mg in the PM (Patient not taking: No sig reported) 180 tablet 0   • [DISCONTINUED] valACYclovir (VALTREX) 1,000 mg tablet Take 1 tablet (1,000 mg total) by mouth 3 (three) times a day for 7 days 21 tablet 0     No current facility-administered medications on file prior to visit  Review of Systems   Constitutional: Negative for chills and fever  HENT: Negative for ear pain and sore throat  Eyes: Negative for pain and visual disturbance  Respiratory: Negative for cough and shortness of breath  Cardiovascular: Negative for chest pain and palpitations  Gastrointestinal: Negative for abdominal pain and vomiting  Genitourinary: Negative for dysuria and hematuria  Musculoskeletal: Negative for arthralgias and back pain  Skin: Positive for rash  Negative for color change  Neurological: Positive for headaches  Negative for seizures and syncope  All other systems reviewed and are negative  Objective:    /72 (BP Location: Right arm, Patient Position: Sitting, Cuff Size: Standard)   Pulse 104   Temp (!) 96 9 °F (36 1 °C) (Temporal)   Resp 19   Ht 5' 5" (1 651 m)   Wt 56 kg (123 lb 6 4 oz)   SpO2 99%   BMI 20 53 kg/m²     Physical Exam  Vitals and nursing note reviewed  Constitutional:       General: She is not in acute distress  Appearance: She is well-developed  She is not diaphoretic  HENT:      Head: Normocephalic and atraumatic        Right Ear: External ear normal       Left Ear: External ear normal    Eyes:      Conjunctiva/sclera: Conjunctivae normal       Pupils: Pupils are equal, round, and reactive to light  Cardiovascular:      Rate and Rhythm: Normal rate and regular rhythm  Pulmonary:      Effort: Pulmonary effort is normal  No respiratory distress  Breath sounds: Normal breath sounds  No wheezing  Abdominal:      General: Bowel sounds are normal  There is no distension  Palpations: Abdomen is soft  Tenderness: There is no abdominal tenderness  Musculoskeletal:         General: No deformity  Normal range of motion  Cervical back: Normal range of motion and neck supple  Lymphadenopathy:      Cervical: No cervical adenopathy  Skin:     General: Skin is warm and dry  Capillary Refill: Capillary refill takes less than 2 seconds  Findings: Rash present  Comments: Vesicular rash right chin area    Neurological:      General: No focal deficit present  Mental Status: She is alert and oriented to person, place, and time  Sensory: No sensory deficit        Coordination: Coordination normal       Deep Tendon Reflexes: Reflexes normal    Psychiatric:         Mood and Affect: Mood normal          Behavior: Behavior normal          KARTIK Miranda   11/01/22  5:28 PM

## 2022-11-01 NOTE — PATIENT INSTRUCTIONS
Migraine Headache   AMBULATORY CARE:   A migraine headache  is a severe headache  The pain can be so severe that it interferes with your daily activities  A migraine can last a few hours up to several days  The exact cause of migraines is not known  A family history of migraines increases your risk  Your risk is also higher if you are a woman or take medicines such as estrogen or a vasodilator  Common warning signs include the following:  Warning signs usually start 15 to 60 minutes before the headache:  Visual changes (auras), such as blurred vision, temporary blind or bright spots, lines, or hallucinations    Unusual tiredness or frequent yawning    Tingling in an arm or leg    Signs and symptoms of a migraine headache:  A migraine headache usually begins as a dull ache around the eye or temple  The pain may get worse with movement  You may also have the following:  Pain in your head that may increase to the point that you cannot do everyday activities    Pain on one or both sides of your head    Throbbing, pulsing, or pounding pain in your head    Nausea and vomiting    Sensitivity to light, noise, or smells    Call your local emergency number (911 in the [address] [place] [address],[address]) or have someone call if:   You feel like you are going to faint, you become confused, or you have a seizure  Seek care immediately if:   You have a headache that seems different or much worse than your usual migraine headache  You have a severe headache with a fever or a stiff neck  You have new problems with speech, vision, balance, or movement  Call your doctor or neurologist if:   You have a fever  Your migraines interfere with your daily activities  Your medicines or treatments stop working  You have questions about your condition or care  Treatment:  Migraines cannot be cured  The goal of treatment is to reduce your symptoms  Take medicine as soon as you feel a migraine begin, or as directed   The following may be used to manage migraines:  Medicines  may be given to prevent or treat migraines  Take medicine to prevent migraines as soon as you feel a migraine begin, or as directed  Your healthcare provider may recommend any of the following:    Migraine medicines  are used to help prevent or stop a migraine  NSAIDs  help decrease swelling and pain or fever  This medicine is available with or without a doctor's order  NSAIDs can cause stomach bleeding or kidney problems in certain people  If you take blood thinner medicine, always ask your healthcare provider if NSAIDs are safe for you  Always read the medicine label and follow directions  Acetaminophen  decreases pain and fever  It is available without a doctor's order  Ask how much to take and how often to take it  Follow directions  Read the labels of all other medicines you are using to see if they also contain acetaminophen, or ask your doctor or pharmacist  Acetaminophen can cause liver damage if not taken correctly  Do not use more than 4 grams (4,000 milligrams) total of acetaminophen in one day  Prescription pain medicine  may be given  Ask your healthcare provider how to take this medicine safely  Some prescription pain medicines contain acetaminophen  Do not take other medicines that contain acetaminophen without talking to your healthcare provider  Too much acetaminophen may cause liver damage  Prescription pain medicine may cause constipation  Ask your healthcare provider how to prevent or treat constipation  Antinausea medicine  may be given to calm your stomach and to help prevent vomiting  This medicine can also help relieve pain  Cognitive behavior therapy (CBT)  can help you learn ways to manage and prevent migraines  A therapist can teach you to relax and to reduce stress  You may learn ways to create healthy nutrition, activity, and sleep habits to prevent migraines   The therapist can also help you manage conditions that can affect migraines, such as anxiety or depression  Common triggers for a migraine include the following:   Stress, eye strain, oversleeping, or not getting enough sleep    Hormone changes in women from birth control pills, pregnancy, menopause, or during a monthly period    Skipping meals, going too long without eating, or not drinking enough liquids    Certain foods or drinks such as chocolate, hard cheese, alcohol, or drinks that contain caffeine    Foods that contain gluten, nitrates, MSG, or artificial sweeteners    Sunlight, bright or flashing lights, loud noises, smoke, or strong smells    Heat, humidity, or changes in the weather    Manage your symptoms:   Rest in a dark, quiet room  This will help decrease your pain  Sleep may also help relieve the pain  Apply ice to decrease pain  Use an ice pack, or put crushed ice in a plastic bag  Cover the ice pack with a towel and place it on your head where it hurts for 15 to 20 minutes every hour  Apply heat to decrease pain and muscle spasms  Use a small towel dampened with warm water or a heating pad, or sit in a warm bath  Apply heat on the area for 20 to 30 minutes every 2 hours  You may alternate heat and ice  Keep a migraine record  Write down when your migraines start and stop  Include your symptoms and what you were doing when a migraine began  Record what you ate or drank for 24 hours before the migraine started  Keep track of what you did to treat your migraine and if it worked  Prevent another migraine headache:   Prevent a medicine overuse headache  Take pain medicines only as long as directed  A medicine may be limited to a certain amount each month  Your healthcare provider can help you create a plan so you get a safe amount each month  Do not smoke  Nicotine and other chemicals in cigarettes and cigars can trigger a migraine and also cause lung damage  Ask your healthcare provider for information if you currently smoke and need help to quit  E-cigarettes or smokeless tobacco still contain nicotine  Talk to your healthcare provider before you use these products  Do not drink alcohol  Alcohol can trigger a migraine  It can also interfere with the medicines used to treat your migraine  Be physically active  Physical activity, such as exercise, may help prevent migraines  Talk to your healthcare provider about the best activity plan for you  Try to get at least 30 minutes of physical activity on most days  Manage stress  Stress may trigger a migraine  Learn new ways to relax, such as deep breathing  Follow a sleep schedule  Go to bed and get up at the same time each day  Eat a variety of healthy foods  Healthy foods include fruit, vegetables, whole-grain breads, low-fat dairy products, beans, lean meats, and fish  Do not have foods or drinks that trigger your migraines  Drink more liquids to prevent dehydration  Your healthcare provider can tell you how much liquid to drink each day and which liquids are best for you  Follow up with your doctor or neurologist as directed:  Bring your migraine record with you  Write down your questions so you remember to ask them during your visits  © Copyright [employer] 2022 Information is for End User's use only and may not be sold, redistributed or otherwise used for commercial purposes  All illustrations and images included in CareNotes® are the copyrighted property of [employer]  or [facility] Rigoberto English   The above information is an  only  It is not intended as medical advice for individual conditions or treatments  Talk to your doctor, nurse or pharmacist before following any medical regimen to see if it is safe and effective for you

## 2022-11-01 NOTE — ASSESSMENT & PLAN NOTE
Was seen and treated for zoster infection last week, rash is to right face, has improved with treatment but still present   Will send 7 more days of Valtrex and send gabapentin HS to help with neuropathic pain worse at night time

## 2022-11-11 ENCOUNTER — OFFICE VISIT (OUTPATIENT)
Dept: PAIN MEDICINE | Facility: CLINIC | Age: 32
End: 2022-11-11

## 2022-11-11 VITALS
SYSTOLIC BLOOD PRESSURE: 102 MMHG | HEIGHT: 65 IN | HEART RATE: 99 BPM | WEIGHT: 120 LBS | DIASTOLIC BLOOD PRESSURE: 75 MMHG | BODY MASS INDEX: 19.99 KG/M2

## 2022-11-11 DIAGNOSIS — B02.8 HERPES ZOSTER WITH OTHER COMPLICATION: ICD-10-CM

## 2022-11-11 DIAGNOSIS — M54.12 CERVICAL RADICULOPATHY: Primary | ICD-10-CM

## 2022-11-11 DIAGNOSIS — M47.812 CERVICAL SPONDYLOSIS: ICD-10-CM

## 2022-11-11 DIAGNOSIS — M48.02 FORAMINAL STENOSIS OF CERVICAL REGION: ICD-10-CM

## 2022-11-11 RX ORDER — GABAPENTIN 300 MG/1
CAPSULE ORAL
Qty: 90 CAPSULE | Refills: 1 | Status: SHIPPED | OUTPATIENT
Start: 2022-11-11

## 2022-11-11 NOTE — PROGRESS NOTES
Assessment:  1  Cervical radiculopathy    2  Cervical spondylosis    3  Foraminal stenosis of cervical region    4  Herpes zoster with other complication        Plan:  1  Increase gabapentin to 300 mg t i d  for neuropathic complaints  I advised the patient that if they experience any side effects or issues with the changes in their medication regiment, they should give our office a call to discuss  I also advised the patient not to drive or operate machinery until they see how the changes in the medication regimen affects them  The patient was agreeable and verbalized an understanding  2  Offered to schedule the patient for repeat C6-7 OSITO, however she declines at this time   3  Continue home exercise program   4  Follow-up in 4-6 weeks or sooner if needed    History of Present Illness: The patient is a 33 y o  female last seen on 9/30/22 who presents for a follow up office visit in regards to chronic neck pain that radiates into the right upper extremity  Patient denies bowel or bladder incontinence or balance issues  Patient is status post C6-7 on October 11, 2022 without any relief of her symptoms  EMG of the right upper extremity was unremarkable  MRI of the cervical spine reveals mild left foraminal stenosis at C6-7 and facet arthrosis  She is had right subacromial bursa injections without relief  He has tried Tylenol, i NSAIDs, and methocarbamol currently taking gabapentin 300 mg q h s  without relief  She rates her pain an 8/10 on the numerical pain rating scale  She constantly has pain throughout the day which is described as burning, sharp, throbbing, cramping, pressure-like, shooting, numbness and pins and needles    I have personally reviewed and/or updated the patient's past medical history, past surgical history, family history, social history, current medications, allergies, and vital signs today         Review of Systems:    Review of Systems   Respiratory: Negative for shortness of breath  Cardiovascular: Negative for chest pain  Gastrointestinal: Negative for constipation, diarrhea, nausea and vomiting  Musculoskeletal: Positive for gait problem  Negative for arthralgias, joint swelling and myalgias  Skin: Negative for rash  Neurological: Negative for dizziness, seizures and weakness  All other systems reviewed and are negative          Past Medical History:   Diagnosis Date   • Abnormal Pap smear of cervix     2009 cryo; 6/2019 LSIL/AGC pap; 6/2019 colpo HSIL; 8/2019 LEEP HSIL w/neg margins; 6/2020 NILM pap/neg HPV; 6/2020 NILM pap/neg HPV   • ADHD    • Bipolar 1 disorder ([facility] [address] )    • Depression    • Epilepsy ([facility] )     last episode 2000   • GERD (gastroesophageal reflux disease)    • Migraine    • OCD (obsessive compulsive disorder)    • Urogenital trichomoniasis 2019       Past Surgical History:   Procedure Laterality Date   • GYNECOLOGIC CRYOSURGERY  2009   • SC COLPOSCOPY,CERVIX W/ADJ VAG,W/LOOP BX N/A 08/09/2019    Procedure: BIOPSY LEEP CERVIX;  Surgeon: Renita Thomas MD;  Location: [facility];  Service: Gynecology       Family History   Problem Relation Age of Onset   • Bipolar disorder Mother    • Diabetes Mother    • Migraines Mother    • Miscarriages / [place] Mother    • No Known Problems Father    • Depression Sister    • Asthma Sister    • Osteoporosis Maternal Grandmother    • Cancer Maternal Grandfather    • Breast cancer Neg Hx    • Colon cancer Neg Hx    • Ovarian cancer Neg Hx        Social History     Occupational History   • Not on file   Tobacco Use   • Smoking status: Current Every Day Smoker     Packs/day: 1 00     Years: 11 00     Pack years: 11 00     Types: Cigarettes   • Smokeless tobacco: Never Used   Vaping Use   • Vaping Use: Never used   Substance and Sexual Activity   • Alcohol use: Yes     Comment: couple times/month   • Drug use: Never   • Sexual activity: Yes     Partners: Male     Birth control/protection: Injection         Current Outpatient Medications:   •  gabapentin (Neurontin) 300 mg capsule, Take 1 PO BID x 5 days, then 1 PO TID, Disp: 90 capsule, Rfl: 1  •  fluticasone (FLONASE) 50 mcg/act nasal spray, SHAKE LIQUID AND USE 1 SPRAY IN EACH NOSTRIL TWICE DAILY, Disp: 48 g, Rfl: 0  •  ibuprofen (MOTRIN) 600 mg tablet, , Disp: , Rfl:   •  Latuda 20 MG tablet, , Disp: , Rfl:   •  Magnesium 400 MG TABS, Take 1 tablet (400 mg total) by mouth in the morning, Disp: 90 tablet, Rfl: 3  •  meclizine (ANTIVERT) 25 mg tablet, Take 1 tablet (25 mg total) by mouth 3 (three) times a day as needed for dizziness, Disp: 30 tablet, Rfl: 0  •  medroxyPROGESTERone acetate (DEPO-PROVERA SYRINGE) 150 mg/mL injection, INJECT 1 ML INTO THE MUSCLE EVERY 3 MONTHS, Disp: 1 mL, Rfl: 5  •  mirtazapine (REMERON) 15 mg tablet, , Disp: , Rfl:   •  Riboflavin 400 MG TABS, Take 1 tablet (400 mg total) by mouth in the morning, Disp: 90 tablet, Rfl: 3  •  rizatriptan (MAXALT) 10 mg tablet, Take 1 tablet (10 mg total) by mouth as needed for migraine Take at the onset of migraine; if symptoms continue or return, may take another dose at least 2 hours after first dose  Take no more than 2 doses in a day , Disp: 9 tablet, Rfl: 0  •  topiramate (Topamax) 50 MG tablet, Take 50 mg in the AM and 50 mg in the PM, Disp: 180 tablet, Rfl: 0  •  triamcinolone (KENALOG) 0 5 % ointment, Apply topically 2 (two) times a day, Disp: 30 g, Rfl: 0  •  valACYclovir (VALTREX) 1,000 mg tablet, Take 1 tablet (1,000 mg total) by mouth 3 (three) times a day for 7 days, Disp: 21 tablet, Rfl: 0    No Known Allergies    Physical Exam:    /75   Pulse 99   Ht 5' 5" (1 651 m)   Wt 54 4 kg (120 lb)   BMI 19 97 kg/m²     Constitutional:normal, well developed, well nourished, alert, in no distress and non-toxic and no overt pain behavior    Eyes:anicteric  HEENT:grossly intact  Neck:supple, symmetric, trachea midline and no masses   Pulmonary:even and unlabored  Cardiovascular:No edema or pitting edema present  Skin:Normal without rashes or lesions and well hydrated  Psychiatric:Mood and affect appropriate  Neurologic:Cranial Nerves II-XII grossly intact  Musculoskeletal:normal gait      Imaging  No orders to display         No orders of the defined types were placed in this encounter

## 2022-12-01 ENCOUNTER — TELEPHONE (OUTPATIENT)
Dept: PAIN MEDICINE | Facility: MEDICAL CENTER | Age: 32
End: 2022-12-01

## 2022-12-01 DIAGNOSIS — M54.12 CERVICAL RADICULOPATHY: Primary | ICD-10-CM

## 2022-12-01 NOTE — TELEPHONE ENCOUNTER
S/w pt  C/o neck pain radiating to right arm  Pt states she increased to 300mg tid of gabapentin as instructed at 11/11 ov  Pt states she feels no improvement and pain is worse  Pt denies any side effects on this current dose and is also taking motrin for pain  Any recs at this time?

## 2022-12-01 NOTE — TELEPHONE ENCOUNTER
Please have the patient increase gabapentin to 600mg TID  as follows:  300/300/600 x 3 days  300/600/600 x 3 days  600 TID

## 2022-12-01 NOTE — TELEPHONE ENCOUNTER
Caller: Patient    Doctor: Hai Lindsey    Reason for call: Would like an increase of gabapentin, currently taking 300mg  Not helping with pain   Patient has a f/u appt 12/30 and prefer to f/u with Dr Hai Lindsey    Call back#: [phone]

## 2022-12-07 RX ORDER — PREGABALIN 50 MG/1
CAPSULE ORAL
Qty: 90 CAPSULE | Refills: 1 | Status: SHIPPED | OUTPATIENT
Start: 2022-12-07

## 2022-12-08 ENCOUNTER — CLINICAL SUPPORT (OUTPATIENT)
Dept: OBGYN CLINIC | Facility: CLINIC | Age: 32
End: 2022-12-08

## 2022-12-08 VITALS
BODY MASS INDEX: 20.83 KG/M2 | HEART RATE: 101 BPM | DIASTOLIC BLOOD PRESSURE: 73 MMHG | WEIGHT: 125.2 LBS | SYSTOLIC BLOOD PRESSURE: 105 MMHG

## 2022-12-08 DIAGNOSIS — Z30.42 ENCOUNTER FOR MANAGEMENT AND INJECTION OF DEPO-PROVERA: ICD-10-CM

## 2022-12-08 RX ORDER — MEDROXYPROGESTERONE ACETATE 150 MG/ML
150 INJECTION, SUSPENSION INTRAMUSCULAR ONCE
Status: COMPLETED | OUTPATIENT
Start: 2022-12-08 | End: 2022-12-08

## 2022-12-08 RX ADMIN — MEDROXYPROGESTERONE ACETATE 150 MG: 150 INJECTION, SUSPENSION INTRAMUSCULAR at 08:56

## 2022-12-08 NOTE — PATIENT INSTRUCTIONS
Thank you for your confidence in our team    We appreciate you and welcome your feedback  If you receive a survey from us, please take a few moments to let us know how we are doing     Sincerely,  Naga Madrigal MA

## 2023-01-09 NOTE — PROGRESS NOTES
Assessment:  1  Cervical radiculopathy    2  Cervical spondylosis    3  Foraminal stenosis of cervical region    4  Neuropathic pain        Plan:  1  Patient will increase pregabalinTo 75 mg 3 times a day for neuropathic complaints  I advised the patient that if they experience any side effects or issues with the changes in their medication regiment, they should give our office a call to discuss  I also advised the patient not to drive or operate machinery until they see how the changes in the medication regimen affects them  The patient was agreeable and verbalized an understanding  2   Patient was encouraged to initiate with neurology  She is scheduled for May 2023  3  Continue with home exercise program  4  Patient wishes to call to schedule her follow-up this time    History of Present Illness: The patient is a 33 y o  female last seen on 11/11/2022 who presents for a follow up office visit in regards to chronic neck pain that radiates into the right upper extremity the patient denies bowel or bladder incontinence or balance issues  Patient is status post C6-7 cervical epidural steroid injection on October 11, 2022 without any relief of her symptoms  EMG of the right upper extremity is unremarkable  MRI of the cervical spine reveals mild left foraminal stenosis at C6-7 with facet arthrosis, otherwise unremarkable  She has had right subacromial bursa injections without relief  She has cycled through numerous muscle relaxants, NSAIDs without relief, and has not found relief with gabapentin  She is currently taking pregabalin 50 mg 3 times a day without improvement of her pain  She is scheduled to see neurology for evaluation in May 2023    The patient rates her pain an 8 out of 10 on the numeric pain rating scale    She constantly has pain in the morning and at night which is described as burning, sharp, pressure-like, shooting, numbness and pins-and-needles    I have personally reviewed and/or updated the patient's past medical history, past surgical history, family history, social history, current medications, allergies, and vital signs today  Review of Systems:    Review of Systems   Respiratory: Negative for shortness of breath  Cardiovascular: Negative for chest pain  Gastrointestinal: Negative for constipation, diarrhea, nausea and vomiting  Musculoskeletal: Positive for gait problem  Negative for arthralgias, joint swelling and myalgias  Skin: Negative for rash  Neurological: Negative for dizziness, seizures and weakness  All other systems reviewed and are negative          Past Medical History:   Diagnosis Date   • Abnormal Pap smear of cervix     2009 cryo; 6/2019 LSIL/AGC pap; 6/2019 colpo HSIL; 8/2019 LEEP HSIL w/neg margins; 6/2020 NILM pap/neg HPV; 6/2020 NILM pap/neg HPV   • ADHD    • Bipolar 1 disorder ([facility] [address] )    • Depression    • [facility] )     last episode 2000   • GERD (gastroesophageal reflux disease)    • Migraine    • OCD (obsessive compulsive disorder)    • Urogenital trichomoniasis 2019       Past Surgical History:   Procedure Laterality Date   • GYNECOLOGIC CRYOSURGERY  2009   • ND COLPOSCOPY CERVIX VAG LOOP ELTRD BX CERVIX N/A 08/09/2019    Procedure: BIOPSY LEEP CERVIX;  Surgeon: Megan Vargas MD;  Location: [facility];  Service: Gynecology       Family History   Problem Relation Age of Onset   • Bipolar disorder Mother    • Diabetes Mother    • Migraines Mother    • Miscarriages / [place] Mother    • No Known Problems Father    • Depression Sister    • Asthma Sister    • Osteoporosis Maternal Grandmother    • Cancer Maternal Grandfather    • Breast cancer Neg Hx    • Colon cancer Neg Hx    • Ovarian cancer Neg Hx        Social History     Occupational History   • Not on file   Tobacco Use   • Smoking status: Every Day     Packs/day: 1 00     Years: 11 00     Pack years: 11 00     Types: Cigarettes   • Smokeless tobacco: Never   Vaping Use   • Vaping Use: Never used   Substance and Sexual Activity   • Alcohol use: Yes     Comment: couple times/month   • Drug use: Never   • Sexual activity: Yes     Partners: Male     Birth control/protection: Injection         Current Outpatient Medications:   •  pregabalin (LYRICA) 75 mg capsule, Take 1 capsule (75 mg total) by mouth 3 (three) times a day, Disp: 90 capsule, Rfl: 1  •  fluticasone (FLONASE) 50 mcg/act nasal spray, SHAKE LIQUID AND USE 1 SPRAY IN EACH NOSTRIL TWICE DAILY, Disp: 48 g, Rfl: 0  •  ibuprofen (MOTRIN) 600 mg tablet, , Disp: , Rfl:   •  Latuda 20 MG tablet, , Disp: , Rfl:   •  Magnesium 400 MG TABS, Take 1 tablet (400 mg total) by mouth in the morning, Disp: 90 tablet, Rfl: 3  •  meclizine (ANTIVERT) 25 mg tablet, Take 1 tablet (25 mg total) by mouth 3 (three) times a day as needed for dizziness, Disp: 30 tablet, Rfl: 0  •  medroxyPROGESTERone acetate (DEPO-PROVERA SYRINGE) 150 mg/mL injection, INJECT 1 ML INTO THE MUSCLE EVERY 3 MONTHS, Disp: 1 mL, Rfl: 5  •  mirtazapine (REMERON) 15 mg tablet, , Disp: , Rfl:   •  Riboflavin 400 MG TABS, Take 1 tablet (400 mg total) by mouth in the morning, Disp: 90 tablet, Rfl: 3  •  rizatriptan (MAXALT) 10 mg tablet, Take 1 tablet (10 mg total) by mouth as needed for migraine Take at the onset of migraine; if symptoms continue or return, may take another dose at least 2 hours after first dose   Take no more than 2 doses in a day , Disp: 9 tablet, Rfl: 0  •  topiramate (Topamax) 50 MG tablet, Take 50 mg in the AM and 50 mg in the PM, Disp: 180 tablet, Rfl: 0  •  triamcinolone (KENALOG) 0 5 % ointment, Apply topically 2 (two) times a day, Disp: 30 g, Rfl: 0  •  valACYclovir (VALTREX) 1,000 mg tablet, Take 1 tablet (1,000 mg total) by mouth 3 (three) times a day for 7 days, Disp: 21 tablet, Rfl: 0    No Known Allergies    Physical Exam:    /77   Pulse (!) 114   Ht 5' 5" (1 651 m)   Wt 56 2 kg (124 lb)   BMI 20 63 kg/m²     Constitutional:normal, well developed, well nourished, alert, in no distress and non-toxic and no overt pain behavior  Eyes:anicteric  HEENT:grossly intact  Neck:supple, symmetric, trachea midline and no masses   Pulmonary:even and unlabored  Cardiovascular:No edema or pitting edema present  Skin:Normal without rashes or lesions and well hydrated  Psychiatric:Mood and affect appropriate  Neurologic:Cranial Nerves II-XII grossly intact  Musculoskeletal:normal gait      Imaging  No orders to display     MRI CERVICAL SPINE WITHOUT CONTRAST   INDICATION: M25 511: Pain in right shoulder   G89 29: Other chronic pain   M54 2: Cervicalgia   M54 12: Radiculopathy, cervical region  COMPARISON: X-rays dated 2/17/2022  TECHNIQUE: Sagittal T1, sagittal T2, sagittal inversion recovery, axial T2, axial 2D merge   IMAGE QUALITY: Axial GRE sequence is somewhat degraded by motion  FINDINGS:   ALIGNMENT: Normal alignment of the cervical spine  No compression fracture  No subluxation  No scoliosis  MARROW SIGNAL: Normal marrow signal is identified within the visualized bony structures  No discrete marrow lesion  CERVICAL AND VISUALIZED THORACIC CORD: Normal signal within the visualized cord  PREVERTEBRAL AND PARASPINAL SOFT TISSUES: Normal    VISUALIZED POSTERIOR FOSSA: The visualized posterior fossa demonstrates no abnormal signal    CERVICAL DISC SPACES:   C2-C3: Normal    C3-C4: Normal    C4-C5: Normal    C5-C6: Normal    C6-C7: No disc herniation or canal stenosis  Mild left uncovertebral hypertrophy with mild foraminal stenosis  C7-T1: Normal    UPPER THORACIC DISC SPACES: Normal    IMPRESSION:   Mild left foraminal stenosis at C6-7  No disc herniation, canal or foraminal stenosis  No orders of the defined types were placed in this encounter

## 2023-01-10 ENCOUNTER — OFFICE VISIT (OUTPATIENT)
Dept: PAIN MEDICINE | Facility: CLINIC | Age: 33
End: 2023-01-10

## 2023-01-10 VITALS
HEART RATE: 114 BPM | BODY MASS INDEX: 20.66 KG/M2 | SYSTOLIC BLOOD PRESSURE: 107 MMHG | HEIGHT: 65 IN | WEIGHT: 124 LBS | DIASTOLIC BLOOD PRESSURE: 77 MMHG

## 2023-01-10 DIAGNOSIS — M54.12 CERVICAL RADICULOPATHY: Primary | ICD-10-CM

## 2023-01-10 DIAGNOSIS — M79.2 NEUROPATHIC PAIN: ICD-10-CM

## 2023-01-10 DIAGNOSIS — M47.812 CERVICAL SPONDYLOSIS: ICD-10-CM

## 2023-01-10 DIAGNOSIS — M48.02 FORAMINAL STENOSIS OF CERVICAL REGION: ICD-10-CM

## 2023-01-10 RX ORDER — PREGABALIN 75 MG/1
75 CAPSULE ORAL 3 TIMES DAILY
Qty: 90 CAPSULE | Refills: 1 | Status: SHIPPED | OUTPATIENT
Start: 2023-01-10

## 2023-01-19 ENCOUNTER — TELEPHONE (OUTPATIENT)
Dept: FAMILY MEDICINE CLINIC | Facility: CLINIC | Age: 33
End: 2023-01-19

## 2023-01-26 ENCOUNTER — OFFICE VISIT (OUTPATIENT)
Dept: FAMILY MEDICINE CLINIC | Facility: CLINIC | Age: 33
End: 2023-01-26

## 2023-01-26 DIAGNOSIS — J02.9 PHARYNGITIS, UNSPECIFIED ETIOLOGY: ICD-10-CM

## 2023-01-26 DIAGNOSIS — Z91.09 MULTIPLE ENVIRONMENTAL ALLERGIES: Primary | ICD-10-CM

## 2023-01-26 LAB — S PYO AG THROAT QL: NEGATIVE

## 2023-01-26 RX ORDER — FEXOFENADINE HCL 180 MG/1
180 TABLET ORAL DAILY
Qty: 90 TABLET | Refills: 1 | Status: SHIPPED | OUTPATIENT
Start: 2023-01-26

## 2023-01-26 NOTE — PROGRESS NOTES
Name: Princess Smalls      : 1990      MRN: [id number]  Encounter Provider: KARTIK Razo  Encounter Date: 2023   Encounter department: [address] [place] [address]     1  Multiple environmental allergies  -     fexofenadine (ALLEGRA) 180 MG tablet; Take 1 tablet (180 mg total) by mouth daily  -     [facility] Allergy Panel, Adult; Future  -     Food Allergy Profile; Future  -     Ambulatory Referral to Allergy; Future    2  Pharyngitis, unspecified etiology  -     Strep A PCR  -     POCT rapid strepA           Subjective     Princess Smalls is a 33 y o  female who  has a past medical history of Abnormal Pap smear of cervix, ADHD, Bipolar 1 disorder ([facility] [address] ), Depression, [facility] ), GERD (gastroesophageal reflux disease), Migraine, OCD (obsessive compulsive disorder), and Urogenital trichomoniasis  who presented to the office today for follow up  Reports that she has been having severe allergy sx whenever she is inside her home  She has lived at current residence for 3 months  She experiences sneezing, congestion, itchy eyes  She thinks that it may be the carpet, she has never had carpet before  She does have pets  She is a smoker  She takes Benadryl with improvement in sx  She also reports 2 day onset of sore throat, difficulty swallowing, body aches, and swollen glands  Has had chills but no fever  Is eating and drinking and no changes to bowel or bladder function  Review of Systems   Constitutional: Negative for activity change, appetite change, chills, fatigue, fever and unexpected weight change  HENT: Positive for congestion, sneezing, sore throat and trouble swallowing  Negative for hearing loss, nosebleeds and sinus pain  Eyes: Positive for itching  Negative for photophobia and visual disturbance  Respiratory: Negative for cough, chest tightness, shortness of breath and wheezing      Cardiovascular: Negative for chest pain, palpitations and leg swelling  Gastrointestinal: Negative for abdominal pain, constipation, nausea and vomiting  Genitourinary: Negative for decreased urine volume, difficulty urinating, dysuria, flank pain, genital sores, hematuria and urgency  Musculoskeletal: Negative for back pain and gait problem  Skin: Negative for pallor, rash and wound  Neurological: Negative for dizziness, seizures, syncope, weakness, numbness and headaches  Hematological: Negative for adenopathy  Does not bruise/bleed easily  Psychiatric/Behavioral: Negative for confusion, hallucinations, self-injury, sleep disturbance and suicidal ideas  The patient is not nervous/anxious          Past Medical History:   Diagnosis Date   • Abnormal Pap smear of cervix     2009 cryo; 6/2019 LSIL/AGC pap; 6/2019 colpo HSIL; 8/2019 LEEP HSIL w/neg margins; 6/2020 NILM pap/neg HPV; 6/2020 NILM pap/neg HPV   • ADHD    • Bipolar 1 disorder ([facility] [address] )    • Depression    • Epilepsy ([facility] )     last episode 2000   • GERD (gastroesophageal reflux disease)    • Migraine    • OCD (obsessive compulsive disorder)    • Urogenital trichomoniasis 2019     Past Surgical History:   Procedure Laterality Date   • GYNECOLOGIC CRYOSURGERY  2009   • NE COLPOSCOPY CERVIX VAG LOOP ELTRD BX CERVIX N/A 08/09/2019    Procedure: BIOPSY LEEP CERVIX;  Surgeon: Adrien Hdz MD;  Location: [facility];  Service: Gynecology     Family History   Problem Relation Age of Onset   • Bipolar disorder Mother    • Diabetes Mother    • Migraines Mother    • Miscarriages / [place] Mother    • No Known Problems Father    • Depression Sister    • Asthma Sister    • Osteoporosis Maternal Grandmother    • Cancer Maternal Grandfather    • Breast cancer Neg Hx    • Colon cancer Neg Hx    • Ovarian cancer Neg Hx      Social History     Socioeconomic History   • Marital status: Single     Spouse name: None   • Number of children: None   • Years of education: None   • Highest education level: None   Occupational History   • None   Tobacco Use   • Smoking status: Every Day     Packs/day: 1 00     Years: 11 00     Pack years: 11 00     Types: Cigarettes   • Smokeless tobacco: Never   Vaping Use   • Vaping Use: Never used   Substance and Sexual Activity   • Alcohol use: Yes     Comment: couple times/month   • Drug use: Never   • Sexual activity: Yes     Partners: Male     Birth control/protection: Injection   Other Topics Concern   • None   Social History Narrative   • None     Social Determinants of Health     Financial Resource Strain: Low Risk    • Difficulty of Paying Living Expenses: Not hard at all   Food Insecurity: No Food Insecurity   • Worried About Running Out of Food in the Last Year: Never true   • Ran Out of Food in the Last Year: Never true   Transportation Needs: No Transportation Needs   • Lack of Transportation (Medical): No   • Lack of Transportation (Non-Medical):  No   Physical Activity: Not on file   Stress: Not on file   Social Connections: Not on file   Intimate Partner Violence: Not on file   Housing Stability: Low Risk    • Unable to Pay for Housing in the Last Year: No   • Number of Places Lived in the Last Year: 1   • Unstable Housing in the Last Year: No     Current Outpatient Medications on File Prior to Visit   Medication Sig   • fluticasone (FLONASE) 50 mcg/act nasal spray SHAKE LIQUID AND USE 1 SPRAY IN EACH NOSTRIL TWICE DAILY   • ibuprofen (MOTRIN) 600 mg tablet    • Latuda 20 MG tablet    • Magnesium 400 MG TABS Take 1 tablet (400 mg total) by mouth in the morning   • meclizine (ANTIVERT) 25 mg tablet Take 1 tablet (25 mg total) by mouth 3 (three) times a day as needed for dizziness   • medroxyPROGESTERone acetate (DEPO-PROVERA SYRINGE) 150 mg/mL injection INJECT 1 ML INTO THE MUSCLE EVERY 3 MONTHS   • mirtazapine (REMERON) 15 mg tablet  (Patient not taking: Reported on 10/21/2022)   • pregabalin (LYRICA) 75 mg capsule Take 1 capsule (75 mg total) by mouth 3 (three) times a day   • Riboflavin 400 MG TABS Take 1 tablet (400 mg total) by mouth in the morning   • rizatriptan (MAXALT) 10 mg tablet Take 1 tablet (10 mg total) by mouth as needed for migraine Take at the onset of migraine; if symptoms continue or return, may take another dose at least 2 hours after first dose  Take no more than 2 doses in a day     • topiramate (Topamax) 50 MG tablet Take 50 mg in the AM and 50 mg in the PM   • triamcinolone (KENALOG) 0 5 % ointment Apply topically 2 (two) times a day   • valACYclovir (VALTREX) 1,000 mg tablet Take 1 tablet (1,000 mg total) by mouth 3 (three) times a day for 7 days     No Known Allergies  Immunization History   Administered Date(s) Administered   • COVID-19 MODERNA VACC 0 5 ML IM 03/22/2021, 04/20/2021   • DTaP 1990, [birth year], 09/30/1992, 05/05/1995   • H1N1, All Formulations 12/28/2009   • HPV Quadrivalent 05/24/2007, 07/24/2007, 12/24/2007, 05/16/2013, 09/19/2013, 12/30/2013   • Hep B, Adolescent or Pediatric 04/21/1998, 08/04/1998, 02/02/1999   • HiB 1990, 1990, 1990, 09/30/1992   • INFLUENZA 12/28/2009, 12/28/2010, 10/27/2015, 11/01/2016, 09/12/2017, 10/10/2018, 04/12/2021, 10/27/2021   • IPV 1990, 1990, 09/30/1992, 05/05/1995   • Influenza, injectable, quadrivalent, preservative free 0 5 mL 09/24/2018, 09/15/2020, 11/01/2022   • MMR 06/09/1992, 03/19/1994, 01/12/2005   • Meningococcal MCV4P 10/07/2008   • Pneumococcal Conjugate Vaccine 20-valent (Pcv20), Polysace 11/01/2022   • Pneumococcal Polysaccharide PPV23 06/03/2021   • Td (adult), adsorbed 10/13/2003   • Tdap 12/28/2009, 10/14/2020       Objective     BP (P) 102/72 (BP Location: Left arm, Patient Position: Sitting, Cuff Size: Standard)   Pulse (P) 105   Temp (P) 97 8 °F (36 6 °C) (Temporal)   Resp (P) 18   Ht (P) 5' 5" (1 651 m)   Wt (P) 55 2 kg (121 lb 11 2 oz)   SpO2 (P) 98%   BMI (P) 20 25 kg/m²     Physical Exam  Vitals and nursing note reviewed  Constitutional:       General: She is not in acute distress  Appearance: She is well-developed  She is not diaphoretic  HENT:      Head: Normocephalic and atraumatic  Right Ear: External ear normal       Left Ear: External ear normal       Nose: Congestion and rhinorrhea present  Mouth/Throat:      Pharynx: Oropharyngeal exudate and posterior oropharyngeal erythema present  Eyes:      Pupils: Pupils are equal, round, and reactive to light  Cardiovascular:      Rate and Rhythm: Normal rate and regular rhythm  Heart sounds: Normal heart sounds  Pulmonary:      Effort: Pulmonary effort is normal  No respiratory distress  Breath sounds: Normal breath sounds  No wheezing  Abdominal:      General: Bowel sounds are normal  There is no distension  Palpations: Abdomen is soft  Tenderness: There is no abdominal tenderness  Musculoskeletal:         General: No deformity  Normal range of motion  Cervical back: Normal range of motion and neck supple  Lymphadenopathy:      Cervical: Cervical adenopathy present  Skin:     General: Skin is warm and dry  Capillary Refill: Capillary refill takes less than 2 seconds  Findings: No rash  Neurological:      Mental Status: She is alert and oriented to person, place, and time  Sensory: No sensory deficit        Coordination: Coordination normal       Deep Tendon Reflexes: Reflexes normal    Psychiatric:         Behavior: Behavior normal        KARTIK Hatch

## 2023-01-27 LAB — BACTERIA THROAT CULT: ABNORMAL

## 2023-01-29 DIAGNOSIS — G43.019 INTRACTABLE MIGRAINE WITHOUT AURA AND WITHOUT STATUS MIGRAINOSUS: ICD-10-CM

## 2023-01-30 DIAGNOSIS — J02.0 STREP PHARYNGITIS: Primary | ICD-10-CM

## 2023-01-30 RX ORDER — AMOXICILLIN 500 MG/1
500 CAPSULE ORAL EVERY 12 HOURS SCHEDULED
Qty: 20 CAPSULE | Refills: 0 | Status: SHIPPED | OUTPATIENT
Start: 2023-01-30 | End: 2023-02-09

## 2023-01-31 RX ORDER — TOPIRAMATE 50 MG/1
TABLET, FILM COATED ORAL
Qty: 180 TABLET | Refills: 0 | Status: SHIPPED | OUTPATIENT
Start: 2023-01-31 | End: 2023-02-07

## 2023-02-07 ENCOUNTER — CONSULT (OUTPATIENT)
Dept: NEUROLOGY | Facility: CLINIC | Age: 33
End: 2023-02-07

## 2023-02-07 VITALS
DIASTOLIC BLOOD PRESSURE: 62 MMHG | TEMPERATURE: 97.2 F | WEIGHT: 123.3 LBS | SYSTOLIC BLOOD PRESSURE: 108 MMHG | BODY MASS INDEX: 20.54 KG/M2 | HEIGHT: 65 IN

## 2023-02-07 DIAGNOSIS — G43.109 MIGRAINE WITH AURA AND WITHOUT STATUS MIGRAINOSUS, NOT INTRACTABLE: Primary | ICD-10-CM

## 2023-02-07 DIAGNOSIS — G43.109 MIGRAINE WITH AURA AND WITHOUT STATUS MIGRAINOSUS, NOT INTRACTABLE: ICD-10-CM

## 2023-02-07 RX ORDER — CYCLOBENZAPRINE HCL 5 MG
5 TABLET ORAL
Qty: 15 TABLET | Refills: 0 | Status: SHIPPED | OUTPATIENT
Start: 2023-02-07

## 2023-02-07 RX ORDER — DIVALPROEX SODIUM 500 MG/1
500 TABLET, DELAYED RELEASE ORAL
Qty: 30 TABLET | Refills: 2 | Status: SHIPPED | OUTPATIENT
Start: 2023-02-07 | End: 2023-02-08

## 2023-02-07 NOTE — PROGRESS NOTES
Patient ID: Aga Bernal is a 33 y o  female who presents to the [address] [place] [address]    Assessment/Plan:    Migraine with aura and without status migrainosus, not intractable  -Will avoid mood stabilizers due to being on latuda  -Of note, she does report a history of seizures  Last seizure when she was a child    -She should continue magnesium and vitamin B2   - Will trial Depakote as a preventative option  She is currently having daily headaches  Still may be helpful in breaking her current headache cycle as well  -Due to increased frequency and severity of headaches and migraines, recommend further evaluation with MRI brain without contrast to rule out structural or treatable causes of symptoms   -We discussed lifestyle modifications that may help with headaches as outlined below    -She has tried two types of triptans as an abortive option and she does not find relief with this  Will trial Nurtec as an abortive medication for severe migraines  -Reports eye twitching with her migraines and going musculoskeletal/neck pain and has tried different muscle relaxers in the past  Will trial flexeril at bedtime  Subjective:    HPI    Aga Bernal is a 33 y o  female who presents for evaluation of headache  Previous  Prevention:  latuda   mirtazipine   Lyrica   Gabapentin  Topamax- stopped about 2 months ago  Magnesium   Vitamin B2     Abortive:  Rizatriptan- doesn't work   Sumatriptan  Robaxin  Ibuprofen   Ketorolac      What is your current pain level ? 8/10  Started at age 28    How often do the headaches occur?   - as of 2/8/2023: daily, wakes up with a headache  Has been happening daily for about a year  Has been taking motrin 600-800 mg daily for a "long time"  She experiences ongoing eye-twitching with her migraines     What time of the day do the headaches start? Morning   How long do the headaches last? Last all day   Are you ever headache free?  No    Where is your headache located: the entire right side of face/head     Describe your usual headache:   [x] burning [x] Pressure    [x] Stabbing  [x] Shooting     Aura? Sometimes will see little dots     What is the intensity of pain? Average: 8/10, worst 10/10    Associated symptoms:   [x] Nausea       [x] Vomiting    [x] Photophobia     [x]Phonophobia       [x] Light-headed or dizzy    [x] Prefer to be alone and in a dark room    Things that make the headache worse? No specific movements    Headache triggers:  No     Have you seen someone else for headaches or pain? Yes, family doctor   Have you had trigger point injection performed and how often? No  Have you had Botox injection performed and how often? No   Have you had epidural injections or transforaminal injections performed? No    Have you used CBD or THC for your headaches and how often? No    Are you current pregnant or planning on getting pregnant? No, birth control     Have you ever had any Brain imaging? No    Lifestyle:    Sleep   - averages: 6-8 hours   Problems falling asleep?:   No  Problems staying asleep?:  Yes    Physical activity: Yes    Water: 2-3 bottles per day  Caffeine: occasionally     Mood: Bipolar        Objective:    Blood pressure 108/62, temperature (!) 97 2 °F (36 2 °C), temperature source Temporal, height 5' 5" (1 651 m), weight 55 9 kg (123 lb 4 8 oz), not currently breastfeeding  Neurological Exam  Awake, alert, oriented, and in no apparent distress  Mood is appropriate to situation  Speech is fluent with no dysarthria or aphasia  Cranial nerves 2-12 were symmetrically intact bilaterally  Motor testing reveals 5/5 strength in the bilateral upper and lower extremities  Sensation is intact to light touch in the bilateral upper and lower extremities  Coordination intact, no drift present  Finger-to-nose absent for tremor, dysmetria, or ataxia  DTRs are symmetric and intact bilaterally  Able to rise without assistance, gait is stable      Review of Systems   Constitutional: Negative  Negative for appetite change and fever  HENT: Negative  Negative for hearing loss, tinnitus, trouble swallowing and voice change  Eyes: Negative  Negative for photophobia, pain and visual disturbance  Respiratory: Negative  Negative for shortness of breath  Cardiovascular: Negative  Negative for palpitations  Gastrointestinal: Negative  Negative for nausea and vomiting  Endocrine: Negative  Negative for cold intolerance  Genitourinary: Negative  Negative for dysuria, frequency and urgency  Musculoskeletal: Positive for neck pain  Negative for gait problem and myalgias  Skin: Negative  Negative for rash  Allergic/Immunologic: Negative  Neurological: Positive for dizziness and headaches  Negative for tremors, seizures, syncope, facial asymmetry, speech difficulty, weakness, light-headedness and numbness  Hematological: Negative  Does not bruise/bleed easily  Psychiatric/Behavioral: Positive for sleep disturbance  Negative for confusion and hallucinations  I have spent 50 minutes today on this case including chart review, performing history and exam, patient counseling, and documentation/communication

## 2023-02-07 NOTE — PATIENT INSTRUCTIONS
Patient Instructions:  - For the time being please avoid taking motrin as this has the potential for rebound headaches     Additional Testing:   Neurodiagnostic workup: MRI Brain ordered    Headache Calendar  Please maintain a headache calendar  Consider using phone applications such as Migraine Buddy or Migraine Diary    Headache/migraine treatment:     Rescue medications (for immediate treatment of a headache): For your more moderate to severe migraines take this medication early  Nurtec 75 mg  Take at the onset of a migraine  Do not exceed more than 1 tablet in 24 hours     Trial flexeril 5 mg at bedtime as needed for migraines and eye twitching  Prescription preventive medications for headaches/migraines   (to take every day to help prevent headaches - not to take at the time of headache):  [x] Start depakote daily at bedtime  Start by taking 1/2 tablet 250 mg at bedtime for 1 week  Then increase to a full tablet 500 daily at bedtime  Please let me know how you are tolerating this medication     *Typically these types of medications take time until you see the benefit, although some may see improvement in days, often it may take weeks, especially if the medication is being titrated up to a beneficial level  Please contact us if there are any concerns or questions regarding the medication  Over the counter preventive supplements for headaches/migraines (if you try, try for 3 months straight)  (to take every day to help prevent headaches - not to take at the time of headache):   There are combo pills online of these - none of which regulated by FDA and double check dosing - take appropriate dose only once a day- prevent a migraine, migravent, mind ease, migrelief   [x] Magnesium 400mg daily (If any diarrhea or upset stomach, decrease dose  as tolerated)  [x] Riboflavin (Vitamin B2) 400mg daily (may make your urine bright/neon yellow)    Lifestyle Recommendations:  [x] SLEEP - Maintain a regular sleep schedule: Adults need at least 7-8 hours of uninterrupted a night  Maintain good sleep hygiene:  Going to bed and waking up at consistent times, avoiding excessive daytime naps, avoiding caffeinated beverages in the evening, avoid excessive stimulation in the evening and generally using bed primarily for sleeping  One hour before bedtime would recommend turning lights down lower, decreasing your activity (may read quietly, listen to music at a low volume)  When you get into bed, should eliminate all technology (no texting, emailing, playing with your phone, iPad or tablet in bed)  [x] HYDRATION - Maintain good hydration  Drink  2L of fluid a day (4 typical small water bottles)  [x] DIET - Maintain good nutrition  In particular don't skip meals and try and eat healthy balanced meals regularly  [x] TRIGGERS - Look for other triggers and avoid them: Limit caffeine to 1-2 cups a day or less  Avoid dietary triggers that you have noticed bring on your headaches (this could include aged cheese, peanuts, MSG, aspartame and nitrates)  [x] EXERCISE - physical exercise as we all know is good for you in many ways, and not only is good for your heart, but also is beneficial for your mental health, cognitive health and  chronic pain/headaches  I would encourage at the least 5 days of physical exercise weekly for at least 30 minutes  Education and Follow-up  [x] Please call with any questions or concerns  Of course if any new concerning symptoms go to the emergency department    [x] Follow up in 3 months

## 2023-02-08 RX ORDER — DIVALPROEX SODIUM 500 MG/1
TABLET, DELAYED RELEASE ORAL
Qty: 90 TABLET | Refills: 0 | Status: SHIPPED | OUTPATIENT
Start: 2023-02-08

## 2023-02-08 NOTE — ASSESSMENT & PLAN NOTE
-Will avoid mood stabilizers due to being on latuda  -Of note, she does report a history of seizures  Last seizure when she was a child    -She should continue magnesium and vitamin B2   - Will trial Depakote as a preventative option  She is currently having daily headaches  Still may be helpful in breaking her current headache cycle as well  -Due to increased frequency and severity of headaches and migraines, recommend further evaluation with MRI brain without contrast to rule out structural or treatable causes of symptoms   -We discussed lifestyle modifications that may help with headaches as outlined below    -She has tried two types of triptans as an abortive option and she does not find relief with this  Will trial Nurtec as an abortive medication for severe migraines  -Reports eye twitching with her migraines and going musculoskeletal/neck pain and has tried different muscle relaxers in the past  Will trial flexeril at bedtime

## 2023-02-15 ENCOUNTER — TELEPHONE (OUTPATIENT)
Dept: NEUROLOGY | Facility: CLINIC | Age: 33
End: 2023-02-15

## 2023-02-15 NOTE — TELEPHONE ENCOUNTER
----- Message from Susan Olivas RN sent at 2/15/2023 10:04 AM EST -----  Regarding: FW: Medicine coverage   Contact: [phone]    ----- Message -----  From: Genna Shoemaker  Sent: 2/15/2023  10:01 AM EST  To: Neurology [address] Clinical Team 5  Subject: Medicine coverage                                My insurance didn’t wanna cover nurtec I called the pharmacy they said it needs a pre authorization

## 2023-02-16 DIAGNOSIS — G43.109 MIGRAINE WITH AURA AND WITHOUT STATUS MIGRAINOSUS, NOT INTRACTABLE: Primary | ICD-10-CM

## 2023-02-16 NOTE — TELEPHONE ENCOUNTER
PA for Nurtec was denied due to the following reason(s):       Deniedtoday  Denied  The drug you requested, [place] [place], is not on our formulary  Based on the information submitted, you have not tried the formulary alternative that works in a similar way to treat your condition  You must try the following formulary alternative: Angelaadri Rael (requires prior authorization)  You should work with your doctor to determine if this alternative is right for you  Your request may be approved if your doctor tells us that you have tried the formulary alternative or if your doctor tells us why you cannot take any of the drugs on our formulary      Please advise, thank you

## 2023-02-20 ENCOUNTER — HOSPITAL ENCOUNTER (OUTPATIENT)
Dept: MRI IMAGING | Facility: HOSPITAL | Age: 33
Discharge: HOME | End: 2023-02-20

## 2023-02-20 DIAGNOSIS — G43.109 MIGRAINE WITH AURA AND WITHOUT STATUS MIGRAINOSUS, NOT INTRACTABLE: ICD-10-CM

## 2023-02-21 NOTE — TELEPHONE ENCOUNTER
Rico Hays submitted via [facility], key [id number]; determination pending

## 2023-02-23 ENCOUNTER — CLINICAL SUPPORT (OUTPATIENT)
Dept: OBGYN CLINIC | Facility: CLINIC | Age: 33
End: 2023-02-23

## 2023-02-23 VITALS
SYSTOLIC BLOOD PRESSURE: 103 MMHG | DIASTOLIC BLOOD PRESSURE: 72 MMHG | HEART RATE: 112 BPM | BODY MASS INDEX: 20.69 KG/M2 | HEIGHT: 65 IN | WEIGHT: 124.2 LBS

## 2023-02-23 DIAGNOSIS — Z30.09 UNWANTED FERTILITY: Primary | ICD-10-CM

## 2023-02-23 RX ORDER — MEDROXYPROGESTERONE ACETATE 150 MG/ML
150 INJECTION, SUSPENSION INTRAMUSCULAR ONCE
Status: COMPLETED | OUTPATIENT
Start: 2023-02-23 | End: 2023-02-23

## 2023-02-23 RX ADMIN — MEDROXYPROGESTERONE ACETATE 150 MG: 150 INJECTION, SUSPENSION INTRAMUSCULAR at 11:18

## 2023-03-24 DIAGNOSIS — G43.109 MIGRAINE WITH AURA AND WITHOUT STATUS MIGRAINOSUS, NOT INTRACTABLE: ICD-10-CM

## 2023-03-28 ENCOUNTER — TELEPHONE (OUTPATIENT)
Dept: NEUROLOGY | Facility: CLINIC | Age: 33
End: 2023-03-28

## 2023-03-28 RX ORDER — UBROGEPANT 100 MG/1
TABLET ORAL
Qty: 9 TABLET | Refills: 0 | Status: SHIPPED | OUTPATIENT
Start: 2023-03-28

## 2023-03-28 NOTE — TELEPHONE ENCOUNTER
LMOM for the patient to call us back to reschedule appt with [facility] since she will not be in the office

## 2023-05-01 ENCOUNTER — OFFICE VISIT (OUTPATIENT)
Dept: FAMILY MEDICINE CLINIC | Facility: CLINIC | Age: 33
End: 2023-05-01

## 2023-05-01 VITALS
OXYGEN SATURATION: 98 % | DIASTOLIC BLOOD PRESSURE: 70 MMHG | HEART RATE: 109 BPM | HEIGHT: 65 IN | SYSTOLIC BLOOD PRESSURE: 130 MMHG | WEIGHT: 128.4 LBS | BODY MASS INDEX: 21.39 KG/M2 | TEMPERATURE: 97 F | RESPIRATION RATE: 18 BRPM

## 2023-05-01 DIAGNOSIS — Z91.09 MULTIPLE ENVIRONMENTAL ALLERGIES: Primary | ICD-10-CM

## 2023-05-01 DIAGNOSIS — Z23 ENCOUNTER FOR IMMUNIZATION: ICD-10-CM

## 2023-05-01 RX ORDER — ZOSTER VACCINE RECOMBINANT, ADJUVANTED 50 MCG/0.5
0.5 KIT INTRAMUSCULAR ONCE
Qty: 1 EACH | Refills: 1 | Status: SHIPPED | OUTPATIENT
Start: 2023-05-01 | End: 2023-05-01

## 2023-05-01 RX ORDER — AZELASTINE 1 MG/ML
1 SPRAY, METERED NASAL 2 TIMES DAILY
Qty: 30 ML | Refills: 1 | Status: SHIPPED | OUTPATIENT
Start: 2023-05-01

## 2023-05-01 NOTE — PROGRESS NOTES
Name: Marlon Perez      : 1990      MRN: [id number]  Encounter Provider: KARTIK Us  Encounter Date: 2023   Encounter department: [facility]    Assessment & Plan     1  Multiple environmental allergies  Assessment & Plan:  She has allergies to dust, mold - c/o sinus congestion, PND   She is taking Allegra and Flonase, feels sx are not well controlled   Will D/C Flonase and start azelastine bid, consider Singulair if sx not controlled in 4 weeks with regular use of nasal steroid     Orders:  -     azelastine (ASTELIN) 0 1 % nasal spray; 1 spray into each nostril 2 (two) times a day Use in each nostril as directed    2  Encounter for immunization  -     Zoster Vac Recomb Adjuvanted (Shingrix) 50 MCG/0 5ML SUSR; Inject 0 5 mL into a muscle once for 1 dose Repeat dose in 2 to 6 months      Tobacco Cessation Counseling: Tobacco cessation counseling was provided  The patient is sincerely urged to quit consumption of tobacco  She is not ready to quit tobacco          Subjective     Marlon Perez is a 33 y o  female who  has a past medical history of Abnormal Pap smear of cervix, ADHD, Bipolar 1 disorder ([address] ), Depression, Epilepsy ([facility] [address] ), GERD (gastroesophageal reflux disease), Migraine, OCD (obsessive compulsive disorder), and Urogenital trichomoniasis  who presented to the office today for follow up  Patient reports that allergy sx not well controlled  She is taking daily antihistamine and sometimes using Flonase  Review of Systems   Constitutional: Negative for chills and fever  HENT: Positive for congestion and postnasal drip  Negative for ear pain and sore throat  Eyes: Negative for pain and visual disturbance  Respiratory: Negative for cough and shortness of breath  Cardiovascular: Negative for chest pain and palpitations  Gastrointestinal: Negative for abdominal pain and vomiting     Genitourinary: Negative for dysuria and hematuria  Musculoskeletal: Negative for arthralgias and back pain  Skin: Negative for color change and rash  Allergic/Immunologic: Positive for environmental allergies  Neurological: Negative for seizures and syncope  All other systems reviewed and are negative        Past Medical History:   Diagnosis Date    Abnormal Pap smear of cervix     2009 cryo; 6/2019 LSIL/AGC pap; 6/2019 colpo HSIL; 8/2019 LEEP HSIL w/neg margins; 6/2020 NILM pap/neg HPV; 6/2020 NILM pap/neg HPV    ADHD     Bipolar 1 disorder ([facility] [address] )     Depression     Epilepsy ([facility] )     last episode 2000    GERD (gastroesophageal reflux disease)     Migraine     OCD (obsessive compulsive disorder)     Urogenital trichomoniasis 2019     Past Surgical History:   Procedure Laterality Date    GYNECOLOGIC CRYOSURGERY  2009    DC COLPOSCOPY CERVIX VAG LOOP ELTRD BX CERVIX N/A 08/09/2019    Procedure: BIOPSY LEEP CERVIX;  Surgeon: Adeel Nayak MD;  Location: [facility] OR;  Service: Gynecology     Family History   Problem Relation Age of Onset    Bipolar disorder Mother     Diabetes Mother    [facility] Mother    [de-identified] / [place] Mother     No Known Problems Father     Depression Sister     Asthma Sister     Osteoporosis Maternal Grandmother     Cancer Maternal Grandfather     Breast cancer Neg Hx     Colon cancer Neg Hx     Ovarian cancer Neg Hx      Social History     Socioeconomic History    Marital status: Single     Spouse name: None    Number of children: None    Years of education: None    Highest education level: None   Occupational History    None   Tobacco Use    Smoking status: Every Day     Packs/day: 1 00     Years: 11 00     Pack years: 11 00     Types: Cigarettes    Smokeless tobacco: Never   Vaping Use    Vaping Use: Never used   Substance and Sexual Activity    Alcohol use: Yes     Comment: couple times/month    Drug use: Never    Sexual activity: Yes     Partners: Male Birth control/protection: Injection   Other Topics Concern    None   Social History Narrative    None     Social Determinants of Health     Financial Resource Strain: Low Risk     Difficulty of Paying Living Expenses: Not hard at all   Food Insecurity: No Food Insecurity    Worried About Running Out of Food in the Last Year: Never true    Sujatha of Food in the Last Year: Never true   Transportation Needs: No Transportation Needs    Lack of Transportation (Medical): No    Lack of Transportation (Non-Medical):  No   Physical Activity: Not on file   Stress: Not on file   Social Connections: Not on file   Intimate Partner Violence: Not on file   Housing Stability: Low Risk     Unable to Pay for Housing in the Last Year: No    Number of Places Lived in the Last Year: 1    Unstable Housing in the Last Year: No     Current Outpatient Medications on File Prior to Visit   Medication Sig    cyclobenzaprine (FLEXERIL) 5 mg tablet Take 1 tablet (5 mg total) by mouth daily at bedtime as needed for muscle spasms    divalproex sodium (DEPAKOTE) 500 mg EC tablet TAKE 1 TABLET(500 MG) BY MOUTH DAILY AT BEDTIME    fexofenadine (ALLEGRA) 180 MG tablet Take 1 tablet (180 mg total) by mouth daily    lurasidone (LATUDA) 40 mg tablet Take 40 mg by mouth in the morning    Magnesium 400 MG TABS Take 1 tablet (400 mg total) by mouth in the morning    meclizine (ANTIVERT) 25 mg tablet Take 1 tablet (25 mg total) by mouth 3 (three) times a day as needed for dizziness    medroxyPROGESTERone acetate (DEPO-PROVERA SYRINGE) 150 mg/mL injection ADMINISTER 1 ML IN THE MUSCLE EVERY 3 MONTHS    mirtazapine (REMERON) 15 mg tablet     pregabalin (LYRICA) 75 mg capsule Take 1 capsule (75 mg total) by mouth 3 (three) times a day    Riboflavin 400 MG TABS Take 1 tablet (400 mg total) by mouth in the morning    triamcinolone (KENALOG) 0 5 % ointment Apply topically 2 (two) times a day    Ubrogepant (Ubrelvy) 100 MG tablet Take 1 tablet "(100 mg) one time as needed for migraine  May repeat one additional tablet (100 mg) at least two hours after the first dose  Do not use more than two doses per day, or for more than eight days per month      valACYclovir (VALTREX) 1,000 mg tablet Take 1 tablet (1,000 mg total) by mouth 3 (three) times a day for 7 days    [DISCONTINUED] fluticasone (FLONASE) 50 mcg/act nasal spray SHAKE LIQUID AND USE 1 SPRAY IN EACH NOSTRIL TWICE DAILY    [DISCONTINUED] ibuprofen (MOTRIN) 600 mg tablet     [DISCONTINUED] medroxyPROGESTERone acetate (DEPO-PROVERA SYRINGE) 150 mg/mL injection ADMINISTER 1 ML IN THE MUSCLE EVERY 3 MONTHS     No Known Allergies  Immunization History   Administered Date(s) Administered    COVID-19 MODERNA VACC 0 5 ML IM 03/22/2021, 04/20/2021    DTaP 1990, 1990, 1990, 09/30/1992, 05/05/1995    H1N1, All Formulations 12/28/2009    HPV Quadrivalent 05/24/2007, 07/24/2007, 12/24/2007, 05/16/2013, 09/19/2013, 12/30/2013    Hep B, Adolescent or Pediatric 04/21/1998, 08/04/1998, 02/02/1999    HiB 1990, 1990, 1990, 09/30/1992    INFLUENZA 12/28/2009, 12/28/2010, 10/27/2015, 11/01/2016, 09/12/2017, 10/10/2018, 04/12/2021, 10/27/2021    IPV 1990, 1991, 09/30/1992, 05/05/1995    Influenza, injectable, quadrivalent, preservative free 0 5 mL 09/24/2018, 09/15/2020, 11/01/2022    MMR 06/09/1992, 03/19/1994, 01/12/2005    Meningococcal MCV4P 10/07/2008    Pneumococcal Conjugate Vaccine 20-valent (Pcv20), Polysace 11/01/2022    Pneumococcal Polysaccharide PPV23 06/03/2021    Td (adult), adsorbed 10/13/2003    Tdap 12/28/2009, 10/14/2020       Objective     /70 (BP Location: Left arm, Patient Position: Sitting, Cuff Size: Standard)   Pulse (!) 109   Temp (!) 97 °F (36 1 °C) (Temporal)   Resp 18   Ht 5' 5\" (1 651 m)   Wt 58 2 kg (128 lb 6 4 oz)   LMP  (LMP Unknown)   SpO2 98%   BMI 21 37 kg/m²     Physical Exam  Vitals and nursing note " reviewed  Constitutional:       General: She is not in acute distress  Appearance: She is well-developed  She is not diaphoretic  HENT:      Head: Normocephalic and atraumatic  Right Ear: External ear normal       Left Ear: External ear normal       Nose: Congestion present  Mouth/Throat:      Comments: +pnd  Eyes:      Pupils: Pupils are equal, round, and reactive to light  Cardiovascular:      Rate and Rhythm: Normal rate and regular rhythm  Heart sounds: Normal heart sounds  Pulmonary:      Effort: Pulmonary effort is normal  No respiratory distress  Breath sounds: Normal breath sounds  No wheezing  Abdominal:      General: Bowel sounds are normal  There is no distension  Palpations: Abdomen is soft  Tenderness: There is no abdominal tenderness  Musculoskeletal:         General: No deformity  Normal range of motion  Cervical back: Normal range of motion and neck supple  Lymphadenopathy:      Cervical: No cervical adenopathy  Skin:     General: Skin is warm and dry  Capillary Refill: Capillary refill takes less than 2 seconds  Findings: No rash  Neurological:      Mental Status: She is alert and oriented to person, place, and time  Sensory: No sensory deficit        Coordination: Coordination normal       Deep Tendon Reflexes: Reflexes normal    Psychiatric:         Behavior: Behavior normal        Pedro Jay

## 2023-05-01 NOTE — ASSESSMENT & PLAN NOTE
She has allergies to dust, mold - c/o sinus congestion, PND   She is taking Allegra and Flonase, feels sx are not well controlled   Will D/C Flonase and start azelastine bid, consider Singulair if sx not controlled in 4 weeks with regular use of nasal steroid

## 2023-05-09 ENCOUNTER — OFFICE VISIT (OUTPATIENT)
Dept: NEUROLOGY | Facility: CLINIC | Age: 33
End: 2023-05-09

## 2023-05-09 VITALS
OXYGEN SATURATION: 97 % | SYSTOLIC BLOOD PRESSURE: 108 MMHG | HEART RATE: 100 BPM | TEMPERATURE: 98.3 F | DIASTOLIC BLOOD PRESSURE: 60 MMHG | WEIGHT: 128.8 LBS | BODY MASS INDEX: 21.43 KG/M2

## 2023-05-09 DIAGNOSIS — G43.109 MIGRAINE WITH AURA AND WITHOUT STATUS MIGRAINOSUS, NOT INTRACTABLE: Primary | ICD-10-CM

## 2023-05-09 DIAGNOSIS — G43.109 MIGRAINE WITH AURA AND WITHOUT STATUS MIGRAINOSUS, NOT INTRACTABLE: ICD-10-CM

## 2023-05-09 RX ORDER — DIVALPROEX SODIUM 250 MG/1
TABLET, DELAYED RELEASE ORAL
Qty: 90 TABLET | Refills: 0 | Status: SHIPPED | OUTPATIENT
Start: 2023-05-09

## 2023-05-09 RX ORDER — MOMETASONE FUROATE 1 MG/ML
SOLUTION TOPICAL
COMMUNITY
Start: 2023-02-22

## 2023-05-09 RX ORDER — DIVALPROEX SODIUM 250 MG/1
250 TABLET, DELAYED RELEASE ORAL
Qty: 30 TABLET | Refills: 3 | Status: SHIPPED | OUTPATIENT
Start: 2023-05-09 | End: 2023-05-09

## 2023-05-09 RX ORDER — HYDROXYZINE HYDROCHLORIDE 10 MG/1
TABLET, FILM COATED ORAL
COMMUNITY
Start: 2023-02-21

## 2023-05-09 NOTE — PROGRESS NOTES
[facility]  PATIENT:  Hawa Alex  MRN:  [id number]  :  1990  DATE OF SERVICE:  2023      Assessment/Plan:     Migraines:    I had the pleasure of seeing Rajesh Kay today at [facility] in [place]  She is presenting today as an office visit follow-up in regards to her migraine headaches  She states that her migraine headaches have been about the same since last time  Noted that the Depakote and the Flexeril have not helped as much as she would have anticipated to prevent the migraines from occurring  However, she notes that the [place] that she received for abortive therapy does help with the significant headaches that she is currently having  We did go over the patient's MRI brain results, showed no acute infarct, hemorrhage, or mass effect  No intracranial abnormalities which would be leading to the patient's migraines at this time     -Patient would still like to continue with the Depakote therapy  She would like to try and increase the dose and see if we can have a little bit more success with the medication that way  Would like her to take an additional 250 mg of Depakote at bedtime  Still, along with her 500 mg that she already takes  If the patient is not having much success with her migraine headaches after increase in dosage, we may taper her down at the next visit and try something else   -Still can continue to take the Flexeril as needed for muscle stiffness/tightness in her neck   -Would encourage the patient to still take Ubrelvy as needed when a severe migraine seems to come on  For the more mild to moderate headaches, if the patient would like to take either 1000 mg of Tylenol or 600 mg of ibuprofen that would be appropriate as well    Also, if the patient does have a very severe headache and she would like to try taking either 1000 mg of Tylenol or 600 mg of ibuprofen with her [place], that is suitable at this time     Patient Instructions:    Headache Calendar  Please maintain a headache calendar  Consider using phone applications such as Migraine Buddy or Migraine Diary    Headache/migraine treatment:     Rescue medications (for immediate treatment of a headache): It is ok to take ibuprofen, acetaminophen or naproxen (Advil, Tylenol,  Aleve, Excedrin) if they help your headaches you should limit these to No more than 3 times a week to avoid medication overuse/rebound headaches  Ubrelvy 100 mg, take 1 tablet by mouth once as needed for a severe migraine headache  May repeat this dosage in 2 hours if needed  Do not exceed more than 2 dosages of Ubrelvy in 1 day  Prescription preventive medications for headaches/migraines   (to take every day to help prevent headaches - not to take at the time of headache):  [x] Depakote 500 mg, take 1 tablet by mouth once daily at bedtime  Starting Depakote 250 mg, take 1 tablet by mouth once daily at bedtime as well    *Typically these types of medications take time until you see the benefit, although some may see improvement in days, often it may take weeks, especially if the medication is being titrated up to a beneficial level  Please contact us if there are any concerns or questions regarding the medication  Over the counter preventive supplements for headaches/migraines (if you try, try for 3 months straight)  (to take every day to help prevent headaches - not to take at the time of headache): There are combo pills online of these - none of which regulated by FDA and double check dosing - take appropriate dose only once a day- prevent a migraine, migravent, mind ease, migrelief   [] Magnesium 400mg daily (If any diarrhea or upset stomach, decrease dose  as tolerated)  [] Riboflavin (Vitamin B2) 400mg daily (may make your urine bright/neon yellow)    Sleep and headache prevention:   [] Melatonin - you may take 1-3 mg nightly for sleep or headache prevention   You should take this 1 hour prior to bedtime consistently every night for it to work  It works by gradually helping to adjust your sleep time over days to weeks, rather than immediately making you feel sleepy  Lifestyle Recommendations:  [x] SLEEP - Maintain a regular sleep schedule: Adults need at least 7-8 hours of uninterrupted a night  Maintain good sleep hygiene:  Going to bed and waking up at consistent times, avoiding excessive daytime naps, avoiding caffeinated beverages in the evening, avoid excessive stimulation in the evening and generally using bed primarily for sleeping  One hour before bedtime would recommend turning lights down lower, decreasing your activity (may read quietly, listen to music at a low volume)  When you get into bed, should eliminate all technology (no texting, emailing, playing with your phone, iPad or tablet in bed)  [x] HYDRATION - Maintain good hydration  Drink  2L of fluid a day (4 typical small water bottles)  [x] DIET - Maintain good nutrition  In particular don't skip meals and try and eat healthy balanced meals regularly  [x] TRIGGERS - Look for other triggers and avoid them: Limit caffeine to 1-2 cups a day or less  Avoid dietary triggers that you have noticed bring on your headaches (this could include aged cheese, peanuts, MSG, aspartame and nitrates)  [x] EXERCISE - physical exercise as we all know is good for you in many ways, and not only is good for your heart, but also is beneficial for your mental health, cognitive health and  chronic pain/headaches  I would encourage at the least 5 days of physical exercise weekly for at least 30 minutes  Education and Follow-up  [x] Please call with any questions or concerns  Of course if any new concerning symptoms go to the emergency department  [x] Follow up in about 4 months time with [facility]  History of Present Illness:    We had the pleasure of evaluating Mary Grace Solo in neurological follow up "today for headaches  As you know,  she is a 29 y o  female who is presenting for the evaluation of her migraine headaches today  Patient last saw Liliana Geronimo in the office for evaluation of her migraine headaches  Patient had stated that the migraine headaches started at age 28  Noted that they were happening daily for about a years time  Noted that she wakes up with a headache  She had been taking ibuprofen 600 to 800 mg daily for a \"long time\" patient states that her headaches usually last all day  Usually on the entire right side of the face/head is where the headaches occur  States that it is a burning, pressure, stabbing, and shooting pain  Patient will sometimes have an aura further migraine headaches and sometimes see little dots in her vision  Patient has associated symptoms of nausea, vomiting, photophobia, phonophobia, lightheadedness or dizziness with the migraines  At the last appointment, patient was on Latuda so Marlene Paez wanted to avoid mood stabilizers  Marlene Paez felt to try Depakote as a preventative option, also to see if he could break her current migraine cycle that she is having  Patient had tried 2 other triptan medications in the past, so Marlene Paez had tried to see if she can get Nurtec approved for the patient  However, it looks like Nurtec was not approved but the patient ended up getting Ubrelvy instead  Current medical illnesses: Migraine with aura, history of epilepsy, cervical radiculopathy, cervical spondylosis, multiple environmental allergies, dizziness, history of ADHD, bipolar 1 disorder      What medications do you take or have you taken for your headaches?    Current Preventive:   Remeron, Depakote, Latuda, Lyrica, riboflavin, magnesium, flexeril     Current Abortive:   Ubrelvy    Prior Preventive:   Topamax, gabapentin    Prior Abortive:   Sumatriptan, rizatriptan, Robaxin, ibuprofen, Toradol    Interval updates as of 5/9/2023:  Patient states that her migraines has " stayed relatively about the same  Still occurring on almost an every day basis  She is very light sensitive today, currently wearing sunglasses at the visit today  Patient states that the Depakote and the Flexeril has not really seem to help significantly  However, she was open to giving the Depakote an additional try  She is open to the possibility of slightly increasing the dose of the Depakote to see how it helps with the migraine headaches  However, patient did state that the [place] had seem to be working for the abortive aspect of her migraines  She states for the really severe migraines that she has, the [place] does seem to do its job  Patient did note to me some right upper extremity weakness upon examination  However, she stated that she does have cervical radiculopathy and cervical spondylosis  States that a lot of the weakness and sometimes numbness and tingling she gets in that arm is due to her previous cervical diagnoses  What is your current pain level ? 6/10  How often do the headaches occur? Almost every day   Are you ever headache free? no    Headache history with updates: Alternative therapies used in the past for headaches? Hot compress on her forehead, and seems to help  Headache are worse if the patient: laying down makes headache more intense    No positional change headaches    Headache triggers:  Can't identify triggers always having it    Aura/warning and how long does it last ? Yes, spots in her vision     What time of the day do the headaches start? Wake up with headaches    How long do the headaches last?   Renuka has seemed to be helping to eliminate severe headaches    Describe your usual headache ? Pressure, stabbing, and burning sensation     Where is your headache located? Always on the right side of head    What is the intensity of pain?    10/10 pain    Associated symptoms:   -  nausea, vomiting,   - Photophobia, sometimes phonophobia,   - Problem with concentration  - Just spots in her vision  - light-headed or dizzy, stiff or sore neck,   - prefer to be alone and in a dark room, unable to work    Number of days missed per month because of headaches:  Work (or school) days: has to miss some days due to headaches  Social or Family activities: has to miss a few    Have you seen someone else for headaches or pain? Yes, PCP for headaches prior  Have you had trigger point injection performed and how often? No  Have you had Botox injection performed and how often? No   Have you had epidural injections or transforaminal injections performed? No    Are you current pregnant or planning on getting pregnant? No, on birth control  Have you ever had any Brain imaging? yes MRI brain without contrast, 02/20/2023  I personally reviewed these images  Reviewed old notes from physician seen in the past- see above HPI for summary of previous encounters         Past Medical History:   Diagnosis Date   • Abnormal Pap smear of cervix     2009 cryo; 6/2019 LSIL/AGC pap; 6/2019 colpo HSIL; 8/2019 LEEP HSIL w/neg margins; 6/2020 NILM pap/neg HPV; 6/2020 NILM pap/neg HPV   • ADHD    • Bipolar 1 disorder ([facility] [address] )    • Depression    • Epilepsy ([facility] [address] )     last episode 2000   • GERD (gastroesophageal reflux disease)    • Migraine    • OCD (obsessive compulsive disorder)    • Urogenital trichomoniasis 2019       Patient Active Problem List   Diagnosis   • Dizziness   • Migraine with aura and without status migrainosus, not intractable   • Yeast vaginitis   • COVID-19 virus infection   • Cervical radiculopathy   • Foraminal stenosis of cervical region   • Cervical spondylosis   • Herpes zoster without complication   • Multiple environmental allergies       Medications:      Current Outpatient Medications   Medication Sig Dispense Refill   • azelastine (ASTELIN) 0 1 % nasal spray 1 spray into each nostril 2 (two) times a day Use in each nostril as directed 30 mL 1   • cyclobenzaprine (FLEXERIL) 5 mg tablet Take 1 tablet (5 mg total) by mouth daily at bedtime as needed for muscle spasms 15 tablet 0   • divalproex sodium (DEPAKOTE) 500 mg EC tablet TAKE 1 TABLET(500 MG) BY MOUTH DAILY AT BEDTIME 90 tablet 0   • fexofenadine (ALLEGRA) 180 MG tablet Take 1 tablet (180 mg total) by mouth daily 90 tablet 1   • hydrOXYzine HCL (ATARAX) 10 mg tablet      • lurasidone (LATUDA) 40 mg tablet Take 40 mg by mouth in the morning     • Magnesium 400 MG TABS Take 1 tablet (400 mg total) by mouth in the morning 90 tablet 3   • medroxyPROGESTERone acetate (DEPO-PROVERA SYRINGE) 150 mg/mL injection ADMINISTER 1 ML IN THE MUSCLE EVERY 3 MONTHS 1 mL 5   • mirtazapine (REMERON) 15 mg tablet      • mometasone (ELOCON) 0 1 % lotion      • pregabalin (LYRICA) 75 mg capsule Take 1 capsule (75 mg total) by mouth 3 (three) times a day 90 capsule 1   • Riboflavin 400 MG TABS Take 1 tablet (400 mg total) by mouth in the morning 90 tablet 3   • Ubrogepant (Ubrelvy) 100 MG tablet Take 1 tablet (100 mg) one time as needed for migraine  May repeat one additional tablet (100 mg) at least two hours after the first dose  Do not use more than two doses per day, or for more than eight days per month  9 tablet 11     No current facility-administered medications for this visit          Allergies:    No Known Allergies    Family History:     Family History   Problem Relation Age of Onset   • Bipolar disorder Mother    • Diabetes Mother    • Migraines Mother    • Miscarriages / [place] Mother    • No Known Problems Father    • Depression Sister    • Asthma Sister    • Osteoporosis Maternal Grandmother    • Cancer Maternal Grandfather    • Breast cancer Neg Hx    • Colon cancer Neg Hx    • Ovarian cancer Neg Hx        Social History:     Social History     Socioeconomic History   • Marital status: Single     Spouse name: Not on file   • Number of children: Not on file   • Years of education: Not on file   • Highest education level: Not on file Occupational History   • Not on file   Tobacco Use   • Smoking status: Every Day     Packs/day: 1 00     Years: 11 00     Pack years: 11 00     Types: Cigarettes   • Smokeless tobacco: Current   Vaping Use   • Vaping Use: Never used   Substance and Sexual Activity   • Alcohol use: Yes     Comment: couple times/month   • Drug use: No   • Sexual activity: Yes     Partners: Male     Birth control/protection: Injection   Other Topics Concern   • Not on file   Social History Narrative   • Not on file     Social Determinants of Health     Financial Resource Strain: Low Risk    • Difficulty of Paying Living Expenses: Not hard at all   Food Insecurity: No Food Insecurity   • Worried About Running Out of Food in the Last Year: Never true   • Ran Out of Food in the Last Year: Never true   Transportation Needs: No Transportation Needs   • Lack of Transportation (Medical): No   • Lack of Transportation (Non-Medical): No   Physical Activity: Not on file   Stress: Not on file   Social Connections: Not on file   Intimate Partner Violence: Not on file   Housing Stability: Low Risk    • Unable to Pay for Housing in the Last Year: No   • Number of Places Lived in the Last Year: 1   • Unstable Housing in the Last Year: No         Objective:     Physical Exam:                                                                 Vitals:            Constitutional:    /60 (BP Location: Right arm, Patient Position: Sitting, Cuff Size: Standard)   Pulse 100   Temp 98 3 °F (36 8 °C) (Temporal)   Wt 58 4 kg (128 lb 12 8 oz)   LMP  (LMP Unknown)   SpO2 97%   BMI 21 43 kg/m²   BP Readings from Last 3 Encounters:   05/09/23 108/60   05/01/23 130/70   04/17/23 112/76     Pulse Readings from Last 3 Encounters:   05/09/23 100   05/01/23 (!) 109   04/17/23 97         Well developed, well nourished, well groomed  No dysmorphic features         Psychiatric:  Normal behavior and appropriate affect       Neurological Examination:     Mental status/cognitive function:   Orientated to time, place and person  Recent and remote memory intact  Attention span and concentration as well as fund of knowledge are appropriate for age  Normal language and spontaneous speech  Cranial Nerves:  II-visual fields full  III, IV, VI-Pupils were equal, round, and reactive to light and accomodation  Extraocular movements were full and conjugate without nystagmus  Conjugate gaze, normal smooth pursuits, normal saccades   V-facial sensation symmetric  VII-facial expression symmetric, intact forehead wrinkle, strong eye closure, symmetric smile    VIII-hearing grossly intact bilaterally   IX, X-palate elevation symmetric, no dysarthria  XI-shoulder shrug strength intact    XII-tongue protrusion midline  Motor Exam: symmetric bulk and tone throughout, no pronator drift  Power/strength 5/5 bilateral upper and lower extremities except for the right upper extremity  4/5 strength of the right upper extremity (previously known, due to cervical radiculopathy), no atrophy, fasciculations or abnormal movements noted  Sensory: grossly intact light touch in all extremities  Reflexes: brachioradialis 1+, biceps 1+, knee 2+ bilaterally  Coordination: Finger nose finger intact bilaterally, no apparent dysmetria, ataxia or tremor noted  Gait: steady casual and tandem gait  Review of Systems:     Review of Systems   Constitutional: Negative  Negative for appetite change and fever  HENT: Negative  Negative for hearing loss, tinnitus, trouble swallowing and voice change  Eyes: Positive for photophobia and visual disturbance (Spots)  Negative for pain  Respiratory: Negative  Negative for shortness of breath  Cardiovascular: Negative  Negative for palpitations  Gastrointestinal: Positive for nausea and vomiting  Endocrine: Negative  Negative for cold intolerance  Genitourinary: Negative  Negative for dysuria, frequency and urgency  Musculoskeletal: Negative  Negative for gait problem, myalgias and neck pain  Skin: Negative  Negative for rash  Allergic/Immunologic: Negative  Neurological: Positive for dizziness, light-headedness and headaches (Daily, right side of head, piercing pain)  Negative for tremors, seizures, syncope, facial asymmetry, speech difficulty, weakness and numbness  Hematological: Negative  Does not bruise/bleed easily  Psychiatric/Behavioral: Negative  Negative for confusion, hallucinations and sleep disturbance  I personally reviewed the ROS entered by the MA    I spent 25 minutes in total time for this visit      Author:  Fred Guillory PA-C 5/9/2023 9:15 AM

## 2023-05-09 NOTE — PROGRESS NOTES
Review of Systems   Constitutional: Negative  Negative for appetite change and fever  HENT: Negative  Negative for hearing loss, tinnitus, trouble swallowing and voice change  Eyes: Positive for photophobia and visual disturbance (Spots)  Negative for pain  Respiratory: Negative  Negative for shortness of breath  Cardiovascular: Negative  Negative for palpitations  Gastrointestinal: Positive for nausea and vomiting  Endocrine: Negative  Negative for cold intolerance  Genitourinary: Negative  Negative for dysuria, frequency and urgency  Musculoskeletal: Negative  Negative for gait problem, myalgias and neck pain  Skin: Negative  Negative for rash  Allergic/Immunologic: Negative  Neurological: Positive for dizziness, light-headedness and headaches (Daily, right side of head, piercing pain)  Negative for tremors, seizures, syncope, facial asymmetry, speech difficulty, weakness and numbness  Hematological: Negative  Does not bruise/bleed easily  Psychiatric/Behavioral: Negative  Negative for confusion, hallucinations and sleep disturbance

## 2023-05-09 NOTE — PATIENT INSTRUCTIONS
Migraines:    I had the pleasure of seeing Amanda Mckeon today at [facility] [address] [facility] in [place]  She is presenting today as an office visit follow-up in regards to her migraine headaches  She states that her migraine headaches have been about the same since last time  Noted that the Depakote and the Flexeril have not helped as much as she would have anticipated to prevent the migraines from occurring  However, she notes that the [place] that she received for abortive therapy does help with the significant headaches that she is currently having  We did go over the patient's MRI brain results, showed no acute infarct, hemorrhage, or mass effect  No intracranial abnormalities which would be leading to the patient's migraines at this time     -Patient would still like to continue with the Depakote therapy  She would like to try and increase the dose and see if we can have a little bit more success with the medication that way  Would like her to take an additional 250 mg of Depakote at bedtime  Still, along with her 500 mg that she already takes  If the patient is not having much success with her migraine headaches after increase in dosage, we may taper her down at the next visit and try something else   -Still can continue to take the Flexeril as needed for muscle stiffness/tightness in her neck   -Would encourage the patient to still take Ubrelvy as needed when a severe migraine seems to come on  For the more mild to moderate headaches, if the patient would like to take either 1000 mg of Tylenol or 600 mg of ibuprofen that would be appropriate as well  Also, if the patient does have a very severe headache and she would like to try taking either 1000 mg of Tylenol or 600 mg of ibuprofen with her [place], that is suitable at this time      Patient Instructions:    Headache Calendar  Please maintain a headache calendar  Consider using phone applications such as Migraine Buddy or Migraine Diary    Headache/migraine treatment:     Rescue medications (for immediate treatment of a headache): It is ok to take ibuprofen, acetaminophen or naproxen (Advil, Tylenol,  Aleve, Excedrin) if they help your headaches you should limit these to No more than 3 times a week to avoid medication overuse/rebound headaches  Ubrelvy 100 mg, take 1 tablet by mouth once as needed for a severe migraine headache  May repeat this dosage in 2 hours if needed  Do not exceed more than 2 dosages of Ubrelvy in 1 day  Prescription preventive medications for headaches/migraines   (to take every day to help prevent headaches - not to take at the time of headache):  [x] Depakote 500 mg, take 1 tablet by mouth once daily at bedtime  Starting Depakote 250 mg, take 1 tablet by mouth once daily at bedtime as well    *Typically these types of medications take time until you see the benefit, although some may see improvement in days, often it may take weeks, especially if the medication is being titrated up to a beneficial level  Please contact us if there are any concerns or questions regarding the medication  Over the counter preventive supplements for headaches/migraines (if you try, try for 3 months straight)  (to take every day to help prevent headaches - not to take at the time of headache): There are combo pills online of these - none of which regulated by FDA and double check dosing - take appropriate dose only once a day- prevent a migraine, migravent, mind ease, migrelief   [] Magnesium 400mg daily (If any diarrhea or upset stomach, decrease dose  as tolerated)  [] Riboflavin (Vitamin B2) 400mg daily (may make your urine bright/neon yellow)    Sleep and headache prevention:   [] Melatonin - you may take 1-3 mg nightly for sleep or headache prevention  You should take this 1 hour prior to bedtime consistently every night for it to work   It works by gradually helping to adjust your sleep time over days to weeks, rather than immediately making you feel sleepy  Lifestyle Recommendations:  [x] SLEEP - Maintain a regular sleep schedule: Adults need at least 7-8 hours of uninterrupted a night  Maintain good sleep hygiene:  Going to bed and waking up at consistent times, avoiding excessive daytime naps, avoiding caffeinated beverages in the evening, avoid excessive stimulation in the evening and generally using bed primarily for sleeping  One hour before bedtime would recommend turning lights down lower, decreasing your activity (may read quietly, listen to music at a low volume)  When you get into bed, should eliminate all technology (no texting, emailing, playing with your phone, iPad or tablet in bed)  [x] HYDRATION - Maintain good hydration  Drink  2L of fluid a day (4 typical small water bottles)  [x] DIET - Maintain good nutrition  In particular don't skip meals and try and eat healthy balanced meals regularly  [x] TRIGGERS - Look for other triggers and avoid them: Limit caffeine to 1-2 cups a day or less  Avoid dietary triggers that you have noticed bring on your headaches (this could include aged cheese, peanuts, MSG, aspartame and nitrates)  [x] EXERCISE - physical exercise as we all know is good for you in many ways, and not only is good for your heart, but also is beneficial for your mental health, cognitive health and  chronic pain/headaches  I would encourage at the least 5 days of physical exercise weekly for at least 30 minutes  Education and Follow-up  [x] Please call with any questions or concerns  Of course if any new concerning symptoms go to the emergency department    [x] Follow up in about 4 months time with [facility]

## 2023-05-11 ENCOUNTER — CLINICAL SUPPORT (OUTPATIENT)
Dept: OBGYN CLINIC | Facility: CLINIC | Age: 33
End: 2023-05-11

## 2023-05-11 VITALS
BODY MASS INDEX: 21.26 KG/M2 | SYSTOLIC BLOOD PRESSURE: 112 MMHG | HEIGHT: 65 IN | WEIGHT: 127.6 LBS | DIASTOLIC BLOOD PRESSURE: 79 MMHG | HEART RATE: 112 BPM

## 2023-05-11 DIAGNOSIS — Z30.09 UNWANTED FERTILITY: Primary | ICD-10-CM

## 2023-05-11 RX ORDER — MEDROXYPROGESTERONE ACETATE 150 MG/ML
150 INJECTION, SUSPENSION INTRAMUSCULAR ONCE
Status: COMPLETED | OUTPATIENT
Start: 2023-05-11 | End: 2023-05-11

## 2023-05-11 RX ADMIN — MEDROXYPROGESTERONE ACETATE 150 MG: 150 INJECTION, SUSPENSION INTRAMUSCULAR at 09:07

## 2023-05-18 ENCOUNTER — TELEPHONE (OUTPATIENT)
Dept: PAIN MEDICINE | Facility: CLINIC | Age: 33
End: 2023-05-18

## 2023-05-18 NOTE — TELEPHONE ENCOUNTER
S/W pt about her pain and discomfort, pt reports she had worse neck pain last week but this week she has a shocking sensation from her right elbow to her fingers, her right fingers are red, swollen, and hard to  anything  Nurse asked pt what she is doing for her pain, pt is taking lyrica, flexeril, unable to take ibuprofen per neurology and her migrainus, nurse advised pt to try ice/heat, OTC pain creams along with tylenol (advised not to exceed 3,000mg in 24 hours)  While on phone nurse offered OVS, pt agreed, appointment scheduled for 5/19  Pt appreciative of call

## 2023-05-18 NOTE — TELEPHONE ENCOUNTER
Caller: Catalina Rosario    Doctor: Wendall Homans     Reason for call: R hand unable to  and hold anything, Fingers are crooked started last night, Fingers are swollen       Call back#: [phone]

## 2023-05-19 ENCOUNTER — OFFICE VISIT (OUTPATIENT)
Dept: PAIN MEDICINE | Facility: CLINIC | Age: 33
End: 2023-05-19

## 2023-05-19 ENCOUNTER — TELEPHONE (OUTPATIENT)
Dept: OBGYN CLINIC | Facility: OTHER | Age: 33
End: 2023-05-19

## 2023-05-19 VITALS
BODY MASS INDEX: 21.16 KG/M2 | HEIGHT: 65 IN | HEART RATE: 116 BPM | DIASTOLIC BLOOD PRESSURE: 84 MMHG | WEIGHT: 127 LBS | SYSTOLIC BLOOD PRESSURE: 130 MMHG

## 2023-05-19 DIAGNOSIS — M79.2 NEUROPATHIC PAIN: Primary | ICD-10-CM

## 2023-05-19 DIAGNOSIS — M79.601 RIGHT ARM PAIN: ICD-10-CM

## 2023-05-19 RX ORDER — PREGABALIN 100 MG/1
100 CAPSULE ORAL 3 TIMES DAILY
Qty: 90 CAPSULE | Refills: 1 | Status: SHIPPED | OUTPATIENT
Start: 2023-05-19

## 2023-05-19 NOTE — PROGRESS NOTES
Assessment:  1  Neuropathic pain    2  Right arm pain        Plan:  1  We will increase pregabalin to 100 mg 3 times a day for pain complaints  I advised the patient that if they experience any side effects or issues with the changes in their medication regiment, they should give our office a call to discuss  I also advised the patient not to drive or operate machinery until they see how the changes in the medication regimen affects them  The patient was agreeable and verbalized an understanding  2   I will refer the patient to an orthopedic hand specialist to rule out CTS/cubital tunnel  3   Patient may continue cyclobenzaprine as prescribed  4  Patient avoids NSAIDs secondary to migraines  5  Continue to follow with neurology as scheduled  6  Follow-up after orthopedic evaluation or sooner if needed    History of Present Illness: The patient is a 29 y o  female last seen on 01/10/2023 who presents for a follow up office visit in regards to chronic right arm pain primarily from the elbow into the right hand and no specific dermatomal distribution  She states that her right hand is cramping and she is unable to straighten her fingers  She has had a C6-7 cervical epidural steroid injection in October 2022 without relief  Raymundo Valverdeila of the cervical spine reveals mild left foraminal stenosis at C6-7 otherwise unremarkable  EMG of the right upper extremity is unremarkable  He has had right subacromial bursa injections without relief  She has cycled through numerous muscle relaxants, and NSAIDs without relief  She is currently taking pregabalin 75 mg 3 times a day without relief  The patient rates her pain a 10 out of 10 on the numeric pain rating scale    She constantly has pain throughout the day which is described as sharp, shooting and pins-and-needles    I have personally reviewed and/or updated the patient's past medical history, past surgical history, family history, social history, current medications, allergies, and vital signs today         Review of Systems:    Review of Systems      Past Medical History:   Diagnosis Date   • Abnormal Pap smear of cervix     2009 cryo; 6/2019 LSIL/AGC pap; 6/2019 colpo HSIL; 8/2019 LEEP HSIL w/neg margins; 6/2020 NILM pap/neg HPV; 6/2020 NILM pap/neg HPV   • ADHD    • Bipolar 1 disorder ([facility] )    • Depression    • [facility] )     last episode 2000   • GERD (gastroesophageal reflux disease)    • Migraine    • OCD (obsessive compulsive disorder)    • Urogenital trichomoniasis 2019       Past Surgical History:   Procedure Laterality Date   • GYNECOLOGIC CRYOSURGERY  2009   • GA COLPOSCOPY CERVIX VAG LOOP ELTRD BX CERVIX N/A 08/09/2019    Procedure: BIOPSY LEEP CERVIX;  Surgeon: Roseanne Gomez MD;  Location: [facility];  Service: Gynecology       Family History   Problem Relation Age of Onset   • Bipolar disorder Mother    • Diabetes Mother    • Migraines Mother    • Miscarriages / [place] Mother    • No Known Problems Father    • Depression Sister    • Asthma Sister    • Osteoporosis Maternal Grandmother    • Cancer Maternal Grandfather    • Breast cancer Neg Hx    • Colon cancer Neg Hx    • Ovarian cancer Neg Hx        Social History     Occupational History   • Not on file   Tobacco Use   • Smoking status: Every Day     Packs/day: 1 00     Years: 11 00     Pack years: 11 00     Types: Cigarettes   • Smokeless tobacco: Current   Vaping Use   • Vaping Use: Never used   Substance and Sexual Activity   • Alcohol use: Yes     Comment: couple times/month   • Drug use: No   • Sexual activity: Yes     Partners: Male     Birth control/protection: Injection         Current Outpatient Medications:   •  Magnesium 400 MG TABS, Take 1 tablet (400 mg total) by mouth in the morning, Disp: 90 tablet, Rfl: 3  •  pregabalin (LYRICA) 100 mg capsule, Take 1 capsule (100 mg total) by mouth 3 (three) times a day, Disp: 90 capsule, Rfl: 1  •  Riboflavin 400 MG TABS, Take 1 "tablet (400 mg total) by mouth in the morning, Disp: 90 tablet, Rfl: 3  •  azelastine (ASTELIN) 0 1 % nasal spray, 1 spray into each nostril 2 (two) times a day Use in each nostril as directed, Disp: 30 mL, Rfl: 1  •  cyclobenzaprine (FLEXERIL) 5 mg tablet, Take 1 tablet (5 mg total) by mouth daily at bedtime as needed for muscle spasms, Disp: 15 tablet, Rfl: 0  •  divalproex sodium (DEPAKOTE) 250 mg DR tablet, TAKE 1 TABLET(250 MG) BY MOUTH DAILY AT BEDTIME, Disp: 90 tablet, Rfl: 0  •  divalproex sodium (DEPAKOTE) 500 mg EC tablet, TAKE 1 TABLET(500 MG) BY MOUTH DAILY AT BEDTIME, Disp: 90 tablet, Rfl: 0  •  fexofenadine (ALLEGRA) 180 MG tablet, Take 1 tablet (180 mg total) by mouth daily, Disp: 90 tablet, Rfl: 1  •  hydrOXYzine HCL (ATARAX) 10 mg tablet, , Disp: , Rfl:   •  lurasidone (LATUDA) 40 mg tablet, Take 40 mg by mouth in the morning, Disp: , Rfl:   •  medroxyPROGESTERone acetate (DEPO-PROVERA SYRINGE) 150 mg/mL injection, ADMINISTER 1 ML IN THE MUSCLE EVERY 3 MONTHS, Disp: 1 mL, Rfl: 5  •  mirtazapine (REMERON) 15 mg tablet, , Disp: , Rfl:   •  mometasone (ELOCON) 0 1 % lotion, , Disp: , Rfl:   •  Ubrogepant (Ubrelvy) 100 MG tablet, Take 1 tablet (100 mg) one time as needed for migraine  May repeat one additional tablet (100 mg) at least two hours after the first dose  Do not use more than two doses per day, or for more than eight days per month , Disp: 9 tablet, Rfl: 11    No Known Allergies    Physical Exam:    /84   Pulse (!) 116   Ht 5' 5\" (1 651 m)   Wt 57 6 kg (127 lb)   BMI 21 13 kg/m²     Constitutional:normal, well developed, well nourished, alert, in no distress and non-toxic and no overt pain behavior    Eyes:anicteric  HEENT:grossly intact  Neck:supple, symmetric, trachea midline and no masses   Pulmonary:even and unlabored  Cardiovascular:No edema or pitting edema present  Skin:Normal without rashes or lesions and well hydrated  Psychiatric:Mood and affect " appropriate  Neurologic:Cranial Nerves II-XII grossly intact  Musculoskeletal:normal gait  Patient's right  strength 2/5   Full range of motion of the cervical spine      Imaging  No orders to display         Orders Placed This Encounter   Procedures   • Ambulatory referral to Orthopedic Surgery

## 2023-05-19 NOTE — TELEPHONE ENCOUNTER
Patient is being referred to a orthopedics  Please schedule accordingly      [address]   [phone]

## 2023-05-22 NOTE — TELEPHONE ENCOUNTER
Nay Roberson,  This patient called our office saying that the ortho office didn't think they needed to see her and to see neurology as well as obtain an EMG  She did have an EMG recently which was normal, MRI C spine is normal, and she's also already following with neuro, but when I see her in the office, her symptoms appear to be CTS/Cubital tunnel related  I wasn't sure if orthopedics would see her to evaluate her from their end and potentially order an US to rule it in/out  If not, that is okay, I just wanted to relay why I referred her there  Thanks!   Madelyn Crabtree

## 2023-05-24 ENCOUNTER — TELEPHONE (OUTPATIENT)
Dept: PAIN MEDICINE | Facility: CLINIC | Age: 33
End: 2023-05-24

## 2023-05-24 NOTE — TELEPHONE ENCOUNTER
S/W Robinson Cespedes in regards to [facility] message sent on a previous task  Robinson Cespedes to relay message in regards to possible US order to be placed  Robinson Cespedes will discuss with hand specialist and call patient with recommendation

## 2023-06-02 ENCOUNTER — OFFICE VISIT (OUTPATIENT)
Dept: OBGYN CLINIC | Facility: CLINIC | Age: 33
End: 2023-06-02

## 2023-06-02 VITALS
HEIGHT: 65 IN | SYSTOLIC BLOOD PRESSURE: 110 MMHG | BODY MASS INDEX: 21.16 KG/M2 | DIASTOLIC BLOOD PRESSURE: 78 MMHG | WEIGHT: 127 LBS

## 2023-06-02 DIAGNOSIS — R20.2 PARESTHESIA OF RIGHT ARM: Primary | ICD-10-CM

## 2023-06-02 DIAGNOSIS — M79.2 NEUROPATHIC PAIN: ICD-10-CM

## 2023-06-02 RX ORDER — LURASIDONE HYDROCHLORIDE 20 MG/1
TABLET, FILM COATED ORAL
COMMUNITY
Start: 2023-05-16

## 2023-06-02 NOTE — PROGRESS NOTES
Patient Name:  Amy Dye  MRN:  [id number]    Assessment & Plan     Right upper extremity pain and paresthesias  1  Patient has a history of right-sided cervical radiculopathy and has been seeing pain management  She did undergo an epidural steroid injection in 2023 without significant improvement  She also underwent an EMG in July 2022 which was normal   At this time pain management recommended evaluation by orthopedics for possible cubital and carpal tunnel syndrome  She does exhibit some numbness and tingling in the cubital tunnel and carpal tunnel distribution  At this time we will obtain an ultrasound of the elbow and wrist to evaluate the cubital and carpal tunnels  2  Follow-up after ultrasound with primary care sports medicine  3  Continue Lyrica and close follow-up with pain management  Chief Complaint     Right upper extremity paresthesias  History of the Present Illness     Amy Dye is a 29 y o  right-hand-dominant female who reports to the office today for evaluation of her right upper extremity  Patient noted onset of pain and paresthesias in the right upper extremity and cervical spine approximately 2 years ago  She was diagnosed with a pinched nerve in her neck and has been seeing pain management  She did undergo a cervical epidural steroid injection by them which provided no improvement  She also underwent an EMG which was normal as well as a subacromial corticosteroid injection into the shoulder which provided no benefit as well  She has been aching Lyrica with some improvement and has completed formal physical therapy in the past as well  She still notes paresthesias in the right upper extremity primarily in the hand  Numbness and tingling occurs in all digits and she describes a burning type pain  It is worse at night and causes her to wake up  She notes weakness and decreased  strength as well  No fevers or chills      Physical Exam     BP "110/78   Ht 5' 5\" (1 651 m)   Wt 57 6 kg (127 lb)   BMI 21 13 kg/m²     Right upper extremity: Skin intact  No erythema ecchymosis or swelling  No thenar atrophy or first dorsal interosseous space atrophy  Full wrist flexion and extension without pain  Full composite fist formation  Full elbow range of motion as well  No tenderness over the lateral and medial epicondyles as well as the olecranon  No tenderness over the distal radius, distal ulna, and carpus  Positive Tinel's and Durkan's compression test about the carpal tunnel  Negative Tinel's over the cubital tunnel  No evidence of ulnar nerve subluxation with range of motion of the elbow  5 out of 5 APB strength  Sensation intact but diminished in the ulnar and ED nerve distribution  Sensation intact in the radial nerve distribution  2+ radial pulse  Eyes: Anicteric sclerae  ENT: Trachea midline  Lungs: Normal respiratory effort  CV: Capillary refill is less than 2 seconds  Skin: Intact without erythema  Lymph: No palpable lymphadenopathy  Neuro: Sensation is grossly intact to light touch  Psych: Mood and affect are appropriate  Data Review     I have personally reviewed pertinent films in PACS, and my interpretation follows:    EMG right upper extremity 7/7/2022 is normal     MRI cervical spine 5/21/2022 reveals mild left foraminal stenosis at C6-C7      Past Medical History:   Diagnosis Date   • Abnormal Pap smear of cervix     2009 cryo; 6/2019 LSIL/AGC pap; 6/2019 colpo HSIL; 8/2019 LEEP HSIL w/neg margins; 6/2020 NILM pap/neg HPV; 6/2020 NILM pap/neg HPV   • ADHD    • Bipolar 1 disorder ([facility] )    • Depression    • Epilepsy ([facility] )     last episode 2000   • GERD (gastroesophageal reflux disease)    • Migraine    • OCD (obsessive compulsive disorder)    • Urogenital trichomoniasis 2019       Past Surgical History:   Procedure Laterality Date   • GYNECOLOGIC CRYOSURGERY  2009   • NM COLPOSCOPY CERVIX VAG LOOP ELTRD BX CERVIX N/A " 08/09/2019    Procedure: BIOPSY LEEP CERVIX;  Surgeon: Samy Melton MD;  Location: [facility];  Service: Gynecology       No Known Allergies    Current Outpatient Medications on File Prior to Visit   Medication Sig Dispense Refill   • azelastine (ASTELIN) 0 1 % nasal spray 1 spray into each nostril 2 (two) times a day Use in each nostril as directed 30 mL 1   • cyclobenzaprine (FLEXERIL) 5 mg tablet Take 1 tablet (5 mg total) by mouth daily at bedtime as needed for muscle spasms 15 tablet 0   • fexofenadine (ALLEGRA) 180 MG tablet Take 1 tablet (180 mg total) by mouth daily 90 tablet 1   • hydrOXYzine HCL (ATARAX) 10 mg tablet      • Latuda 20 MG tablet      • lurasidone (LATUDA) 40 mg tablet Take 40 mg by mouth in the morning     • Magnesium 400 MG TABS Take 1 tablet (400 mg total) by mouth in the morning 90 tablet 3   • medroxyPROGESTERone acetate (DEPO-PROVERA SYRINGE) 150 mg/mL injection ADMINISTER 1 ML IN THE MUSCLE EVERY 3 MONTHS 1 mL 5   • mirtazapine (REMERON) 15 mg tablet      • mometasone (ELOCON) 0 1 % lotion      • pregabalin (LYRICA) 100 mg capsule Take 1 capsule (100 mg total) by mouth 3 (three) times a day 90 capsule 1   • Riboflavin 400 MG TABS Take 1 tablet (400 mg total) by mouth in the morning 90 tablet 3   • Ubrogepant (Ubrelvy) 100 MG tablet Take 1 tablet (100 mg) one time as needed for migraine  May repeat one additional tablet (100 mg) at least two hours after the first dose  Do not use more than two doses per day, or for more than eight days per month  9 tablet 11   • divalproex sodium (DEPAKOTE) 250 mg DR tablet TAKE 1 TABLET(250 MG) BY MOUTH DAILY AT BEDTIME 90 tablet 0   • divalproex sodium (DEPAKOTE) 500 mg EC tablet TAKE 1 TABLET(500 MG) BY MOUTH DAILY AT BEDTIME 90 tablet 0     No current facility-administered medications on file prior to visit         Social History     Tobacco Use   • Smoking status: Every Day     Packs/day: 1 00     Years: 11 00     Total pack years: 11 00 Types: Cigarettes   • Smokeless tobacco: Current   Vaping Use   • Vaping Use: Never used   Substance Use Topics   • Alcohol use: Yes     Comment: couple times/month   • Drug use: No       Family History   Problem Relation Age of Onset   • Bipolar disorder Mother    • Diabetes Mother    • Migraines Mother    • Miscarriages / [place] Mother    • No Known Problems Father    • Depression Sister    • Asthma Sister    • Osteoporosis Maternal Grandmother    • Cancer Maternal Grandfather    • Breast cancer Neg Hx    • Colon cancer Neg Hx    • Ovarian cancer Neg Hx        Review of Systems     As stated in the HPI  All other systems reviewed and are negative

## 2023-06-21 ENCOUNTER — PATIENT OUTREACH (OUTPATIENT)
Dept: OBGYN CLINIC | Facility: CLINIC | Age: 33
End: 2023-06-21

## 2023-06-21 ENCOUNTER — ANNUAL EXAM (OUTPATIENT)
Dept: OBGYN CLINIC | Facility: CLINIC | Age: 33
End: 2023-06-21

## 2023-06-21 VITALS
HEART RATE: 107 BPM | WEIGHT: 128.6 LBS | BODY MASS INDEX: 21.43 KG/M2 | HEIGHT: 65 IN | SYSTOLIC BLOOD PRESSURE: 116 MMHG | DIASTOLIC BLOOD PRESSURE: 72 MMHG

## 2023-06-21 DIAGNOSIS — Z01.419 ENCOUNTER FOR GYNECOLOGICAL EXAMINATION WITHOUT ABNORMAL FINDING: Primary | ICD-10-CM

## 2023-06-21 DIAGNOSIS — Z13.31 POSITIVE DEPRESSION SCREENING: ICD-10-CM

## 2023-06-21 DIAGNOSIS — Z12.39 ENCOUNTER FOR BREAST CANCER SCREENING USING NON-MAMMOGRAM MODALITY: ICD-10-CM

## 2023-06-21 DIAGNOSIS — Z11.3 SCREEN FOR STD (SEXUALLY TRANSMITTED DISEASE): ICD-10-CM

## 2023-06-21 DIAGNOSIS — Z12.4 SCREENING FOR CERVICAL CANCER: ICD-10-CM

## 2023-06-21 PROBLEM — B37.31 YEAST VAGINITIS: Status: RESOLVED | Noted: 2022-08-12 | Resolved: 2023-06-21

## 2023-06-21 PROBLEM — M48.02 FORAMINAL STENOSIS OF CERVICAL REGION: Status: RESOLVED | Noted: 2022-09-30 | Resolved: 2023-06-21

## 2023-06-21 PROBLEM — B02.9 HERPES ZOSTER WITHOUT COMPLICATION: Status: RESOLVED | Noted: 2022-11-01 | Resolved: 2023-06-21

## 2023-06-21 PROBLEM — U07.1 COVID-19 VIRUS INFECTION: Status: RESOLVED | Noted: 2022-08-24 | Resolved: 2023-06-21

## 2023-06-21 PROBLEM — Z91.09 MULTIPLE ENVIRONMENTAL ALLERGIES: Status: RESOLVED | Noted: 2023-05-01 | Resolved: 2023-06-21

## 2023-06-21 PROBLEM — R42 DIZZINESS: Status: RESOLVED | Noted: 2020-05-12 | Resolved: 2023-06-21

## 2023-06-21 PROCEDURE — 99395 PREV VISIT EST AGE 18-39: CPT | Performed by: NURSE PRACTITIONER

## 2023-06-21 PROCEDURE — 87591 N.GONORRHOEAE DNA AMP PROB: CPT | Performed by: NURSE PRACTITIONER

## 2023-06-21 PROCEDURE — 87491 CHLMYD TRACH DNA AMP PROBE: CPT | Performed by: NURSE PRACTITIONER

## 2023-06-21 NOTE — LETTER
2023    To Gonzalo Villarreal  : 1990      This letter is to advise you that your recent CULTURES for gonorrhea and chlamydia were reviewed by me and are NORMAL  Please contact the office for an appointment if you have any additional concerns      [id number] [facility] Francisco Mercado

## 2023-06-21 NOTE — PROGRESS NOTES
Jane Ontiveros is a 29 y o  female who presents today for annual GYN exam   Her last pap smear was performed 2021 and result was NILM with negative HPV  She reports no history of abnormal pap smears in her past   She reports menses as absent due to [address] contraception  Her general medical history has been reviewed and she reports it as follows:    Past Medical History:   Diagnosis Date   • Abnormal Pap smear of cervix      cryo; 2019 LSIL/AGC pap; 2019 colpo HSIL; 2019 LEEP HSIL w/neg margins; 2020 NILM pap/neg HPV; 2020 NILM pap/neg HPV   • ADHD    • Anemia    • Bipolar 1 disorder ([facility] [address] )    • Depression    • Epilepsy ([facility] )     last episode    • GERD (gastroesophageal reflux disease)    • Migraine    • OCD (obsessive compulsive disorder)    • Urogenital trichomoniasis      Past Surgical History:   Procedure Laterality Date   • GYNECOLOGIC CRYOSURGERY     • MI COLPOSCOPY CERVIX VAG LOOP ELTRD BX CERVIX N/A 2019    Procedure: BIOPSY LEEP CERVIX;  Surgeon: Jose Farr MD;  Location: [facility] OR;  Service: Gynecology     OB History        0    Para   0    Term   0       0    AB   0    Living   0       SAB   0    IAB   0    Ectopic   0    Multiple   0    Live Births   0               Social History     Tobacco Use   • Smoking status: Every Day     Packs/day: 1 00     Years: 11 00     Total pack years:  00     Types: Cigarettes   • Smokeless tobacco: Never   Vaping Use   • Vaping Use: Never used   Substance Use Topics   • Alcohol use: Yes     Comment: couple times/month   • Drug use: Never     Social History     Substance and Sexual Activity   Sexual Activity Yes   • Partners: Male   • Birth control/protection: Injection     Cancer-related family history includes Cancer in her maternal grandfather  There is no history of Breast cancer, Colon cancer, or Ovarian cancer      Current Outpatient Medications "  Medication Instructions   • azelastine (ASTELIN) 0 1 % nasal spray 1 spray, Nasal, 2 times daily, Use in each nostril as directed   • divalproex sodium (DEPAKOTE) 250 mg DR tablet TAKE 1 TABLET(250 MG) BY MOUTH DAILY AT BEDTIME   • divalproex sodium (DEPAKOTE) 500 mg EC tablet TAKE 1 TABLET(500 MG) BY MOUTH DAILY AT BEDTIME   • fexofenadine (ALLEGRA) 180 mg, Oral, Daily   • lurasidone (LATUDA) 40 mg, Oral, Daily   • Magnesium 400 mg, Oral, Daily   • medroxyPROGESTERone acetate (DEPO-PROVERA SYRINGE) 150 mg/mL injection ADMINISTER 1 ML IN THE MUSCLE EVERY 3 MONTHS   • mirtazapine (REMERON) 15 mg tablet No dose, route, or frequency recorded  • mometasone (ELOCON) 0 1 % lotion No dose, route, or frequency recorded  • pregabalin (LYRICA) 100 mg, Oral, 3 times daily   • Riboflavin 400 mg, Oral, Daily   • Ubrogepant (Ubrelvy) 100 MG tablet Take 1 tablet (100 mg) one time as needed for migraine  May repeat one additional tablet (100 mg) at least two hours after the first dose  Do not use more than two doses per day, or for more than eight days per month  Review of Systems:  Review of Systems   Constitutional: Negative  Gastrointestinal: Negative  Genitourinary: Negative for difficulty urinating, menstrual problem, pelvic pain and vaginal discharge  Skin: Negative  Physical Exam:  /72   Pulse (!) 107   Ht 5' 5\" (1 651 m)   Wt 58 3 kg (128 lb 9 6 oz)   BMI 21 40 kg/m²   Physical Exam  Constitutional:       General: She is not in acute distress  Appearance: She is well-developed  Genitourinary:      Vulva normal       No lesions in the vagina  Right Adnexa: not tender and no mass present  Left Adnexa: not tender and no mass present  No cervical motion tenderness or lesion  Uterus is not tender  Breasts:     Right: No mass, nipple discharge, skin change or tenderness  Left: No mass, nipple discharge, skin change or tenderness     Neck:      Thyroid: No " thyromegaly  Cardiovascular:      Rate and Rhythm: Normal rate and regular rhythm  Pulmonary:      Effort: Pulmonary effort is normal    Abdominal:      Palpations: Abdomen is soft  Tenderness: There is no abdominal tenderness  Musculoskeletal:      Cervical back: Neck supple  Neurological:      Mental Status: She is alert and oriented to person, place, and time  Skin:     General: Skin is warm and dry  Vitals reviewed  Assessment/Plan:   1  Normal well-woman GYN exam   2  Cervical cancer screening:  Normal cervical exam   Pap smear not indicated at this time  Has received HPV vaccine in the past    3  STD screening:  Orders placed for vaginal GC/CT cultures  Orders placed for serum anti-HIV, anti-HCV, HbsAg, syphilis panel  4  Breast cancer screening:  Normal breast exam   Reviewed breast self-awareness  5  Depression Screening: Patient's depression screening was assessed with a PHQ-2 score of 3  Their PHQ-9 score was 10  Patient assessed for underlying major depression  They have no active suicidal ideations  Brief counseling provided and recommend additional follow-up/re-evaluation next office visit  Referral placed for  consultation  6  BMI Counseling: Body mass index is 21 4 kg/m²  No intervention indicated  7  Tobacco Cessation Counseling: Tobacco cessation counseling and education was provided  The patient is sincerely urged to quit consumption of tobacco  She is not ready to quit tobacco  The numerous health risks of tobacco consumption were discussed  If she decides to quit, there are a number of helpful adjunctive aids, and she can see me to discuss nicotine replacement therapy, chantix, or bupropion anytime in the future  8  Contraception:  Desires to switch from Depoprovera to [place] IUD  We will obtain insurance authorization and once device is delivered here to clinic, we will then contact patient to schedule insertion procedure     9  Return to office in 1 year for annual GYN exam     Reviewed with patient that test results are available in [address] immediately, but that they will not necessarily be reviewed by me immediately  Explained that I will review results at my earliest opportunity and contact patient appropriately

## 2023-06-21 NOTE — PATIENT INSTRUCTIONS
Thank you for your confidence in our team    We appreciate you and welcome your feedback  If you receive a survey from us, please take a few moments to let us know how we are doing  Sincerely,  KARTIK [place]       Cigarette Smoking and Your Health     What are the risks to my health if I smoke tobacco?  Nicotine and other chemicals found in tobacco damage every cell in your body  Even if you are a light smoker, you have an increased risk for cancer, heart disease, and lung disease  If you are pregnant or have diabetes, smoking increases your risk for complications  What are the benefits to my health if I stop smoking? You decrease respiratory symptoms such as coughing, wheezing, and shortness of breath  You reduce your risk for cancers of the lung, mouth, throat, kidney, bladder, pancreas, stomach, and cervix  If you already have cancer, you increase the benefits of chemotherapy  You also reduce your risk for cancer returning or a second cancer from developing  You reduce your risk for heart disease, blood clots, heart attack, and stroke  You reduce your risk for lung infections, and diseases such as pneumonia, asthma, chronic bronchitis, and emphysema  Your circulation improves  More oxygen can be delivered to your body  If you have diabetes, you lower your risk for complications, such as kidney, artery, and eye diseases  You also lower your risk for nerve damage  Nerve damage can lead to amputations, poor vision, and blindness  You improve your body's ability to heal and to fight infections  What are the health benefits to others if I stop smoking? Tobacco is harmful to nonsmokers who breathe in your secondhand smoke  The following are ways the health of others around you may improve when you stop smoking: You lower the risks for lung cancer and heart disease in nonsmoking adults       If you are pregnant, you lower the risk for miscarriage, early delivery, low birth weight, and stillbirth  You also lower your baby's risk for SIDS, obesity, developmental delay, and neurobehavioral problems, such as ADHD  If you have children, you lower their risk for ear infections, colds, pneumonia, bronchitis, and asthma  How to Stop Smoking     You will improve your health and the health of others around you  if you stop smoking  Your risk for heart and lung disease, cancer, stroke, heart attack, and vision problems will also decrease  You can benefit from quitting no matter how long you have smoked  PREPARE to stop smoking  Nicotine is a highly addictive drug found in cigarettes  Withdrawal symptoms can happen when you stop smoking and make it hard to quit  These include anxiety, depression, irritability, trouble sleeping, and increased appetite  You increase your chances of success if you PREPARE to quit  Set a quit date  [place] a date that is within the next 2 weeks  Do not pick a day that you think may be stressful or busy  Write down the day or [place] it on your calender  Tell friends and family that you plan to quit  Explain that you may have withdrawal symptoms when you try to quit  Ask them to support you  They may be able to encourage you and help reduce your stress to make it easier for you to quit  Make a list of your reasons for quitting  Put the list somewhere you will see it every day, such as your refrigerator  You can look at the list when you have a craving  Remove all tobacco and nicotine products from your home, car, and workplace  Also, remove anything else that will tempt you to smoke, such as lighters, matches, or ashtrays  Clean your car, home, and places at work that smell like smoke  The smell of smoke can trigger a craving  Identify triggers that make you want to smoke  This may include activities, feelings, or people  Also write down 1 way you can deal with each of your triggers   For example, if you want to smoke as soon as you wake up, plan another activity during this time, such as exercise  Make a plan for how you will quit  Learn about the tools that can help you quit, such as medicine, counseling, or nicotine replacement therapy  Choose at least 2 options to help you quit  Tools to help you stop smoking:     Counseling  from a trained healthcare provider can provide you with support and skills to quit smoking  The provider will also teach you to manage your withdrawal symptoms and cravings  You may receive counseling from one counselor, in group therapy, or through phone therapy called a quit line  Nicotine replacement therapy (NRT)  such as nicotine patches, gum, or lozenges may help reduce your nicotine cravings  You may get these without a doctor's order  Do not use e-cigarettes or smokeless tobacco in place of cigarettes or to help you quit  They still contain nicotine  Prescription medicines  such as nasal sprays or nicotine inhalers may help reduce your withdrawal symptoms  Other medicines may also be used to reduce your urge to smoke  Ask your healthcare provider about these medicines  You may need to start certain medicines 2 weeks before your quit date for them to work well  Hypnosis  is a practice that helps guide you through thoughts and feelings  Hypnosis may help decrease your cravings and make you more willing to quit  Acupuncture therapy  uses very thin needles to balance energy channels in the body  This is thought to help decrease cravings and symptoms of nicotine withdrawal      Support groups  let you talk to others who are trying to quit or have already quit  It may be helpful to speak with others about how they quit  Manage your cravings:     Avoid situations, people, and places that tempt you to smoke  Go to nonsmoking places, such as libraries or restaurants  Understand what tempts you and try to avoid these things  Keep your hands busy  Hold things such as a stress ball or pen       Put candy or toothpicks in your mouth  Keep lollipops, sugarless gum, or toothpicks with you at all times  Do not have alcohol or caffeine  These drinks may tempt you to smoke  Drink healthy liquids such as water or juice instead  Reward yourself when you resist your cravings  Rewards will motivate you and help you stay positive  Do an activity that distracts you from your craving  Examples include going for a walk, exercising, or cleaning  Prevent weight gain after you quit:  You may gain a few pounds after you quit smoking  It is healthier for you to gain a few pounds than to continue to smoke  The following can help you prevent weight gain:    Eat healthy foods  These include fruits, vegetables, whole-grain breads, low-fat dairy products, beans, lean meats, and fish  Eat healthy snacks, such as low-fat yogurt, if you get hungry between meals  Drink water before, during, and between meals  This will make your stomach feel full and help prevent you from overeating  Ask your healthcare provider how much liquid to drink each day and which liquids are best for you  Exercise  Take a walk or do some kind of exercise every day  Ask your healthcare provider what exercise is right for you  This may help reduce your cravings and reduce stress

## 2023-06-21 NOTE — LETTER
2023    To Julio César Felix  : 1990      This letter is to advise you that your recent BLOODWORK for Sexually-Transmitted Diseases (HIV, hepatitis B, hepatitis C, and syphilis) results were reviewed by me and are NORMAL  Please contact our office for an appointment if you have any additional concerns      [address] [facility]

## 2023-06-22 ENCOUNTER — APPOINTMENT (OUTPATIENT)
Dept: LAB | Facility: HOSPITAL | Age: 33
End: 2023-06-22
Payer: MEDICARE

## 2023-06-22 DIAGNOSIS — Z11.3 SCREEN FOR STD (SEXUALLY TRANSMITTED DISEASE): ICD-10-CM

## 2023-06-22 LAB
C TRACH DNA SPEC QL NAA+PROBE: NEGATIVE
N GONORRHOEA DNA SPEC QL NAA+PROBE: NEGATIVE

## 2023-06-22 PROCEDURE — 86803 HEPATITIS C AB TEST: CPT

## 2023-06-22 PROCEDURE — 87389 HIV-1 AG W/HIV-1&-2 AB AG IA: CPT

## 2023-06-22 PROCEDURE — 87340 HEPATITIS B SURFACE AG IA: CPT

## 2023-06-22 PROCEDURE — 86780 TREPONEMA PALLIDUM: CPT

## 2023-06-22 PROCEDURE — 36415 COLL VENOUS BLD VENIPUNCTURE: CPT

## 2023-06-23 LAB
HBV SURFACE AG SER QL: NORMAL
HCV AB SER QL: NORMAL
HIV 1+2 AB+HIV1 P24 AG SERPL QL IA: NORMAL
HIV 2 AB SERPL QL IA: NORMAL
HIV1 AB SERPL QL IA: NORMAL
HIV1 P24 AG SERPL QL IA: NORMAL
TREPONEMA PALLIDUM IGG+IGM AB [PRESENCE] IN SERUM OR PLASMA BY IMMUNOASSAY: NORMAL

## 2023-06-23 NOTE — PROGRESS NOTES
DUNG NAPIER received a referral from [place], [place] for a PHQ-9 screen of 10, no SI/HI, pt already connected to [place] [address] services but [facility] stated that pt may not have the best relationship with her therapist per conversation  DUNG NAPIER did review pts chart and she has [address] rep, which limits her ability to change providers if she wanted to find a new therapist      DUNG NAPIER attempted to outreach the pt to discuss further, left VM for a return call  Will close this encounter today as pt is connected to services, however, if she calls back will provide resources as requested

## 2023-07-12 ENCOUNTER — TELEPHONE (OUTPATIENT)
Dept: OBGYN CLINIC | Facility: CLINIC | Age: 33
End: 2023-07-12

## 2023-07-27 ENCOUNTER — CLINICAL SUPPORT (OUTPATIENT)
Dept: OBGYN CLINIC | Facility: CLINIC | Age: 33
End: 2023-07-27

## 2023-07-27 VITALS
WEIGHT: 128.2 LBS | DIASTOLIC BLOOD PRESSURE: 78 MMHG | SYSTOLIC BLOOD PRESSURE: 113 MMHG | HEART RATE: 104 BPM | BODY MASS INDEX: 21.33 KG/M2

## 2023-07-27 DIAGNOSIS — Z30.09 UNWANTED FERTILITY: Primary | ICD-10-CM

## 2023-07-27 PROCEDURE — 96372 THER/PROPH/DIAG INJ SC/IM: CPT

## 2023-07-27 RX ORDER — MEDROXYPROGESTERONE ACETATE 150 MG/ML
150 INJECTION, SUSPENSION INTRAMUSCULAR ONCE
Status: COMPLETED | OUTPATIENT
Start: 2023-07-27 | End: 2023-07-27

## 2023-07-27 RX ADMIN — MEDROXYPROGESTERONE ACETATE 150 MG: 150 INJECTION, SUSPENSION INTRAMUSCULAR at 08:22

## 2023-08-02 ENCOUNTER — OFFICE VISIT (OUTPATIENT)
Dept: FAMILY MEDICINE CLINIC | Facility: CLINIC | Age: 33
End: 2023-08-02

## 2023-08-02 VITALS
DIASTOLIC BLOOD PRESSURE: 70 MMHG | BODY MASS INDEX: 21.49 KG/M2 | TEMPERATURE: 97.8 F | WEIGHT: 129 LBS | HEIGHT: 65 IN | RESPIRATION RATE: 18 BRPM | SYSTOLIC BLOOD PRESSURE: 104 MMHG

## 2023-08-02 DIAGNOSIS — R10.13 EPIGASTRIC ABDOMINAL PAIN: Primary | ICD-10-CM

## 2023-08-02 DIAGNOSIS — K21.9 GASTROESOPHAGEAL REFLUX DISEASE WITHOUT ESOPHAGITIS: ICD-10-CM

## 2023-08-02 DIAGNOSIS — F90.9 ATTENTION DEFICIT HYPERACTIVITY DISORDER (ADHD), UNSPECIFIED ADHD TYPE: ICD-10-CM

## 2023-08-02 DIAGNOSIS — F32.A DEPRESSION, UNSPECIFIED DEPRESSION TYPE: ICD-10-CM

## 2023-08-02 DIAGNOSIS — M54.12 CERVICAL RADICULOPATHY: ICD-10-CM

## 2023-08-02 DIAGNOSIS — F31.9 BIPOLAR 1 DISORDER (HCC): ICD-10-CM

## 2023-08-02 DIAGNOSIS — F41.9 ANXIETY: ICD-10-CM

## 2023-08-02 DIAGNOSIS — G43.109 MIGRAINE WITH AURA AND WITHOUT STATUS MIGRAINOSUS, NOT INTRACTABLE: ICD-10-CM

## 2023-08-02 DIAGNOSIS — Z00.00 MEDICARE ANNUAL WELLNESS VISIT, SUBSEQUENT: ICD-10-CM

## 2023-08-02 PROCEDURE — 99214 OFFICE O/P EST MOD 30 MIN: CPT | Performed by: NURSE PRACTITIONER

## 2023-08-02 PROCEDURE — G0439 PPPS, SUBSEQ VISIT: HCPCS | Performed by: NURSE PRACTITIONER

## 2023-08-02 RX ORDER — OMEPRAZOLE 20 MG/1
20 CAPSULE, DELAYED RELEASE ORAL DAILY
Qty: 60 CAPSULE | Refills: 0 | Status: SHIPPED | OUTPATIENT
Start: 2023-08-02

## 2023-08-02 NOTE — ASSESSMENT & PLAN NOTE
Will check H Pylori and RUQ U/S   Trial PPI   If no improvement is sx referral to GI   -Discussed diet and lifestyle interventions to improve sx including: avoidance of common triggers (chocolate, caffeine, tomatoes, citrus), eat small meals frequently, remain upright after meals

## 2023-08-02 NOTE — ASSESSMENT & PLAN NOTE
Stable, follows with pain management  Currently taking pregabalin prescribed by  mg tid with improvement in sx

## 2023-08-02 NOTE — PATIENT INSTRUCTIONS
Medicare Preventive Visit Patient Instructions  Thank you for completing your Welcome to Medicare Visit or Medicare Annual Wellness Visit today. Your next wellness visit will be due in one year (8/2/2024). The screening/preventive services that you may require over the next 5-10 years are detailed below. Some tests may not apply to you based off risk factors and/or age. Screening tests ordered at today's visit but not completed yet may show as past due. Also, please note that scanned in results may not display below. Preventive Screenings:  Service Recommendations Previous Testing/Comments   Colorectal Cancer Screening  * Colonoscopy    * Fecal Occult Blood Test (FOBT)/Fecal Immunochemical Test (FIT)  * Fecal DNA/Cologuard Test  * Flexible Sigmoidoscopy Age: 43-73 years old   Colonoscopy: every 10 years (may be performed more frequently if at higher risk)  OR  FOBT/FIT: every 1 year  OR  Cologuard: every 3 years  OR  Sigmoidoscopy: every 5 years  Screening may be recommended earlier than age 39 if at higher risk for colorectal cancer. Also, an individualized decision between you and your healthcare provider will decide whether screening between the ages of 77-80 would be appropriate. Colonoscopy: Not on file  FOBT/FIT: Not on file  Cologuard: Not on file  Sigmoidoscopy: Not on file          Breast Cancer Screening Age: 36 years old  Frequency: every 1-2 years  Not required if history of left and right mastectomy Mammogram: Not on file    Screening Not Indicated   Cervical Cancer Screening Between the ages of 21-29, pap smear recommended once every 3 years. Between the ages of 32-69, can perform pap smear with HPV co-testing every 5 years.    Recommendations may differ for women with a history of total hysterectomy, cervical cancer, or abnormal pap smears in past. Pap Smear: 06/21/2023    Screening Current   Hepatitis C Screening Once for adults born between 1945 and 1965  More frequently in patients at high risk for Hepatitis C Hep C Antibody: 06/22/2023    Screening Current   Diabetes Screening 1-2 times per year if you're at risk for diabetes or have pre-diabetes Fasting glucose: 99 mg/dL (3/5/2022)  A1C: 5.9 % (3/5/2022)      Cholesterol Screening Once every 5 years if you don't have a lipid disorder. May order more often based on risk factors. Lipid panel: 06/04/2021    Screening Current     Other Preventive Screenings Covered by Medicare:  1. Abdominal Aortic Aneurysm (AAA) Screening: covered once if your at risk. You're considered to be at risk if you have a family history of AAA. 2. Lung Cancer Screening: covers low dose CT scan once per year if you meet all of the following conditions: (1) Age 48-67; (2) No signs or symptoms of lung cancer; (3) Current smoker or have quit smoking within the last 15 years; (4) You have a tobacco smoking history of at least 20 pack years (packs per day multiplied by number of years you smoked); (5) You get a written order from a healthcare provider. 3. Glaucoma Screening: covered annually if you're considered high risk: (1) You have diabetes OR (2) Family history of glaucoma OR (3)  aged 48 and older OR (3)  American aged 72 and older  3. Osteoporosis Screening: covered every 2 years if you meet one of the following conditions: (1) You're estrogen deficient and at risk for osteoporosis based off medical history and other findings; (2) Have a vertebral abnormality; (3) On glucocorticoid therapy for more than 3 months; (4) Have primary hyperparathyroidism; (5) On osteoporosis medications and need to assess response to drug therapy. · Last bone density test (DXA Scan): Not on file. 5. HIV Screening: covered annually if you're between the age of 14-79. Also covered annually if you are younger than 13 and older than 72 with risk factors for HIV infection. For pregnant patients, it is covered up to 3 times per pregnancy.     Immunizations:  Immunization Recommendations   Influenza Vaccine Annual influenza vaccination during flu season is recommended for all persons aged >= 6 months who do not have contraindications   Pneumococcal Vaccine   * Pneumococcal conjugate vaccine = PCV13 (Prevnar 13), PCV15 (Vaxneuvance), PCV20 (Prevnar 20)  * Pneumococcal polysaccharide vaccine = PPSV23 (Pneumovax) Adults 20-63 years old: 1-3 doses may be recommended based on certain risk factors  Adults 72 years old: 1-2 doses may be recommended based off what pneumonia vaccine you previously received   Hepatitis B Vaccine 3 dose series if at intermediate or high risk (ex: diabetes, end stage renal disease, liver disease)   Tetanus (Td) Vaccine - COST NOT COVERED BY MEDICARE PART B Following completion of primary series, a booster dose should be given every 10 years to maintain immunity against tetanus. Td may also be given as tetanus wound prophylaxis. Tdap Vaccine - COST NOT COVERED BY MEDICARE PART B Recommended at least once for all adults. For pregnant patients, recommended with each pregnancy. Shingles Vaccine (Shingrix) - COST NOT COVERED BY MEDICARE PART B  2 shot series recommended in those aged 48 and above     Health Maintenance Due:      Topic Date Due   • Cervical Cancer Screening  06/16/2024   • HIV Screening  Completed   • Hepatitis C Screening  Completed     Immunizations Due:      Topic Date Due   • COVID-19 Vaccine (4 - Moderna series) 01/24/2022   • Influenza Vaccine (1) 09/01/2023     Advance Directives   What are advance directives? Advance directives are legal documents that state your wishes and plans for medical care. These plans are made ahead of time in case you lose your ability to make decisions for yourself. Advance directives can apply to any medical decision, such as the treatments you want, and if you want to donate organs. What are the types of advance directives?   There are many types of advance directives, and each state has rules about how to use them. You may choose a combination of any of the following:  · Living will: This is a written record of the treatment you want. You can also choose which treatments you do not want, which to limit, and which to stop at a certain time. This includes surgery, medicine, IV fluid, and tube feedings. · Durable power of  for healthcare [facility]): This is a written record that states who you want to make healthcare choices for you when you are unable to make them for yourself. This person, called a proxy, is usually a family member or a friend. You may choose more than 1 proxy. · Do not resuscitate (DNR) order:  A DNR order is used in case your heart stops beating or you stop breathing. It is a request not to have certain forms of treatment, such as CPR. A DNR order may be included in other types of advance directives. · Medical directive: This covers the care that you want if you are in a coma, near death, or unable to make decisions for yourself. You can list the treatments you want for each condition. Treatment may include pain medicine, surgery, blood transfusions, dialysis, IV or tube feedings, and a ventilator (breathing machine). · Values history: This document has questions about your views, beliefs, and how you feel and think about life. This information can help others choose the care that you would choose. Why are advance directives important? An advance directive helps you control your care. Although spoken wishes may be used, it is better to have your wishes written down. Spoken wishes can be misunderstood, or not followed. Treatments may be given even if you do not want them. An advance directive may make it easier for your family to make difficult choices about your care. Cigarette Smoking and Your Health   Risks to your health if you smoke:  Nicotine and other chemicals found in tobacco damage every cell in your body.  Even if you are a light smoker, you have an increased risk for cancer, heart disease, and lung disease. If you are pregnant or have diabetes, smoking increases your risk for complications. Benefits to your health if you stop smoking:   · You decrease respiratory symptoms such as coughing, wheezing, and shortness of breath. · You reduce your risk for cancers of the lung, mouth, throat, kidney, bladder, pancreas, stomach, and cervix. If you already have cancer, you increase the benefits of chemotherapy. You also reduce your risk for cancer returning or a second cancer from developing. · You reduce your risk for heart disease, blood clots, heart attack, and stroke. · You reduce your risk for lung infections, and diseases such as pneumonia, asthma, chronic bronchitis, and emphysema. · Your circulation improves. More oxygen can be delivered to your body. If you have diabetes, you lower your risk for complications, such as kidney, artery, and eye diseases. You also lower your risk for nerve damage. Nerve damage can lead to amputations, poor vision, and blindness. · You improve your body's ability to heal and to fight infections. For more information and support to stop smoking:   · Smokefree. gov  Phone: 4- 219 - 626-2156  Web Address: www.[employer]. [address] Information is for End User's use only and may not be sold, redistributed or otherwise used for commercial purposes.  All illustrations and images included in CareNotes® are the copyrighted property of [employer].A.M., Inc. or  [employer]

## 2023-08-02 NOTE — ASSESSMENT & PLAN NOTE
Stable, following with Dr. Flora Baig   She is taking Latuda daily, Remeron daily, also on Depakote for migraines

## 2023-08-02 NOTE — PROGRESS NOTES
Assessment and Plan:     Problem List Items Addressed This Visit        Digestive    GERD (gastroesophageal reflux disease)     Will check H Pylori and RUQ U/S   Trial PPI   If no improvement is sx referral to GI   -Discussed diet and lifestyle interventions to improve sx including: avoidance of common triggers (chocolate, caffeine, tomatoes, citrus), eat small meals frequently, remain upright after meals            Relevant Medications    omeprazole (PriLOSEC) 20 mg delayed release capsule    Other Relevant Orders    H. pylori antigen, stool    US right upper quadrant       Cardiovascular and Mediastinum    Migraine with aura and without status migrainosus, not intractable       Nervous and Auditory    Cervical radiculopathy     Stable, follows with pain management  Currently taking pregabalin prescribed by  mg tid with improvement in sx             Other    Anxiety    Depression    ADHD    Bipolar 1 disorder (HCC)     Stable, following with Dr. Wolf Field   She is taking Latuda daily, Remeron daily, also on Depakote for migraines         Other Visit Diagnoses     Epigastric abdominal pain    -  Primary    Relevant Medications    omeprazole (PriLOSEC) 20 mg delayed release capsule    Other Relevant Orders    H. pylori antigen, stool    US right upper quadrant    Medicare annual wellness visit, subsequent               Preventive health issues were discussed with patient, and age appropriate screening tests were ordered as noted in patient's After Visit Summary. Personalized health advice and appropriate referrals for health education or preventive services given if needed, as noted in patient's After Visit Summary. History of Present Illness:      Jada Sofia is a 29 y.o. female who  has a past medical history of Abnormal Pap smear of cervix, ADHD, Bipolar 1 disorder ([address]), Depression, Epilepsy ([address]), GERD (gastroesophageal reflux disease), Migraine, OCD (obsessive compulsive disorder), and Urogenital trichomoniasis. who presented to the office today for a Medicare Wellness Visit. Patient also reports that she has a one week onset of epigastric abdominal pain. The pain is sharp in nature. Not better or worse with certain foods or after BM. She denies changes to BM or urinary sx. Does have nausea and has had vomiting. Patient Care Team:  Rell Hsu as PCP - General (Family Medicine)     Review of Systems:           Review of Systems   Constitutional: Negative for activity change, appetite change, chills, fatigue, fever and unexpected weight change. HENT: Negative for hearing loss, nosebleeds, sinus pain, sneezing, sore throat and trouble swallowing. Eyes: Negative for photophobia and visual disturbance. Respiratory: Negative for cough, chest tightness, shortness of breath and wheezing. Cardiovascular: Negative for chest pain, palpitations and leg swelling. Gastrointestinal: Positive for abdominal pain, nausea and vomiting. Negative for constipation. Genitourinary: Negative for decreased urine volume, difficulty urinating, dysuria, flank pain, genital sores, hematuria and urgency. Musculoskeletal: Negative for back pain and gait problem. Skin: Negative for pallor, rash and wound. Neurological: Negative for dizziness, seizures, syncope, weakness, numbness and headaches. Hematological: Negative for adenopathy. Does not bruise/bleed easily. Psychiatric/Behavioral: Negative for confusion, hallucinations, self-injury, sleep disturbance and suicidal ideas. The patient is not nervous/anxious.          Problem List:     Patient Active Problem List   Diagnosis   • Anxiety   • Depression   • ADHD   • Bipolar 1 disorder ([address])   • Migraine with aura and without status migrainosus, not intractable   • Numbness and tingling of right arm   • Cervical radiculopathy   • Cervical spondylosis   • GERD (gastroesophageal reflux disease)      Past Medical and Surgical History: Past Medical History:   Diagnosis Date   • Abnormal Pap smear of cervix     2009 cryo; 6/2019 LSIL/AGC pap; 6/2019 colpo HSIL; 8/2019 LEEP HSIL w/neg margins; 6/2020 NILM pap/neg HPV; 6/2020 NILM pap/neg HPV   • ADHD    • Anemia    • Bipolar 1 disorder ([address])    • Depression    • Epilepsy ([address])     last episode 2000   • GERD (gastroesophageal reflux disease)    • Migraine    • OCD (obsessive compulsive disorder)    • Urogenital trichomoniasis 2019     Past Surgical History:   Procedure Laterality Date   • GYNECOLOGIC CRYOSURGERY  2009   • [place] COLPOSCOPY CERVIX VAG LOOP ELTRD BX CERVIX N/A 08/09/2019    Procedure: BIOPSY LEEP CERVIX;  Surgeon: Anaid Shrestha MD;  Location: [facility];  Service: Gynecology      Family History:     Family History   Problem Relation Age of Onset   • Bipolar disorder Mother    • Diabetes Mother    • Migraines Mother    • Miscarriages / [place] Mother    • No Known Problems Father    • Depression Sister    • Asthma Sister    • Osteoporosis Maternal Grandmother    • Cancer Maternal Grandfather         lung   • Breast cancer Neg Hx    • Colon cancer Neg Hx    • Ovarian cancer Neg Hx       Social History:     Social History     Socioeconomic History   • Marital status: Single     Spouse name: None   • Number of children: None   • Years of education: None   • Highest education level: None   Occupational History   • None   Tobacco Use   • Smoking status: Every Day     Packs/day: 1.00     Years: 11.00     Total pack years: 11.00     Types: Cigarettes     Passive exposure: Current   • Smokeless tobacco: Never   Vaping Use   • Vaping Use: Never used   Substance and Sexual Activity   • Alcohol use: Yes     Comment: couple times/month   • Drug use: Never   • Sexual activity: Yes     Partners: Male     Birth control/protection: Injection   Other Topics Concern   • None   Social History Narrative   • None     Social Determinants of Health     Financial Resource Strain: Low Risk (7/28/2023)    Overall Financial Resource Strain (CARDIA)    • Difficulty of Paying Living Expenses: Not hard at all   Food Insecurity: Food Insecurity Present (7/28/2023)    Hunger Vital Sign    • Worried About Running Out of Food in the Last Year: Sometimes true    • Ran Out of Food in the Last Year: Sometimes true   Transportation Needs: No Transportation Needs (7/28/2023)    PRAPARE - Transportation    • Lack of Transportation (Medical): No    • Lack of Transportation (Non-Medical): No   Physical Activity: Inactive (6/16/2021)    Exercise Vital Sign    • Days of Exercise per Week: 0 days    • Minutes of Exercise per Session: 0 min   Stress: No Stress Concern Present (6/16/2021)    [facility]    • Feeling of Stress :  Only a little   Social Connections: Not on file   Intimate Partner Violence: Not At Risk (6/16/2021)    Humiliation, Afraid, Rape, and Kick questionnaire    • Fear of Current or Ex-Partner: No    • Emotionally Abused: No    • Physically Abused: No    • Sexually Abused: No   Housing Stability: Low Risk  (7/29/2022)    Housing Stability Vital Sign    • Unable to Pay for Housing in the Last Year: No    • Number of [address] in the Last Year: 1    • Unstable Housing in the Last Year: No      Medications and Allergies:     Current Outpatient Medications   Medication Sig Dispense Refill   • omeprazole (PriLOSEC) 20 mg delayed release capsule Take 1 capsule (20 mg total) by mouth daily 60 capsule 0   • azelastine (ASTELIN) 0.1 % nasal spray 1 spray into each nostril 2 (two) times a day Use in each nostril as directed 30 mL 1   • divalproex sodium (DEPAKOTE) 250 mg DR tablet TAKE 1 TABLET(250 MG) BY MOUTH DAILY AT BEDTIME 90 tablet 0   • divalproex sodium (DEPAKOTE) 500 mg EC tablet TAKE 1 TABLET(500 MG) BY MOUTH DAILY AT BEDTIME 90 tablet 0   • fexofenadine (ALLEGRA) 180 MG tablet Take 1 tablet (180 mg total) by mouth daily 90 tablet 1   • lurasidone (LATUDA) 40 mg tablet Take 40 mg by mouth in the morning     • Magnesium 400 MG TABS Take 1 tablet (400 mg total) by mouth in the morning 90 tablet 3   • medroxyPROGESTERone acetate (DEPO-PROVERA SYRINGE) 150 mg/mL injection ADMINISTER 1 ML IN THE MUSCLE EVERY 3 MONTHS 1 mL 5   • mirtazapine (REMERON) 15 mg tablet      • mometasone (ELOCON) 0.1 % lotion      • pregabalin (LYRICA) 100 mg capsule Take 1 capsule (100 mg total) by mouth 3 (three) times a day 90 capsule 1   • Riboflavin 400 MG TABS Take 1 tablet (400 mg total) by mouth in the morning 90 tablet 3   • Ubrogepant (Ubrelvy) 100 MG tablet Take 1 tablet (100 mg) one time as needed for migraine. May repeat one additional tablet (100 mg) at least two hours after the first dose. Do not use more than two doses per day, or for more than eight days per month. 9 tablet 11     No current facility-administered medications for this visit.      No Known Allergies   Immunizations:     Immunization History   Administered Date(s) Administered   • COVID-19 MODERNA VACC 0.5 ML IM 03/22/2021, 04/20/2021   • DTaP 1991, 1991, 1991, 09/30/1992, 05/05/1995   • H1N1, All Formulations 12/28/2009   • HPV Quadrivalent 05/24/2007, 07/24/2007, 12/24/2007, 05/16/2013, 09/19/2013, 12/30/2013   • Hep B, Adolescent or Pediatric 04/21/1998, 08/04/1998, 02/02/1999   • HiB 1991, 1991, 1991, 09/30/1992   • INFLUENZA 12/28/2009, 12/28/2010, 10/27/2015, 11/01/2016, 09/12/2017, 10/10/2018, 04/12/2021, 10/27/2021   • IPV 1991, 1991, 09/30/1992, 05/05/1995   • Influenza, injectable, quadrivalent, preservative free 0.5 mL 09/24/2018, 09/15/2020, 11/01/2022   • MMR 06/09/1992, 03/19/1994, 01/12/2005   • Meningococcal MCV4P 10/07/2008   • Pneumococcal Conjugate Vaccine 20-valent (Pcv20), Polysace 11/01/2022   • Pneumococcal Polysaccharide PPV23 06/03/2021   • Td (adult), adsorbed 10/13/2003   • Tdap 12/28/2009, 10/14/2020      Health Maintenance:         Topic Date Due   • Cervical Cancer Screening  06/16/2024   • HIV Screening  Completed   • Hepatitis C Screening  Completed         Topic Date Due   • COVID-19 Vaccine (4 - Moderna series) 01/24/2022   • Influenza Vaccine (1) 09/01/2023      Medicare Screening Tests and Risk Assessments:         Health Risk Assessment:   Patient rates overall health as fair. Patient feels that their physical health rating is much worse. Patient is satisfied with their life. Eyesight was rated as same. Hearing was rated as same. Patient feels that their emotional and mental health rating is same. Patients states they are often angry. Patient states they are often unusually tired/fatigued. Pain experienced in the last 7 days has been a lot. Patient's pain rating has been 8/10. Patient states that she has experienced weight loss or gain in last 6 months. Fall Risk Screening: In the past year, patient has experienced: no history of falling in past year      Urinary Incontinence Screening:   Patient has not leaked urine accidently in the last six months. Home Safety:  Patient does not have trouble with stairs inside or outside of their home. Patient has working smoke alarms and has no working carbon monoxide detector. Home safety hazards include: none. Nutrition:   Current diet is Regular. Medications:   Patient is not currently taking any over-the-counter supplements. Patient is able to manage medications. Activities of Daily Living (ADLs)/Instrumental Activities of Daily Living (IADLs):   Walk and transfer into and out of bed and chair?: Yes  Dress and groom yourself?: Yes    Bathe or shower yourself?: Yes    Feed yourself? Yes  Do your laundry/housekeeping?: Yes  Manage your money, pay your bills and track your expenses?: Yes  Make your own meals?: Yes    Do your own shopping?: Yes    ADL comments: Sometimes i cant cook do my laundry etc because of my hand.     Previous Hospitalizations:   Any hospitalizations or ED visits within the last 12 months?: Yes    How many hospitalizations have you had in the last year?: 1-2    Advance Care Planning:   Living will: No    Durable POA for healthcare: No    Advanced directive: No      PREVENTIVE SCREENINGS      Cardiovascular Screening:    General: Screening Current      Colorectal Cancer Screening:     General: Screening Not Indicated      Breast Cancer Screening:     General: Screening Not Indicated      Cervical Cancer Screening:    General: Screening Current      Osteoporosis Screening:    General: Screening Not Indicated      Abdominal Aortic Aneurysm (AAA) Screening:        General: Screening Not Indicated      Lung Cancer Screening:     General: Screening Not Indicated      Hepatitis C Screening:    General: Screening Current    Screening, Brief Intervention, and Referral to Treatment (SBIRT)    Screening  Typical number of drinks in a day: 0  Typical number of drinks in a week: 1  Interpretation: Low risk drinking behavior. AUDIT-C Screenin) How often did you have a drink containing alcohol in the past year? 2 to 4 times a month  2) How many drinks did you have on a typical day when you were drinking in the past year? 1 to 2  3) How often did you have 6 or more drinks on one occasion in the past year? never    AUDIT-C Score: 2  Interpretation: Score 0-2 (female): Negative screen for alcohol misuse    Single Item Drug Screening:  How often have you used an illegal drug (including marijuana) or a prescription medication for non-medical reasons in the past year? never    Single Item Drug Screen Score: 0  Interpretation: Negative screen for possible drug use disorder    No results found. Physical Exam:     /70 (BP Location: Left arm, Patient Position: Sitting, Cuff Size: Standard)   Temp 97.8 °F (36.6 °C) (Temporal)   Resp 18   Ht 5' 5" (1.651 m)   Wt 58.5 kg (129 lb)   BMI 21.47 kg/m²     Physical Exam  Vitals and nursing note reviewed. Constitutional:       General: She is not in acute distress. Appearance: She is well-developed. HENT:      Head: Normocephalic and atraumatic. Right Ear: External ear normal.      Left Ear: External ear normal.   Eyes:      Conjunctiva/sclera: Conjunctivae normal.      Pupils: Pupils are equal, round, and reactive to light. Cardiovascular:      Rate and Rhythm: Normal rate and regular rhythm. Heart sounds: No murmur heard. Pulmonary:      Effort: Pulmonary effort is normal. No respiratory distress. Breath sounds: Normal breath sounds. Abdominal:      General: Bowel sounds are normal. There is no distension. Palpations: Abdomen is soft. Tenderness: There is abdominal tenderness in the epigastric area. There is no right CVA tenderness, left CVA tenderness or guarding. Musculoskeletal:         General: No swelling. Cervical back: Neck supple. Skin:     General: Skin is warm and dry. Capillary Refill: Capillary refill takes less than 2 seconds. Neurological:      Mental Status: She is alert and oriented to person, place, and time.    Psychiatric:         Mood and Affect: Mood normal.          Manolo Stacy

## 2023-08-09 ENCOUNTER — TELEPHONE (OUTPATIENT)
Age: 33
End: 2023-08-09

## 2023-08-09 ENCOUNTER — TELEPHONE (OUTPATIENT)
Dept: OBGYN CLINIC | Facility: HOSPITAL | Age: 33
End: 2023-08-09

## 2023-08-09 ENCOUNTER — HOSPITAL ENCOUNTER (OUTPATIENT)
Dept: ULTRASOUND IMAGING | Facility: HOSPITAL | Age: 33
Discharge: HOME | End: 2023-08-09
Payer: MEDICARE

## 2023-08-09 DIAGNOSIS — M79.2 NEUROPATHIC PAIN: ICD-10-CM

## 2023-08-09 DIAGNOSIS — R20.2 PARESTHESIA OF RIGHT ARM: ICD-10-CM

## 2023-08-09 PROCEDURE — 76882 US LMTD JT/FCL EVL NVASC XTR: CPT

## 2023-08-09 NOTE — TELEPHONE ENCOUNTER
Caller: Patient    Doctor: Ricarda Meza PA-c    Reason for call: Relayed message to patient.     Call back#: n/a

## 2023-08-09 NOTE — TELEPHONE ENCOUNTER
CLM on VM for pt to return call to relay msg from LAMONTE Doyle's re: US results and recommendations.

## 2023-08-09 NOTE — TELEPHONE ENCOUNTER
Caller: Patient    Doctor: Rigoberto Hyde    Reason for call: Patient had US performed today. Referred to Dr. Elicia Muse. Is there someone else she can see sooner based on the results.     Call back#: [phone]

## 2023-08-09 NOTE — TELEPHONE ENCOUNTER
Caller: Patient     Doctor: spine / pain    Reason for call:     Patient is returning call form the nurse, sending encounter to nurse to return call    Call back#: [phone]

## 2023-08-09 NOTE — TELEPHONE ENCOUNTER
Caller: patient    Doctor: Zeke Thomas    Reason for call: patient went to the hand doctor and they referred her back to [facility], please advise next step    Call back#: [phone]

## 2023-08-09 NOTE — TELEPHONE ENCOUNTER
Her Ultrasounds were normal. She does not have cubital or carpal tunnel based on the ultrasound results. At this time I recommend she continue follow up with pain management instead of Dr. Ni Jordan.     thanks

## 2023-08-12 DIAGNOSIS — G43.109 MIGRAINE WITH AURA AND WITHOUT STATUS MIGRAINOSUS, NOT INTRACTABLE: ICD-10-CM

## 2023-08-14 RX ORDER — DIVALPROEX SODIUM 250 MG/1
TABLET, DELAYED RELEASE ORAL
Qty: 90 TABLET | Refills: 0 | Status: SHIPPED | OUTPATIENT
Start: 2023-08-14

## 2023-08-15 ENCOUNTER — HOSPITAL ENCOUNTER (OUTPATIENT)
Dept: ULTRASOUND IMAGING | Facility: HOSPITAL | Age: 33
Discharge: HOME | End: 2023-08-15
Payer: MEDICARE

## 2023-08-15 DIAGNOSIS — R10.13 EPIGASTRIC ABDOMINAL PAIN: ICD-10-CM

## 2023-08-15 DIAGNOSIS — K21.9 GASTROESOPHAGEAL REFLUX DISEASE WITHOUT ESOPHAGITIS: ICD-10-CM

## 2023-08-15 PROCEDURE — 76705 ECHO EXAM OF ABDOMEN: CPT

## 2023-08-15 NOTE — PROGRESS NOTES
Assessment:  1. Chronic right-sided low back pain, unspecified whether sciatica present    2. Neck pain    3. Left arm pain        Plan:  1. I will order an EMG of the left upper extremity  2. I will order an x-ray lumbar spine  3. Patient was given a referral to physical therapy for low back and right lower extremity complaints. If no relief after 6 weeks of conservative treatment, may consider MRI lumbar spine without contrast  4. Patient wishes to wean off of pregabalin as it is not providing any relief at 100 mg 3 times a day. I will decrease pregabalin to 50 mg 3 times a day for her next prescription and she was provided with weaning instructions  5. Follow-up in 6 weeks or sooner if needed    History of Present Illness: The patient is a 29 y.o. female last seen on 05/19/2023 who presents for a follow up office visit in regards to chronic right arm pain from elbow to hand and no specific dermatomal distribution. EMG of the right upper extremity was normal.  MRI of the cervical spine was essentially normal other than some mild left foraminal narrowing at C6-7. Had no improvement from C6-7 cervical epidural steroid injection in October 2022. She was evaluated by orthopedics who performed ultrasound of the elbow and right wrist which did not reveal any cubital or carpal tunnel syndrome. She has had right subacromial bursa injections without relief. She also is now complaining of left upper extremity symptoms in no specific dermatomal distribution and back pain that radiates into the right lower extremity in no specific dermatomal distribution. Patient states her right lower extremity symptoms began at the end of May without any inciting event or trauma. I have no imaging of the lumbar spine to review. She has not completed any formal physical therapy for this condition    Patient rates her pain a 10 out of 10 on the numeric pain rating scale.   She constantly has pain throughout the day which is described as burning, sharp, cramping, pressure-like, shooting, numbness and pins-and-needles. The patient is currently managing her pain with pregabalin 100 mg 3 times a day without any relief. He has cycled through numerous muscle relaxants and NSAIDs without relief. I have personally reviewed and/or updated the patient's past medical history, past surgical history, family history, social history, current medications, allergies, and vital signs today. Review of Systems:    Review of Systems   Respiratory: Negative for shortness of breath. Cardiovascular: Negative for chest pain. Gastrointestinal: Negative for constipation, diarrhea, nausea and vomiting. Musculoskeletal: Negative for arthralgias, gait problem, joint swelling and myalgias. Skin: Negative for rash. Neurological: Negative for dizziness, seizures and weakness. All other systems reviewed and are negative.         Past Medical History:   Diagnosis Date   • Abnormal Pap smear of cervix     2009 cryo; 6/2019 LSIL/AGC pap; 6/2019 colpo HSIL; 8/2019 LEEP HSIL w/neg margins; 6/2020 NILM pap/neg HPV; 6/2020 NILM pap/neg HPV   • ADHD    • Anemia    • Bipolar 1 disorder ([address])    • Depression    • Epilepsy ([address])     last episode 2000   • GERD (gastroesophageal reflux disease)    • Migraine    • OCD (obsessive compulsive disorder)    • Urogenital trichomoniasis 2019       Past Surgical History:   Procedure Laterality Date   • GYNECOLOGIC CRYOSURGERY  2009   • [place] COLPOSCOPY CERVIX VAG LOOP ELTRD BX CERVIX N/A 08/09/2019    Procedure: BIOPSY LEEP CERVIX;  Surgeon: Trey Bergman MD;  [facility];  Service: Gynecology       Family History   Problem Relation Age of Onset   • Bipolar disorder Mother    • Diabetes Mother    • Migraines Mother    • Miscarriages / [place] Mother    • No Known Problems Father    • Depression Sister    • Asthma Sister    • Osteoporosis Maternal Grandmother    • Cancer Maternal Grandfather lung   • Breast cancer Neg Hx    • Colon cancer Neg Hx    • Ovarian cancer Neg Hx        Social History     Occupational History   • Not on file   Tobacco Use   • Smoking status: Every Day     Packs/day: 1.00     Years: 11.00     Total pack years: 11.00     Types: Cigarettes     Passive exposure: Current   • Smokeless tobacco: Never   Vaping Use   • Vaping Use: Never used   Substance and Sexual Activity   • Alcohol use: Yes     Comment: couple times/month   • Drug use: Never   • Sexual activity: Yes     Partners: Male     Birth control/protection: Injection         Current Outpatient Medications:   •  divalproex sodium (DEPAKOTE) 250 mg DR tablet, TAKE 1 TABLET(250 MG) BY MOUTH DAILY AT BEDTIME, Disp: 90 tablet, Rfl: 0  •  fexofenadine (ALLEGRA) 180 MG tablet, Take 1 tablet (180 mg total) by mouth daily, Disp: 90 tablet, Rfl: 1  •  lurasidone (LATUDA) 40 mg tablet, Take 40 mg by mouth in the morning, Disp: , Rfl:   •  Magnesium 400 MG TABS, Take 1 tablet (400 mg total) by mouth in the morning, Disp: 90 tablet, Rfl: 3  •  mirtazapine (REMERON) 15 mg tablet, , Disp: , Rfl:   •  mometasone (ELOCON) 0.1 % lotion, , Disp: , Rfl:   •  omeprazole (PriLOSEC) 20 mg delayed release capsule, Take 1 capsule (20 mg total) by mouth daily, Disp: 60 capsule, Rfl: 0  •  pregabalin (LYRICA) 50 mg capsule, Take 1 PO TID x 5 days, then 1 PO BID x 5 days, then 1 PO daily x 5 days, then stop., Disp: 30 capsule, Rfl: 0  •  Riboflavin 400 MG TABS, Take 1 tablet (400 mg total) by mouth in the morning, Disp: 90 tablet, Rfl: 3  •  Ubrogepant (Ubrelvy) 100 MG tablet, Take 1 tablet (100 mg) one time as needed for migraine. May repeat one additional tablet (100 mg) at least two hours after the first dose.  Do not use more than two doses per day, or for more than eight days per month., Disp: 9 tablet, Rfl: 11  •  azelastine (ASTELIN) 0.1 % nasal spray, 1 spray into each nostril 2 (two) times a day Use in each nostril as directed, Disp: 30 mL, Rfl: 1  •  divalproex sodium (DEPAKOTE) 500 mg EC tablet, TAKE 1 TABLET(500 MG) BY MOUTH DAILY AT BEDTIME, Disp: 90 tablet, Rfl: 0  •  medroxyPROGESTERone acetate (DEPO-PROVERA SYRINGE) 150 mg/mL injection, ADMINISTER 1 ML IN THE MUSCLE EVERY 3 MONTHS, Disp: 1 mL, Rfl: 5    No Known Allergies    Physical Exam:    /77   Pulse 98   Ht 5' 5" (1.651 m)   BMI 21.47 kg/m²     Constitutional:normal, well developed, well nourished, alert, in no distress and non-toxic and no overt pain behavior. Eyes:anicteric  HEENT:grossly intact  Neck:supple, symmetric, trachea midline and no masses   Pulmonary:even and unlabored  Cardiovascular:No edema or pitting edema present  Skin:Normal without rashes or lesions and well hydrated  Psychiatric:Mood and affect appropriate  Neurologic:Cranial Nerves II-XII grossly intact  Musculoskeletal:normal gait. Bilateral lower extremity strength 5 out of 5 in all muscle groups. Right SI joint mildly tender to palpation. Negative straight leg raise bilaterally. Positive Ronen's test on the right and negative on the left.       Imaging  X-ray lumbar spine 2 or 3 views    (Results Pending)         Orders Placed This Encounter   Procedures   • X-ray lumbar spine 2 or 3 views   • Ambulatory referral to Physical Therapy   • EMG 1 Limb

## 2023-08-16 ENCOUNTER — OFFICE VISIT (OUTPATIENT)
Dept: PAIN MEDICINE | Facility: CLINIC | Age: 33
End: 2023-08-16
Payer: MEDICARE

## 2023-08-16 ENCOUNTER — APPOINTMENT (OUTPATIENT)
Dept: LAB | Facility: HOSPITAL | Age: 33
End: 2023-08-16
Payer: MEDICARE

## 2023-08-16 VITALS
HEIGHT: 65 IN | HEART RATE: 98 BPM | BODY MASS INDEX: 21.47 KG/M2 | SYSTOLIC BLOOD PRESSURE: 110 MMHG | DIASTOLIC BLOOD PRESSURE: 77 MMHG

## 2023-08-16 DIAGNOSIS — M47.812 CERVICAL SPONDYLOSIS: ICD-10-CM

## 2023-08-16 DIAGNOSIS — M54.2 NECK PAIN: ICD-10-CM

## 2023-08-16 DIAGNOSIS — G89.29 CHRONIC RIGHT-SIDED LOW BACK PAIN, UNSPECIFIED WHETHER SCIATICA PRESENT: Primary | ICD-10-CM

## 2023-08-16 DIAGNOSIS — K21.9 GASTROESOPHAGEAL REFLUX DISEASE WITHOUT ESOPHAGITIS: ICD-10-CM

## 2023-08-16 DIAGNOSIS — M54.50 CHRONIC RIGHT-SIDED LOW BACK PAIN, UNSPECIFIED WHETHER SCIATICA PRESENT: Primary | ICD-10-CM

## 2023-08-16 DIAGNOSIS — M79.602 LEFT ARM PAIN: ICD-10-CM

## 2023-08-16 DIAGNOSIS — R10.13 EPIGASTRIC ABDOMINAL PAIN: ICD-10-CM

## 2023-08-16 PROCEDURE — 87338 HPYLORI STOOL AG IA: CPT

## 2023-08-16 PROCEDURE — 99214 OFFICE O/P EST MOD 30 MIN: CPT | Performed by: NURSE PRACTITIONER

## 2023-08-16 RX ORDER — PREGABALIN 50 MG/1
CAPSULE ORAL
Qty: 30 CAPSULE | Refills: 0 | Status: SHIPPED | OUTPATIENT
Start: 2023-08-16

## 2023-08-17 ENCOUNTER — APPOINTMENT (OUTPATIENT)
Dept: RADIOLOGY | Facility: MEDICAL CENTER | Age: 33
End: 2023-08-17
Payer: MEDICARE

## 2023-08-17 DIAGNOSIS — M54.50 CHRONIC RIGHT-SIDED LOW BACK PAIN, UNSPECIFIED WHETHER SCIATICA PRESENT: ICD-10-CM

## 2023-08-17 DIAGNOSIS — G89.29 CHRONIC RIGHT-SIDED LOW BACK PAIN, UNSPECIFIED WHETHER SCIATICA PRESENT: ICD-10-CM

## 2023-08-17 LAB — H PYLORI AG STL QL IA: POSITIVE

## 2023-08-17 PROCEDURE — 72100 X-RAY EXAM L-S SPINE 2/3 VWS: CPT

## 2023-08-18 ENCOUNTER — OFFICE VISIT (OUTPATIENT)
Dept: URGENT CARE | Facility: MEDICAL CENTER | Age: 33
End: 2023-08-18
Payer: MEDICARE

## 2023-08-18 VITALS — OXYGEN SATURATION: 100 % | HEART RATE: 86 BPM | TEMPERATURE: 96.4 F

## 2023-08-18 DIAGNOSIS — R35.0 INCREASED URINARY FREQUENCY: Primary | ICD-10-CM

## 2023-08-18 DIAGNOSIS — N30.01 ACUTE CYSTITIS WITH HEMATURIA: ICD-10-CM

## 2023-08-18 DIAGNOSIS — B37.31 VAGINAL YEAST INFECTION: ICD-10-CM

## 2023-08-18 LAB
SL AMB  POCT GLUCOSE, UA: NEGATIVE
SL AMB LEUKOCYTE ESTERASE,UA: NEGATIVE
SL AMB POCT BILIRUBIN,UA: NEGATIVE
SL AMB POCT BLOOD,UA: ABNORMAL
SL AMB POCT CLARITY,UA: CLEAR
SL AMB POCT COLOR,UA: YELLOW
SL AMB POCT KETONES,UA: NEGATIVE
SL AMB POCT NITRITE,UA: NEGATIVE
SL AMB POCT PH,UA: 6
SL AMB POCT SPECIFIC GRAVITY,UA: 1.01
SL AMB POCT URINE PROTEIN: NEGATIVE
SL AMB POCT UROBILINOGEN: 0.2

## 2023-08-18 PROCEDURE — 81002 URINALYSIS NONAUTO W/O SCOPE: CPT | Performed by: ORTHOPAEDIC SURGERY

## 2023-08-18 PROCEDURE — 87086 URINE CULTURE/COLONY COUNT: CPT | Performed by: ORTHOPAEDIC SURGERY

## 2023-08-18 PROCEDURE — 99213 OFFICE O/P EST LOW 20 MIN: CPT | Performed by: ORTHOPAEDIC SURGERY

## 2023-08-18 RX ORDER — NITROFURANTOIN 25; 75 MG/1; MG/1
100 CAPSULE ORAL 2 TIMES DAILY
Qty: 10 CAPSULE | Refills: 0 | Status: SHIPPED | OUTPATIENT
Start: 2023-08-18 | End: 2023-08-23

## 2023-08-18 RX ORDER — FLUCONAZOLE 150 MG/1
150 TABLET ORAL ONCE
Qty: 1 TABLET | Refills: 0 | Status: SHIPPED | OUTPATIENT
Start: 2023-08-18 | End: 2023-08-18

## 2023-08-18 NOTE — PROGRESS NOTES
[facility]        NAME: Bernice Carrel is a 29 y.o. female  : 1990    MRN: [id number]  DATE: 2023  TIME: 5:28 PM    Assessment and Plan   Increased urinary frequency [R35.0]  1. Increased urinary frequency  POCT urine dip    Urine culture      2. Acute cystitis with hematuria  nitrofurantoin (MACROBID) 100 mg capsule      3. Vaginal yeast infection  fluconazole (DIFLUCAN) 150 mg tablet        Based off of history and exam, will treat as yeast and possible UTI. Patient advised we will send to lab for culture, will call if change in antibiotics is necessary. Patient will otherwise follow-up with PCP/gynecologist.    Patient Instructions       Follow up with PCP in 3-5 days. Proceed to  ER if symptoms worsen. Chief Complaint     Chief Complaint   Patient presents with   • Possible UTI     Pt noticed increased frequency and pain with urination starting Monday. History of Present Illness       43-year-old female presents the urgent care for evaluation of urinary frequency and burning. Patient notes symptoms began this past Monday. Patient also complains of malodorous vaginal discharge and vaginal itching. She notes the discharge is white but not thick. The patient denies any rashes, lesions, fevers. She denies any abdominal pain or back pain. The patient states that she does not get periods due to her birth control, she has not noticed any blood in her urine. Patient mitts that she does get UTIs frequently, and notes that she has had yeast infections concurrently before. The patient denies any new sexual partners. She did have STD testing done 2 months ago, which was negative. Review of Systems   Review of Systems   Constitutional: Negative for chills and fever. HENT: Negative for congestion, ear pain and sore throat. Eyes: Negative for pain and visual disturbance. Respiratory: Negative for cough and shortness of breath.     Cardiovascular: Negative for chest pain and palpitations. Gastrointestinal: Negative for abdominal pain and vomiting. Genitourinary: Positive for dysuria, frequency, urgency and vaginal discharge. Negative for hematuria and vaginal pain (Vaginal itching). Musculoskeletal: Negative for arthralgias and back pain. Skin: Negative for color change and rash. Neurological: Negative for dizziness, seizures, syncope, light-headedness and headaches. All other systems reviewed and are negative.         Current Medications       Current Outpatient Medications:   •  azelastine (ASTELIN) 0.1 % nasal spray, 1 spray into each nostril 2 (two) times a day Use in each nostril as directed, Disp: 30 mL, Rfl: 1  •  divalproex sodium (DEPAKOTE) 250 mg DR tablet, TAKE 1 TABLET(250 MG) BY MOUTH DAILY AT BEDTIME, Disp: 90 tablet, Rfl: 0  •  divalproex sodium (DEPAKOTE) 500 mg EC tablet, TAKE 1 TABLET(500 MG) BY MOUTH DAILY AT BEDTIME, Disp: 90 tablet, Rfl: 0  •  fexofenadine (ALLEGRA) 180 MG tablet, Take 1 tablet (180 mg total) by mouth daily, Disp: 90 tablet, Rfl: 1  •  fluconazole (DIFLUCAN) 150 mg tablet, Take 1 tablet (150 mg total) by mouth once for 1 dose, Disp: 1 tablet, Rfl: 0  •  lurasidone (LATUDA) 40 mg tablet, Take 40 mg by mouth in the morning, Disp: , Rfl:   •  Magnesium 400 MG TABS, Take 1 tablet (400 mg total) by mouth in the morning, Disp: 90 tablet, Rfl: 3  •  medroxyPROGESTERone acetate (DEPO-PROVERA SYRINGE) 150 mg/mL injection, ADMINISTER 1 ML IN THE MUSCLE EVERY 3 MONTHS, Disp: 1 mL, Rfl: 5  •  mirtazapine (REMERON) 15 mg tablet, , Disp: , Rfl:   •  mometasone (ELOCON) 0.1 % lotion, , Disp: , Rfl:   •  nitrofurantoin (MACROBID) 100 mg capsule, Take 1 capsule (100 mg total) by mouth 2 (two) times a day for 5 days, Disp: 10 capsule, Rfl: 0  •  omeprazole (PriLOSEC) 20 mg delayed release capsule, Take 1 capsule (20 mg total) by mouth daily, Disp: 60 capsule, Rfl: 0  •  pregabalin (LYRICA) 50 mg capsule, Take 1 PO TID x 5 days, then 1 PO BID x 5 days, then 1 PO daily x 5 days, then stop., Disp: 30 capsule, Rfl: 0  •  Riboflavin 400 MG TABS, Take 1 tablet (400 mg total) by mouth in the morning, Disp: 90 tablet, Rfl: 3  •  Ubrogepant (Ubrelvy) 100 MG tablet, Take 1 tablet (100 mg) one time as needed for migraine. May repeat one additional tablet (100 mg) at least two hours after the first dose.  Do not use more than two doses per day, or for more than eight days per month., Disp: 9 tablet, Rfl: 11    Current Allergies     Allergies as of 08/18/2023   • (No Known Allergies)            The following portions of the patient's history were reviewed and updated as appropriate: allergies, current medications, past family history, past medical history, past social history, past surgical history and problem list.     Past Medical History:   Diagnosis Date   • Abnormal Pap smear of cervix     2009 cryo; 6/2019 LSIL/AGC pap; 6/2019 colpo HSIL; 8/2019 LEEP HSIL w/neg margins; 6/2020 NILM pap/neg HPV; 6/2020 NILM pap/neg HPV   • ADHD    • Anemia    • Bipolar 1 disorder ([address])    • Depression    • Epilepsy ([address])     last episode 2000   • GERD (gastroesophageal reflux disease)    • Migraine    • OCD (obsessive compulsive disorder)    • Urogenital trichomoniasis 2019       Past Surgical History:   Procedure Laterality Date   • GYNECOLOGIC CRYOSURGERY  2009   • MT COLPOSCOPY CERVIX VAG LOOP ELTRD BX CERVIX N/A 08/09/2019    Procedure: BIOPSY LEEP CERVIX;  Surgeon: Greg Maki MD;  Location: [facility];  Service: Gynecology       Family History   Problem Relation Age of Onset   • Bipolar disorder Mother    • Diabetes Mother    • Migraines Mother    • Miscarriages / Everett Cielo Mother    • No Known Problems Father    • Depression Sister    • Asthma Sister    • Osteoporosis Maternal Grandmother    • Cancer Maternal Grandfather         lung   • Breast cancer Neg Hx    • Colon cancer Neg Hx    • Ovarian cancer Neg Hx          Medications have been verified. Objective   Pulse 86   Temp (!) 96.4 °F (35.8 °C) (Tympanic)   SpO2 100%        Physical Exam     Physical Exam  Vitals and nursing note reviewed. Constitutional:       Appearance: Normal appearance. HENT:      Head: Normocephalic and atraumatic. Nose: Nose normal.      Mouth/Throat:      Pharynx: Oropharynx is clear. Eyes:      Extraocular Movements: Extraocular movements intact. Pupils: Pupils are equal, round, and reactive to light. Cardiovascular:      Rate and Rhythm: Normal rate and regular rhythm. Pulses: Normal pulses. Heart sounds: Normal heart sounds. Pulmonary:      Effort: Pulmonary effort is normal. No respiratory distress. Breath sounds: Normal breath sounds. No wheezing or rhonchi. Abdominal:      General: Abdomen is flat. Bowel sounds are normal.      Palpations: Abdomen is soft. Tenderness: There is abdominal tenderness (Suprapubic). There is no right CVA tenderness, left CVA tenderness or rebound. Musculoskeletal:         General: Normal range of motion. Cervical back: Normal range of motion. Skin:     General: Skin is warm and dry. Capillary Refill: Capillary refill takes less than 2 seconds. Neurological:      General: No focal deficit present. Mental Status: She is alert and oriented to person, place, and time.    Psychiatric:         Mood and Affect: Mood normal.         Behavior: Behavior normal.

## 2023-08-19 LAB — BACTERIA UR CULT: NORMAL

## 2023-08-21 DIAGNOSIS — A04.8 H. PYLORI INFECTION: Primary | ICD-10-CM

## 2023-08-21 RX ORDER — METRONIDAZOLE 500 MG/1
500 TABLET ORAL EVERY 12 HOURS SCHEDULED
Qty: 28 TABLET | Refills: 0 | Status: SHIPPED | OUTPATIENT
Start: 2023-08-21 | End: 2023-09-04

## 2023-08-21 RX ORDER — BISMUTH SUBSALICYLATE 262 MG/1
524 TABLET, CHEWABLE ORAL
Qty: 112 TABLET | Refills: 0 | Status: SHIPPED | OUTPATIENT
Start: 2023-08-21 | End: 2023-09-04

## 2023-08-21 RX ORDER — OMEPRAZOLE 20 MG/1
20 CAPSULE, DELAYED RELEASE ORAL 2 TIMES DAILY
Qty: 28 CAPSULE | Refills: 0 | Status: SHIPPED | OUTPATIENT
Start: 2023-08-21 | End: 2023-09-14

## 2023-08-21 RX ORDER — DOXYCYCLINE HYCLATE 100 MG/1
100 CAPSULE ORAL EVERY 12 HOURS SCHEDULED
Qty: 28 CAPSULE | Refills: 0 | Status: SHIPPED | OUTPATIENT
Start: 2023-08-21 | End: 2023-09-04

## 2023-08-26 ENCOUNTER — HOSPITAL ENCOUNTER (EMERGENCY)
Facility: HOSPITAL | Age: 33
Discharge: HOME | End: 2023-08-26
Attending: EMERGENCY MEDICINE
Payer: MEDICARE

## 2023-08-26 VITALS
WEIGHT: 133.82 LBS | HEART RATE: 86 BPM | TEMPERATURE: 98.4 F | RESPIRATION RATE: 18 BRPM | OXYGEN SATURATION: 98 % | DIASTOLIC BLOOD PRESSURE: 72 MMHG | BODY MASS INDEX: 22.27 KG/M2 | SYSTOLIC BLOOD PRESSURE: 106 MMHG

## 2023-08-26 DIAGNOSIS — R11.2 NAUSEA AND VOMITING: Primary | ICD-10-CM

## 2023-08-26 DIAGNOSIS — R19.7 DIARRHEA: ICD-10-CM

## 2023-08-26 LAB
ALBUMIN SERPL BCP-MCNC: 4.6 G/DL (ref 3.5–5)
ALP SERPL-CCNC: 61 U/L (ref 34–104)
ALT SERPL W P-5'-P-CCNC: 16 U/L (ref 7–52)
ANION GAP SERPL CALCULATED.3IONS-SCNC: 6 MMOL/L
APTT PPP: 27 SECONDS (ref 23–37)
AST SERPL W P-5'-P-CCNC: 19 U/L (ref 13–39)
BACTERIA UR QL AUTO: ABNORMAL /HPF
BASOPHILS # BLD MANUAL: 0.25 THOUSAND/UL (ref 0–0.1)
BASOPHILS NFR MAR MANUAL: 2 % (ref 0–1)
BILIRUB SERPL-MCNC: 0.36 MG/DL (ref 0.2–1)
BILIRUB UR QL STRIP: NEGATIVE
BUN SERPL-MCNC: 14 MG/DL (ref 5–25)
CALCIUM SERPL-MCNC: 9.5 MG/DL (ref 8.4–10.2)
CHLORIDE SERPL-SCNC: 108 MMOL/L (ref 96–108)
CLARITY UR: CLEAR
CO2 SERPL-SCNC: 24 MMOL/L (ref 21–32)
COLOR UR: ABNORMAL
CREAT SERPL-MCNC: 0.59 MG/DL (ref 0.6–1.3)
EOSINOPHIL # BLD MANUAL: 0 THOUSAND/UL (ref 0–0.4)
EOSINOPHIL NFR BLD MANUAL: 0 % (ref 0–6)
ERYTHROCYTE [DISTWIDTH] IN BLOOD BY AUTOMATED COUNT: 13.4 % (ref 11.6–15.1)
EXT PREGNANCY TEST URINE: NEGATIVE
EXT. CONTROL: NORMAL
GFR SERPL CREATININE-BSD FRML MDRD: 121 ML/MIN/1.73SQ M
GLUCOSE SERPL-MCNC: 97 MG/DL (ref 65–140)
GLUCOSE UR STRIP-MCNC: NEGATIVE MG/DL
HCT VFR BLD AUTO: 43.9 % (ref 34.8–46.1)
HGB BLD-MCNC: 14.4 G/DL (ref 11.5–15.4)
HGB UR QL STRIP.AUTO: ABNORMAL
INR PPP: 1.04 (ref 0.84–1.19)
KETONES UR STRIP-MCNC: ABNORMAL MG/DL
LEUKOCYTE ESTERASE UR QL STRIP: NEGATIVE
LIPASE SERPL-CCNC: 48 U/L (ref 11–82)
LYMPHOCYTES # BLD AUTO: 41 % (ref 14–44)
LYMPHOCYTES # BLD AUTO: 5.21 THOUSAND/UL (ref 0.6–4.47)
MCH RBC QN AUTO: 30.6 PG (ref 26.8–34.3)
MCHC RBC AUTO-ENTMCNC: 32.8 G/DL (ref 31.4–37.4)
MCV RBC AUTO: 93 FL (ref 82–98)
MONOCYTES # BLD AUTO: 0.38 THOUSAND/UL (ref 0–1.22)
MONOCYTES NFR BLD: 3 % (ref 4–12)
MUCOUS THREADS UR QL AUTO: ABNORMAL
NEUTROPHILS # BLD MANUAL: 6.86 THOUSAND/UL (ref 1.85–7.62)
NEUTS BAND NFR BLD MANUAL: 1 % (ref 0–8)
NEUTS SEG NFR BLD AUTO: 53 % (ref 43–75)
NITRITE UR QL STRIP: NEGATIVE
NON-SQ EPI CELLS URNS QL MICRO: ABNORMAL /HPF
PH UR STRIP.AUTO: 6.5 [PH] (ref 4.5–8)
PLATELET # BLD AUTO: 315 THOUSANDS/UL (ref 149–390)
PLATELET BLD QL SMEAR: ADEQUATE
PMV BLD AUTO: 9.4 FL (ref 8.9–12.7)
POTASSIUM SERPL-SCNC: 3.6 MMOL/L (ref 3.5–5.3)
PROT SERPL-MCNC: 7.4 G/DL (ref 6.4–8.4)
PROT UR STRIP-MCNC: ABNORMAL MG/DL
PROTHROMBIN TIME: 13.6 SECONDS (ref 11.6–14.5)
RBC # BLD AUTO: 4.7 MILLION/UL (ref 3.81–5.12)
RBC #/AREA URNS AUTO: ABNORMAL /HPF
RBC MORPH BLD: NORMAL
SODIUM SERPL-SCNC: 138 MMOL/L (ref 135–147)
SP GR UR STRIP.AUTO: 1.02 (ref 1–1.03)
UROBILINOGEN UR QL STRIP.AUTO: 0.2 E.U./DL
WBC # BLD AUTO: 12.7 THOUSAND/UL (ref 4.31–10.16)
WBC #/AREA URNS AUTO: ABNORMAL /HPF

## 2023-08-26 PROCEDURE — 85007 BL SMEAR W/DIFF WBC COUNT: CPT | Performed by: EMERGENCY MEDICINE

## 2023-08-26 PROCEDURE — 83690 ASSAY OF LIPASE: CPT | Performed by: EMERGENCY MEDICINE

## 2023-08-26 PROCEDURE — 85027 COMPLETE CBC AUTOMATED: CPT | Performed by: EMERGENCY MEDICINE

## 2023-08-26 PROCEDURE — 36415 COLL VENOUS BLD VENIPUNCTURE: CPT | Performed by: EMERGENCY MEDICINE

## 2023-08-26 PROCEDURE — 85610 PROTHROMBIN TIME: CPT | Performed by: EMERGENCY MEDICINE

## 2023-08-26 PROCEDURE — 99285 EMERGENCY DEPT VISIT HI MDM: CPT | Performed by: EMERGENCY MEDICINE

## 2023-08-26 PROCEDURE — 96365 THER/PROPH/DIAG IV INF INIT: CPT

## 2023-08-26 PROCEDURE — 80053 COMPREHEN METABOLIC PANEL: CPT | Performed by: EMERGENCY MEDICINE

## 2023-08-26 PROCEDURE — 81025 URINE PREGNANCY TEST: CPT | Performed by: EMERGENCY MEDICINE

## 2023-08-26 PROCEDURE — 96375 TX/PRO/DX INJ NEW DRUG ADDON: CPT

## 2023-08-26 PROCEDURE — 96366 THER/PROPH/DIAG IV INF ADDON: CPT

## 2023-08-26 PROCEDURE — 99284 EMERGENCY DEPT VISIT MOD MDM: CPT

## 2023-08-26 PROCEDURE — 81001 URINALYSIS AUTO W/SCOPE: CPT

## 2023-08-26 PROCEDURE — 85730 THROMBOPLASTIN TIME PARTIAL: CPT | Performed by: EMERGENCY MEDICINE

## 2023-08-26 RX ORDER — ONDANSETRON 4 MG/1
4 TABLET, ORALLY DISINTEGRATING ORAL EVERY 6 HOURS PRN
Qty: 20 TABLET | Refills: 0 | Status: SHIPPED | OUTPATIENT
Start: 2023-08-26

## 2023-08-26 RX ORDER — HALOPERIDOL 5 MG/ML
5 INJECTION INTRAMUSCULAR ONCE
Status: COMPLETED | OUTPATIENT
Start: 2023-08-26 | End: 2023-08-26

## 2023-08-26 RX ORDER — ONDANSETRON 2 MG/ML
4 INJECTION INTRAMUSCULAR; INTRAVENOUS ONCE
Status: COMPLETED | OUTPATIENT
Start: 2023-08-26 | End: 2023-08-26

## 2023-08-26 RX ADMIN — HALOPERIDOL LACTATE 5 MG: 5 INJECTION, SOLUTION INTRAMUSCULAR at 03:51

## 2023-08-26 RX ADMIN — SODIUM CHLORIDE, SODIUM LACTATE, POTASSIUM CHLORIDE, AND CALCIUM CHLORIDE 1000 ML: .6; .31; .03; .02 INJECTION, SOLUTION INTRAVENOUS at 01:44

## 2023-08-26 RX ADMIN — ONDANSETRON 4 MG: 2 INJECTION INTRAMUSCULAR; INTRAVENOUS at 01:43

## 2023-08-26 NOTE — ED PROVIDER NOTES
History  Chief Complaint   Patient presents with   • Abdominal Pain     Pt reports hx of H. Pylori. Pt reports N/V/D and UTI symptoms. Pt reports symptoms for 2 weeks. Also having upper abd pain. 30-year-old female presents with nausea/vomiting/diarrhea. Has been ongoing for the past 2 weeks. Was recently diagnosed with H. pylori. States that she has been unable to keep the antibiotics down. Denies any drug or marijuana use. She does admit to occasional alcohol use. Prior to Admission Medications   Prescriptions Last Dose Informant Patient Reported? Taking? Magnesium 400 MG TABS   No No   Sig: Take 1 tablet (400 mg total) by mouth in the morning   Riboflavin 400 MG TABS   No No   Sig: Take 1 tablet (400 mg total) by mouth in the morning   Ubrogepant (Ubrelvy) 100 MG tablet   No No   Sig: Take 1 tablet (100 mg) one time as needed for migraine. May repeat one additional tablet (100 mg) at least two hours after the first dose. Do not use more than two doses per day, or for more than eight days per month.    azelastine (ASTELIN) 0.1 % nasal spray   No No   Si spray into each nostril 2 (two) times a day Use in each nostril as directed   bismuth subsalicylate (PEPTO BISMOL) 262 MG chewable tablet   No No   Sig: Chew 2 tablets (524 mg total) 4 (four) times a day (before meals and at bedtime) for 14 days   divalproex sodium (DEPAKOTE) 250 mg DR tablet   No No   Sig: TAKE 1 TABLET(250 MG) BY MOUTH DAILY AT BEDTIME   divalproex sodium (DEPAKOTE) 500 mg EC tablet   No No   Sig: TAKE 1 TABLET(500 MG) BY MOUTH DAILY AT BEDTIME   doxycycline hyclate (VIBRAMYCIN) 100 mg capsule   No No   Sig: Take 1 capsule (100 mg total) by mouth every 12 (twelve) hours for 14 days   fexofenadine (ALLEGRA) 180 MG tablet   No No   Sig: Take 1 tablet (180 mg total) by mouth daily   lurasidone (LATUDA) 40 mg tablet   Yes No   Sig: Take 40 mg by mouth in the morning   medroxyPROGESTERone acetate (DEPO-PROVERA SYRINGE) 150 mg/mL injection   No No   Sig: ADMINISTER 1 ML IN THE MUSCLE EVERY 3 MONTHS   metroNIDAZOLE (FLAGYL) 500 mg tablet   No No   Sig: Take 1 tablet (500 mg total) by mouth every 12 (twelve) hours for 14 days   mirtazapine (REMERON) 15 mg tablet   Yes No   mometasone (ELOCON) 0.1 % lotion   Yes No   omeprazole (PriLOSEC) 20 mg delayed release capsule   No No   Sig: Take 1 capsule (20 mg total) by mouth daily   omeprazole (PriLOSEC) 20 mg delayed release capsule   No No   Sig: Take 1 capsule (20 mg total) by mouth 2 (two) times a day for 14 days   pregabalin (LYRICA) 50 mg capsule   No No   Sig: Take 1 PO TID x 5 days, then 1 PO BID x 5 days, then 1 PO daily x 5 days, then stop. Facility-Administered Medications: None       Past Medical History:   Diagnosis Date   • Abnormal Pap smear of cervix     2009 cryo; 6/2019 LSIL/AGC pap; 6/2019 colpo HSIL; 8/2019 LEEP HSIL w/neg margins; 6/2020 NILM pap/neg HPV; 6/2020 NILM pap/neg HPV   • ADHD    • Anemia    • Bipolar 1 disorder ([address])    • Depression    • Epilepsy ([address])     last episode 2000   • GERD (gastroesophageal reflux disease)    • Migraine    • OCD (obsessive compulsive disorder)    • Urogenital trichomoniasis 2019       Past Surgical History:   Procedure Laterality Date   • GYNECOLOGIC CRYOSURGERY  2009   • DE COLPOSCOPY CERVIX VAG LOOP ELTRD BX CERVIX N/A 08/09/2019    Procedure: BIOPSY LEEP CERVIX;  Surgeon: Onnie Krabbe, MD;  Location: [facility];  Service: Gynecology       Family History   Problem Relation Age of Onset   • Bipolar disorder Mother    • Diabetes Mother    • Migraines Mother    • Miscarriages / [place] Mother    • No Known Problems Father    • Depression Sister    • Asthma Sister    • Osteoporosis Maternal Grandmother    • Cancer Maternal Grandfather         lung   • Breast cancer Neg Hx    • Colon cancer Neg Hx    • Ovarian cancer Neg Hx      I have reviewed and agree with the history as documented.     E-Cigarette/Vaping   • E-Cigarette Use Never User      E-Cigarette/Vaping Substances     Social History     Tobacco Use   • Smoking status: Every Day     Packs/day: 1.00     Years: 11.00     Total pack years: 11.00     Types: Cigarettes     Passive exposure: Current   • Smokeless tobacco: Never   Vaping Use   • Vaping Use: Never used   Substance Use Topics   • Alcohol use: Yes     Comment: couple times/month   • Drug use: Never       Review of Systems   Constitutional: Negative for chills and fever. HENT: Negative for rhinorrhea, sore throat and trouble swallowing. Eyes: Negative for photophobia and visual disturbance. Respiratory: Negative for cough, chest tightness and shortness of breath. Cardiovascular: Negative for chest pain, palpitations and leg swelling. Gastrointestinal: Positive for abdominal pain, diarrhea, nausea and vomiting. Negative for blood in stool. Endocrine: Negative for polyuria. Genitourinary: Negative for dysuria, flank pain, hematuria, vaginal bleeding and vaginal discharge. Musculoskeletal: Negative for back pain and neck pain. Skin: Negative for color change and rash. Allergic/Immunologic: Negative for immunocompromised state. Neurological: Negative for dizziness, weakness, light-headedness, numbness and headaches. All other systems reviewed and are negative. Physical Exam  Physical Exam  Vitals and nursing note reviewed. Constitutional:       General: She is not in acute distress. Appearance: She is well-developed. HENT:      Head: Normocephalic and atraumatic. Mouth/Throat:      Lips: Pink. Mouth: Mucous membranes are moist.   Eyes:      General: Lids are normal.      Extraocular Movements: Extraocular movements intact. Conjunctiva/sclera: Conjunctivae normal.      Pupils: Pupils are equal, round, and reactive to light. Cardiovascular:      Rate and Rhythm: Normal rate and regular rhythm. Heart sounds: Normal heart sounds. No murmur heard.   Pulmonary: Effort: Pulmonary effort is normal.      Breath sounds: Normal breath sounds. Abdominal:      General: There is no distension. Palpations: Abdomen is soft. Tenderness: There is no abdominal tenderness. There is no guarding or rebound. Musculoskeletal:         General: No swelling. Cervical back: Full passive range of motion without pain, normal range of motion and neck supple. Skin:     General: Skin is warm. Capillary Refill: Capillary refill takes less than 2 seconds. Neurological:      General: No focal deficit present. Mental Status: She is alert.    Psychiatric:         Mood and Affect: Mood normal.         Speech: Speech normal.         Behavior: Behavior normal.         Vital Signs  ED Triage Vitals   Temperature Pulse Respirations Blood Pressure SpO2   08/26/23 0348 08/26/23 0124 08/26/23 0124 08/26/23 0124 08/26/23 0124   98.4 °F (36.9 °C) 96 20 147/91 100 %      Temp Source Heart Rate Source Patient Position - Orthostatic VS BP Location FiO2 (%)   08/26/23 0348 08/26/23 0124 08/26/23 0124 08/26/23 0124 --   Oral Monitor Sitting Right arm       Pain Score       --                  Vitals:    08/26/23 0124 08/26/23 0348   BP: 147/91 106/72   Pulse: 96 86   Patient Position - Orthostatic VS: Sitting Lying         Visual Acuity      ED Medications  Medications   lactated ringers bolus 1,000 mL (0 mL Intravenous Stopped 8/26/23 0350)   ondansetron (ZOFRAN) injection 4 mg (4 mg Intravenous Given 8/26/23 0143)   haloperidol lactate (HALDOL) injection 5 mg (5 mg Intravenous Given 8/26/23 0351)       Diagnostic Studies  Results Reviewed     Procedure Component Value Units Date/Time    Manual Differential(PHLEBS Do Not Order) [[id number]]  (Abnormal) Collected: 08/26/23 0139    Lab Status: Final result Specimen: Blood from Arm, Left Updated: 08/26/23 0317     Segmented % 53 %      Bands % 1 %      Lymphocytes % 41 %      Monocytes % 3 %      Eosinophils, % 0 %      Basophils % 2 % Absolute Neutrophils 6.86 Thousand/uL      Lymphocytes Absolute 5.21 Thousand/uL      Monocytes Absolute 0.38 Thousand/uL      Eosinophils Absolute 0.00 Thousand/uL      Basophils Absolute 0.25 Thousand/uL      Total Counted --     RBC Morphology Normal     Platelet Estimate Adequate    Urine Microscopic [[id number]]  (Abnormal) Collected: 08/26/23 0158    Lab Status: Final result Specimen: Urine, Clean Catch Updated: 08/26/23 0213     RBC, UA 4-10 /hpf      WBC, UA 1-2 /hpf      Epithelial Cells Occasional /hpf      Bacteria, UA Occasional /hpf      MUCUS THREADS Moderate    POCT pregnancy, urine [[id number]]  (Normal) Resulted: 08/26/23 0202    Lab Status: Final result Updated: 08/26/23 0203     EXT Preg Test, Ur Negative     Control Valid    CBC and differential [[id number]]  (Abnormal) Collected: 08/26/23 0139    Lab Status: Final result Specimen: Blood from Arm, Left Updated: 08/26/23 0200     WBC 12.70 Thousand/uL      RBC 4.70 Million/uL      Hemoglobin 14.4 g/dL      Hematocrit 43.9 %      MCV 93 fL      MCH 30.6 pg      MCHC 32.8 g/dL      RDW 13.4 %      MPV 9.4 fL      Platelets 781 Thousands/uL     Urine Macroscopic, POC [[id number]]  (Abnormal) Collected: 08/26/23 0158    Lab Status: Final result Specimen: Urine Updated: 08/26/23 0159     Color, UA Arabella     Clarity, UA Clear     pH, UA 6.5     Leukocytes, UA Negative     Nitrite, UA Negative     Protein, UA Trace mg/dl      Glucose, UA Negative mg/dl      Ketones, UA Trace mg/dl      Urobilinogen, UA 0.2 E.U./dl      Bilirubin, UA Negative     Occult Blood, UA Trace     Specific Gravity, UA 1.020    Narrative:      CLINITEK RESULT    Comprehensive metabolic panel [[id number]]  (Abnormal) Collected: 08/26/23 0139    Lab Status: Final result Specimen: Blood from Arm, Left Updated: 08/26/23 0159     Sodium 138 mmol/L      Potassium 3.6 mmol/L      Chloride 108 mmol/L      CO2 24 mmol/L      ANION GAP 6 mmol/L      BUN 14 mg/dL      Creatinine 0.59 mg/dL Glucose 97 mg/dL      Calcium 9.5 mg/dL      AST 19 U/L      ALT 16 U/L      Alkaline Phosphatase 61 U/L      Total Protein 7.4 g/dL      Albumin 4.6 g/dL      Total Bilirubin 0.36 mg/dL      eGFR 121 ml/min/1.73sq m     Narrative:      [facility] guidelines for Chronic Kidney Disease (CKD):   •  Stage 1 with normal or high GFR (GFR > 90 mL/min/1.73 square meters)  •  Stage 2 Mild CKD (GFR = 60-89 mL/min/1.73 square meters)  •  Stage 3A Moderate CKD (GFR = 45-59 mL/min/1.73 square meters)  •  Stage 3B Moderate CKD (GFR = 30-44 mL/min/1.73 square meters)  •  Stage 4 Severe CKD (GFR = 15-29 mL/min/1.73 square meters)  •  Stage 5 End Stage CKD (GFR <15 mL/min/1.73 square meters)  Note: GFR calculation is accurate only with a steady state creatinine    Lipase [[id number]]  (Normal) Collected: 08/26/23 0139    Lab Status: Final result Specimen: Blood from Arm, Left Updated: 08/26/23 0159     Lipase 48 u/L     Protime-INR [[id number]]  (Normal) Collected: 08/26/23 0139    Lab Status: Final result Specimen: Blood from Arm, Left Updated: 08/26/23 0157     Protime 13.6 seconds      INR 1.04    APTT [[id number]]  (Normal) Collected: 08/26/23 0139    Lab Status: Final result Specimen: Blood from Arm, Left Updated: 08/26/23 0157     PTT 27 seconds                  No orders to display              Procedures  Procedures         ED Course  ED Course as of 08/26/23 0625   Sat Aug 26, 2023   0311 States that she was unable to keep the crackers down. Will give Haldol. Likely discharge. SBIRT 22yo+    Flowsheet Row Most Recent Value   Initial Alcohol Screen: US AUDIT-C     1. How often do you have a drink containing alcohol? 2 Filed at: 08/26/2023 0350   2. How many drinks containing alcohol do you have on a typical day you are drinking? 1 Filed at: 08/26/2023 0350   3a. Male UNDER 65: How often do you have five or more drinks on one occasion?  0 Filed at: 08/26/2023 0350 3b. FEMALE Any Age, or MALE 65+: How often do you have 4 or more drinks on one occassion? 0 Filed at: 08/26/2023 0350   Audit-C Score 3 Filed at: 08/26/2023 1490   ALFRED: How many times in the past year have you. .. Used an illegal drug or used a prescription medication for non-medical reasons? Never Filed at: 08/26/2023 0350                    Medical Decision Making  - given the presentation, will check CBC for marked leukocytosis  - CMP for liver enzyme elevation that could signal cholecystitis, biliary obstructive disease. Check RFTs for CLEMENT / markers of dehydration.  - Lipase given abdominal pain to evaluate specifically for pancreatitis. - Urine: will check for UTI or signs of pyelonephritis. - Pregnancy test: given she is female, could if positive, could be ectopic and would change workup to ultrasound instead of CT scan.   -Plan to hold off on imaging at this time as the patient has an unremarkable abdominal exam.  -Plan to treat her symptomatically with fluids and Zofran. - Disposition per workup. Diarrhea: acute illness or injury  Nausea and vomiting: complicated acute illness or injury with systemic symptoms  Amount and/or Complexity of Data Reviewed  Labs: ordered. Risk  Prescription drug management. Disposition  Final diagnoses:   Nausea and vomiting   Diarrhea     Time reflects when diagnosis was documented in both MDM as applicable and the Disposition within this note     Time User Action Codes Description Comment    8/26/2023  3:54 AM Kevon FREEMAN Add [R11.2] Nausea and vomiting     8/26/2023  3:54 AM Hector Gandara Add [R19.7] Diarrhea       ED Disposition     ED Disposition   Discharge    Condition   Stable    Date/Time   Sat Aug 26, 2023  3:54 AM    Comment   Ab Bonner discharge to home/self care.                Follow-up Information     Follow up With Specialties Details Why Contact Info Additional KARTIK [facility], Nurse Practitioner Schedule an appointment as soon as possible for a visit   [address] [facility]  [address] [facility] [address] [facility]       [phone] [facility] Emergency Department Emergency Medicine Go to  If symptoms worsen [address] [id number] [address] [facility] Emergency Department, [address] [facility] [place], [place], [address]          Discharge Medication List as of 8/26/2023  3:55 AM      START taking these medications    Details   ondansetron (ZOFRAN-ODT) 4 mg disintegrating tablet Take 1 tablet (4 mg total) by mouth every 6 (six) hours as needed for nausea, Starting Sat 8/26/2023, Normal         CONTINUE these medications which have NOT CHANGED    Details   azelastine (ASTELIN) 0.1 % nasal spray 1 spray into each nostril 2 (two) times a day Use in each nostril as directed, Starting Mon 5/1/2023, Normal      bismuth subsalicylate (PEPTO BISMOL) 262 MG chewable tablet Chew 2 tablets (524 mg total) 4 (four) times a day (before meals and at bedtime) for 14 days, Starting Mon 8/21/2023, Until Mon 9/4/2023, Normal      !! divalproex sodium (DEPAKOTE) 250 mg DR tablet TAKE 1 TABLET(250 MG) BY MOUTH DAILY AT BEDTIME, Normal      !! divalproex sodium (DEPAKOTE) 500 mg EC tablet TAKE 1 TABLET(500 MG) BY MOUTH DAILY AT BEDTIME, Normal      doxycycline hyclate (VIBRAMYCIN) 100 mg capsule Take 1 capsule (100 mg total) by mouth every 12 (twelve) hours for 14 days, Starting Mon 8/21/2023, Until Mon 9/4/2023, Normal      fexofenadine (ALLEGRA) 180 MG tablet Take 1 tablet (180 mg total) by mouth daily, Starting Thu 1/26/2023, Normal      lurasidone (LATUDA) 40 mg tablet Take 40 mg by mouth in the morning, Starting Sun 5/29/2022, Historical Med      Magnesium 400 MG TABS Take 1 tablet (400 mg total) by mouth in the morning, Starting Tue 11/1/2022, Normal      medroxyPROGESTERone acetate (DEPO-PROVERA SYRINGE) 150 mg/mL injection ADMINISTER 1 ML IN THE MUSCLE EVERY 3 MONTHS, Normal      metroNIDAZOLE (FLAGYL) 500 mg tablet Take 1 tablet (500 mg total) by mouth every 12 (twelve) hours for 14 days, Starting Mon 8/21/2023, Until Mon 9/4/2023, Normal      mirtazapine (REMERON) 15 mg tablet Starting Mon 1/17/2022, Historical Med      mometasone (ELOCON) 0.1 % lotion Starting Wed 2/22/2023, Historical Med      !! omeprazole (PriLOSEC) 20 mg delayed release capsule Take 1 capsule (20 mg total) by mouth daily, Starting Wed 8/2/2023, Normal      !! omeprazole (PriLOSEC) 20 mg delayed release capsule Take 1 capsule (20 mg total) by mouth 2 (two) times a day for 14 days, Starting Mon 8/21/2023, Until Mon 9/4/2023, Normal      pregabalin (LYRICA) 50 mg capsule Take 1 PO TID x 5 days, then 1 PO BID x 5 days, then 1 PO daily x 5 days, then stop., Normal      Riboflavin 400 MG TABS Take 1 tablet (400 mg total) by mouth in the morning, Starting Tue 11/1/2022, Normal      Ubrogepant (Ubrelvy) 100 MG tablet Take 1 tablet (100 mg) one time as needed for migraine. May repeat one additional tablet (100 mg) at least two hours after the first dose. Do not use more than two doses per day, or for more than eight days per month., Normal       !! - Potential duplicate medications found. Please discuss with provider. No discharge procedures on file.     PDMP Review       Value Time User    PDMP Reviewed  Yes 1/14/2022 11:43 AM Jacinto Everett PA-C          ED Provider  Electronically Signed by           Lala Erazo MD  08/26/23 9092

## 2023-08-31 ENCOUNTER — TELEPHONE (OUTPATIENT)
Dept: PAIN MEDICINE | Facility: CLINIC | Age: 33
End: 2023-08-31

## 2023-08-31 NOTE — TELEPHONE ENCOUNTER
S/w the patient to inquire. She stated she had the EMG completed at [facility]. Will have to call tomorrow to have results faxed.

## 2023-08-31 NOTE — TELEPHONE ENCOUNTER
----- Message from Joanie Hess, [address] [place] [address] sent at 8/31/2023  1:57 PM EDT -----  Regarding: FW: EMG of left hand   Contact: [phone]  Ask her where she got it done. If its outside of [facility] we'll need to call and request records. ----- Message -----  From: Tami Wild RN  Sent: 8/31/2023   1:55 PM EDT  To: KARTIK Campbell  Subject: FW: EMG of left hand                             No results as of yet  ----- Message -----  From: Gage Anand  Sent: 8/31/2023  12:37 PM EDT  To: [facility]  Subject: EMG of left hand                                 I would like know the results or my EMG of my left hand thanks.

## 2023-09-01 NOTE — TELEPHONE ENCOUNTER
Contacted [facility] at [phone] and s/w c/ reception and they will fax over the results of the left hand EMG as per our request. Fax number provided.

## 2023-09-01 NOTE — TELEPHONE ENCOUNTER
EMG reveals non-specific findings that may represent changes from her old wrist/hand injury as a child, but there are no acute findings to explain patient's symptoms. No evidence of carpal tunnel, cubital tunnel, brachial plexus plexopathy or cervical radiculopathy.

## 2023-09-05 NOTE — TELEPHONE ENCOUNTER
Luz Bateman RN  to Gage Anand          9/1/23  1:47 PM  Hi Veverlula Ramirez,  We got your EMG results, Du Triplett said it reveals non-specific findings that may represent changes from her old wrist/hand injury as a child, but there are no acute findings to explain patient's symptoms. No evidence of carpal tunnel, cubital tunnel, brachial plexus plexopathy or cervical radiculopathy. Thank you,  Ruby Lee RN    Last read by Gage Anand at  9:47 AM on 9/2/2023.

## 2023-09-07 ENCOUNTER — PATIENT MESSAGE (OUTPATIENT)
Dept: PAIN MEDICINE | Facility: CLINIC | Age: 33
End: 2023-09-07

## 2023-09-10 DIAGNOSIS — A04.8 H. PYLORI INFECTION: ICD-10-CM

## 2023-09-14 ENCOUNTER — PROCEDURE VISIT (OUTPATIENT)
Dept: OBGYN CLINIC | Facility: CLINIC | Age: 33
End: 2023-09-14

## 2023-09-14 VITALS
HEART RATE: 98 BPM | DIASTOLIC BLOOD PRESSURE: 72 MMHG | SYSTOLIC BLOOD PRESSURE: 102 MMHG | WEIGHT: 131.6 LBS | BODY MASS INDEX: 21.9 KG/M2

## 2023-09-14 DIAGNOSIS — Z30.431 IUD CHECK UP: ICD-10-CM

## 2023-09-14 DIAGNOSIS — Z30.430 ENCOUNTER FOR INSERTION OF MIRENA IUD: ICD-10-CM

## 2023-09-14 DIAGNOSIS — Z30.09 UNWANTED FERTILITY: Primary | ICD-10-CM

## 2023-09-14 LAB — SL AMB POCT URINE HCG: NEGATIVE

## 2023-09-14 PROCEDURE — 81025 URINE PREGNANCY TEST: CPT | Performed by: NURSE PRACTITIONER

## 2023-09-14 PROCEDURE — 58300 INSERT INTRAUTERINE DEVICE: CPT | Performed by: NURSE PRACTITIONER

## 2023-09-14 RX ORDER — OMEPRAZOLE 20 MG/1
20 CAPSULE, DELAYED RELEASE ORAL 2 TIMES DAILY
Qty: 28 CAPSULE | Refills: 0 | Status: ON HOLD | OUTPATIENT
Start: 2023-09-14

## 2023-09-14 NOTE — PROGRESS NOTES
Iud insertions    Date/Time: 9/14/2023 8:30 AM    Performed by: Elin Zavaleta [address] [place] [address] by: KARTIK Donald    Other Assisting Provider: No    Verbal consent obtained?: Yes    Written consent obtained?: Yes    Risks and benefits: Risks, benefits and alternatives were discussed    Consent given by:  Patient  Time Out:     Time out: Immediately prior to the procedure a time out was called    Patient states understanding of procedure being performed: Yes    Patient's understanding of procedure matches consent: Yes    Procedure consent matches procedure scheduled: Yes    Required items: Required blood products, implants, devices and special equipment available    Patient identity confirmed:  Verbally with patient  Procedure:     Pelvic exam performed: yes      Negative GC/chlamydia test: yes      Negative urine pregnancy test: yes      Cervix cleaned and prepped: yes      Speculum placed in vagina: yes      Tenaculum applied to cervix: yes      Uterus sounded: yes      IUD inserted with no complications: yes      IUD type:  Mirena    Strings trimmed: yes    Post-procedure:     Patient tolerated procedure well: yes      Patient will follow up after next period: yes    Comments:      Uterus sounding performed and met resistance initially at 5cm - with tenaculum application and minimal pressure, sound advanced without resistance to hub of sound (>11cm) which patient tolerated without pain or bleeding. Sounding was repeated multiple times with same result and is believed to have entered R fallopian tube as patient verbalized feeling discomfort in R adnexal region. No bleeding visualized. Order placed for pelvic ultrasound.

## 2023-09-18 ENCOUNTER — EVALUATION (OUTPATIENT)
Dept: PHYSICAL THERAPY | Facility: MEDICAL CENTER | Age: 33
End: 2023-09-18
Payer: MEDICARE

## 2023-09-18 DIAGNOSIS — G89.29 CHRONIC RIGHT-SIDED LOW BACK PAIN, UNSPECIFIED WHETHER SCIATICA PRESENT: Primary | ICD-10-CM

## 2023-09-18 DIAGNOSIS — M54.50 CHRONIC RIGHT-SIDED LOW BACK PAIN, UNSPECIFIED WHETHER SCIATICA PRESENT: Primary | ICD-10-CM

## 2023-09-18 PROCEDURE — 97161 PT EVAL LOW COMPLEX 20 MIN: CPT | Performed by: PHYSICAL THERAPIST

## 2023-09-18 PROCEDURE — 97110 THERAPEUTIC EXERCISES: CPT | Performed by: PHYSICAL THERAPIST

## 2023-09-18 NOTE — PROGRESS NOTES
PT Evaluation     Today's date: 2023  Patient name: Nancy Mayorga  : 1990  MRN: [id number]  Referring provider: KARTIK Alberto  Dx:   Encounter Diagnosis     ICD-10-CM    1. Chronic right-sided low back pain, unspecified whether sciatica present  M54.50 Ambulatory referral to Physical Therapy    G89.29                      Assessment  Assessment details: Nancy Mayorga is a 29 y.o. female presents with chronic right-sided low back pain with R LE radiculopathy. Nancy Mayorga has the below listed impairments and will benefit from skilled PT to improve deficits to return to prior level of function. Impairments: abnormal muscle firing, abnormal or restricted ROM, impaired physical strength and pain with function  Understanding of Dx/Px/POC: good   Prognosis: good    Goals  Impairment Goals  - Decrease pain to 0-3/10  - Improve ROM equal to [facility]  - Increase strength equal to 5/5    Functional Goals  - Patient will be independent with comprehensive HEP  - Ambulation is improved to prior level of function  - Stair climbing is improved to prior level of function  - Squatting is improved to prior level of function  - Patient will be able to lift/carry objects without provocation of symptoms      Plan  Patient would benefit from: skilled PT  Referral necessary: No  Planned therapy interventions: home exercise program, manual therapy, neuromuscular re-education, patient education, functional ROM exercises, strengthening, stretching and joint mobilization  Frequency: 1x week  Duration in weeks: 8  Treatment plan discussed with: patient        Subjective Evaluation    History of Present Illness  Mechanism of injury: No Farias presents with low back pain and right lower extremity complaints which started in May without precipitating event. Lumbar xray revealed Mild dextroscoliosis. No acute osseous abnormality or degenerative changes.  She reports a constant R LBP described as pinch with intermittent right leg numbness in non-dermatomal pattern. Denies bowel/bladder changes, night pain, LE weakness. She has increased symptoms with [address] [employer] activities. Patient Goals  Patient goals for therapy: decreased pain    Pain  At best pain ratin  At worst pain ratin          Objective     Neurological Testing     Reflexes   Left   Patellar (L4): normal (2+)  Achilles (S1): normal (2+)    Right   Patellar (L4): normal (2+)  Achilles (S1): normal (2+)    Active Range of Motion     Lumbar   Flexion:  with pain Restriction level: moderate  Extension:  Restriction level: minimal  Left lateral flexion:  Restriction level: minimal  Right lateral flexion:  Restriction level: minimal  Left rotation:  Restriction level: minimal  Right rotation:  Restriction level: minimal    Joint Play     Hypomobile: L3, L4 and L5     Pain: L3, L4 and L5     Strength/Myotome Testing     Left Hip   Planes of Motion   Flexion: 5    Right Hip   Planes of Motion   Flexion: 5    Left Knee   Flexion: 5  Extension: 5    Right Knee   Flexion: 5  Extension: 5    Left Ankle/Foot   Dorsiflexion: 5  Plantar flexion: 5  Eversion: 5    Right Ankle/Foot   Dorsiflexion: 5  Plantar flexion: 5  Eversion: 5    Tests     Lumbar     Left   Negative crossed SLR, femoral stretch, passive SLR and slump test.     Right   Positive femoral stretch, passive SLR and slump test.     Left Hip   Negative long sit. Right Hip   Negative long sit.               Precautions: none      Manuals                                                                 Neuro Re-Ed                                                                                                        Ther Ex             LTR 5"x10            SKTC 5"x5            slump                                                                              Ther Activity                                       Gait Training                                       Modalities

## 2023-09-19 ENCOUNTER — HOSPITAL ENCOUNTER (OUTPATIENT)
Dept: RADIOLOGY | Facility: HOSPITAL | Age: 33
Discharge: HOME | End: 2023-09-19
Payer: MEDICARE

## 2023-09-19 ENCOUNTER — TELEPHONE (OUTPATIENT)
Dept: OBGYN CLINIC | Facility: CLINIC | Age: 33
End: 2023-09-19

## 2023-09-19 ENCOUNTER — HOSPITAL ENCOUNTER (OUTPATIENT)
Facility: HOSPITAL | Age: 33
Setting detail: OBSERVATION
Discharge: HOME | End: 2023-09-20
Attending: EMERGENCY MEDICINE | Admitting: OBSTETRICS & GYNECOLOGY
Payer: MEDICARE

## 2023-09-19 ENCOUNTER — HOSPITAL ENCOUNTER (OUTPATIENT)
Dept: ULTRASOUND IMAGING | Facility: HOSPITAL | Age: 33
Discharge: HOME | End: 2023-09-19
Payer: MEDICARE

## 2023-09-19 ENCOUNTER — NURSE TRIAGE (OUTPATIENT)
Dept: OTHER | Facility: OTHER | Age: 33
End: 2023-09-19

## 2023-09-19 ENCOUNTER — APPOINTMENT (EMERGENCY)
Dept: CT IMAGING | Facility: HOSPITAL | Age: 33
End: 2023-09-19
Payer: MEDICARE

## 2023-09-19 DIAGNOSIS — S37.69XA: Primary | ICD-10-CM

## 2023-09-19 DIAGNOSIS — Z30.431 IUD CHECK UP: ICD-10-CM

## 2023-09-19 DIAGNOSIS — T83.32XA INTRAUTERINE DEVICE (IUD) MIGRATION, INITIAL ENCOUNTER: ICD-10-CM

## 2023-09-19 DIAGNOSIS — R20.0 NUMBNESS AND TINGLING OF RIGHT ARM: ICD-10-CM

## 2023-09-19 DIAGNOSIS — R20.2 NUMBNESS AND TINGLING OF RIGHT ARM: ICD-10-CM

## 2023-09-19 DIAGNOSIS — T83.32XA INTRAUTERINE DEVICE (IUD) MIGRATION, INITIAL ENCOUNTER: Primary | ICD-10-CM

## 2023-09-19 DIAGNOSIS — F41.9 ANXIETY: ICD-10-CM

## 2023-09-19 DIAGNOSIS — T83.32XA IUD MIGRATION: ICD-10-CM

## 2023-09-19 DIAGNOSIS — M54.12 CERVICAL RADICULOPATHY: ICD-10-CM

## 2023-09-19 DIAGNOSIS — G43.109 MIGRAINE WITH AURA AND WITHOUT STATUS MIGRAINOSUS, NOT INTRACTABLE: ICD-10-CM

## 2023-09-19 DIAGNOSIS — M47.812 CERVICAL SPONDYLOSIS: ICD-10-CM

## 2023-09-19 DIAGNOSIS — F31.9 BIPOLAR 1 DISORDER (HCC): ICD-10-CM

## 2023-09-19 LAB
ABO GROUP BLD: NORMAL
ANION GAP SERPL CALCULATED.3IONS-SCNC: 7 MMOL/L
APTT PPP: 27 SECONDS (ref 23–37)
B-HCG SERPL-ACNC: <1 MIU/ML (ref 0–5)
BACTERIA UR QL AUTO: ABNORMAL /HPF
BASOPHILS # BLD AUTO: 0.07 THOUSANDS/ÂΜL (ref 0–0.1)
BASOPHILS NFR BLD AUTO: 1 % (ref 0–1)
BILIRUB UR QL STRIP: NEGATIVE
BLD GP AB SCN SERPL QL: NEGATIVE
BUN SERPL-MCNC: 14 MG/DL (ref 5–25)
CALCIUM SERPL-MCNC: 9.6 MG/DL (ref 8.4–10.2)
CHLORIDE SERPL-SCNC: 106 MMOL/L (ref 96–108)
CLARITY UR: CLEAR
CO2 SERPL-SCNC: 23 MMOL/L (ref 21–32)
COLOR UR: YELLOW
CREAT SERPL-MCNC: 0.66 MG/DL (ref 0.6–1.3)
EOSINOPHIL # BLD AUTO: 0.21 THOUSAND/ÂΜL (ref 0–0.61)
EOSINOPHIL NFR BLD AUTO: 2 % (ref 0–6)
ERYTHROCYTE [DISTWIDTH] IN BLOOD BY AUTOMATED COUNT: 13.7 % (ref 11.6–15.1)
GFR SERPL CREATININE-BSD FRML MDRD: 117 ML/MIN/1.73SQ M
GLUCOSE SERPL-MCNC: 95 MG/DL (ref 65–140)
GLUCOSE UR STRIP-MCNC: NEGATIVE MG/DL
HCT VFR BLD AUTO: 42.1 % (ref 34.8–46.1)
HGB BLD-MCNC: 13.7 G/DL (ref 11.5–15.4)
HGB UR QL STRIP.AUTO: ABNORMAL
HYALINE CASTS #/AREA URNS LPF: ABNORMAL /LPF
IMM GRANULOCYTES # BLD AUTO: 0.04 THOUSAND/UL (ref 0–0.2)
IMM GRANULOCYTES NFR BLD AUTO: 0 % (ref 0–2)
INR PPP: 1.01 (ref 0.84–1.19)
KETONES UR STRIP-MCNC: NEGATIVE MG/DL
LEUKOCYTE ESTERASE UR QL STRIP: NEGATIVE
LYMPHOCYTES # BLD AUTO: 4.23 THOUSANDS/ÂΜL (ref 0.6–4.47)
LYMPHOCYTES NFR BLD AUTO: 40 % (ref 14–44)
MCH RBC QN AUTO: 30.4 PG (ref 26.8–34.3)
MCHC RBC AUTO-ENTMCNC: 32.5 G/DL (ref 31.4–37.4)
MCV RBC AUTO: 93 FL (ref 82–98)
MONOCYTES # BLD AUTO: 0.61 THOUSAND/ÂΜL (ref 0.17–1.22)
MONOCYTES NFR BLD AUTO: 6 % (ref 4–12)
MUCOUS THREADS UR QL AUTO: ABNORMAL
NEUTROPHILS # BLD AUTO: 5.45 THOUSANDS/ÂΜL (ref 1.85–7.62)
NEUTS SEG NFR BLD AUTO: 51 % (ref 43–75)
NITRITE UR QL STRIP: NEGATIVE
NON-SQ EPI CELLS URNS QL MICRO: ABNORMAL /HPF
NRBC BLD AUTO-RTO: 0 /100 WBCS
PH UR STRIP.AUTO: 5 [PH] (ref 4.5–8)
PLATELET # BLD AUTO: 281 THOUSANDS/UL (ref 149–390)
PMV BLD AUTO: 9.5 FL (ref 8.9–12.7)
POTASSIUM SERPL-SCNC: 3.3 MMOL/L (ref 3.5–5.3)
PROT UR STRIP-MCNC: NEGATIVE MG/DL
PROTHROMBIN TIME: 13.3 SECONDS (ref 11.6–14.5)
RBC # BLD AUTO: 4.51 MILLION/UL (ref 3.81–5.12)
RBC #/AREA URNS AUTO: ABNORMAL /HPF
RH BLD: NEGATIVE
SODIUM SERPL-SCNC: 136 MMOL/L (ref 135–147)
SP GR UR STRIP.AUTO: >=1.03 (ref 1–1.03)
SPECIMEN EXPIRATION DATE: NORMAL
UROBILINOGEN UR QL STRIP.AUTO: 0.2 E.U./DL
WBC # BLD AUTO: 10.61 THOUSAND/UL (ref 4.31–10.16)
WBC #/AREA URNS AUTO: ABNORMAL /HPF

## 2023-09-19 PROCEDURE — 86900 BLOOD TYPING SEROLOGIC ABO: CPT | Performed by: PHYSICIAN ASSISTANT

## 2023-09-19 PROCEDURE — 81001 URINALYSIS AUTO W/SCOPE: CPT

## 2023-09-19 PROCEDURE — 86850 RBC ANTIBODY SCREEN: CPT | Performed by: PHYSICIAN ASSISTANT

## 2023-09-19 PROCEDURE — 72170 X-RAY EXAM OF PELVIS: CPT

## 2023-09-19 PROCEDURE — 74177 CT ABD & PELVIS W/CONTRAST: CPT

## 2023-09-19 PROCEDURE — 85025 COMPLETE CBC W/AUTO DIFF WBC: CPT | Performed by: PHYSICIAN ASSISTANT

## 2023-09-19 PROCEDURE — 96361 HYDRATE IV INFUSION ADD-ON: CPT

## 2023-09-19 PROCEDURE — 84702 CHORIONIC GONADOTROPIN TEST: CPT

## 2023-09-19 PROCEDURE — 36415 COLL VENOUS BLD VENIPUNCTURE: CPT | Performed by: PHYSICIAN ASSISTANT

## 2023-09-19 PROCEDURE — 99284 EMERGENCY DEPT VISIT MOD MDM: CPT

## 2023-09-19 PROCEDURE — 99285 EMERGENCY DEPT VISIT HI MDM: CPT | Performed by: PHYSICIAN ASSISTANT

## 2023-09-19 PROCEDURE — 76830 TRANSVAGINAL US NON-OB: CPT

## 2023-09-19 PROCEDURE — G1004 CDSM NDSC: HCPCS

## 2023-09-19 PROCEDURE — 85730 THROMBOPLASTIN TIME PARTIAL: CPT | Performed by: PHYSICIAN ASSISTANT

## 2023-09-19 PROCEDURE — 76856 US EXAM PELVIC COMPLETE: CPT

## 2023-09-19 PROCEDURE — 85610 PROTHROMBIN TIME: CPT | Performed by: PHYSICIAN ASSISTANT

## 2023-09-19 PROCEDURE — 80048 BASIC METABOLIC PNL TOTAL CA: CPT | Performed by: PHYSICIAN ASSISTANT

## 2023-09-19 PROCEDURE — 87591 N.GONORRHOEAE DNA AMP PROB: CPT | Performed by: PHYSICIAN ASSISTANT

## 2023-09-19 PROCEDURE — 99222 1ST HOSP IP/OBS MODERATE 55: CPT | Performed by: OBSTETRICS & GYNECOLOGY

## 2023-09-19 PROCEDURE — 86901 BLOOD TYPING SEROLOGIC RH(D): CPT | Performed by: PHYSICIAN ASSISTANT

## 2023-09-19 PROCEDURE — 87491 CHLMYD TRACH DNA AMP PROBE: CPT | Performed by: PHYSICIAN ASSISTANT

## 2023-09-19 PROCEDURE — 96374 THER/PROPH/DIAG INJ IV PUSH: CPT

## 2023-09-19 RX ORDER — KETOROLAC TROMETHAMINE 30 MG/ML
15 INJECTION, SOLUTION INTRAMUSCULAR; INTRAVENOUS ONCE
Status: COMPLETED | OUTPATIENT
Start: 2023-09-19 | End: 2023-09-19

## 2023-09-19 RX ORDER — ACETAMINOPHEN 325 MG/1
975 TABLET ORAL ONCE
Status: COMPLETED | OUTPATIENT
Start: 2023-09-19 | End: 2023-09-19

## 2023-09-19 RX ORDER — ONDANSETRON 2 MG/ML
4 INJECTION INTRAMUSCULAR; INTRAVENOUS EVERY 6 HOURS PRN
Status: DISCONTINUED | OUTPATIENT
Start: 2023-09-19 | End: 2023-09-20

## 2023-09-19 RX ORDER — ONDANSETRON 2 MG/ML
4 INJECTION INTRAMUSCULAR; INTRAVENOUS ONCE
Status: DISCONTINUED | OUTPATIENT
Start: 2023-09-19 | End: 2023-09-19

## 2023-09-19 RX ORDER — SODIUM CHLORIDE, SODIUM LACTATE, POTASSIUM CHLORIDE, CALCIUM CHLORIDE 600; 310; 30; 20 MG/100ML; MG/100ML; MG/100ML; MG/100ML
125 INJECTION, SOLUTION INTRAVENOUS CONTINUOUS
Status: DISCONTINUED | OUTPATIENT
Start: 2023-09-19 | End: 2023-09-20

## 2023-09-19 RX ORDER — MORPHINE SULFATE 4 MG/ML
4 INJECTION, SOLUTION INTRAMUSCULAR; INTRAVENOUS ONCE
Status: DISCONTINUED | OUTPATIENT
Start: 2023-09-19 | End: 2023-09-19

## 2023-09-19 RX ADMIN — SODIUM CHLORIDE, SODIUM LACTATE, POTASSIUM CHLORIDE, AND CALCIUM CHLORIDE 125 ML/HR: .6; .31; .03; .02 INJECTION, SOLUTION INTRAVENOUS at 22:29

## 2023-09-19 RX ADMIN — KETOROLAC TROMETHAMINE 15 MG: 30 INJECTION, SOLUTION INTRAMUSCULAR; INTRAVENOUS at 19:30

## 2023-09-19 RX ADMIN — ACETAMINOPHEN 975 MG: 325 TABLET ORAL at 22:28

## 2023-09-19 RX ADMIN — SODIUM CHLORIDE 1000 ML: 0.9 INJECTION, SOLUTION INTRAVENOUS at 19:30

## 2023-09-19 RX ADMIN — IOHEXOL 90 ML: 350 INJECTION, SOLUTION INTRAVENOUS at 20:54

## 2023-09-19 NOTE — TELEPHONE ENCOUNTER
Reason for Disposition  • [1] SEVERE abdominal pain (e.g., excruciating) AND [2] present > 1 hour    Answer Assessment - Initial Assessment Questions  1. TYPE: "What type of IUD do you have?"     - Copper IUD (e.g., Cu-T380A, ParaGard, Nova-T, Flexi-T)    - Hormonal IUD (e.g., Onnie Basset, Liletta, LNG-IUS, Mirena, WEIDING)     mirena     2. START DATE:  "When was your IUD inserted?" (e.g., date; weeks, months, years ago)       9/14/23    3. SYMPTOM: "What is the main symptom (or question) you're concerned about?"       Abdominal pain, hurts to sit and burning with urination     4. ONSET: "When did the  S/S   start?"      Today    5. VAGINAL BLEEDING: "Are you having any unusual vaginal bleeding?"     - NONE    - SPOTTING: spotting, or pinkish / brownish mucous discharge; does not fill panti-liner or pad     - MILD:  less than 1 pad / hour; less than patient's usual menstrual bleeding    - MODERATE: 1-2 pads / hour; small-medium blood clots (e.g., pea, grape, small coin)     - SEVERE: soaking 2 or more pads/hour for 2 or more hours; bleeding not contained by pads or continuous red blood from vagina; large blood clots (e.g., golf ball, large coin)       Denies    6. ABDOMEN OR PELVIC PAIN: "Are you have any pain in your abdomen or pelvic area?" (Scale: 0, 1-10; none, mild, moderate, severe)    - NONE (0): no pain    - MILD (1-3): doesn't interfere with normal activities, abdomen soft and not tender to touch     - MODERATE (4-7): interferes with normal activities or awakens from sleep, tender to touch     - SEVERE (8-10): excruciating pain, doubled over, unable to do any normal activities      Constant and 8/10    7. FEVER:"Is there a fever?" If yes, ask: "What is the temperature, how was it measured, and when did it start?"      Denies    8.  PREGNANCY: "Are you concerned that you might be pregnant?" "When was your last menstrual period?"      Denies      Tylenol 500 mg PO at 1700    Protocols used: CONTRACEPTION - IUD SYMPTOMS AND QUESTIONS-ADULT-AH

## 2023-09-19 NOTE — TELEPHONE ENCOUNTER
I advised pt via phone that IUD is not visualized on pelvic ultrasound. Pelvic xray ordered and she can have it performed tomorrow. She denies any pain - states has only a very small amount of pelvic discomfort. Advised she may use Ibuprofen prn. She verbalizes understanding.

## 2023-09-20 ENCOUNTER — ANESTHESIA (OUTPATIENT)
Dept: PERIOP | Facility: HOSPITAL | Age: 33
End: 2023-09-20
Payer: MEDICARE

## 2023-09-20 ENCOUNTER — ANESTHESIA EVENT (OUTPATIENT)
Dept: PERIOP | Facility: HOSPITAL | Age: 33
End: 2023-09-20
Payer: MEDICARE

## 2023-09-20 VITALS
DIASTOLIC BLOOD PRESSURE: 67 MMHG | TEMPERATURE: 98 F | SYSTOLIC BLOOD PRESSURE: 105 MMHG | RESPIRATION RATE: 18 BRPM | HEART RATE: 92 BPM | WEIGHT: 132.28 LBS | OXYGEN SATURATION: 97 % | BODY MASS INDEX: 22.01 KG/M2

## 2023-09-20 PROBLEM — IMO0001 SMOKING: Status: ACTIVE | Noted: 2018-07-24

## 2023-09-20 PROBLEM — Z98.890 STATUS POST LAPAROSCOPY: Status: ACTIVE | Noted: 2023-09-20

## 2023-09-20 LAB
ABO GROUP BLD: NORMAL
C TRACH DNA SPEC QL NAA+PROBE: NEGATIVE
N GONORRHOEA DNA SPEC QL NAA+PROBE: NEGATIVE
RH BLD: NEGATIVE

## 2023-09-20 PROCEDURE — NC001 PR NO CHARGE: Performed by: OBSTETRICS & GYNECOLOGY

## 2023-09-20 PROCEDURE — 49320 DIAG LAPARO SEPARATE PROC: CPT | Performed by: OBSTETRICS & GYNECOLOGY

## 2023-09-20 RX ORDER — KETOROLAC TROMETHAMINE 30 MG/ML
15 INJECTION, SOLUTION INTRAMUSCULAR; INTRAVENOUS ONCE
Status: COMPLETED | OUTPATIENT
Start: 2023-09-20 | End: 2023-09-20

## 2023-09-20 RX ORDER — HYDROMORPHONE HCL/PF 1 MG/ML
0.5 SYRINGE (ML) INJECTION
Status: DISCONTINUED | OUTPATIENT
Start: 2023-09-20 | End: 2023-09-20 | Stop reason: HOSPADM

## 2023-09-20 RX ORDER — FENTANYL CITRATE 50 UG/ML
INJECTION, SOLUTION INTRAMUSCULAR; INTRAVENOUS AS NEEDED
Status: DISCONTINUED | OUTPATIENT
Start: 2023-09-20 | End: 2023-09-20

## 2023-09-20 RX ORDER — IBUPROFEN 600 MG/1
600 TABLET ORAL EVERY 6 HOURS PRN
Status: DISCONTINUED | OUTPATIENT
Start: 2023-09-20 | End: 2023-09-20 | Stop reason: HOSPADM

## 2023-09-20 RX ORDER — BUPIVACAINE HYDROCHLORIDE 2.5 MG/ML
INJECTION, SOLUTION EPIDURAL; INFILTRATION; INTRACAUDAL AS NEEDED
Status: DISCONTINUED | OUTPATIENT
Start: 2023-09-20 | End: 2023-09-20 | Stop reason: HOSPADM

## 2023-09-20 RX ORDER — ACETAMINOPHEN 325 MG/1
975 TABLET ORAL EVERY 6 HOURS PRN
Refills: 0
Start: 2023-09-20

## 2023-09-20 RX ORDER — ONDANSETRON 2 MG/ML
INJECTION INTRAMUSCULAR; INTRAVENOUS AS NEEDED
Status: DISCONTINUED | OUTPATIENT
Start: 2023-09-20 | End: 2023-09-20

## 2023-09-20 RX ORDER — PROPOFOL 10 MG/ML
INJECTION, EMULSION INTRAVENOUS CONTINUOUS PRN
Status: DISCONTINUED | OUTPATIENT
Start: 2023-09-20 | End: 2023-09-20

## 2023-09-20 RX ORDER — MIDAZOLAM HYDROCHLORIDE 2 MG/2ML
INJECTION, SOLUTION INTRAMUSCULAR; INTRAVENOUS AS NEEDED
Status: DISCONTINUED | OUTPATIENT
Start: 2023-09-20 | End: 2023-09-20

## 2023-09-20 RX ORDER — ACETAMINOPHEN 325 MG/1
975 TABLET ORAL EVERY 6 HOURS PRN
Status: DISCONTINUED | OUTPATIENT
Start: 2023-09-20 | End: 2023-09-20 | Stop reason: SDUPTHER

## 2023-09-20 RX ORDER — MAGNESIUM HYDROXIDE 1200 MG/15ML
LIQUID ORAL AS NEEDED
Status: DISCONTINUED | OUTPATIENT
Start: 2023-09-20 | End: 2023-09-20 | Stop reason: HOSPADM

## 2023-09-20 RX ORDER — HYDROMORPHONE HCL/PF 1 MG/ML
SYRINGE (ML) INJECTION AS NEEDED
Status: DISCONTINUED | OUTPATIENT
Start: 2023-09-20 | End: 2023-09-20

## 2023-09-20 RX ORDER — DEXAMETHASONE SODIUM PHOSPHATE 10 MG/ML
INJECTION, SOLUTION INTRAMUSCULAR; INTRAVENOUS AS NEEDED
Status: DISCONTINUED | OUTPATIENT
Start: 2023-09-20 | End: 2023-09-20

## 2023-09-20 RX ORDER — PROPOFOL 10 MG/ML
INJECTION, EMULSION INTRAVENOUS AS NEEDED
Status: DISCONTINUED | OUTPATIENT
Start: 2023-09-20 | End: 2023-09-20

## 2023-09-20 RX ORDER — IBUPROFEN 600 MG/1
600 TABLET ORAL EVERY 6 HOURS PRN
Status: DISCONTINUED | OUTPATIENT
Start: 2023-09-20 | End: 2023-09-20 | Stop reason: SDUPTHER

## 2023-09-20 RX ORDER — ACETAMINOPHEN 325 MG/1
975 TABLET ORAL EVERY 6 HOURS PRN
Status: DISCONTINUED | OUTPATIENT
Start: 2023-09-20 | End: 2023-09-20 | Stop reason: HOSPADM

## 2023-09-20 RX ORDER — FENTANYL CITRATE/PF 50 MCG/ML
50 SYRINGE (ML) INJECTION
Status: DISCONTINUED | OUTPATIENT
Start: 2023-09-20 | End: 2023-09-20 | Stop reason: HOSPADM

## 2023-09-20 RX ORDER — ONDANSETRON 2 MG/ML
4 INJECTION INTRAMUSCULAR; INTRAVENOUS ONCE AS NEEDED
Status: DISCONTINUED | OUTPATIENT
Start: 2023-09-20 | End: 2023-09-20 | Stop reason: HOSPADM

## 2023-09-20 RX ORDER — ROCURONIUM BROMIDE 10 MG/ML
INJECTION, SOLUTION INTRAVENOUS AS NEEDED
Status: DISCONTINUED | OUTPATIENT
Start: 2023-09-20 | End: 2023-09-20

## 2023-09-20 RX ORDER — IBUPROFEN 600 MG/1
600 TABLET ORAL EVERY 6 HOURS PRN
Qty: 30 TABLET | Refills: 0
Start: 2023-09-20 | End: 2023-10-20

## 2023-09-20 RX ORDER — KETOROLAC TROMETHAMINE 30 MG/ML
INJECTION, SOLUTION INTRAMUSCULAR; INTRAVENOUS AS NEEDED
Status: DISCONTINUED | OUTPATIENT
Start: 2023-09-20 | End: 2023-09-20

## 2023-09-20 RX ORDER — ACETAMINOPHEN 325 MG/1
975 TABLET ORAL EVERY 6 HOURS PRN
Status: DISCONTINUED | OUTPATIENT
Start: 2023-09-20 | End: 2023-09-20

## 2023-09-20 RX ORDER — LIDOCAINE HYDROCHLORIDE 20 MG/ML
INJECTION, SOLUTION EPIDURAL; INFILTRATION; INTRACAUDAL; PERINEURAL AS NEEDED
Status: DISCONTINUED | OUTPATIENT
Start: 2023-09-20 | End: 2023-09-20

## 2023-09-20 RX ADMIN — SUGAMMADEX 300 MG: 100 INJECTION, SOLUTION INTRAVENOUS at 15:33

## 2023-09-20 RX ADMIN — MIDAZOLAM 2 MG: 1 INJECTION INTRAMUSCULAR; INTRAVENOUS at 14:42

## 2023-09-20 RX ADMIN — PROPOFOL 200 MG: 10 INJECTION, EMULSION INTRAVENOUS at 14:49

## 2023-09-20 RX ADMIN — SODIUM CHLORIDE, SODIUM LACTATE, POTASSIUM CHLORIDE, AND CALCIUM CHLORIDE 125 ML/HR: .6; .31; .03; .02 INJECTION, SOLUTION INTRAVENOUS at 04:42

## 2023-09-20 RX ADMIN — HYDROMORPHONE HYDROCHLORIDE 0.5 MG: 1 INJECTION, SOLUTION INTRAMUSCULAR; INTRAVENOUS; SUBCUTANEOUS at 15:10

## 2023-09-20 RX ADMIN — FENTANYL CITRATE 50 MCG: 0.05 INJECTION, SOLUTION INTRAMUSCULAR; INTRAVENOUS at 15:51

## 2023-09-20 RX ADMIN — PROPOFOL 60 MCG/KG/MIN: 10 INJECTION, EMULSION INTRAVENOUS at 14:50

## 2023-09-20 RX ADMIN — LIDOCAINE HYDROCHLORIDE 100 MG: 20 INJECTION, SOLUTION EPIDURAL; INFILTRATION; INTRACAUDAL at 14:49

## 2023-09-20 RX ADMIN — ROCURONIUM BROMIDE 50 MG: 10 INJECTION, SOLUTION INTRAVENOUS at 14:49

## 2023-09-20 RX ADMIN — ACETAMINOPHEN 325MG 975 MG: 325 TABLET ORAL at 09:26

## 2023-09-20 RX ADMIN — DEXAMETHASONE SODIUM PHOSPHATE 10 MG: 10 INJECTION INTRAMUSCULAR; INTRAVENOUS at 14:55

## 2023-09-20 RX ADMIN — FENTANYL CITRATE 50 MCG: 0.05 INJECTION, SOLUTION INTRAMUSCULAR; INTRAVENOUS at 15:58

## 2023-09-20 RX ADMIN — SODIUM CHLORIDE, SODIUM LACTATE, POTASSIUM CHLORIDE, AND CALCIUM CHLORIDE: .6; .31; .03; .02 INJECTION, SOLUTION INTRAVENOUS at 15:29

## 2023-09-20 RX ADMIN — KETOROLAC TROMETHAMINE 30 MG: 30 INJECTION, SOLUTION INTRAMUSCULAR; INTRAVENOUS at 15:26

## 2023-09-20 RX ADMIN — ONDANSETRON 4 MG: 2 INJECTION INTRAMUSCULAR; INTRAVENOUS at 14:55

## 2023-09-20 RX ADMIN — FENTANYL CITRATE 100 MCG: 50 INJECTION INTRAMUSCULAR; INTRAVENOUS at 14:49

## 2023-09-20 RX ADMIN — SODIUM CHLORIDE, SODIUM LACTATE, POTASSIUM CHLORIDE, AND CALCIUM CHLORIDE 1000 ML: .6; .31; .03; .02 INJECTION, SOLUTION INTRAVENOUS at 09:27

## 2023-09-20 RX ADMIN — KETOROLAC TROMETHAMINE 15 MG: 30 INJECTION, SOLUTION INTRAMUSCULAR; INTRAVENOUS at 04:42

## 2023-09-20 NOTE — ASSESSMENT & PLAN NOTE
IUD inserted 9/14  CTAP: IUD in peritoneal cavity, no concern for other acute injury  SSE: no strings visualized  NPO  IVF  Morphine 2mg q4 PRN-> declined by patient-> Toradol overnight   Plan for diagnostic laparoscopy and IUD removal today

## 2023-09-20 NOTE — UTILIZATION REVIEW
Initial Clinical Review    Admission: Date/Time/Statement:   Admission Orders (From admission, onward)     Ordered        09/19/23 2157  Place in Observation  Once                      Orders Placed This Encounter   Procedures   • Place in Observation     Standing Status:   Standing     Number of Occurrences:   1     Order Specific Question:   Level of Care     Answer:   Med Surg [16]     ED Arrival Information     Expected   9/19/2023 18:45    Arrival   9/19/2023 18:47    Acuity   Urgent            Means of arrival   Walk-In    Escorted by   Self    Service   OB/GYN    Admission type   Emergency            Arrival complaint   abdominal pain           Chief Complaint   Patient presents with   • Pelvic Pain     Pt reports "my doctor sent me here. My IUD is not where it's supposed to be. Everything hurts." Pt reports her IUD was placed ~4 days ago and has been having pain since. Pt reports they did an ultrasound today and were unable to find the IUD       Initial Presentation: 29 y.o. female presents to ED with abdominal pain, 8/10, constant. Had an IUD placed  On  9/14. Pain mostly in  Suprapubic  Region. + nausea   - vomiting    Ct abd/pelvis shows IUD migration into peritoneal cavity. Admit  Observation   With    IUD  Migration  And plan is  IVF, pain control and  Surgical intervention.           ED Triage Vitals   Temperature Pulse Respirations Blood Pressure SpO2   09/19/23 1856 09/19/23 1856 09/19/23 1856 09/19/23 1856 09/19/23 1856   98.7 °F (37.1 °C) (!) 125 20 125/81 97 %      Temp Source Heart Rate Source Patient Position - Orthostatic VS BP Location FiO2 (%)   09/19/23 1856 09/19/23 1856 09/19/23 1856 09/19/23 1856 --   Oral Monitor Sitting Right arm       Pain Score       09/19/23 1930       5          Wt Readings from Last 1 Encounters:   09/19/23 60 kg (132 lb 4.4 oz)     Additional Vital Signs:   96.9 °F (36.1 °C) Abnormal  73 16 92/56 -- 97 % None (Room air) Lying    09/19/23 2315 97.8 °F (36.6 °C) 77 16 128/82 100 99 % None (Room air) Sitting   09/19/23 2300 -- 88 -- 120/65 86 98 % -- --   09/19/23 2234 -- 92 20 126/68 -- 98 % None (Room air) Sitting   09/19/23 2230 -- 52 Abnormal  -- 126/68 88 100 % -- --   09/19/23 2015 -- 80 20 122/63 89 99 % None (Room air) Sitting   09/19/23 1856 98.7 °F (37.1 °C) 125 Abnormal  20 125/81 -- 97 % None (Room air) Sitting       Pertinent Labs/Diagnostic Test Results:   CT abdomen pelvis with contrast   ED Interpretation by Jose Miguel Blanchard PA-C (09/19 2144)   IUD in LLQ, not in uterus as interpreted by myself. Final Result by Elsworth Saint, MD (09/19 2324)   Addendum (preliminary) 1 of 1 by Elsworth Saint, MD (09/19 2324)   ADDENDUM:         I personally discussed this study with Jose Miguel Blanchard on 9/19/2023 9:39    PM.            Final      IUD is seen in the peritoneal cavity, left lower quadrant, outside the uterus. Recommend gynecologic consultation.             Workstation performed: [id number]               Results from last 7 days   Lab Units 09/19/23 1932   WBC Thousand/uL 10.61*   HEMOGLOBIN g/dL 13.7   HEMATOCRIT % 42.1   PLATELETS Thousands/uL 281   NEUTROS ABS Thousands/µL 5.45         Results from last 7 days   Lab Units 09/19/23 1932   SODIUM mmol/L 136   POTASSIUM mmol/L 3.3*   CHLORIDE mmol/L 106   CO2 mmol/L 23   ANION GAP mmol/L 7   BUN mg/dL 14   CREATININE mg/dL 0.66   EGFR ml/min/1.73sq m 117   CALCIUM mg/dL 9.6             Results from last 7 days   Lab Units 09/19/23 1932   GLUCOSE RANDOM mg/dL 95               Results from last 7 days   Lab Units 09/19/23 1957   PROTIME seconds 13.3   INR  1.01   PTT seconds 27                       Results from last 7 days   Lab Units 09/19/23 2000   CLARITY UA  Clear   COLOR UA  Yellow   SPEC GRAV UA  >=1.030   PH UA  5.0   GLUCOSE UA mg/dl Negative   KETONES UA mg/dl Negative   BLOOD UA  Trace*   PROTEIN UA mg/dl Negative   NITRITE UA  Negative   BILIRUBIN UA  Negative   UROBILINOGEN UA E.U./dl 0.2   LEUKOCYTES UA  Negative   WBC UA /hpf 1-2   RBC UA /hpf 4-10*   BACTERIA UA /hpf Occasional   EPITHELIAL CELLS WET PREP /hpf Occasional   MUCUS THREADS  Innumerable*         ED Treatment:   Medication Administration from 09/19/2023 1817 to 09/19/2023 2315       Date/Time Order Dose Route Action Comments     09/19/2023 1930 EDT ketorolac (TORADOL) injection 15 mg 15 mg Intravenous Given --     09/19/2023 1931 EDT ondansetron (ZOFRAN) injection 4 mg 4 mg Intravenous Not Given --     09/19/2023 2045 EDT sodium chloride 0.9 % bolus 1,000 mL 0 mL Intravenous Stopped --     09/19/2023 1930 EDT sodium chloride 0.9 % bolus 1,000 mL 1,000 mL Intravenous New Bag --     09/19/2023 2054 EDT iohexol (OMNIPAQUE) 350 MG/ML injection (MULTI-DOSE) 90 mL 90 mL Intravenous Given --     09/19/2023 2151 EDT morphine injection 4 mg -- Intravenous Canceled Entry --     09/19/2023 2228 EDT acetaminophen (TYLENOL) tablet 975 mg 975 mg Oral Given --     09/19/2023 2229 EDT lactated ringers infusion 125 mL/hr Intravenous New Bag --          Admitting Diagnosis: Anxiety [F41.9]  Cervical spondylosis [M47.812]  IUD migration [T83.32XA]  Cervical radiculopathy [M54.12]  Migraine with aura and without status migrainosus, not intractable [G43.109]  Bipolar 1 disorder (HCC) [F31.9]  Pelvic pain [R10.2]  Numbness and tingling of right arm [R20.0, R20.2]  Perforation of uterus, initial encounter [S37.69XA]  Age/Sex: 29 y.o. female  Admission Orders:  Scheduled Medications:     Continuous IV Infusions:  lactated ringers, 125 mL/hr, Intravenous, Continuous      PRN Meds:  morphine injection, 2 mg, Intravenous, Q4H PRN  ondansetron, 4 mg, Intravenous, Q6H PRN        IP CONSULT TO OB GYN    Network Utilization Review Department  ATTENTION: Please call with any questions or concerns to [phone] and carefully listen to the prompts so that you are directed to the right person.  All voicemails are confidential.  Reagan Roche all requests for admission clinical reviews, approved or denied determinations and any other requests to dedicated fax number below belonging to the campus where the patient is receiving treatment.  List of dedicated fax numbers for the Facilities:  [place] DENIALS (Administrative/Medical Necessity) [phone] [address] (Maternity/NICU/Pediatrics) [phone]   [address] [place] [address] Drive [phone]   [address] [phone] [address] [place] [address] [phone]   [address] [facility] [address] [phone]

## 2023-09-20 NOTE — DISCHARGE INSTRUCTIONS
Diagnostic  Laparoscopy   WHAT YOU NEED TO KNOW:   Diagnostic laparoscopy is surgery to look for causes of pain, abnormal growths, bleeding, or disease in your abdomen. During this surgery, small incisions are made in your abdomen. A small scope and tools are inserted through these incisions. A scope is a flexible tube with a light and camera on the end. DISCHARGE INSTRUCTIONS:   Call your local emergency number (911 in the [address]) if:   You suddenly feel lightheaded and short of breath. You have chest pain when you take a deep breath or cough. You cough up blood. Seek care immediately if:   Your incisions come apart. Your incisions bleed, or blood soaks through your bandage. Your arm or leg feels warm, tender, and painful. It may look swollen and red. Call your doctor or surgeon if:   You have a fever. You have pain in your abdomen or shoulder that does not go away after 2 days or gets worse. You have more vaginal bleeding or discharge than you expected. You have trouble having a bowel movement, or have diarrhea often. You have repeated vomiting. You have chills, a cough, or feel weak and achy. Your stitches are swollen, red, or have pus coming from them. You have questions or concerns about your condition or care. Medicines: You may need any of the following:      Prescription pain medicine  may be given. Ask your healthcare provider how to take this medicine safely. Some prescription pain medicines contain acetaminophen. Do not take other medicines that contain acetaminophen without talking to your healthcare provider. Too much acetaminophen may cause liver damage. Prescription pain medicine may cause constipation. Ask your healthcare provider how to prevent or treat constipation. Take your medicine as directed. Contact your healthcare provider if you think your medicine is not helping or if you have side effects.  Tell your provider if you are allergic to any medicine. Keep a list of the medicines, vitamins, and herbs you take. Include the amounts, and when and why you take them. Bring the list or the pill bottles to follow-up visits. Carry your medicine list with you in case of an emergency. Care for the surgery area:   Keep the area covered. You may need to keep the bandage on for 1 to 2 days or until your follow-up visit. After your follow-up visit, you may need to change your bandage 1 to 2 times a day. Ask when it is okay to take a shower or bath. Wash your hands before you care for the area. Use soap and warm water to wash your hands. Handwashing helps prevent an infection. Remove your bandage gently. If the bandage sticks to the surgery area, use warm water on the bandage and lift it off slowly. Lift the edges toward the center of the area. Carefully wash around the area with soap and water. Dry the area and put on new, clean bandages as directed. Change your bandages when they get wet or dirty. Check the area for signs of infection, such as swelling, redness, or pus. Self-care:   Relieve neck or shoulder pain from gas used during surgery. Rest and use heat on your shoulder. You may be more comfortable if you elevate your head and shoulders on several pillows. Rest as needed. You may feel like resting more after your surgery. Slowly start to do more each day. Rest when you feel it is needed. Follow up with your doctor or surgeon as directed: Your surgery area will need to be checked, and you may need to have stitches removed. Write down your questions so you remember to ask them during your visits. © Copyright Aydechris Albarado 2023 Information is for End User's use only and may not be sold, redistributed or otherwise used for commercial purposes. The above information is an  only. It is not intended as medical advice for individual conditions or treatments.  Talk to your doctor, nurse or pharmacist before following any medical regimen to see if it is safe and effective for you.

## 2023-09-20 NOTE — PLAN OF CARE
Problem: Potential for Falls  Goal: Patient will remain free of falls  Description: INTERVENTIONS:  - Educate patient/family on patient safety including physical limitations  - Instruct patient to call for assistance with activity   - Consult OT/PT to assist with strengthening/mobility   - Keep Call bell within reach  - Keep bed low and locked with side rails adjusted as appropriate  - Keep care items and personal belongings within reach  - Initiate and maintain comfort rounds  - Make Fall Risk Sign visible to staff  - Offer Toileting every 2 Hours, in advance of need  - Initiate/Maintain bed alarm  - Obtain necessary fall risk management equipment  - Apply yellow socks and bracelet for high fall risk patients  - Consider moving patient to room near nurses station  Outcome: Progressing     Problem: PAIN - ADULT  Goal: Verbalizes/displays adequate comfort level or baseline comfort level  Description: Interventions:  - Encourage patient to monitor pain and request assistance  - Assess pain using appropriate pain scale  - Administer analgesics based on type and severity of pain and evaluate response  - Implement non-pharmacological measures as appropriate and evaluate response  - Consider cultural and social influences on pain and pain management  - Notify physician/advanced practitioner if interventions unsuccessful or patient reports new pain  Outcome: Progressing     Problem: DISCHARGE PLANNING  Goal: Discharge to home or other facility with appropriate resources  Description: INTERVENTIONS:  - Identify barriers to discharge w/patient and caregiver  - Arrange for needed discharge resources and transportation as appropriate  - Identify discharge learning needs (meds, wound care, etc.)  - Arrange for interpretive services to assist at discharge as needed  - Refer to Case Management Department for coordinating discharge planning if the patient needs post-hospital services based on physician/advanced practitioner order or complex needs related to functional status, cognitive ability, or social support system  Outcome: Progressing     Problem: Knowledge Deficit  Goal: Patient/family/caregiver demonstrates understanding of disease process, treatment plan, medications, and discharge instructions  Description: Complete learning assessment and assess knowledge base.   Interventions:  - Provide teaching at level of understanding  - Provide teaching via preferred learning methods  Outcome: Progressing     Problem: GASTROINTESTINAL - ADULT  Goal: Minimal or absence of nausea and/or vomiting  Description: INTERVENTIONS:  - Administer IV fluids if ordered to ensure adequate hydration  - Maintain NPO status until nausea and vomiting are resolved  - Nasogastric tube if ordered  - Administer ordered antiemetic medications as needed  - Provide nonpharmacologic comfort measures as appropriate  - Advance diet as tolerated, if ordered  - Consider nutrition services referral to assist patient with adequate nutrition and appropriate food choices  Outcome: Progressing     Problem: SKIN/TISSUE INTEGRITY - ADULT  Goal: Incision(s), wounds(s) or drain site(s) healing without S/S of infection  Description: INTERVENTIONS  - Assess and document dressing, incision, wound bed, drain sites and surrounding tissue  - Provide patient and family education  - Perform skin care/dressing changes   Outcome: Progressing

## 2023-09-20 NOTE — H&P
Consultation - Obstetrics & Gynecology  Bea Marie 29 y.o. female MRN: [id number]  Unit/Bed#: E2 -01 Encounter: [id number]      Assessment/Plan     * IUD migration  Assessment & Plan  IUD inserted 9/14  CTAP: IUD in peritoneal cavity, no concern for other acute injury  SSE: no strings visualized    Admit to OBGYN for observation  NPO  IVF  Morphine 2mg q4 PRN  Plan for OR tomorrow - diagnostic laparoscopy and IUD removal      Plan of care discussed with Dr. Terry Gonzalez. Consult to obstetrics / gynecology  Consult performed by: Pritesh Bolaños MD  Consult ordered by: Jerald Christiansen PA-C        History of Present Illness   Physician Requesting Consult: Zuleika Dill MD  Reason for Consult / Principal Problem: abdominal pain, recent IUD insertion    HPI: Bea Marie is a 29 y.o. G0 who presented with abdominal pain. She rates it as 8/10 and constant. She recently had an IUD placed on 9/14. The pain is mostly local to suprapubic region. She endorses some nausea, no vomiting. She is tolerating PO and last ate at 1900. She denies fevers/chills, chest pain, shortness of breath, dysuria, back pain. Review of Systems   Constitutional: Negative for chills and fever. Eyes: Negative for visual disturbance. Respiratory: Negative for shortness of breath. Cardiovascular: Negative for chest pain. Gastrointestinal: Positive for abdominal pain. Negative for diarrhea, nausea and vomiting. Genitourinary: Positive for pelvic pain. Negative for dysuria, flank pain, hematuria, vaginal bleeding and vaginal discharge. Skin: Negative for rash. Neurological: Negative for dizziness, numbness and headaches. All other systems reviewed and are negative.       Historical Information   Past Medical History:   Diagnosis Date   • Abnormal Pap smear of cervix     2009 cryo; 6/2019 LSIL/AGC pap; 6/2019 colpo HSIL; 8/2019 LEEP HSIL w/neg margins; 6/2020 NILM pap/neg HPV; 6/2020 NILM pap/neg HPV   • ADHD    • Anemia    • Bipolar 1 disorder ([address])    • Depression    • Epilepsy ([address])     last episode    • GERD (gastroesophageal reflux disease)    • Migraine    • OCD (obsessive compulsive disorder)    • Urogenital trichomoniasis      Past Surgical History:   Procedure Laterality Date   • GYNECOLOGIC CRYOSURGERY     • GA COLPOSCOPY CERVIX VAG LOOP ELTRD BX CERVIX N/A 2019    Procedure: BIOPSY LEEP CERVIX;  Surgeon: Debby Clancy MD;  Location: [facility];  Service: Gynecology     OB History    Para Term  AB Living   0 0 0 0 0 0   SAB IAB Ectopic Multiple Live Births   0 0 0 0 0     Family History   Problem Relation Age of Onset   • Bipolar disorder Mother    • Diabetes Mother    • Migraines Mother    • Miscarriages / [place] Mother    • No Known Problems Father    • Depression Sister    • Asthma Sister    • Osteoporosis Maternal Grandmother    • Cancer Maternal Grandfather         lung   • Breast cancer Neg Hx    • Colon cancer Neg Hx    • Ovarian cancer Neg Hx      Social History   Social History     Substance and Sexual Activity   Alcohol Use Yes    Comment: couple times/month     Social History     Substance and Sexual Activity   Drug Use Never     Social History     Tobacco Use   Smoking Status Every Day   • Packs/day: 1.00   • Years: 11.00   • Total pack years: 11.00   • Types: Cigarettes   • Passive exposure: Current   Smokeless Tobacco Never       Meds/Allergies   Current Facility-Administered Medications   Medication Dose Route Frequency   • lactated ringers infusion  125 mL/hr Intravenous Continuous   • morphine injection 2 mg  2 mg Intravenous Q4H PRN   • ondansetron (ZOFRAN) injection 4 mg  4 mg Intravenous Q6H PRN       No Known Allergies    Objective   Vitals: Blood pressure 128/82, pulse 77, temperature 97.8 °F (36.6 °C), temperature source Temporal, resp. rate 16, weight 60 kg (132 lb 4.4 oz), SpO2 99 %, not currently breastfeeding.  Body mass index is 22.01 kg/m². Intake/Output Summary (Last 24 hours) at 9/20/2023 0144  Last data filed at 9/19/2023 2045  Gross per 24 hour   Intake 1000 ml   Output --   Net 1000 ml       Invasive Devices     Peripheral Intravenous Line  Duration           Peripheral IV 09/19/23 Left Antecubital <1 day                Physical Exam  Constitutional:       General: She is not in acute distress. Appearance: Normal appearance. Genitourinary:      Vulva normal.      Genitourinary Comments: SSE: no strings visualized, normal appearing cervix      No vaginal discharge. HENT:      Head: Normocephalic and atraumatic. Eyes:      Extraocular Movements: Extraocular movements intact. Cardiovascular:      Rate and Rhythm: Normal rate. Pulses: Normal pulses. Pulmonary:      Effort: Pulmonary effort is normal. No respiratory distress. Abdominal:      General: There is no distension. Palpations: There is no mass. Tenderness: There is abdominal tenderness. There is no guarding. Musculoskeletal:         General: Normal range of motion. Cervical back: Normal range of motion. Neurological:      General: No focal deficit present. Mental Status: She is alert. Mental status is at baseline. Skin:     General: Skin is warm and dry. Psychiatric:         Mood and Affect: Mood normal.         Behavior: Behavior normal.   Vitals reviewed. Exam conducted with a chaperone present.          Lab Results:   Recent Results (from the past 24 hour(s))   CBC and differential    Collection Time: 09/19/23  7:32 PM   Result Value Ref Range    WBC 10.61 (H) 4.31 - 10.16 Thousand/uL    RBC 4.51 3.81 - 5.12 Million/uL    Hemoglobin 13.7 11.5 - 15.4 g/dL    Hematocrit 42.1 34.8 - 46.1 %    MCV 93 82 - 98 fL    MCH 30.4 26.8 - 34.3 pg    MCHC 32.5 31.4 - 37.4 g/dL    RDW 13.7 11.6 - 15.1 %    MPV 9.5 8.9 - 12.7 fL    Platelets 897 998 - 760 Thousands/uL    nRBC 0 /100 WBCs    Neutrophils Relative 51 43 - 75 %    Immat GRANS % 0 0 - 2 %    Lymphocytes Relative 40 14 - 44 %    Monocytes Relative 6 4 - 12 %    Eosinophils Relative 2 0 - 6 %    Basophils Relative 1 0 - 1 %    Neutrophils Absolute 5.45 1.85 - 7.62 Thousands/µL    Immature Grans Absolute 0.04 0.00 - 0.20 Thousand/uL    Lymphocytes Absolute 4.23 0.60 - 4.47 Thousands/µL    Monocytes Absolute 0.61 0.17 - 1.22 Thousand/µL    Eosinophils Absolute 0.21 0.00 - 0.61 Thousand/µL    Basophils Absolute 0.07 0.00 - 0.10 Thousands/µL   Basic metabolic panel    Collection Time: 09/19/23  7:32 PM   Result Value Ref Range    Sodium 136 135 - 147 mmol/L    Potassium 3.3 (L) 3.5 - 5.3 mmol/L    Chloride 106 96 - 108 mmol/L    CO2 23 21 - 32 mmol/L    ANION GAP 7 mmol/L    BUN 14 5 - 25 mg/dL    Creatinine 0.66 0.60 - 1.30 mg/dL    Glucose 95 65 - 140 mg/dL    Calcium 9.6 8.4 - 10.2 mg/dL    eGFR 117 ml/min/1.73sq m   hCG, quantitative    Collection Time: 09/19/23  7:32 PM   Result Value Ref Range    HCG, Quant <1 0 - 5 mIU/mL   Type and screen    Collection Time: 09/19/23  7:57 PM   Result Value Ref Range    ABO Grouping B     Rh Factor Negative     Antibody Screen Negative     Specimen Expiration Date [id number]    Protime-INR    Collection Time: 09/19/23  7:57 PM   Result Value Ref Range    Protime 13.3 11.6 - 14.5 seconds    INR 1.01 0.84 - 1.19   APTT    Collection Time: 09/19/23  7:57 PM   Result Value Ref Range    PTT 27 23 - 37 seconds   Urine Macroscopic, POC    Collection Time: 09/19/23  8:00 PM   Result Value Ref Range    Color, UA Yellow     Clarity, UA Clear     pH, UA 5.0 4.5 - 8.0    Leukocytes, UA Negative Negative    Nitrite, UA Negative Negative    Protein, UA Negative Negative mg/dl    Glucose, UA Negative Negative mg/dl    Ketones, UA Negative Negative mg/dl    Urobilinogen, UA 0.2 0.2, 1.0 E.U./dl E.U./dl    Bilirubin, UA Negative Negative    Occult Blood, UA Trace (A) Negative    Specific Gravity, UA >=1.030 1.003 - 1.030   Urine Microscopic    Collection Time: 09/19/23  8:00 PM Result Value Ref Range    RBC, UA 4-10 (A) None Seen, 1-2 /hpf    WBC, UA 1-2 None Seen, 1-2 /hpf    Epithelial Cells Occasional None Seen, Occasional /hpf    Bacteria, UA Occasional None Seen, Occasional /hpf    MUCUS THREADS Innumerable (A) None Seen    Hyaline Casts, UA 3-5 (A) None Seen /lpf       Imaging:  CTAP : IUD in peritoneal cavity    Alberto Amador MD  PGY-II, OB/GYN  9/20/2023, 1:44 AM

## 2023-09-20 NOTE — ANESTHESIA PREPROCEDURE EVALUATION
Procedure:  LAPAROSCOPY DIAGNOSTIC (Abdomen)  removal of IUD (Abdomen)    Relevant Problems   CARDIO   (+) Migraine with aura and without status migrainosus, not intractable      GI/HEPATIC   (+) GERD (gastroesophageal reflux disease)      MUSCULOSKELETAL   (+) Cervical spondylosis      NEURO/PSYCH   (+) Anxiety   (+) Depression   (+) Epilepsy (HCC)   (+) Migraine with aura and without status migrainosus, not intractable   (+) Numbness and tingling of right arm      PULMONARY   (+) Smoking      Other   (+) ADHD   (+) Bipolar 1 disorder (HCC)        Physical Exam    Airway    Mallampati score: II  TM Distance: >3 FB  Neck ROM: full     Dental   No notable dental hx     Cardiovascular  Rhythm: regular, Rate: normal, Cardiovascular exam normal    Pulmonary  Pulmonary exam normal Breath sounds clear to auscultation,     Other Findings        Anesthesia Plan  ASA Score- 2     Anesthesia Type- general with ASA Monitors. Additional Monitors:   Airway Plan: ETT. Plan Factors-    Chart reviewed. Patient summary reviewed. Induction- intravenous. Postoperative Plan- Plan for postoperative opioid use. Informed Consent- Anesthetic plan and risks discussed with patient.

## 2023-09-20 NOTE — ED PROVIDER NOTES
History  Chief Complaint   Patient presents with   • Pelvic Pain     Pt reports "my doctor sent me here. My IUD is not where it's supposed to be. Everything hurts." Pt reports her IUD was placed ~4 days ago and has been having pain since. Pt reports they did an ultrasound today and were unable to find the IUD     This is a 45-year-old female presenting to the ED for evaluation of pelvic pain. Patient had an IUD placed approximately 4 days ago, has had pelvic pain since placement. Patient went for an outpatient ultrasound today, no IUD noted on ultrasound. Patient then had a pelvic x-ray done, unsure of result. Patient states that she contacted the OB/GYN office who told her to the present to the ED if she has continued discomfort. Patient denies any nausea or vomiting, no pain medications prior to arrival, no fevers or chills. She denies any dysuria or hematuria, she denies any vaginal bleeding or discharge. She endorses vaginal and rectal pressure. Patient endorses mild distention to the lower abdomen. She denies any vaginal or rectal bleeding. She is unsure of last bowel movement. History provided by:  Patient   used: No        Prior to Admission Medications   Prescriptions Last Dose Informant Patient Reported? Taking? Levonorgestrel (MIRENA) 20 MCG/DAY IUD   No No   Si Intra Uterine Device by Intrauterine route once for 1 dose   Magnesium 400 MG TABS   No Yes   Sig: Take 1 tablet (400 mg total) by mouth in the morning   Riboflavin 400 MG TABS Not Taking  No No   Sig: Take 1 tablet (400 mg total) by mouth in the morning   Patient not taking: Reported on 2023   Ubrogepant Annalisa Hartman) 100 MG tablet   No Yes   Sig: Take 1 tablet (100 mg) one time as needed for migraine. May repeat one additional tablet (100 mg) at least two hours after the first dose. Do not use more than two doses per day, or for more than eight days per month.    azelastine (ASTELIN) 0.1 % nasal spray   No Yes   Si spray into each nostril 2 (two) times a day Use in each nostril as directed   divalproex sodium (DEPAKOTE) 250 mg DR tablet   No Yes   Sig: TAKE 1 TABLET(250 MG) BY MOUTH DAILY AT BEDTIME   divalproex sodium (DEPAKOTE) 500 mg EC tablet   No Yes   Sig: TAKE 1 TABLET(500 MG) BY MOUTH DAILY AT BEDTIME   fexofenadine (ALLEGRA) 180 MG tablet   No Yes   Sig: Take 1 tablet (180 mg total) by mouth daily   lurasidone (LATUDA) 40 mg tablet   Yes Yes   Sig: Take 40 mg by mouth in the morning   mirtazapine (REMERON) 15 mg tablet   Yes Yes   mometasone (ELOCON) 0.1 % lotion Not Taking  Yes No   Patient not taking: Reported on 2023   omeprazole (PriLOSEC) 20 mg delayed release capsule   No Yes   Sig: Take 1 capsule (20 mg total) by mouth daily   omeprazole (PriLOSEC) 20 mg delayed release capsule   No Yes   Sig: TAKE 1 CAPSULE(20 MG) BY MOUTH TWICE DAILY FOR 14 DAYS   ondansetron (ZOFRAN-ODT) 4 mg disintegrating tablet   No Yes   Sig: Take 1 tablet (4 mg total) by mouth every 6 (six) hours as needed for nausea   pregabalin (LYRICA) 50 mg capsule   No Yes   Sig: Take 1 PO TID x 5 days, then 1 PO BID x 5 days, then 1 PO daily x 5 days, then stop.       Facility-Administered Medications: None       Past Medical History:   Diagnosis Date   • Abnormal Pap smear of cervix      cryo; 2019 LSIL/AGC pap; 2019 colpo HSIL; 2019 LEEP HSIL w/neg margins; 2020 NILM pap/neg HPV; 2020 NILM pap/neg HPV   • ADHD    • Anemia    • Bipolar 1 disorder ([address])    • Depression    • Epilepsy ([address])     last episode    • GERD (gastroesophageal reflux disease)    • Migraine    • OCD (obsessive compulsive disorder)    • Urogenital trichomoniasis        Past Surgical History:   Procedure Laterality Date   • GYNECOLOGIC CRYOSURGERY     • [place] COLPOSCOPY CERVIX VAG LOOP ELTRD BX CERVIX N/A 2019    Procedure: BIOPSY LEEP CERVIX;  Surgeon: Aris Weinberg MD;  Location: [facility];  Service: Gynecology Family History   Problem Relation Age of Onset   • Bipolar disorder Mother    • Diabetes Mother    • Migraines Mother    • Miscarriages / Novelty Mother    • No Known Problems Father    • Depression Sister    • Asthma Sister    • Osteoporosis Maternal Grandmother    • Cancer Maternal Grandfather         lung   • Breast cancer Neg Hx    • Colon cancer Neg Hx    • Ovarian cancer Neg Hx      I have reviewed and agree with the history as documented. E-Cigarette/Vaping   • E-Cigarette Use Never User      E-Cigarette/Vaping Substances     Social History     Tobacco Use   • Smoking status: Every Day     Packs/day: 1.00     Years: 11.00     Total pack years: 11.00     Types: Cigarettes     Passive exposure: Current   • Smokeless tobacco: Never   Vaping Use   • Vaping Use: Never used   Substance Use Topics   • Alcohol use: Yes     Comment: couple times/month   • Drug use: Never       Review of Systems   Constitutional: Negative for chills and fatigue. Respiratory: Negative for shortness of breath. Cardiovascular: Negative for chest pain and palpitations. Gastrointestinal: Negative for diarrhea, nausea and vomiting. Genitourinary: Positive for pelvic pain and vaginal pain. Negative for dysuria, flank pain, hematuria, vaginal bleeding and vaginal discharge. Neurological: Negative for light-headedness. All other systems reviewed and are negative. Physical Exam  Physical Exam  Vitals reviewed. Constitutional:       General: She is not in acute distress. Appearance: Normal appearance. She is well-developed and well-groomed. She is not ill-appearing, toxic-appearing or diaphoretic. HENT:      Head: Normocephalic and atraumatic. Right Ear: External ear normal.      Left Ear: External ear normal.      Nose: Nose normal. No congestion or rhinorrhea. Mouth/Throat:      Mouth: Mucous membranes are moist.      Pharynx: Oropharynx is clear.  No oropharyngeal exudate or posterior oropharyngeal erythema. Eyes:      General: No scleral icterus. Right eye: No discharge. Left eye: No discharge. Extraocular Movements: Extraocular movements intact. Conjunctiva/sclera: Conjunctivae normal.   Cardiovascular:      Rate and Rhythm: Normal rate and regular rhythm. Pulses: Normal pulses. Heart sounds: No murmur heard. No friction rub. No gallop. Pulmonary:      Effort: Pulmonary effort is normal. No respiratory distress. Breath sounds: Normal breath sounds. No wheezing, rhonchi or rales. Abdominal:      General: Abdomen is flat. There is no distension. Palpations: Abdomen is soft. Tenderness: There is abdominal tenderness in the suprapubic area. There is guarding. There is no right CVA tenderness, left CVA tenderness or rebound. Comments: Suprapubic TTP to light palpation. Guarding on exam.   Musculoskeletal:         General: No deformity. Normal range of motion. Cervical back: Normal range of motion and neck supple. Skin:     General: Skin is warm and dry. Coloration: Skin is not jaundiced or pale. Findings: No rash. Neurological:      General: No focal deficit present. Mental Status: She is alert. Psychiatric:         Mood and Affect: Mood normal.         Behavior: Behavior normal. Behavior is cooperative.          Vital Signs  ED Triage Vitals   Temperature Pulse Respirations Blood Pressure SpO2   09/19/23 1856 09/19/23 1856 09/19/23 1856 09/19/23 1856 09/19/23 1856   98.7 °F (37.1 °C) (!) 125 20 125/81 97 %      Temp Source Heart Rate Source Patient Position - Orthostatic VS BP Location FiO2 (%)   09/19/23 1856 09/19/23 1856 09/19/23 1856 09/19/23 1856 --   Oral Monitor Sitting Right arm       Pain Score       09/19/23 1930       5           Vitals:    09/19/23 2230 09/19/23 2234 09/19/23 2300 09/19/23 2315   BP: 126/68 126/68 120/65 128/82   Pulse: (!) 52 92 88 77   Patient Position - Orthostatic VS:  Sitting Sitting         Visual Acuity      ED Medications  Medications   lactated ringers infusion (125 mL/hr Intravenous New Bag 9/19/23 2229)   morphine injection 2 mg (has no administration in time range)   ondansetron (ZOFRAN) injection 4 mg (has no administration in time range)   ketorolac (TORADOL) injection 15 mg (15 mg Intravenous Given 9/19/23 1930)   sodium chloride 0.9 % bolus 1,000 mL (0 mL Intravenous Stopped 9/19/23 2045)   iohexol (OMNIPAQUE) 350 MG/ML injection (MULTI-DOSE) 90 mL (90 mL Intravenous Given 9/19/23 2054)   acetaminophen (TYLENOL) tablet 975 mg (975 mg Oral Given 9/19/23 2228)       Diagnostic Studies  Results Reviewed     Procedure Component Value Units Date/Time    hCG, quantitative [[id number]]  (Normal) Collected: 09/19/23 1932    Lab Status: Final result Specimen: Blood from Arm, Left Updated: 09/19/23 2310     HCG, Quant <1 mIU/mL     Narrative:       Expected Ranges:    HCG results between 5 and 25 mIU/mL may be indicative of early pregnancy but should be interpreted in light of the total clinical presentation. HCG can rise to detectable levels in blanca and post menopausal women (0-11.6 mIU/mL).      Approximate               Approximate HCG  Gestation age          Concentration ( mIU/mL)  _____________          ______________________   Miguel [facility]                      HCG values  0.2-1                       5-50  1-2                           2-3                         100-5000  3-4                         500-01331  4-5                         1000-00870  5-6                         [id number]  6-8                         25142-231605  8-12                        [id number]      Urine Microscopic [[id number]]  (Abnormal) Collected: 09/19/23 2000    Lab Status: Final result Specimen: Urine, Clean Catch Updated: 09/19/23 2029     RBC, UA 4-10 /hpf      WBC, UA 1-2 /hpf      Epithelial Cells Occasional /hpf      Bacteria, UA Occasional /hpf      MUCUS THREADS Innumerable     Hyaline Casts, UA 3-5 /lpf     Protime-INR [[id number]]  (Normal) Collected: 09/19/23 1957    Lab Status: Final result Specimen: Blood from Arm, Left Updated: 09/19/23 2024     Protime 13.3 seconds      INR 1.01    APTT [[id number]]  (Normal) Collected: 09/19/23 1957    Lab Status: Final result Specimen: Blood from Arm, Left Updated: 09/19/23 2024     PTT 27 seconds     Chlamydia/GC amplified DNA by PCR [[id number]] Collected: 09/19/23 1957    Lab Status:  In process Specimen: Urine, Other Updated: 09/19/23 2006    Urine Macroscopic, POC [[id number]]  (Abnormal) Collected: 09/19/23 2000    Lab Status: Final result Specimen: Urine Updated: 09/19/23 2001     Color, UA Yellow     Clarity, UA Clear     pH, UA 5.0     Leukocytes, UA Negative     Nitrite, UA Negative     Protein, UA Negative mg/dl      Glucose, UA Negative mg/dl      Ketones, UA Negative mg/dl      Urobilinogen, UA 0.2 E.U./dl      Bilirubin, UA Negative     Occult Blood, UA Trace     Specific Gravity, UA >=1.030    Narrative:      CLINITEK RESULT    Basic metabolic panel [[id number]]  (Abnormal) Collected: 09/19/23 1932    Lab Status: Final result Specimen: Blood from Arm, Left Updated: 09/19/23 1959     Sodium 136 mmol/L      Potassium 3.3 mmol/L      Chloride 106 mmol/L      CO2 23 mmol/L      ANION GAP 7 mmol/L      BUN 14 mg/dL      Creatinine 0.66 mg/dL      Glucose 95 mg/dL      Calcium 9.6 mg/dL      eGFR 117 ml/min/1.73sq m     Narrative:      [place] guidelines for Chronic Kidney Disease (CKD):   •  Stage 1 with normal or high GFR (GFR > 90 mL/min/1.73 square meters)  •  Stage 2 Mild CKD (GFR = 60-89 mL/min/1.73 square meters)  •  Stage 3A Moderate CKD (GFR = 45-59 mL/min/1.73 square meters)  •  Stage 3B Moderate CKD (GFR = 30-44 mL/min/1.73 square meters)  •  Stage 4 Severe CKD (GFR = 15-29 mL/min/1.73 square meters)  •  Stage 5 End Stage CKD (GFR <15 mL/min/1.73 square meters)  Note: GFR calculation is accurate only with a steady state creatinine    CBC and differential [[id number]]  (Abnormal) Collected: 09/19/23 1932    Lab Status: Final result Specimen: Blood from Arm, Left Updated: 09/19/23 1945     WBC 10.61 Thousand/uL      RBC 4.51 Million/uL      Hemoglobin 13.7 g/dL      Hematocrit 42.1 %      MCV 93 fL      MCH 30.4 pg      MCHC 32.5 g/dL      RDW 13.7 %      MPV 9.5 fL      Platelets 754 Thousands/uL      nRBC 0 /100 WBCs      Neutrophils Relative 51 %      Immat GRANS % 0 %      Lymphocytes Relative 40 %      Monocytes Relative 6 %      Eosinophils Relative 2 %      Basophils Relative 1 %      Neutrophils Absolute 5.45 Thousands/µL      Immature Grans Absolute 0.04 Thousand/uL      Lymphocytes Absolute 4.23 Thousands/µL      Monocytes Absolute 0.61 Thousand/µL      Eosinophils Absolute 0.21 Thousand/µL      Basophils Absolute 0.07 Thousands/µL     Narrative: This is an appended report. These results have been appended to a previously verified report. CT abdomen pelvis with contrast   ED Interpretation by Lauren Sheehan PA-C (09/19 2144)   IUD in LLQ, not in uterus as interpreted by myself. Final Result by Brayden Marvin MD (09/19 2324)   Addendum (preliminary) 1 of 1 by Brayden Marvin MD (09/19 2324)   ADDENDUM:         I personally discussed this study with Lauren Sheehan on 9/19/2023 9:39    PM.            Final      IUD is seen in the peritoneal cavity, left lower quadrant, outside the uterus. Recommend gynecologic consultation. Workstation performed: [id number]                    Procedures  Procedures         ED Course  ED Course as of 09/20/23 0352   Tue Sep 19, 2023   2182 X-ray personally reviewed by myself, IUD noted. 2155 CT abdomen pelvis with contrast  IUD is seen in the peritoneal cavity, left lower quadrant, outside the uterus. Recommend gynecologic consultation.                  Medical Decision Making      DDX including but not limited to: IUD in vaginal canal, perforated uterus, IUD in abdominal cavity, IUD embedded in uterus. Pt is here for eval of pelvic pain after IUD insertion 4d ago. She had an US done today, no IUD noted. She then had an X-ray done, I personally reviewed x-ray and noted IUD. She is c/o pelvic and back pain as well as rectal and vaginal pressure. She states she tried to feel for her strings and cannot. She has suprapubic tenderness to light palpation. No vaginal or rectal bleeding. CBC ordered for evaluation of anemia infection, BMP ordered for evaluation of kidney function of electrolyte abnormalities. UA for evaluation urinary tract infection. Will pain control with Toradol and give Zofran for nausea. Will discussed with Dr. Nicola Watkins OB/GYN resident. CT ordered for evaluation of intra-abdominal IUD. Dr. Nicola Watkins recommends adding on type and screen, GC/chlamydia. Will order coags. States will come evaluate patient after CT back. CT reviewed by myself, IUD seen in the left lower quadrant, tender text to OB/GYN resident Dr. Nicola Watkins. Discussed findings with patient bedside, patient verbalized understanding of findings. OB/GYN to come evaluate patient. At the time of admission, the patient is in no acute distress. I discussed with the patient and family the diagnosis, treatment plan, and plan for admission; pt was given the opportunity to ask questions and verbalized understanding. The patient agrees with the plan of care and admission at this time. Perforation of uterus, initial encounter: complicated acute illness or injury with systemic symptoms that poses a threat to life or bodily functions  Amount and/or Complexity of Data Reviewed  Labs: ordered. Radiology: ordered and independent interpretation performed. Decision-making details documented in ED Course. Risk  OTC drugs. Prescription drug management. Decision regarding hospitalization.           Disposition  Final diagnoses:   Perforation of uterus, initial encounter   Cervical spondylosis   Cervical radiculopathy   Numbness and tingling of right arm   Migraine with aura and without status migrainosus, not intractable   Bipolar 1 disorder ([address])   Anxiety     Time reflects when diagnosis was documented in both MDM as applicable and the Disposition within this note     Time User Action Codes Description Comment    9/19/2023  9:12 PM Gabreila Like Add [J51.10NB] Perforation of uterus, initial encounter     9/19/2023  9:12 PM Gabriela Like Add [U20.934] Cervical spondylosis     9/19/2023  9:12 PM Gabriela Like Add [M54.12] Cervical radiculopathy     9/19/2023  9:12 PM Gabriela Like Add [R20.0,  R20.2] Numbness and tingling of right arm     9/19/2023  9:12 PM Gabriela Like Add [L26.299] Migraine with aura and without status migrainosus, not intractable     9/19/2023  9:12 PM Gabriela Like Add [F31.9] Bipolar 1 disorder ([address])     9/19/2023  9:12 PM Vergil Gubler [F41.9] Anxiety     9/19/2023  9:51 PM Francia Lalo Add Gigi Marinelli IUD migration       ED Disposition     ED Disposition   Admit    Condition   Stable    Date/Time   Wed Sep 20, 2023 12:13 AM    Comment   Case was discussed with OB/GYN and the patient's admission status was agreed to be Admission Status: inpatient status to the service of Dr. Royce Mahoney.            Follow-up Information    None         Current Discharge Medication List      CONTINUE these medications which have NOT CHANGED    Details   azelastine (ASTELIN) 0.1 % nasal spray 1 spray into each nostril 2 (two) times a day Use in each nostril as directed  Qty: 30 mL, Refills: 1    Associated Diagnoses: Multiple environmental allergies      !! divalproex sodium (DEPAKOTE) 250 mg DR tablet TAKE 1 TABLET(250 MG) BY MOUTH DAILY AT BEDTIME  Qty: 90 tablet, Refills: 0    Associated Diagnoses: Migraine with aura and without status migrainosus, not intractable      !! divalproex sodium (DEPAKOTE) 500 mg EC tablet TAKE 1 TABLET(500 MG) BY MOUTH DAILY AT BEDTIME  Qty: 90 tablet, Refills: 0    Comments: **Patient requests 90 days supply**  Associated Diagnoses: Migraine with aura and without status migrainosus, not intractable      fexofenadine (ALLEGRA) 180 MG tablet Take 1 tablet (180 mg total) by mouth daily  Qty: 90 tablet, Refills: 1    Associated Diagnoses: Multiple environmental allergies      lurasidone (LATUDA) 40 mg tablet Take 40 mg by mouth in the morning      Magnesium 400 MG TABS Take 1 tablet (400 mg total) by mouth in the morning  Qty: 90 tablet, Refills: 3    Associated Diagnoses: Migraine with aura and without status migrainosus, not intractable      mirtazapine (REMERON) 15 mg tablet       !! omeprazole (PriLOSEC) 20 mg delayed release capsule Take 1 capsule (20 mg total) by mouth daily  Qty: 60 capsule, Refills: 0    Associated Diagnoses: Epigastric abdominal pain; Gastroesophageal reflux disease without esophagitis      !! omeprazole (PriLOSEC) 20 mg delayed release capsule TAKE 1 CAPSULE(20 MG) BY MOUTH TWICE DAILY FOR 14 DAYS  Qty: 28 capsule, Refills: 0    Associated Diagnoses: H. pylori infection      ondansetron (ZOFRAN-ODT) 4 mg disintegrating tablet Take 1 tablet (4 mg total) by mouth every 6 (six) hours as needed for nausea  Qty: 20 tablet, Refills: 0    Associated Diagnoses: Nausea and vomiting      pregabalin (LYRICA) 50 mg capsule Take 1 PO TID x 5 days, then 1 PO BID x 5 days, then 1 PO daily x 5 days, then stop. Qty: 30 capsule, Refills: 0    Associated Diagnoses: Neck pain      Ubrogepant (Ubrelvy) 100 MG tablet Take 1 tablet (100 mg) one time as needed for migraine. May repeat one additional tablet (100 mg) at least two hours after the first dose. Do not use more than two doses per day, or for more than eight days per month.   Qty: 9 tablet, Refills: 11    Associated Diagnoses: Migraine with aura and without status migrainosus, not intractable      Levonorgestrel (MIRENA) 20 MCG/DAY IUD 1 Intra Uterine Device by Intrauterine route once for 1 dose  Qty: 1 Intra Uterine Device, Refills: 0    Associated Diagnoses: Unwanted fertility; Encounter for insertion of mirena IUD      mometasone (ELOCON) 0.1 % lotion       Riboflavin 400 MG TABS Take 1 tablet (400 mg total) by mouth in the morning  Qty: 90 tablet, Refills: 3    Associated Diagnoses: Migraine with aura and without status migrainosus, not intractable       !! - Potential duplicate medications found. Please discuss with provider. No discharge procedures on file.     PDMP Review       Value Time User    PDMP Reviewed  Yes 1/14/2022 11:43 AM Jacinto Everett PA-C          ED Provider  Electronically Signed by [facility]ELY[facility]  09/20/23 6012

## 2023-09-20 NOTE — ANESTHESIA POSTPROCEDURE EVALUATION
Post-Op Assessment Note    CV Status:  Stable    Pain management: adequate     Mental Status:  Alert and awake   Hydration Status:  Euvolemic   PONV Controlled:  Controlled   Airway Patency:  Patent      Post Op Vitals Reviewed: Yes      Staff: Anesthesiologist         No notable events documented.     /68 (09/20/23 1628)    Temp 98 °F (36.7 °C) (09/20/23 1628)    Pulse     Resp 16 (09/20/23 1628)    SpO2

## 2023-09-20 NOTE — PROGRESS NOTES
For questions/concerns on this patient, please reach out to the following:  [facility]- GynOnc Resident   GynProgress note   Osmin Silva 29 y.o. female MRN: [id number]  Unit/Bed#: E2 -01 Encounter: [id number]    Assessment/Plan: Osmin Silva is a 28 yo admitted due to severe abdominal pain and subsequent diagnosis with IUD migration into the peritoneal cavity. Plan for Operative management today. * IUD migration  Assessment & Plan  IUD inserted 9/14  CTAP: IUD in peritoneal cavity, no concern for other acute injury  SSE: no strings visualized  NPO  IVF  Morphine 2mg q4 PRN-> declined by patient-> Toradol overnight   Plan for diagnostic laparoscopy and IUD removal today           Subjective: Osmin Silva has no current complaints. Pain is positional and a zero pain when she is on her side and severe 8/10 when she is in certain positions. Overnight events: used Toradol for pain, doesn't want to use Opioids due to family history of addiction. . Patient is currently voiding. She is ambulating. Patient is currently passing flatus and has had bowel movement. She denies nausea or vomiting. Objective:  /82 (BP Location: Right arm)   Pulse 77   Temp 97.8 °F (36.6 °C) (Temporal)   Resp 16   Wt 60 kg (132 lb 4.4 oz)   SpO2 99%   BMI 22.01 kg/m²     No intake/output data recorded. I/O this shift:  In: 1000 [IV Piggyback:1000]  Out: -     Lab Results   Component Value Date    WBC 10.61 (H) 09/19/2023    HGB 13.7 09/19/2023    HCT 42.1 09/19/2023    MCV 93 09/19/2023     09/19/2023       Lab Results   Component Value Date    CALCIUM 9.6 09/19/2023    K 3.3 (L) 09/19/2023    CO2 23 09/19/2023     09/19/2023    BUN 14 09/19/2023    CREATININE 0.66 09/19/2023           Physical Exam  Constitutional:       Appearance: Normal appearance. HENT:      Head: Normocephalic and atraumatic. Eyes:      Extraocular Movements: Extraocular movements intact. Cardiovascular:      Rate and Rhythm: Normal rate. Pulses: Normal pulses. Pulmonary:      Effort: Pulmonary effort is normal.   Abdominal:      General: Abdomen is flat. There is no distension. Tenderness: There is no abdominal tenderness. Musculoskeletal:         General: Normal range of motion. Cervical back: Normal range of motion. Skin:     General: Skin is warm and dry. Neurological:      General: No focal deficit present. Mental Status: She is alert.    Psychiatric:         Mood and Affect: Mood normal.           Robin Perez MD  9/20/2023  5:59 AM

## 2023-09-20 NOTE — OP NOTE
OPERATIVE REPORT  PATIENT NAME: Nancy Mayorga    :  1990  MRN: [id number]  Pt Location: [facility] ROOM 06    SURGERY DATE: 2023    Surgeon(s) and Role:     * Medardo Andrew MD - Primary     * Sarika Figueroa MD - Assisting    Preop Diagnosis:  Perforation of uterus, initial encounter [B98.90NI]  IUD migration [T83.32XA]     Post-Op Diagnosis Codes:     * Perforation of uterus, initial encounter [P08.09ES]     * IUD migration [T83.32XA]    Procedure(s):  LAPAROSCOPY DIAGNOSTIC  Removal of IUD    Specimen(s):  Mirena IUD      Estimated Blood Loss:   5 ml     Drains:  none    Anesthesia Type:   General Endotracheal     Operative Indications:  Perforation of uterus, initial encounter [S37.69XA]  IUD migration [T83.32XA]      Operative Findings:  Grossly normal external genitalia  Grossly normal Uterus with no apparent signs of ongoing bleeding secondary to perforation  Migrated IUD located on omentum , no apparent bowel injury or bleeding noted  Grossly normal liver edge and bowel      Description of Procedure    Patient was taken to the operating room. General endotracheal anesthesia (GET) was administered and the patient was positioned on the OR table in the dorsal lithotomy position. All pressure points were padded and a warm blanket was placed to maintain control of core body temperature. A bimanual exam was performed and the uterus was noted to be anteverted, normal in size and consistency with no palpable adnexal masses or fullness. The patient was prepped and draped in the usual sterile fashion with chloroprep on the abdomen and betadine prep on the vagina and perineum. Operative Technique    A time out was performed to confirm correct patient and correct procedure. A straight catheter was introduced into the bladder, which was drained of 200 cc of clear yellow urine. A sponge stick was inserted vagina for the purpose of uterine manipulation.  Sterile gloves were then exchanged and attention was turned to the abdomen. A 5mm incision was made at the inferior edge of the umbilicus for introduction of a 5mm trocar. Trocar was introduced under direct visualization. Pneumoperitoneum was then established to a maximum of 15mmHg. The entire abdomen and pelvis was inspected and there was no evidence of injury to bowel, bladder, vasculature, or other structures. Attention was then turned to the pelvis. Patient was placed in Trendelenburg. One additional suprapubic port site were selected 2 cm above the pubic bone. A 5mm incision was made for introduction of a 5mm trocar under direct visualization at each site. A pelvic survey was completed and the IUD threads were visualized on the Omentum. A [place] grasper was inserted and used to grasp the IUD and remove it through the suprapubic port. Upon removal IUD was inspected and noted to be intact. Pneumoperitoneum was allowed to escape. Adequate hemostasis was visualized. The suprapubic trocar were removed under direct visualization. The laparoscope was withdrawn from the abdomen, followed by its trocar sleeve at the umbilicus. Skin incisions were closed with running absorbable suture of 4-0 monocryl. Attention was turned to the vagina. Sponge stick was removed from the vagina. There was no bleeding or injury noted. At the conclusion of the procedure, all needle, sponge, and instrument counts were noted to be correct x2. Patient tolerated the procedure well and was transferred to PACU in stable condition prior to discharge with follow up in 1-2 weeks. Dr. Pete Newton was present and participated in all key portions of the case. Patient Disposition:  PACU       SIGNATURE: Osvaldo Glover MD  DATE: September 20, 2023  TIME: 3:45 PM      I was present and scrubbed for the entire procedure and I agree with the documentation as described.     Julia Garcia MD

## 2023-09-21 ENCOUNTER — OFFICE VISIT (OUTPATIENT)
Dept: OBGYN CLINIC | Facility: CLINIC | Age: 33
End: 2023-09-21

## 2023-09-21 VITALS
HEART RATE: 112 BPM | HEIGHT: 65 IN | BODY MASS INDEX: 22.26 KG/M2 | WEIGHT: 133.6 LBS | SYSTOLIC BLOOD PRESSURE: 119 MMHG | DIASTOLIC BLOOD PRESSURE: 80 MMHG

## 2023-09-21 DIAGNOSIS — R30.0 DYSURIA: Primary | ICD-10-CM

## 2023-09-21 LAB
SL AMB  POCT GLUCOSE, UA: ABNORMAL
SL AMB LEUKOCYTE ESTERASE,UA: ABNORMAL
SL AMB POCT BILIRUBIN,UA: ABNORMAL
SL AMB POCT BLOOD,UA: ABNORMAL
SL AMB POCT CLARITY,UA: ABNORMAL
SL AMB POCT COLOR,UA: ABNORMAL
SL AMB POCT KETONES,UA: ABNORMAL
SL AMB POCT NITRITE,UA: ABNORMAL
SL AMB POCT PH,UA: 5
SL AMB POCT SPECIFIC GRAVITY,UA: 1.03
SL AMB POCT URINE PROTEIN: ABNORMAL
SL AMB POCT UROBILINOGEN: 0.2

## 2023-09-21 PROCEDURE — 87086 URINE CULTURE/COLONY COUNT: CPT | Performed by: NURSE PRACTITIONER

## 2023-09-21 PROCEDURE — 99213 OFFICE O/P EST LOW 20 MIN: CPT | Performed by: NURSE PRACTITIONER

## 2023-09-21 PROCEDURE — 81002 URINALYSIS NONAUTO W/O SCOPE: CPT | Performed by: NURSE PRACTITIONER

## 2023-09-21 RX ORDER — PHENAZOPYRIDINE HYDROCHLORIDE 100 MG/1
100 TABLET, FILM COATED ORAL 3 TIMES DAILY PRN
Qty: 10 TABLET | Refills: 0 | Status: SHIPPED | OUTPATIENT
Start: 2023-09-21 | End: 2023-09-25

## 2023-09-21 RX ORDER — NITROFURANTOIN 25; 75 MG/1; MG/1
100 CAPSULE ORAL 2 TIMES DAILY
Qty: 10 CAPSULE | Refills: 0 | Status: SHIPPED | OUTPATIENT
Start: 2023-09-21 | End: 2023-09-26

## 2023-09-21 NOTE — PROGRESS NOTES
PROBLEM GYNECOLOGICAL VISIT    Julissa Mir is a 29 y.o. female who presents today with complaint of dysuria.   Her general medical history has been reviewed and she reports it as follows:    Past Medical History:   Diagnosis Date   • Abnormal Pap smear of cervix      cryo; 2019 LSIL/AGC pap; 2019 colpo HSIL; 2019 LEEP HSIL w/neg margins; 2020 NILM pap/neg HPV; 2020 NILM pap/neg HPV   • ADHD    • Anemia    • Bipolar 1 disorder ([address])    • Depression    • Epilepsy ([address])     last episode    • GERD (gastroesophageal reflux disease)    • Migraine    • OCD (obsessive compulsive disorder)    • Urogenital trichomoniasis      Past Surgical History:   Procedure Laterality Date   • FOREIGN BODY REMOVAL N/A 2023    Procedure: removal of IUD;  Surgeon: Jigar Myers MD;  Location: [facility];  Service: Gynecology   • GYNECOLOGIC CRYOSURGERY     • VT COLPOSCOPY CERVIX VAG LOOP ELTRD BX CERVIX N/A 2019    Procedure: BIOPSY LEEP CERVIX;  Surgeon: Bhavna Mello MD;  Location: [facility];  Service: Gynecology   • VT LAPS ABD PRTM&OMENTUM DX W/WO SPEC BR/WA SPX N/A 2023    Procedure: LAPAROSCOPY DIAGNOSTIC;  Surgeon: Jigar Myers MD;  Location: [facility];  Service: Gynecology     OB History        0    Para   0    Term   0       0    AB   0    Living   0       SAB   0    IAB   0    Ectopic   0    Multiple   0    Live Births   0               Social History     Tobacco Use   • Smoking status: Every Day     Packs/day: 1.00     Years: 11.00     Total pack years: 11.00     Types: Cigarettes     Passive exposure: Current   • Smokeless tobacco: Never   Vaping Use   • Vaping Use: Never used   Substance Use Topics   • Alcohol use: Yes     Comment: couple times/month   • Drug use: Never     Social History     Substance and Sexual Activity   Sexual Activity Yes   • Partners: Male   • Birth control/protection: Injection       Current Outpatient Medications   Medication Instructions   • acetaminophen (TYLENOL) 975 mg, Oral, Every 6 hours PRN   • azelastine (ASTELIN) 0.1 % nasal spray 1 spray, Nasal, 2 times daily, Use in each nostril as directed   • divalproex sodium (DEPAKOTE) 250 mg DR tablet TAKE 1 TABLET(250 MG) BY MOUTH DAILY AT BEDTIME   • divalproex sodium (DEPAKOTE) 500 mg EC tablet TAKE 1 TABLET(500 MG) BY MOUTH DAILY AT BEDTIME   • fexofenadine (ALLEGRA) 180 mg, Oral, Daily   • ibuprofen (MOTRIN) 600 mg, Oral, Every 6 hours PRN   • lurasidone (LATUDA) 40 mg, Oral, Daily   • Magnesium 400 mg, Oral, Daily   • mirtazapine (REMERON) 15 mg tablet No dose, route, or frequency recorded. • mometasone (ELOCON) 0.1 % lotion No dose, route, or frequency recorded. • omeprazole (PRILOSEC) 20 mg, Oral, Daily   • omeprazole (PRILOSEC) 20 mg, Oral, 2 times daily   • ondansetron (ZOFRAN-ODT) 4 mg, Oral, Every 6 hours PRN   • pregabalin (LYRICA) 50 mg capsule Take 1 PO TID x 5 days, then 1 PO BID x 5 days, then 1 PO daily x 5 days, then stop. • Riboflavin 400 mg, Oral, Daily   • Ubrogepant (Ubrelvy) 100 MG tablet Take 1 tablet (100 mg) one time as needed for migraine. May repeat one additional tablet (100 mg) at least two hours after the first dose. Do not use more than two doses per day, or for more than eight days per month.<BR>       History of Present Illness:   Patient had migrated IUD laparoscopically removed yesterday. She reports that since last evening she has been experiencing dysuria and bladder spasms after voiding. Reports incisional pain is well-controlled with Ibuprofen and Tylenol. Denies vaginal discharge, itching, or odor. Review of Systems:  Review of Systems   Constitutional: Negative. Gastrointestinal: Negative. Genitourinary: Positive for dysuria. Negative for difficulty urinating, pelvic pain, vaginal discharge and vaginal pain.        Physical Exam:  /80   Pulse (!) 112   Ht 5' 5" (1.651 m)   Wt 60.6 kg (133 lb 9.6 oz)   BMI 22.23 kg/m²   Physical Exam  Constitutional:       General: She is not in acute distress. Abdominal:      Palpations: Abdomen is soft. Tenderness: There is abdominal tenderness (mildly tender at incision sites). There is no guarding. Comments: Incisions clean/dry/intact with no signs of infection   Neurological:      Mental Status: She is alert. Skin:     General: Skin is warm and dry. Vitals reviewed. Point of Care Testing:   -urine dipstick: neg leuks, neg nitrites, +++ blood    Assessment:   1. Suspect UTI. Plan:   1. Cultures ordered: urine. 2. Given Rx Macrobid and Pyridium. 3. Return to office as previously scheduled and prn.

## 2023-09-21 NOTE — PATIENT INSTRUCTIONS
Thank you for your confidence in our team.   We appreciate you and welcome your feedback. If you receive a survey from us, please take a few moments to let us know how we are doing.    Sincerely,  Sintia Chi

## 2023-09-22 ENCOUNTER — APPOINTMENT (OUTPATIENT)
Dept: PHYSICAL THERAPY | Facility: MEDICAL CENTER | Age: 33
End: 2023-09-22
Payer: MEDICARE

## 2023-09-22 LAB — BACTERIA UR CULT: NORMAL

## 2023-09-25 ENCOUNTER — POSTPARTUM VISIT (OUTPATIENT)
Dept: OBGYN CLINIC | Facility: CLINIC | Age: 33
End: 2023-09-25

## 2023-09-25 VITALS
SYSTOLIC BLOOD PRESSURE: 124 MMHG | DIASTOLIC BLOOD PRESSURE: 87 MMHG | HEIGHT: 65 IN | WEIGHT: 133 LBS | BODY MASS INDEX: 22.16 KG/M2 | HEART RATE: 105 BPM

## 2023-09-25 DIAGNOSIS — Z09 POSTOPERATIVE EXAMINATION: Primary | ICD-10-CM

## 2023-09-25 PROBLEM — Z98.890 STATUS POST LAPAROSCOPY: Status: RESOLVED | Noted: 2023-09-20 | Resolved: 2023-09-25

## 2023-09-25 PROBLEM — T83.32XA IUD MIGRATION: Status: RESOLVED | Noted: 2023-09-19 | Resolved: 2023-09-25

## 2023-09-25 PROCEDURE — 99213 OFFICE O/P EST LOW 20 MIN: CPT | Performed by: NURSE PRACTITIONER

## 2023-09-25 NOTE — PROGRESS NOTES
Ab Bonner presents today for post-operative follow up visit. She had laparoscopy of abdomen performed on 9/19/2023 for removal of IUD migration into peritoneum due to uterine perforation. She reports that incisional pain is well-controlled with occasional Ibuprofen. She reports that dysuria has resolved. On exam, incision at umbilicus and 5cm below umbilicus are clean, dry, and intact and healing well. No signs of infection noted. Advised to continue to keep clean and dry and use Ibuprofen prn.   Return as previously scheduled on 10/19/23 for next Depoprovera injection and in 6/2024 for annual GYN exam.

## 2023-09-25 NOTE — PATIENT INSTRUCTIONS
Thank you for your confidence in our team.   We appreciate you and welcome your feedback. If you receive a survey from us, please take a few moments to let us know how we are doing.    Sincerely,  Margarita Daniel

## 2023-09-27 ENCOUNTER — OFFICE VISIT (OUTPATIENT)
Dept: NEUROLOGY | Facility: CLINIC | Age: 33
End: 2023-09-27

## 2023-09-27 VITALS
HEIGHT: 65 IN | TEMPERATURE: 98 F | BODY MASS INDEX: 22.16 KG/M2 | WEIGHT: 133 LBS | DIASTOLIC BLOOD PRESSURE: 76 MMHG | OXYGEN SATURATION: 97 % | SYSTOLIC BLOOD PRESSURE: 122 MMHG | HEART RATE: 95 BPM

## 2023-09-27 DIAGNOSIS — G43.109 MIGRAINE WITH AURA AND WITHOUT STATUS MIGRAINOSUS, NOT INTRACTABLE: ICD-10-CM

## 2023-09-27 DIAGNOSIS — F41.9 ANXIETY: ICD-10-CM

## 2023-09-27 DIAGNOSIS — F31.9 BIPOLAR 1 DISORDER (HCC): ICD-10-CM

## 2023-09-27 DIAGNOSIS — G43.109 MIGRAINE WITH AURA AND WITHOUT STATUS MIGRAINOSUS, NOT INTRACTABLE: Primary | ICD-10-CM

## 2023-09-27 RX ORDER — PROPRANOLOL HCL 60 MG
60 CAPSULE, EXTENDED RELEASE 24HR ORAL DAILY
Qty: 30 CAPSULE | Refills: 3 | Status: SHIPPED | OUTPATIENT
Start: 2023-09-27 | End: 2023-09-27

## 2023-09-27 RX ORDER — PROPRANOLOL HCL 60 MG
CAPSULE, EXTENDED RELEASE 24HR ORAL
Qty: 90 CAPSULE | Refills: 3 | Status: SHIPPED | OUTPATIENT
Start: 2023-09-27

## 2023-09-27 NOTE — PROGRESS NOTES
[facility]  PATIENT:  Navi Tubbs  MRN:  [id number]  :  1990  DATE OF SERVICE:  2023      Assessment/Plan:     Migraines with aura: I had the pleasure of seeing Jovita Marsh in the office today at [facility] in [facility]. She is presenting today for a office visit follow-up in regard to her migraine headaches that she has been experiencing. She notes that at this time her migraines are approximately around the same. She is noticing some more problems with brain fog and more forgetfulness at this time. She notes that they are very minor things such as when she is driving she is forgetting where she is going sometimes. Notes that this is occurring quite often. Feel as though this could be a combination of some of the patient's medications that she is taking, also may be combined with some of her anxiety/depression that she has as well at this time. Would like for the patient to continue to monitor this at this time and let me know at the next visit if anything seems to particularly change. She feels as though for the Pawan Abbailee she has to take more than 2 tablets in a day to sometimes get rid of the headache if she were allowed to take more than 2 in a day. In this case, does not feel as though that the Depakote that she has been taking has been significantly helping and getting rid of the headaches that she has. She feels as though it has not been a reliable preventative medication method, and would certainly be open to trying something else at this time.    -Would like for the patient to slowly taper off and discontinue off of the Depakote 500 mg daily that she is taking. Would like for her to cut her Depakote dosage in half and cut the tablet in half. Like her to take Depakote 500 mg, taking half a tablet by mouth once daily for about 2 to 3 weeks and see how she does.   If she is doing well when slowly tapering off of the medication, then she can come off the medication entirely after this point. We just like her to slowly taper off for any adverse effects of the medication or any side effects or any seizure-like activity that could be related to decreasing off of the medication. Was going to run lab work at the patient's visit today for CBC, CMP, and valproate level if the patient was going to continue with  this medication. However since patient is going to slowly taper off of this we will just continue to monitor the patient if we feel as though blood work would be necessary in the future we would certainly do so. -For a new preventative medication therapy, would like for the patient to try propanolol long-acting 60 mg, take 1 capsule by mouth once daily. Believe that propanolol may potentially help the patient at this time in regard to her migraine preventative therapy. It is one of the medications that she has not tried yet. It is noted that her blood pressure at today's visit is 122/76 and her pulse was 95 bpm.  Do not expect the patient to have any significant difficulty with this medication with these blood pressure and heart rate readings. However, the patient does become symptomatic of low blood pressure noticed that she has low blood pressure or low heart rate below 60, would like her to let me know. Also, believe that this medication would be best for her as it may also help with some aspects of anxiety as well while also not being an antidepressant medication as she is currently taking a few antidepressants and mood stabilizing medications at this time.  -Would encourage her to continue with the Ermalinda Oppenheim that she is currently taking at this time. She can continue to take Ubrelvy 100 mg, take 1 tablet by mouth once at the onset of a headache. She may repeat this in 2 hours later. Max dose of 2 tablets in 1 day.   For the more mild to moderate headaches that she experiences she could still use over-the-counter Tylenol 1000 mg or over-the-counter ibuprofen 600 mg at the onset of 1 of these headaches. Again, do not believe that there is an issue with the patient's abortive medication therapy as it does seem to work at times I believe it is more of an issue of the preventative medication that she is on not doing as well as it should be. Over the counter preventive supplements for headaches/migraines (if you try, try for 3 months straight)  (to take every day to help prevent headaches - not to take at the time of headache): There are combo pills online of these - none of which regulated by FDA and double check dosing - take appropriate dose only once a day- prevent a migraine, migravent, mind ease, migrelief   [] Magnesium 400mg daily (If any diarrhea or upset stomach, decrease dose  as tolerated)  [] Riboflavin (Vitamin B2) 400mg daily (may make your urine bright/neon yellow)  [] Vitamin E which may help with menstrual migraine. There is limited data on this, but it may reduce nausea, photophobia and phonophobia during menstruation.   - All supplements can be purchased online    Sleep and headache prevention:   [] Melatonin - you may take 1-3 mg nightly for sleep or headache prevention. You should take this 1 hour prior to bedtime consistently every night for it to work. It works by gradually helping to adjust your sleep time over days to weeks, rather than immediately making you feel sleepy. Lifestyle Recommendations:  [x] SLEEP - Maintain a regular sleep schedule: Adults need at least 7-8 hours of uninterrupted a night. Maintain good sleep hygiene:  Going to bed and waking up at consistent times, avoiding excessive daytime naps, avoiding caffeinated beverages in the evening, avoid excessive stimulation in the evening and generally using bed primarily for sleeping. One hour before bedtime would recommend turning lights down lower, decreasing your activity (may read quietly, listen to music at a low volume).  When you get into bed, should eliminate all technology (no texting, emailing, playing with your phone, iPad or tablet in bed). [x] HYDRATION - Maintain good hydration. Drink  2L of fluid a day (4 typical small water bottles)  [x] DIET - Maintain good nutrition. In particular don't skip meals and try and eat healthy balanced meals regularly. [x] TRIGGERS - Look for other triggers and avoid them: Limit caffeine to 1-2 cups a day or less. Avoid dietary triggers that you have noticed bring on your headaches (this could include aged cheese, peanuts, MSG, aspartame and nitrates). [x] EXERCISE - physical exercise as we all know is good for you in many ways, and not only is good for your heart, but also is beneficial for your mental health, cognitive health and  chronic pain/headaches. I would encourage at the least 5 days of physical exercise weekly for at least 30 minutes. Education and Follow-up  [x] Please call with any questions or concerns. Of course if any new concerning symptoms go to the emergency department. [x] Follow up with 4-5 months with Chio Conway PA-C       History of Present Illness: For Review: We had the pleasure of evaluating Navi Tubbs in neurological follow up  today for headaches. As you know,  she is a 29 y.o.   female with a past history of migraine headaches. Patient was last seen in the office at [facility] on 05/09/2023 by myself. It was noted at the last visit that the patient's migraines had stayed relatively the same since the last time. She was still having migraines on an every day basis. She was very light sensitive. She stated that the Depakote and Flexeril had not really been helping significantly. She was open to giving Depakote an additional try however, therefore when the patient to start taking Depakote 250 mg 1 tablet by mouth once daily at bedtime with the Depakote 500 mg that she already takes at bedtime.   See if this would be successful with potentially treating her migraines. Still recommended that she could continue with the Flexeril for the muscle stiffness/tightness in the neck. Still recommended to take the Ubrelvy and as needed for severe migraines when they do come on. For more mild to moderate headaches, patient could take either 1000 mg of Tylenol or 600 mg of ibuprofen. Also she was having a severe headache she could take either of 1000 mg of Tylenol or 600 mg ibuprofen with the Ubrelvy when she was having a more severe headache. At the last visit we also went over the patient's MRI brain results, which showed no acute infarct, hemorrhage, or mass effect. No intracranial abnormalities which would have been contributing to the patient's migraines at this time. Current medical illnesses: Migraine with aura, history of epilepsy, cervical radiculopathy, cervical spondylosis, multiple environmental allergies, dizziness, history of ADHD, bipolar 1 disorder       What medications do you take or have you taken for your headaches? Current Preventive:   Remeron, Depakote, Latuda, Lyrica, riboflavin, magnesium, flexeril      Current Abortive:   Ubrelvy, Tylenol     Prior Preventive:   Topamax, gabapentin     Prior Abortive:   Sumatriptan, rizatriptan, Robaxin, ibuprofen, Toradol     Interval updates as of 9/27/2023:  Still having migraines a almost every day at this moment in time. Experiencing some symptoms of memory loss, feeling more forgetful at this time. Experiencing some minor memory or forgetfulness issues. Occurring quite frequently she states. Does feel as though her depression and anxiety is currently under control. No issues with sleep at this time she states, 7-8 hours at night for the most part. Feel like she has to use the Ubrelvy every day at this time. Feeling like she has to take multiple Rolando Keepers a day to get rid of it. Does not feel as though the Depakote has been very effective for her in treating the headaches. Had recent laparoscopic surgery, her IUD had implanted into her stomach she said. Seems to be recovering fine she states. How often do the headaches occur? Almost every day  Are you ever headache free? no    What time of the day do the headaches start? In the afternoon     How long do the headaches last?   Does have relief when she goes to sleep but will last the whole day still     Describe your usual headache ? Pressure on the right side    Where is your headache located? Right side of the head at the temples    What is the intensity of pain? Average: 4 or 5/10     Associated symptoms:   Nausea, vomiting  Photophobia, phonophobia,   Problem with concentration  Floaters in her vision   light-headed or dizzy, stiff or sore neck,   Hands or feet tingle or feel numb, prefer to be alone and in a dark room, unable to work    Headache history with updates:  Headache are worse if the patient: laying down would make them worse, missing meals it gets worse    Any positional change headaches? No positional change headahces    Headache triggers:  Skipping meals,     Aura/warning and how long does it last ? Yes, spots in vision beforehand     What time of the year do headaches occur more frequently? do not seem to be related to any time of the year    Have you seen someone else for headaches or pain? Yes, just her PCP    Are you current pregnant or planning on getting pregnant? Yes, DepoProvera injection     Have you ever had any Brain imaging? yes MRI brain without contrast.  I personally reviewed these images. Reviewed old notes from physician seen in the past- see above HPI for summary of previous encounters.        Past Medical History:   Diagnosis Date    Abnormal Pap smear of cervix     2009 cryo; 6/2019 LSIL/AGC pap; 6/2019 colpo HSIL; 8/2019 LEEP HSIL w/neg margins; 6/2020 NILM pap/neg HPV; 6/2020 NILM pap/neg HPV    ADHD     Anemia     Bipolar 1 disorder ([address])     Depression     Epilepsy ([address]) last episode 2000    GERD (gastroesophageal reflux disease)     Migraine     OCD (obsessive compulsive disorder)     Urogenital trichomoniasis 2019       Patient Active Problem List   Diagnosis    Smoking    Anxiety    Depression    ADHD    Bipolar 1 disorder ([address])    Migraine with aura and without status migrainosus, not intractable    Numbness and tingling of right arm    Cervical radiculopathy    Cervical spondylosis    GERD (gastroesophageal reflux disease)    Epilepsy (HCC)       Medications:      Current Outpatient Medications   Medication Sig Dispense Refill    acetaminophen (TYLENOL) 325 mg tablet Take 3 tablets (975 mg total) by mouth every 6 (six) hours as needed for mild pain  0    azelastine (ASTELIN) 0.1 % nasal spray 1 spray into each nostril 2 (two) times a day Use in each nostril as directed 30 mL 1    divalproex sodium (DEPAKOTE) 250 mg DR tablet TAKE 1 TABLET(250 MG) BY MOUTH DAILY AT BEDTIME 90 tablet 0    divalproex sodium (DEPAKOTE) 500 mg EC tablet TAKE 1 TABLET(500 MG) BY MOUTH DAILY AT BEDTIME 90 tablet 0    fexofenadine (ALLEGRA) 180 MG tablet Take 1 tablet (180 mg total) by mouth daily 90 tablet 1    ibuprofen (MOTRIN) 600 mg tablet Take 1 tablet (600 mg total) by mouth every 6 (six) hours as needed for moderate pain 30 tablet 0    lurasidone (LATUDA) 40 mg tablet Take 40 mg by mouth in the morning      Magnesium 400 MG TABS Take 1 tablet (400 mg total) by mouth in the morning 90 tablet 3    mirtazapine (REMERON) 15 mg tablet       mometasone (ELOCON) 0.1 % lotion       omeprazole (PriLOSEC) 20 mg delayed release capsule TAKE 1 CAPSULE(20 MG) BY MOUTH TWICE DAILY FOR 14 DAYS 28 capsule 0    pregabalin (LYRICA) 50 mg capsule Take 1 PO TID x 5 days, then 1 PO BID x 5 days, then 1 PO daily x 5 days, then stop. 30 capsule 0    Ubrogepant (Ubrelvy) 100 MG tablet Take 1 tablet (100 mg) one time as needed for migraine.  May repeat one additional tablet (100 mg) at least two hours after the first dose. Do not use more than two doses per day, or for more than eight days per month. 9 tablet 11    Riboflavin 400 MG TABS Take 1 tablet (400 mg total) by mouth in the morning (Patient not taking: Reported on 9/19/2023) 90 tablet 3     No current facility-administered medications for this visit.         Allergies:    No Known Allergies    Family History:     Family History   Problem Relation Age of Onset    Bipolar disorder Mother     Diabetes Mother     Migraines Mother     Miscarriages / [place] Mother     No Known Problems Father     Depression Sister     Asthma Sister     Osteoporosis Maternal Grandmother     Cancer Maternal Grandfather         lung    Breast cancer Neg Hx     Colon cancer Neg Hx     Ovarian cancer Neg Hx        Social History:     Social History     Socioeconomic History    Marital status: Single     Spouse name: Not on file    Number of children: Not on file    Years of education: Not on file    Highest education level: Not on file   Occupational History    Not on file   Tobacco Use    Smoking status: Every Day     Packs/day: 1.00     Years: 11.00     Total pack years: 11.00     Types: Cigarettes     Passive exposure: Current    Smokeless tobacco: Never   Vaping Use    Vaping Use: Never used   Substance and Sexual Activity    Alcohol use: Yes     Comment: couple times/month    Drug use: Never    Sexual activity: Not Currently     Partners: Male     Birth control/protection: Injection   Other Topics Concern    Not on file   Social History Narrative    Not on file     Social Determinants of Health     Financial Resource Strain: Low Risk  (7/28/2023)    Overall Financial Resource Strain (CARDIA)     Difficulty of Paying Living Expenses: Not hard at all   Food Insecurity: Food Insecurity Present (7/28/2023)    Hunger Vital Sign     Worried About Running Out of Food in the Last Year: Sometimes true     Ran Out of Food in the Last Year: Sometimes true   Transportation Needs: No Transportation Needs (7/28/2023)    PRAPARE - Transportation     Lack of Transportation (Medical): No     Lack of Transportation (Non-Medical): No   Physical Activity: Inactive (6/16/2021)    Exercise Vital Sign     Days of Exercise per Week: 0 days     Minutes of Exercise per Session: 0 min   Stress: No Stress Concern Present (6/16/2021)    [address] [facility]     Feeling of Stress : Only a little   Social Connections: Not on file   Intimate Partner Violence: Not At Risk (6/16/2021)    Humiliation, Afraid, Rape, and Kick questionnaire     Fear of Current or Ex-Partner: No     Emotionally Abused: No     Physically Abused: No     Sexually Abused: No   Housing Stability: [address]  (7/29/2022)    Housing Stability Vital Sign     Unable to Pay for Housing in the Last Year: No     Number of [address] in the Last Year: 1     Unstable Housing in the Last Year: No         Objective:     Physical Exam:                                                                 Vitals:            Constitutional:    /76 (BP Location: Right arm, Patient Position: Sitting, Cuff Size: Adult)   Pulse 95   Temp 98 °F (36.7 °C) (Temporal)   Ht 5' 5" (1.651 m)   Wt 60.3 kg (133 lb)   LMP  (LMP Unknown)   SpO2 97%   BMI 22.13 kg/m²   BP Readings from Last 3 Encounters:   09/27/23 122/76   09/25/23 124/87   09/21/23 119/80     Pulse Readings from Last 3 Encounters:   09/27/23 95   09/25/23 105   09/21/23 (!) 112         Well developed, well nourished, well groomed. No dysmorphic features. Psychiatric:  Normal behavior and appropriate affect        Neurological Examination:     Mental status/cognitive function:   Orientated to time, place and person. Recent and remote memory intact. Attention span and concentration as well as fund of knowledge are appropriate for age. Normal language and spontaneous speech. Cranial Nerves:  II-visual fields full.    III, IV, VI-Pupils were equal, round, and reactive to light and accomodation. Extraocular movements were full and conjugate without nystagmus. Conjugate gaze, normal smooth pursuits, normal saccades   V-facial sensation symmetric. VII-facial expression symmetric, intact forehead wrinkle, strong eye closure, symmetric smile    VIII-hearing grossly intact bilaterally   IX, X-palate elevation symmetric, no dysarthria. XI-shoulder shrug strength intact    XII-tongue protrusion midline. Motor Exam: symmetric bulk and tone throughout, no pronator drift. Power/strength 5/5 bilateral upper and lower extremities except for the right upper extremity. 4/5 strength of the right upper extremity (previously known, due to cervical radiculopathy)   Sensory: grossly intact light touch in all extremities. Reflexes: brachioradialis 1+, biceps 1+, knee 2+ bilaterally  Coordination: Finger nose finger intact bilaterally, no apparent dysmetria, ataxia or tremor noted  Gait: steady casual and tandem gait. Review of Systems:     Review of Systems   Constitutional: Negative for appetite change, fatigue and fever. HENT: Negative. Negative for hearing loss, tinnitus, trouble swallowing and voice change. Eyes: Negative. Negative for photophobia, pain and visual disturbance. Respiratory: Negative. Negative for shortness of breath. Cardiovascular: Negative. Negative for palpitations. Gastrointestinal: Negative. Negative for nausea and vomiting. Endocrine: Negative. Negative for cold intolerance. Genitourinary: Negative. Negative for dysuria, frequency and urgency. Musculoskeletal: Negative for back pain, gait problem, myalgias and neck pain. Skin: Negative. Negative for rash. Allergic/Immunologic: Negative. Neurological: Positive for dizziness and light-headedness. Negative for tremors, seizures, syncope, facial asymmetry, speech difficulty, weakness, numbness and headaches. Hematological: Negative.   Does not bruise/bleed easily. Psychiatric/Behavioral: Negative. Negative for confusion, hallucinations and sleep disturbance. All other systems reviewed and are negative. I personally reviewed the ROS entered by the MA    I spent 30 minutes in total time for this visit.     Author:  Faustino Miguel PA-C 9/27/2023 9:41 AM

## 2023-09-27 NOTE — PROGRESS NOTES
Review of Systems   Constitutional: Negative for appetite change, fatigue and fever. HENT: Negative. Negative for hearing loss, tinnitus, trouble swallowing and voice change. Eyes: Negative. Negative for photophobia, pain and visual disturbance. Respiratory: Negative. Negative for shortness of breath. Cardiovascular: Negative. Negative for palpitations. Gastrointestinal: Negative. Negative for nausea and vomiting. Endocrine: Negative. Negative for cold intolerance. Genitourinary: Negative. Negative for dysuria, frequency and urgency. Musculoskeletal: Negative for back pain, gait problem, myalgias and neck pain. Skin: Negative. Negative for rash. Allergic/Immunologic: Negative. Neurological: Positive for dizziness and light-headedness. Negative for tremors, seizures, syncope, facial asymmetry, speech difficulty, weakness, numbness and headaches. Hematological: Negative. Does not bruise/bleed easily. Psychiatric/Behavioral: Negative. Negative for confusion, hallucinations and sleep disturbance. All other systems reviewed and are negative.

## 2023-09-27 NOTE — PATIENT INSTRUCTIONS
Migraines with aura: I had the pleasure of seeing Aspen Fine in the office today at [facility] in [facility]. She is presenting today for a office visit follow-up in regard to her migraine headaches that she has been experiencing. She notes that at this time her migraines are approximately around the same. She is noticing some more problems with brain fog and more forgetfulness at this time. She notes that they are very minor things such as when she is driving she is forgetting where she is going sometimes. Notes that this is occurring quite often. Feel as though this could be a combination of some of the patient's medications that she is taking, also may be combined with some of her anxiety/depression that she has as well at this time. Would like for the patient to continue to monitor this at this time and let me know at the next visit if anything seems to particularly change. She feels as though for the [place] she has to take more than 2 tablets in a day to sometimes get rid of the headache if she were allowed to take more than 2 in a day. In this case, does not feel as though that the Depakote that she has been taking has been significantly helping and getting rid of the headaches that she has. She feels as though it has not been a reliable preventative medication method, and would certainly be open to trying something else at this time.    -Would like for the patient to slowly taper off and discontinue off of the Depakote 500 mg daily that she is taking. Would like for her to cut her Depakote dosage in half and cut the tablet in half. Like her to take Depakote 500 mg, taking half a tablet by mouth once daily for about 2 to 3 weeks and see how she does. If she is doing well when slowly tapering off of the medication, then she can come off the medication entirely after this point.   We just like her to slowly taper off for any adverse effects of the medication or any side effects or any seizure-like activity that could be related to decreasing off of the medication. Was going to run lab work at the patient's visit today for CBC, CMP, and valproate level if the patient was going to continue with  this medication. However since patient is going to slowly taper off of this we will just continue to monitor the patient if we feel as though blood work would be necessary in the future we would certainly do so. -For a new preventative medication therapy, would like for the patient to try propanolol long-acting 60 mg, take 1 capsule by mouth once daily. Believe that propanolol may potentially help the patient at this time in regard to her migraine preventative therapy. It is one of the medications that she has not tried yet. It is noted that her blood pressure at today's visit is 122/76 and her pulse was 95 bpm.  Do not expect the patient to have any significant difficulty with this medication with these blood pressure and heart rate readings. However, the patient does become symptomatic of low blood pressure noticed that she has low blood pressure or low heart rate below 60, would like her to let me know. Also, believe that this medication would be best for her as it may also help with some aspects of anxiety as well while also not being an antidepressant medication as she is currently taking a few antidepressants and mood stabilizing medications at this time.  -Would encourage her to continue with the [place] Maxxnt that she is currently taking at this time. She can continue to take Ubrelvy 100 mg, take 1 tablet by mouth once at the onset of a headache. She may repeat this in 2 hours later. Max dose of 2 tablets in 1 day. For the more mild to moderate headaches that she experiences she could still use over-the-counter Tylenol 1000 mg or over-the-counter ibuprofen 600 mg at the onset of 1 of these headaches.   Again, do not believe that there is an issue with the patient's abortive medication therapy as it does seem to work at times I believe it is more of an issue of the preventative medication that she is on not doing as well as it should be. Over the counter preventive supplements for headaches/migraines (if you try, try for 3 months straight)  (to take every day to help prevent headaches - not to take at the time of headache): There are combo pills online of these - none of which regulated by FDA and double check dosing - take appropriate dose only once a day- prevent a migraine, migravent, mind ease, migrelief   [] Magnesium 400mg daily (If any diarrhea or upset stomach, decrease dose  as tolerated)  [] Riboflavin (Vitamin B2) 400mg daily (may make your urine bright/neon yellow)  [] Vitamin E which may help with menstrual migraine. There is limited data on this, but it may reduce nausea, photophobia and phonophobia during menstruation.   - All supplements can be purchased online    Sleep and headache prevention:   [] Melatonin - you may take 1-3 mg nightly for sleep or headache prevention. You should take this 1 hour prior to bedtime consistently every night for it to work. It works by gradually helping to adjust your sleep time over days to weeks, rather than immediately making you feel sleepy. Lifestyle Recommendations:  [x] SLEEP - Maintain a regular sleep schedule: Adults need at least 7-8 hours of uninterrupted a night. Maintain good sleep hygiene:  Going to bed and waking up at consistent times, avoiding excessive daytime naps, avoiding caffeinated beverages in the evening, avoid excessive stimulation in the evening and generally using bed primarily for sleeping. One hour before bedtime would recommend turning lights down lower, decreasing your activity (may read quietly, listen to music at a low volume). When you get into bed, should eliminate all technology (no texting, emailing, playing with your phone, iPad or tablet in bed). [x] HYDRATION - Maintain good hydration.   Drink  2L of fluid a day (4 typical small water bottles)  [x] DIET - Maintain good nutrition. In particular don't skip meals and try and eat healthy balanced meals regularly. [x] TRIGGERS - Look for other triggers and avoid them: Limit caffeine to 1-2 cups a day or less. Avoid dietary triggers that you have noticed bring on your headaches (this could include aged cheese, peanuts, MSG, aspartame and nitrates). [x] EXERCISE - physical exercise as we all know is good for you in many ways, and not only is good for your heart, but also is beneficial for your mental health, cognitive health and  chronic pain/headaches. I would encourage at the least 5 days of physical exercise weekly for at least 30 minutes. Education and Follow-up  [x] Please call with any questions or concerns. Of course if any new concerning symptoms go to the emergency department.   [x] Follow up with 4-5 months with Gabbie Rossi PA-C

## 2023-09-29 ENCOUNTER — APPOINTMENT (OUTPATIENT)
Dept: PHYSICAL THERAPY | Facility: MEDICAL CENTER | Age: 33
End: 2023-09-29
Payer: MEDICARE

## 2023-10-06 ENCOUNTER — OFFICE VISIT (OUTPATIENT)
Dept: PHYSICAL THERAPY | Facility: MEDICAL CENTER | Age: 33
End: 2023-10-06
Payer: MEDICARE

## 2023-10-06 DIAGNOSIS — M54.50 CHRONIC RIGHT-SIDED LOW BACK PAIN, UNSPECIFIED WHETHER SCIATICA PRESENT: Primary | ICD-10-CM

## 2023-10-06 DIAGNOSIS — G89.29 CHRONIC RIGHT-SIDED LOW BACK PAIN, UNSPECIFIED WHETHER SCIATICA PRESENT: Primary | ICD-10-CM

## 2023-10-06 PROCEDURE — 97110 THERAPEUTIC EXERCISES: CPT | Performed by: PHYSICAL THERAPIST

## 2023-10-06 NOTE — PROGRESS NOTES
Daily Note     Today's date: 10/6/2023  Patient name: Jer Che  : 1990  MRN: [id number]  Referring provider: KARTIK Knight  Dx:   Encounter Diagnosis     ICD-10-CM    1. Chronic right-sided low back pain, unspecified whether sciatica present  M54.50     G89.29                      Subjective: Karen Espinosa  had laparoscopy of abdomen performed on 2023 for removal of IUD migration into peritoneum due to uterine perforation. She reports she is doing well at this point and she does not have any activity restrictions. Objective: See treatment diary below      Assessment: Tolerated treatment well. Patient exhibited good technique with therapeutic exercises. Initially had pain in groin with mod piriformis stretch which was eliminated after lateral hip distraction. Plan: Progress treatment as tolerated.        Precautions: none      Manuals 9/18 10/4           Lateral hip distraction R  JE                                                  Neuro Re-Ed                                                                                                        Ther Ex             LTR 5"x10 5"x10           SKTC 5"x5 5           slump             Mod piriformis stretch  30"x3 ea           Seated lumbar flexion  15                                                  Ther Activity                                       Gait Training                                       Modalities              seated  10'

## 2023-10-13 ENCOUNTER — OFFICE VISIT (OUTPATIENT)
Dept: PHYSICAL THERAPY | Facility: MEDICAL CENTER | Age: 33
End: 2023-10-13
Payer: MEDICARE

## 2023-10-13 DIAGNOSIS — M54.50 CHRONIC RIGHT-SIDED LOW BACK PAIN, UNSPECIFIED WHETHER SCIATICA PRESENT: Primary | ICD-10-CM

## 2023-10-13 DIAGNOSIS — G89.29 CHRONIC RIGHT-SIDED LOW BACK PAIN, UNSPECIFIED WHETHER SCIATICA PRESENT: Primary | ICD-10-CM

## 2023-10-13 PROCEDURE — 97110 THERAPEUTIC EXERCISES: CPT | Performed by: PHYSICAL THERAPIST

## 2023-10-13 PROCEDURE — 97140 MANUAL THERAPY 1/> REGIONS: CPT | Performed by: PHYSICAL THERAPIST

## 2023-10-13 NOTE — PROGRESS NOTES
Daily Note     Today's date: 10/13/2023  Patient name: Nancy Mayorga  : 1990  MRN: [id number]  Referring provider: KARTIK Alberto  Dx:   Encounter Diagnosis     ICD-10-CM    1. Chronic right-sided low back pain, unspecified whether sciatica present  M54.50     G89.29                      Subjective: No Farias reports she has been ok      Objective: See treatment diary below      Assessment: Tolerated treatment well. Patient exhibited good technique with therapeutic exercises      Plan: Progress treatment as tolerated.        Precautions: none      Manuals 9/18 10/4 10/13          Lateral hip distraction R  JE JE          Long axis hip distraction   JE                                    Neuro Re-Ed                                                                                                        Ther Ex             LTR 5"x10 5"x10 5"x10          SKTC 5"x5 5 10          slump             Mod piriformis stretch  30"x3 ea 30"x3          Seated lumbar flexion  15 20                                                 Ther Activity                                       Gait Training                                       Modalities              seated  10' 10'

## 2023-10-16 ENCOUNTER — TELEPHONE (OUTPATIENT)
Dept: OBGYN CLINIC | Facility: CLINIC | Age: 33
End: 2023-10-16

## 2023-10-16 NOTE — TELEPHONE ENCOUNTER
I returned pt's call. She advises me that she believes her incision site at umbilicus is infected because it is red and swollen and painful and she thinks she sees some pus. I offered her an appointment tomorrow morning and she accepted.

## 2023-10-17 ENCOUNTER — OFFICE VISIT (OUTPATIENT)
Dept: OBGYN CLINIC | Facility: CLINIC | Age: 33
End: 2023-10-17

## 2023-10-17 VITALS
BODY MASS INDEX: 22.1 KG/M2 | DIASTOLIC BLOOD PRESSURE: 75 MMHG | WEIGHT: 132.8 LBS | HEART RATE: 100 BPM | SYSTOLIC BLOOD PRESSURE: 123 MMHG

## 2023-10-17 DIAGNOSIS — T14.8XXA WOUND DISCHARGE: Primary | ICD-10-CM

## 2023-10-17 RX ORDER — IBUPROFEN 200 MG
TABLET ORAL 3 TIMES DAILY
Qty: 3.5 G | Refills: 0 | Status: SHIPPED | OUTPATIENT
Start: 2023-10-17

## 2023-10-17 RX ORDER — MEDROXYPROGESTERONE ACETATE 150 MG/ML
150 INJECTION, SUSPENSION INTRAMUSCULAR
COMMUNITY
End: 2023-10-18 | Stop reason: SDUPTHER

## 2023-10-17 NOTE — PROGRESS NOTES
PROBLEM GYNECOLOGICAL VISIT    Ronel Conner is a 29 y.o. female who presents today with complaint of wound pain and drainage.   Her general medical history has been reviewed and she reports it as follows:    Past Medical History:   Diagnosis Date    Abnormal Pap smear of cervix      cryo; 2019 LSIL/AGC pap; 2019 colpo HSIL; 2019 LEEP HSIL w/neg margins; 2020 NILM pap/neg HPV; 2020 NILM pap/neg HPV    ADHD     Anemia     Bipolar 1 disorder ([address])     Depression     Epilepsy ([address])     last episode     GERD (gastroesophageal reflux disease)     Migraine     OCD (obsessive compulsive disorder)     Urogenital trichomoniasis      Past Surgical History:   Procedure Laterality Date    FOREIGN BODY REMOVAL N/A 2023    Procedure: removal of IUD;  Surgeon: Mary Hector MD;  Location: [facility];  Service: Gynecology    GYNECOLOGIC CRYOSURGERY      [facility]A 2019    Procedure: BIOPSY LEEP CERVIX;  Surgeon: Lana Tran MD;  Location: [facility];  Service: Gynecology    NE LAPS ABD PRTM&OMENTUM DX W/WO SPEC BR/WA [facility] N/A 2023    Procedure: LAPAROSCOPY DIAGNOSTIC;  Surgeon: Mary Hector MD;  Location: [facility];  Service: Gynecology     OB History          0    Para   0    Term   0       0    AB   0    Living   0         SAB   0    IAB   0    Ectopic   0    Multiple   0    Live Births   0               Social History     Tobacco Use    Smoking status: Every Day     Packs/day: 1.00     Years: 11.00     Total pack years: 11.00     Types: Cigarettes     Passive exposure: Current    Smokeless tobacco: Never   Vaping Use    Vaping Use: Never used   Substance Use Topics    Alcohol use: Yes     Comment: couple times/month    Drug use: Never     Social History     Substance and Sexual Activity   Sexual Activity Yes    Partners: Male    Birth control/protection: Injection       Current Outpatient Medications Medication Instructions    azelastine (ASTELIN) 0.1 % nasal spray 1 spray, Nasal, 2 times daily, Use in each nostril as directed    fexofenadine (ALLEGRA) 180 mg, Oral, Daily    lurasidone (LATUDA) 40 mg, Oral, Daily    Magnesium 400 mg, Oral, Daily    medroxyPROGESTERone (DEPO-PROVERA) 150 mg, Intramuscular, Every 3 months    mirtazapine (REMERON) 15 mg tablet No dose, route, or frequency recorded. neomycin-bacitracin-polymyxin (NEOSPORIN) 5-400-5,000 ointment Topical, 3 times daily    omeprazole (PRILOSEC) 20 mg, Oral, 2 times daily    pregabalin (LYRICA) 50 mg capsule Take 1 PO TID x 5 days, then 1 PO BID x 5 days, then 1 PO daily x 5 days, then stop.    propranolol (INDERAL LA) 60 mg 24 hr capsule TAKE 1 CAPSULE(60 MG) BY MOUTH DAILY    Riboflavin 400 mg, Oral, Daily    Ubrogepant (Ubrelvy) 100 MG tablet Take 1 tablet (100 mg) one time as needed for migraine. May repeat one additional tablet (100 mg) at least two hours after the first dose. Do not use more than two doses per day, or for more than eight days per month.<BR>       History of Present Illness:   Reports that yesterday she noticed that her umbilical incision from laparoscopy performed 9/19/2023 was tender, red, and draining fluid. Review of Systems:  Review of Systems   Constitutional: Negative. Gastrointestinal:  Positive for abdominal pain (at umbilical incision site). Genitourinary: Negative. Physical Exam:  /75   Pulse 100   Wt 60.2 kg (132 lb 12.8 oz)   LMP  (LMP Unknown)   BMI 22.10 kg/m²   Physical Exam  Constitutional:       General: She is not in acute distress. Abdominal:      General: There is no distension. Palpations: Abdomen is soft. Tenderness: There is abdominal tenderness (mildly tender @umbilicus, non-erythematous, non-inflamed, 4mm area of incision slightly red where scab fell off, no drainage noted). Hernia: No hernia is present. Neurological:      Mental Status: She is alert. Skin:     General: Skin is warm and dry. Vitals reviewed. Assessment:   1. Wound disruption. Plan:   1. Discussed with patient that it appears a small portion of scab from her incision became dislodged and wound is re-healing. No signs of infection noted. 2. Given Rx antibiotic ointment and advised use TID.    3. Return to office as previously scheduled for annual GYN exam.

## 2023-10-17 NOTE — PATIENT INSTRUCTIONS
Thank you for your confidence in our team.   We appreciate you and welcome your feedback. If you receive a survey from us, please take a few moments to let us know how we are doing.    Sincerely,  Dhaval Cohen

## 2023-10-18 ENCOUNTER — TELEPHONE (OUTPATIENT)
Dept: OBGYN CLINIC | Facility: CLINIC | Age: 33
End: 2023-10-18

## 2023-10-18 DIAGNOSIS — Z30.09 UNWANTED FERTILITY: Primary | ICD-10-CM

## 2023-10-18 RX ORDER — MEDROXYPROGESTERONE ACETATE 150 MG/ML
150 INJECTION, SUSPENSION INTRAMUSCULAR
Qty: 1 ML | Refills: 3 | Status: SHIPPED | OUTPATIENT
Start: 2023-10-18

## 2023-10-19 ENCOUNTER — CLINICAL SUPPORT (OUTPATIENT)
Dept: OBGYN CLINIC | Facility: CLINIC | Age: 33
End: 2023-10-19

## 2023-10-19 VITALS
WEIGHT: 132.6 LBS | DIASTOLIC BLOOD PRESSURE: 80 MMHG | BODY MASS INDEX: 22.07 KG/M2 | HEART RATE: 103 BPM | SYSTOLIC BLOOD PRESSURE: 115 MMHG

## 2023-10-19 DIAGNOSIS — Z30.09 UNWANTED FERTILITY: Primary | ICD-10-CM

## 2023-10-19 RX ORDER — MEDROXYPROGESTERONE ACETATE 150 MG/ML
150 INJECTION, SUSPENSION INTRAMUSCULAR ONCE
Status: COMPLETED | OUTPATIENT
Start: 2023-10-19 | End: 2023-10-19

## 2023-10-19 RX ADMIN — MEDROXYPROGESTERONE ACETATE 150 MG: 150 INJECTION, SUSPENSION INTRAMUSCULAR at 09:14

## 2023-10-19 NOTE — PROGRESS NOTES
Pt here for Depo injection given in the left arm     Lot#: [id number]  XTA#:[id number]  Exp Date:01/2026

## 2023-10-20 ENCOUNTER — OFFICE VISIT (OUTPATIENT)
Dept: PHYSICAL THERAPY | Facility: MEDICAL CENTER | Age: 33
End: 2023-10-20
Payer: MEDICARE

## 2023-10-20 DIAGNOSIS — M54.50 CHRONIC RIGHT-SIDED LOW BACK PAIN, UNSPECIFIED WHETHER SCIATICA PRESENT: Primary | ICD-10-CM

## 2023-10-20 DIAGNOSIS — G89.29 CHRONIC RIGHT-SIDED LOW BACK PAIN, UNSPECIFIED WHETHER SCIATICA PRESENT: Primary | ICD-10-CM

## 2023-10-20 PROCEDURE — 97140 MANUAL THERAPY 1/> REGIONS: CPT | Performed by: PHYSICAL THERAPIST

## 2023-10-20 PROCEDURE — 97110 THERAPEUTIC EXERCISES: CPT | Performed by: PHYSICAL THERAPIST

## 2023-10-20 NOTE — PROGRESS NOTES
Daily Note     Today's date: 10/20/2023  Patient name: Luciano Talamantes  : 1990  MRN: [id number]  Referring provider: KARTIK Lucio  Dx:   Encounter Diagnosis     ICD-10-CM    1. Chronic right-sided low back pain, unspecified whether sciatica present  M54.50     G89.29                      Subjective: Macie Santana reports she has been having pain in her ankle if she walks too much      Objective: See treatment diary below      Assessment: Tolerated treatment well. Patient exhibited good technique with therapeutic exercises      Plan: Progress treatment as tolerated.        Precautions: none      Manuals 9/18 10/4 10/13 10/20         Lateral hip distraction R  JE JE JE         Long axis hip distraction   BOB JE                                   Neuro Re-Ed                                                                                                        Ther Ex             LTR 5"x10 5"x10 5"x10 5"x10         SKTC 5"x5 5 10 10         slump             Mod piriformis stretch  30"x3 ea 30"x3 30"x3         Seated lumbar flexion  15 20 20         STM lax ball glut med    30                                   Ther Activity                                       Gait Training                                       Modalities              seated  10' 10'

## 2023-10-23 ENCOUNTER — OFFICE VISIT (OUTPATIENT)
Dept: PAIN MEDICINE | Facility: CLINIC | Age: 33
End: 2023-10-23
Payer: MEDICARE

## 2023-10-23 VITALS
HEART RATE: 97 BPM | SYSTOLIC BLOOD PRESSURE: 123 MMHG | HEIGHT: 65 IN | WEIGHT: 135 LBS | DIASTOLIC BLOOD PRESSURE: 81 MMHG | BODY MASS INDEX: 22.49 KG/M2

## 2023-10-23 DIAGNOSIS — R20.0 NUMBNESS AND TINGLING IN LEFT ARM: ICD-10-CM

## 2023-10-23 DIAGNOSIS — R20.0 NUMBNESS AND TINGLING OF RIGHT ARM: ICD-10-CM

## 2023-10-23 DIAGNOSIS — R20.2 NUMBNESS AND TINGLING OF RIGHT ARM: ICD-10-CM

## 2023-10-23 DIAGNOSIS — M54.40 LOW BACK PAIN WITH SCIATICA, SCIATICA LATERALITY UNSPECIFIED, UNSPECIFIED BACK PAIN LATERALITY, UNSPECIFIED CHRONICITY: ICD-10-CM

## 2023-10-23 DIAGNOSIS — R20.2 NUMBNESS AND TINGLING IN LEFT ARM: ICD-10-CM

## 2023-10-23 DIAGNOSIS — M47.812 CERVICAL SPONDYLOSIS: Primary | ICD-10-CM

## 2023-10-23 PROCEDURE — 99214 OFFICE O/P EST MOD 30 MIN: CPT | Performed by: ANESTHESIOLOGY

## 2023-10-23 NOTE — PROGRESS NOTES
Assessment  1. Cervical spondylosis    2. Low back pain with sciatica, sciatica laterality unspecified, unspecified back pain laterality, unspecified chronicity    3. Numbness and tingling of right arm    4. Numbness and tingling in left arm        Plan  36-year-old female returning for follow-up of neck pain that radiates into both arms with associated numbness and paresthesias as well as lumbosacral back pain that radiates into the right leg with associated numbness and paresthesias. MRI of the cervical spine showed foraminal stenosis on the right at C6-7. EMG of the upper extremities were normal.  X-ray of the lumbar spine showed very mild scoliosis but no significant degenerative changes. The patient has completed 4 weeks of physical therapy for her lumbar complaints without relief. She has been weaning off of pregabalin as this has been ineffective. She has tried multiple NSAIDs and muscle relaxants without relief. She did have a cervical epidural steroid injection without relief. Unable to explain the patient's upper extremity symptoms. Patient's lower extremity symptoms could be radicular in nature. Does have a myofascial component contributing to her cervical and lumbar complaints. 1.  Encouraged the patient to complete 2 more weeks of physical therapy. If no improvement of her lumbar or leg symptoms we will order an MRI of the lumbar spine without contrast  2. Patient was instructed on how to wean completely off of pregabalin since this is ineffective  3. Patient may benefit from a trial of Cymbalta, however I will defer this to psychiatry issues currently taking Latuda for mood  4. I will follow-up with the patient in 6 weeks      My impressions and treatment recommendations were discussed in detail with the patient who verbalized understanding and had no further questions. Discharge instructions were provided.  I personally saw and examined the patient and I agree with the above discussed plan of care. No orders of the defined types were placed in this encounter. No orders of the defined types were placed in this encounter. History of Present Illness    Nancy Mayorga is a 29 y.o. female returning for follow-up of neck pain that radiates into both arms with associated numbness and paresthesias as well as lumbosacral back pain that radiates into the right leg with associated numbness and paresthesias. He denies any bladder or bowel incontinence or saddle anesthesia. She denies any balance issues. He denies any left lower extremity symptoms. MRI of the cervical spine showed foraminal stenosis on the right at C6-7. EMG of the upper extremities were normal.  X-ray of the lumbar spine showed very mild scoliosis but no significant degenerative changes. The patient has completed 4 weeks of physical therapy for her lumbar complaints without relief. She has been weaning off of pregabalin as this has been ineffective. She has tried multiple NSAIDs and muscle relaxants without relief. She did have a cervical epidural steroid injection without relief. The patient rates her pain an 8 out of 10 and the pain is worse in the evening and at night. The pain is constant and described as dull, aching, sharp, burning, cramping, pressure-like, shooting, numbness, and pins-and-needles. The pain is increased with standing, walking, bending, and exercise. The pain is alleviated somewhat with relaxation. Other than as stated above, the patient denies any interval changes in medications, medical condition, mental condition, symptoms, or allergies since the last office visit. I have personally reviewed and/or updated the patient's past medical history, past surgical history, family history, social history, current medications, allergies, and vital signs today. Review of Systems   Constitutional:  Negative for fever and unexpected weight change.    HENT:  Negative for trouble swallowing. Eyes:  Negative for visual disturbance. Respiratory:  Negative for shortness of breath and wheezing. Cardiovascular:  Negative for chest pain and palpitations. Gastrointestinal:  Negative for constipation, diarrhea, nausea and vomiting. Endocrine: Negative for cold intolerance, heat intolerance and polydipsia. Genitourinary:  Negative for difficulty urinating and frequency. Musculoskeletal:  Positive for gait problem and joint swelling. Negative for arthralgias and myalgias. Swelling, pain in extremity, decreased ROM   Skin:  Negative for rash. Neurological:  Positive for weakness. Negative for dizziness, seizures, syncope and headaches. Hematological:  Does not bruise/bleed easily. Psychiatric/Behavioral:  Negative for dysphoric mood. All other systems reviewed and are negative.       Patient Active Problem List   Diagnosis    Smoking    Anxiety    Depression    ADHD    Bipolar 1 disorder ([address])    Migraine with aura and without status migrainosus, not intractable    Numbness and tingling of right arm    Cervical radiculopathy    Cervical spondylosis    GERD (gastroesophageal reflux disease)    Epilepsy ([address])       Past Medical History:   Diagnosis Date    Abnormal Pap smear of cervix     2009 cryo; 6/2019 LSIL/AGC pap; 6/2019 colpo HSIL; 8/2019 LEEP HSIL w/neg margins; 6/2020 NILM pap/neg HPV; 6/2020 NILM pap/neg HPV    ADHD     Anemia     Bipolar 1 disorder ([address])     Depression     Epilepsy ([address])     last episode 2000    GERD (gastroesophageal reflux disease)     Migraine     OCD (obsessive compulsive disorder)     Urogenital trichomoniasis 2019       Past Surgical History:   Procedure Laterality Date    FOREIGN BODY REMOVAL N/A 9/20/2023    Procedure: removal of IUD;  Surgeon: Morgan Hutson MD;  [facility];  Service: Gynecology    GYNECOLOGIC CRYOSURGERY  2009    MD COLPOSCOPY CERVIX VAG LOOP ELTRD BX CERVIX N/A 08/09/2019    Procedure: BIOPSY LEEP CERVIX;  Surgeon: Aris Weinberg MD;  [facility];  Service: Gynecology    NV LAPS ABD PRTM&OMENTUM DX W/WO SPEC BR/WA [facility] N/A 9/20/2023    Procedure: LAPAROSCOPY DIAGNOSTIC;  Surgeon: Juan Francisco Kirkland MD;  [facility];  Service: Gynecology       Family History   Problem Relation Age of Onset    Bipolar disorder Mother     Diabetes Mother     Migraines Mother     Miscarriages / [place] Mother     No Known Problems Father     Depression Sister     Asthma Sister     Osteoporosis Maternal Grandmother     Cancer Maternal Grandfather         lung    Breast cancer Neg Hx     Colon cancer Neg Hx     Ovarian cancer Neg Hx        Social History     Occupational History    Not on file   Tobacco Use    Smoking status: Every Day     Packs/day: 1.00     Years: 11.00     Total pack years: 11.00     Types: Cigarettes     Passive exposure: Current    Smokeless tobacco: Never   Vaping Use    Vaping Use: Never used   Substance and Sexual Activity    Alcohol use: Yes     Comment: couple times/month    Drug use: Never    Sexual activity: Yes     Partners: Male     Birth control/protection: Injection       Current Outpatient Medications on File Prior to Visit   Medication Sig    azelastine (ASTELIN) 0.1 % nasal spray 1 spray into each nostril 2 (two) times a day Use in each nostril as directed    fexofenadine (ALLEGRA) 180 MG tablet Take 1 tablet (180 mg total) by mouth daily    lurasidone (LATUDA) 40 mg tablet Take 40 mg by mouth in the morning    Magnesium 400 MG TABS Take 1 tablet (400 mg total) by mouth in the morning    medroxyPROGESTERone (DEPO-PROVERA) 150 mg/mL injection Inject 1 mL (150 mg total) into a muscle every 3 (three) months    mirtazapine (REMERON) 15 mg tablet     neomycin-bacitracin-polymyxin (NEOSPORIN) 5-400-5,000 ointment Apply topically 3 (three) times a day    omeprazole (PriLOSEC) 20 mg delayed release capsule TAKE 1 CAPSULE(20 MG) BY MOUTH TWICE DAILY FOR 14 DAYS    pregabalin (LYRICA) 50 mg capsule Take 1 PO TID x 5 days, then 1 PO BID x 5 days, then 1 PO daily x 5 days, then stop.    propranolol (INDERAL LA) 60 mg 24 hr capsule TAKE 1 CAPSULE(60 MG) BY MOUTH DAILY    Riboflavin 400 MG TABS Take 1 tablet (400 mg total) by mouth in the morning    Ubrogepant (Ubrelvy) 100 MG tablet Take 1 tablet (100 mg) one time as needed for migraine. May repeat one additional tablet (100 mg) at least two hours after the first dose. Do not use more than two doses per day, or for more than eight days per month. Current Facility-Administered Medications on File Prior to Visit   Medication    medroxyPROGESTERone (DEPO-PROVERA) IM injection 150 mg       No Known Allergies    Physical Exam    Ht 5' 5" (1.651 m)   Wt 61.2 kg (135 lb)   LMP  (LMP Unknown)   BMI 22.47 kg/m²     Constitutional: normal, well developed, well nourished, alert, in no distress and non-toxic and no overt pain behavior. Eyes: anicteric  HEENT: grossly intact  Neck: supple, symmetric, trachea midline and no masses   Pulmonary:even and unlabored  Cardiovascular:No edema or pitting edema present  Skin:Normal without rashes or lesions and well hydrated  Psychiatric:Mood and affect appropriate  Neurologic:Cranial Nerves II-XII grossly intact  Musculoskeletal:normal gait. Bilateral cervical paraspinals and trapezii tender to palpation. Bilateral upper extremity strength 5 out of 5 in all muscle groups. Sensation intact to light touch in C5-T1 dermatomes bilaterally. Negative Spurling's bilaterally. Right lumbar paraspinals tender to palpation from L4-S1. Right SI joint mildly tender to palpation. Bilateral lower extremity strength 5 out of 5 in all muscular's. Sensation intact to light touch in L3-S1 dermatomes bilaterally. Negative seated straight leg raise bilaterally. Imaging      Study Result    Narrative & Impression   LUMBAR SPINE     INDICATION:   M54.50: Low back pain, unspecified  G89.29: Other chronic pain. COMPARISON:  None     VIEWS:  XR SPINE LUMBAR 2 OR 3 VIEWS INJURY  Images: 3     FINDINGS:     There are 5 non rib bearing lumbar vertebral bodies. There is no evidence of acute fracture or destructive osseous lesion. Mild dextroscoliosis. No significant lumbar degenerative change noted. The pedicles appear intact. Soft tissues are unremarkable. Large amount of feces in the colon. IMPRESSION:     Mild dextroscoliosis. No acute osseous abnormality.         Workstation performed: [id number]

## 2023-10-27 ENCOUNTER — OFFICE VISIT (OUTPATIENT)
Dept: PHYSICAL THERAPY | Facility: MEDICAL CENTER | Age: 33
End: 2023-10-27
Payer: MEDICARE

## 2023-10-27 DIAGNOSIS — M54.50 CHRONIC RIGHT-SIDED LOW BACK PAIN, UNSPECIFIED WHETHER SCIATICA PRESENT: Primary | ICD-10-CM

## 2023-10-27 DIAGNOSIS — G89.29 CHRONIC RIGHT-SIDED LOW BACK PAIN, UNSPECIFIED WHETHER SCIATICA PRESENT: Primary | ICD-10-CM

## 2023-10-27 PROCEDURE — 97110 THERAPEUTIC EXERCISES: CPT | Performed by: PHYSICAL THERAPIST

## 2023-10-27 PROCEDURE — 97140 MANUAL THERAPY 1/> REGIONS: CPT | Performed by: PHYSICAL THERAPIST

## 2023-10-27 NOTE — PROGRESS NOTES
Daily Note     Today's date: 10/27/2023  Patient name: Estella Leon  : 1990  MRN: [id number]  Referring provider: KARTIK Van  Dx:   Encounter Diagnosis     ICD-10-CM    1. Chronic right-sided low back pain, unspecified whether sciatica present  M54.50     G89.29                      Subjective: Shyrl Holter reports her arm has been hurting and she hasn't been sleeping well      Objective: See treatment diary below      Assessment: Tolerated treatment well. Patient exhibited good technique with therapeutic exercises      Plan: Progress treatment as tolerated.        Precautions: none      Manuals 9/18 10/4 10/13 10/20 10/27        Lateral hip distraction R  BOB ROJAS JE        Long axis hip distraction   BOB ROJAS JE                                  Neuro Re-Ed                                                                                                        Ther Ex             LTR 5"x10 5"x10 5"x10 5"x10 x10        SKTC 5"x5 5 10 10 x10        slump             Mod piriformis stretch  30"x3 ea 30"x3 30"x3 30"x3        Seated lumbar flexion  15 20 20 20        STM lax ball glut med    30         Stand lumbar flexion stretch                          Ther Activity                                       Gait Training                                       Modalities              seated  10' 10'  10'

## 2023-11-03 ENCOUNTER — OFFICE VISIT (OUTPATIENT)
Dept: PHYSICAL THERAPY | Facility: MEDICAL CENTER | Age: 33
End: 2023-11-03
Payer: MEDICARE

## 2023-11-03 DIAGNOSIS — M54.50 CHRONIC RIGHT-SIDED LOW BACK PAIN, UNSPECIFIED WHETHER SCIATICA PRESENT: Primary | ICD-10-CM

## 2023-11-03 DIAGNOSIS — M54.50 CHRONIC BILATERAL LOW BACK PAIN, UNSPECIFIED WHETHER SCIATICA PRESENT: Primary | ICD-10-CM

## 2023-11-03 DIAGNOSIS — G89.29 CHRONIC RIGHT-SIDED LOW BACK PAIN, UNSPECIFIED WHETHER SCIATICA PRESENT: Primary | ICD-10-CM

## 2023-11-03 DIAGNOSIS — G89.29 CHRONIC BILATERAL LOW BACK PAIN, UNSPECIFIED WHETHER SCIATICA PRESENT: Primary | ICD-10-CM

## 2023-11-03 PROCEDURE — 97140 MANUAL THERAPY 1/> REGIONS: CPT | Performed by: PHYSICAL THERAPIST

## 2023-11-03 PROCEDURE — 97110 THERAPEUTIC EXERCISES: CPT | Performed by: PHYSICAL THERAPIST

## 2023-11-03 NOTE — PROGRESS NOTES
Daily Note     Today's date: 11/3/2023  Patient name: Kathy Bowers  : 1990  MRN: [id number]  Referring provider: KARTIK Ortiz  Dx:   Encounter Diagnosis     ICD-10-CM    1. Chronic right-sided low back pain, unspecified whether sciatica present  M54.50     G89.29                      Subjective: Massimo Glass reports her back feels about the same      Objective: See treatment diary below      Assessment: Tolerated treatment well. Patient exhibited good technique with therapeutic exercises      Plan: Progress treatment as tolerated.        Precautions: none      Manuals 9/18 10/4 10/13 10/20 10/27 11/3       Lateral hip distraction R  JE JE JE JE JE       Long axis hip distraction   JE JE JE JE                                 Neuro Re-Ed                                                                                                        Ther Ex             LTR 5"x10 5"x10 5"x10 5"x10 x10 x10       SKTC 5"x5 5 10 10 x10 x10       slump             Mod piriformis stretch  30"x3 ea 30"x3 30"x3 30"x3 30"x3       Seated lumbar flexion  15 20 20 20 20       STM lax ball glut med    30         Stand lumbar flexion stretch      10                    Ther Activity                                       Gait Training                                       Modalities              seated  10' 10'  10'

## 2023-11-10 ENCOUNTER — APPOINTMENT (OUTPATIENT)
Dept: PHYSICAL THERAPY | Facility: MEDICAL CENTER | Age: 33
End: 2023-11-10
Payer: MEDICARE

## 2023-11-17 ENCOUNTER — OFFICE VISIT (OUTPATIENT)
Dept: FAMILY MEDICINE CLINIC | Facility: CLINIC | Age: 33
End: 2023-11-17

## 2023-11-17 VITALS
TEMPERATURE: 97.5 F | RESPIRATION RATE: 18 BRPM | HEIGHT: 65 IN | SYSTOLIC BLOOD PRESSURE: 118 MMHG | WEIGHT: 129.8 LBS | HEART RATE: 104 BPM | OXYGEN SATURATION: 97 % | BODY MASS INDEX: 21.63 KG/M2 | DIASTOLIC BLOOD PRESSURE: 80 MMHG

## 2023-11-17 DIAGNOSIS — B02.8 HERPES ZOSTER WITH OTHER COMPLICATION: ICD-10-CM

## 2023-11-17 DIAGNOSIS — E87.6 HYPOKALEMIA: ICD-10-CM

## 2023-11-17 DIAGNOSIS — Z13.220 SCREENING CHOLESTEROL LEVEL: ICD-10-CM

## 2023-11-17 DIAGNOSIS — R73.03 PREDIABETES: Primary | ICD-10-CM

## 2023-11-17 DIAGNOSIS — Z13.0 SCREENING, ANEMIA, DEFICIENCY, IRON: ICD-10-CM

## 2023-11-17 NOTE — PROGRESS NOTES
Name: Ronel Conner      : 1990      MRN: [id number]  Encounter Provider: KARTIK Nelson  Encounter Date: 2023   Encounter department: [address] [facility]     1. Prediabetes  Assessment & Plan:  Lab Results   Component Value Date    HGBA1C 5.9 (H) 2022     Repeat A1c ordered     Orders:  -     Comprehensive metabolic panel; Future  -     Hemoglobin A1C; Future    2. Screening, anemia, deficiency, iron  -     CBC and differential; Future    3. Screening cholesterol level  -     Lipid panel; Future    4. Hypokalemia  -     Comprehensive metabolic panel; Future    5. Herpes zoster with other complication  Assessment & Plan:  2nd lesion in one year  Will check for antibodies    Orders:  -     Herpes I/II IgG ELPIDIO w Reflex to HSV-2; Future         Subjective     Ronel Conner is a 29 y.o. female who  has a past medical history of Abnormal Pap smear of cervix, ADHD, Bipolar 1 disorder ([address]), Depression, Epilepsy ([address]), GERD (gastroesophageal reflux disease), Migraine, OCD (obsessive compulsive disorder), and Urogenital trichomoniasis. who presented to the office today for follow up. Patient reports that one week ago she had tingling and burning of her lip and then developed a blister. She applied Lysine to it and it is now scabbed. She is questioning if this is HSV. She has had no known exposures and does not get any genital lesions. The following portions of the patient's history were reviewed and updated as appropriate: allergies, current medications, past family history, past medical history, past social history, past surgical history and problem list.      Review of Systems   Constitutional:  Negative for activity change, appetite change, chills, fatigue, fever and unexpected weight change. HENT:  Negative for hearing loss, nosebleeds, sinus pain, sneezing, sore throat and trouble swallowing.     Eyes: Negative for photophobia and visual disturbance. Respiratory:  Negative for cough, chest tightness, shortness of breath and wheezing. Cardiovascular:  Negative for chest pain, palpitations and leg swelling. Gastrointestinal:  Negative for abdominal pain, constipation, nausea and vomiting. Genitourinary:  Negative for decreased urine volume, difficulty urinating, dysuria, flank pain, genital sores, hematuria and urgency. Musculoskeletal:  Negative for back pain and gait problem. Skin:  Positive for rash. Negative for pallor and wound. Neurological:  Negative for dizziness, seizures, syncope, weakness, numbness and headaches. Hematological:  Negative for adenopathy. Does not bruise/bleed easily. Psychiatric/Behavioral:  Negative for confusion, hallucinations, self-injury, sleep disturbance and suicidal ideas. The patient is not nervous/anxious.         Past Medical History:   Diagnosis Date   • Abnormal Pap smear of cervix     2009 cryo; 6/2019 LSIL/AGC pap; 6/2019 colpo HSIL; 8/2019 LEEP HSIL w/neg margins; 6/2020 NILM pap/neg HPV; 6/2020 NILM pap/neg HPV   • ADHD    • Anemia    • Bipolar 1 disorder ([address])    • Depression    • Epilepsy ([address])     last episode 2000   • GERD (gastroesophageal reflux disease)    • Migraine    • OCD (obsessive compulsive disorder)    • Urogenital trichomoniasis 2019     Past Surgical History:   Procedure Laterality Date   • FOREIGN BODY REMOVAL N/A 9/20/2023    Procedure: removal of IUD;  Surgeon: Mary Hector MD;  [facility];  Service: Gynecology   • GYNECOLOGIC CRYOSURGERY  2009   • [place] COLPOSCOPY CERVIX VAG LOOP ELTRD BX CERVIX N/A 08/09/2019    Procedure: BIOPSY LEEP CERVIX;  Surgeon: Lana Tran MD;  [facility];  Service: Gynecology   • IL LAPS ABD PRTM&OMENTUM DX W/WO SPEC BR/WA SPX N/A 9/20/2023    Procedure: LAPAROSCOPY DIAGNOSTIC;  Surgeon: Mary Hector MD;  [facility];  Service: Gynecology     Family History   Problem Relation Age of Onset   • Bipolar disorder Mother    • Diabetes Mother    • Migraines Mother    • Miscarriages / [place] Mother    • No Known Problems Father    • Depression Sister    • Asthma Sister    • Osteoporosis Maternal Grandmother    • Cancer Maternal Grandfather         lung   • Breast cancer Neg Hx    • Colon cancer Neg Hx    • Ovarian cancer Neg Hx      Social History     Socioeconomic History   • Marital status: Single     Spouse name: None   • Number of children: None   • Years of education: None   • Highest education level: None   Occupational History   • None   Tobacco Use   • Smoking status: Every Day     Packs/day: 1.00     Years: 11.00     Total pack years: 11.00     Types: Cigarettes     Passive exposure: Current   • Smokeless tobacco: Never   Vaping Use   • Vaping Use: Never used   Substance and Sexual Activity   • Alcohol use: Yes     Comment: couple times/month   • Drug use: Never   • Sexual activity: Yes     Partners: Male     Birth control/protection: Injection   Other Topics Concern   • None   Social History Narrative   • None     Social Determinants of Health     Financial Resource Strain: Low Risk  (7/28/2023)    Overall Financial Resource Strain (CARDIA)    • Difficulty of Paying Living Expenses: Not hard at all   Food Insecurity: Food Insecurity Present (7/28/2023)    Hunger Vital Sign    • Worried About Running Out of Food in the Last Year: Sometimes true    • Ran Out of Food in the Last Year: Sometimes true   Transportation Needs: No Transportation Needs (7/28/2023)    PRAPARE - Transportation    • Lack of Transportation (Medical): No    • Lack of Transportation (Non-Medical):  No   Physical Activity: Inactive (6/16/2021)    Exercise Vital Sign    • Days of Exercise per Week: 0 days    • Minutes of Exercise per Session: 0 min   Stress: No Stress Concern Present (6/16/2021)    [address] [place] [address]    • Feeling of Stress : Only a little   Social Connections: Not on file   Intimate Partner Violence: Not At Risk (6/16/2021)    Humiliation, Afraid, Rape, and Kick questionnaire    • Fear of Current or Ex-Partner: No    • Emotionally Abused: No    • Physically Abused: No    • Sexually Abused: No   Housing Stability: Low Risk  (7/29/2022)    Housing Stability Vital Sign    • Unable to Pay for Housing in the Last Year: No    • Number of [address] in the Last Year: 1    • Unstable Housing in the Last Year: No     Current Outpatient Medications on File Prior to Visit   Medication Sig   • azelastine (ASTELIN) 0.1 % nasal spray 1 spray into each nostril 2 (two) times a day Use in each nostril as directed   • fexofenadine (ALLEGRA) 180 MG tablet Take 1 tablet (180 mg total) by mouth daily   • lurasidone (LATUDA) 40 mg tablet Take 40 mg by mouth in the morning   • Magnesium 400 MG TABS Take 1 tablet (400 mg total) by mouth in the morning   • medroxyPROGESTERone (DEPO-PROVERA) 150 mg/mL injection Inject 1 mL (150 mg total) into a muscle every 3 (three) months   • mirtazapine (REMERON) 15 mg tablet    • neomycin-bacitracin-polymyxin (NEOSPORIN) 5-400-5,000 ointment Apply topically 3 (three) times a day   • omeprazole (PriLOSEC) 20 mg delayed release capsule TAKE 1 CAPSULE(20 MG) BY MOUTH TWICE DAILY FOR 14 DAYS   • pregabalin (LYRICA) 50 mg capsule Take 1 PO TID x 5 days, then 1 PO BID x 5 days, then 1 PO daily x 5 days, then stop. • propranolol (INDERAL LA) 60 mg 24 hr capsule TAKE 1 CAPSULE(60 MG) BY MOUTH DAILY   • Riboflavin 400 MG TABS Take 1 tablet (400 mg total) by mouth in the morning   • Ubrogepant (Ubrelvy) 100 MG tablet Take 1 tablet (100 mg) one time as needed for migraine. May repeat one additional tablet (100 mg) at least two hours after the first dose. Do not use more than two doses per day, or for more than eight days per month.      No Known Allergies  Immunization History   Administered Date(s) Administered   • COVID-19 MODERNA VACC 0.5 ML IM 03/22/2021, 04/20/2021   • DTaP [birth year], 09/30/1992, 05/05/1995   • H1N1, All Formulations 12/28/2009   • HPV Quadrivalent 05/24/2007, 07/24/2007, 12/24/2007, 05/16/2013, 09/19/2013, 12/30/2013   • Hep B, Adolescent or Pediatric 04/21/1998, 08/04/1998, 02/02/1999   • HiB 1990, [birth year], 09/30/1992   • INFLUENZA 12/28/2009, 12/28/2010, 10/27/2015, 11/01/2016, 09/12/2017, 10/10/2018, 04/12/2021, 10/27/2021   • IPV 1990, 1990, 09/30/1992, 05/05/1995   • Influenza, injectable, quadrivalent, preservative free 0.5 mL 09/24/2018, 09/15/2020, 11/01/2022   • MMR 06/09/1992, 03/19/1994, 01/12/2005   • Meningococcal MCV4P 10/07/2008   • Pneumococcal Conjugate Vaccine 20-valent (Pcv20), Polysace 11/01/2022   • Pneumococcal Polysaccharide PPV23 06/03/2021   • Td (adult), adsorbed 10/13/2003   • Tdap 12/28/2009, 10/14/2020       Objective     /80 (BP Location: Left arm, Patient Position: Sitting, Cuff Size: Standard)   Pulse 104   Temp 97.5 °F (36.4 °C) (Temporal)   Resp 18   Ht 5' 5" (1.651 m)   Wt 58.9 kg (129 lb 12.8 oz)   LMP  (LMP Unknown)   SpO2 97%   Breastfeeding No   BMI 21.60 kg/m²     Physical Exam  Vitals and nursing note reviewed. Constitutional:       General: She is not in acute distress. Appearance: She is well-developed. She is not diaphoretic. HENT:      Head: Normocephalic and atraumatic. Right Ear: External ear normal.      Left Ear: External ear normal.      Mouth/Throat:      Lips: Lesions present. Eyes:      General:         Right eye: No discharge. Left eye: No discharge. Conjunctiva/sclera: Conjunctivae normal.   Cardiovascular:      Rate and Rhythm: Normal rate and regular rhythm. Pulmonary:      Effort: Pulmonary effort is normal. No respiratory distress. Breath sounds: Normal breath sounds. No wheezing.    Abdominal:      General: Bowel sounds are normal. There is no distension. Palpations: Abdomen is soft. Tenderness: There is no abdominal tenderness. Musculoskeletal:         General: No deformity. Normal range of motion. Cervical back: Normal range of motion and neck supple. Lymphadenopathy:      Cervical: No cervical adenopathy. Skin:     General: Skin is warm and dry. Capillary Refill: Capillary refill takes less than 2 seconds. Findings: No rash. Neurological:      General: No focal deficit present. Mental Status: She is alert and oriented to person, place, and time. Sensory: No sensory deficit.       Coordination: Coordination normal.      Deep Tendon Reflexes: Reflexes normal.   Psychiatric:         Mood and Affect: Mood normal.         Behavior: Behavior normal.       Luciano Faith

## 2023-11-21 ENCOUNTER — HOSPITAL ENCOUNTER (OUTPATIENT)
Dept: MRI IMAGING | Facility: HOSPITAL | Age: 33
Discharge: HOME | End: 2023-11-21
Payer: MEDICARE

## 2023-11-21 DIAGNOSIS — M54.50 CHRONIC BILATERAL LOW BACK PAIN, UNSPECIFIED WHETHER SCIATICA PRESENT: ICD-10-CM

## 2023-11-21 DIAGNOSIS — G89.29 CHRONIC BILATERAL LOW BACK PAIN, UNSPECIFIED WHETHER SCIATICA PRESENT: ICD-10-CM

## 2023-11-21 PROCEDURE — G1004 CDSM NDSC: HCPCS

## 2023-11-21 PROCEDURE — 72148 MRI LUMBAR SPINE W/O DYE: CPT

## 2023-11-27 ENCOUNTER — TELEPHONE (OUTPATIENT)
Dept: PAIN MEDICINE | Facility: CLINIC | Age: 33
End: 2023-11-27

## 2023-11-27 NOTE — TELEPHONE ENCOUNTER
----- Message from [facility] sent at 11/27/2023  7:46 AM EST -----  MRI lumbar spine is normal

## 2023-12-01 NOTE — PROGRESS NOTES
Assessment:  1. Chronic bilateral low back pain, unspecified whether sciatica present    2. Right leg pain    3. Neck pain        Plan:  I will order an EMG of the right lower extremity  Patient was given a referral to chiropractic therapy  Patient was encouraged to discuss duloxetine trial with psychiatry for both mood and pain  Avoids NSAIDs secondary to history of migraines  Patient may continue Tylenol as needed and should not exceed more than 3000 mg 24 hours  Patient declines information for medical cannabis  Follow-up as needed at this time    History of Present Illness: The patient is a 29 y.o. female last seen on 10/23/2023  who presents for a follow up office visit in regards to chronic neck pain that radiates into both arms with associated numbness and paresthesias and low back pain that radiates into the right lower extremity with associated numbness and paresthesias . she denies left lower extremity symptoms, bowel or bladder incontinence or saddle anesthesia. .  She denies any balance issues. MRI of the cervical spine showed some foraminal stenosis on the right at C6-7. She did have a cervical epidural steroid injection without any relief. EMG of the upper extremities was normal.  MRI of the lumbar spine was normal except for some mild smooth dextroscoliosis. She has not found any relief with physical therapy. She has tried multiple NSAIDs, muscle relaxants, gabapentin and pregabalin without relief. The patient rates her pain an 8 out of 10 on the numeric pain rating scale. Her pain is constant in the evening and it is described as burning, dull aching, sharp, throbbing, cramping, pressure-like, shooting, numbness and pins-and-needles    I have personally reviewed and/or updated the patient's past medical history, past surgical history, family history, social history, current medications, allergies, and vital signs today.        Review of Systems:    Review of Systems   Respiratory:  Negative for shortness of breath. Cardiovascular:  Negative for chest pain. Gastrointestinal:  Negative for constipation, diarrhea, nausea and vomiting. Musculoskeletal:  Negative for arthralgias, gait problem, joint swelling and myalgias. Skin:  Negative for rash. Neurological:  Negative for dizziness, seizures and weakness. All other systems reviewed and are negative.         Past Medical History:   Diagnosis Date    Abnormal Pap smear of cervix     2009 cryo; 6/2019 LSIL/AGC pap; 6/2019 colpo HSIL; 8/2019 LEEP HSIL w/neg margins; 6/2020 NILM pap/neg HPV; 6/2020 NILM pap/neg HPV    ADHD     Anemia     Bipolar 1 disorder ([address])     Depression     Epilepsy ([address] )     last episode 2000    GERD (gastroesophageal reflux disease)     Migraine     OCD (obsessive compulsive disorder)     Urogenital trichomoniasis 2019       Past Surgical History:   Procedure Laterality Date    FOREIGN BODY REMOVAL N/A 9/20/2023    Procedure: removal of IUD;  Surgeon: Tita Thompson MD;  [facility];  Service: Gynecology    GYNECOLOGIC CRYOSURGERY  2009    [place] COLPOSCOPY CERVIX VAG LOOP [facility] CERVIX N/A 08/09/2019    Procedure: BIOPSY LEEP CERVIX;  Surgeon: Vince Castelan MD;  [facility];  Service: Gynecology    IL LAPS ABD PRTM&OMENTUM DX W/WO SPEC BR/WA [address] [facility] N/A 9/20/2023    Procedure: LAPAROSCOPY DIAGNOSTIC;  Surgeon: Tita hTompson MD;  [facility];  Service: Gynecology       Family History   Problem Relation Age of Onset    Bipolar disorder Mother     Diabetes Mother     Migraines Mother     Carloyn Jayjay / [place] Mother     No Known Problems Father     Depression Sister     Asthma Sister     Osteoporosis Maternal Grandmother     Cancer Maternal Grandfather         lung    Breast cancer Neg Hx     Colon cancer Neg Hx     Ovarian cancer Neg Hx        Social History     Occupational History    Not on file   Tobacco Use    Smoking status: Every Day     Packs/day: 1.00     Years: 11.00 Total pack years: 11.00     Types: Cigarettes     Passive exposure: Current    Smokeless tobacco: Never   Vaping Use    Vaping Use: Never used   Substance and Sexual Activity    Alcohol use: Yes     Comment: couple times/month    Drug use: Never    Sexual activity: Yes     Partners: Male     Birth control/protection: Injection         Current Outpatient Medications:     fexofenadine (ALLEGRA) 180 MG tablet, Take 1 tablet (180 mg total) by mouth daily, Disp: 90 tablet, Rfl: 1    lurasidone (LATUDA) 40 mg tablet, Take 40 mg by mouth in the morning, Disp: , Rfl:     Magnesium 400 MG TABS, Take 1 tablet (400 mg total) by mouth in the morning, Disp: 90 tablet, Rfl: 3    medroxyPROGESTERone (DEPO-PROVERA) 150 mg/mL injection, Inject 1 mL (150 mg total) into a muscle every 3 (three) months, Disp: 1 mL, Rfl: 3    mirtazapine (REMERON) 15 mg tablet, , Disp: , Rfl:     neomycin-bacitracin-polymyxin (NEOSPORIN) 5-400-5,000 ointment, Apply topically 3 (three) times a day, Disp: 3.5 g, Rfl: 0    omeprazole (PriLOSEC) 20 mg delayed release capsule, TAKE 1 CAPSULE(20 MG) BY MOUTH TWICE DAILY FOR 14 DAYS, Disp: 28 capsule, Rfl: 0    propranolol (INDERAL LA) 60 mg 24 hr capsule, TAKE 1 CAPSULE(60 MG) BY MOUTH DAILY, Disp: 90 capsule, Rfl: 3    Riboflavin 400 MG TABS, Take 1 tablet (400 mg total) by mouth in the morning, Disp: 90 tablet, Rfl: 3    Ubrogepant (Ubrelvy) 100 MG tablet, Take 1 tablet (100 mg) one time as needed for migraine. May repeat one additional tablet (100 mg) at least two hours after the first dose.  Do not use more than two doses per day, or for more than eight days per month., Disp: 9 tablet, Rfl: 11    azelastine (ASTELIN) 0.1 % nasal spray, 1 spray into each nostril 2 (two) times a day Use in each nostril as directed, Disp: 30 mL, Rfl: 1    pregabalin (LYRICA) 50 mg capsule, Take 1 PO TID x 5 days, then 1 PO BID x 5 days, then 1 PO daily x 5 days, then stop., Disp: 30 capsule, Rfl: 0    No Known Allergies    Physical Exam:    /80   Pulse (!) 115   Ht 5' 5" (1.651 m)   Wt 58.5 kg (129 lb)   LMP  (LMP Unknown)   BMI 21.47 kg/m²     Constitutional:normal, well developed, well nourished, alert, in no distress and non-toxic and no overt pain behavior. Eyes:anicteric  HEENT:grossly intact  Neck:supple, symmetric, trachea midline and no masses   Pulmonary:even and unlabored  Cardiovascular:No edema or pitting edema present  Skin:Normal without rashes or lesions and well hydrated  Psychiatric:Mood and affect appropriate  Neurologic:Cranial Nerves II-XII grossly intact  Musculoskeletal:normal gait      Imaging  No orders to display     Narrative & Impression  MRI LUMBAR SPINE WITHOUT CONTRAST     INDICATION: M54.50: Low back pain, unspecified  G89.29: Other chronic pain. COMPARISON:  None. TECHNIQUE:  Multiplanar, multisequence imaging of the lumbar spine was performed. .        IMAGE QUALITY:  Diagnostic     FINDINGS:     VERTEBRAL BODIES:  There are 5 lumbar type vertebral bodies. Normal alignment of the lumbar spine. No spondylolysis or spondylolisthesis. Mild smooth dextroscoliosis of the thoracolumbar junction. No compression fracture. Normal marrow signal is   identified within the visualized bony structures. No discrete marrow lesion. SACRUM:  Normal signal within the sacrum. No evidence of insufficiency or stress fracture. DISTAL CORD AND CONUS:  Normal size and signal within the distal cord and conus. PARASPINAL SOFT TISSUES:  Paraspinal soft tissues are unremarkable. LOWER THORACIC DISC SPACES:  Normal disc height and signal.  No disc herniation, canal stenosis or foraminal narrowing. LUMBAR DISC SPACES:     L1-L2:  Normal.     L2-L3:  Normal.     L3-L4:  Normal.     L4-L5:  Normal.     L5-S1:  Normal.     OTHER FINDINGS:  None. IMPRESSION:     Normal MRI of the lumbar spine with the exception of mild smooth dextroscoliosis.          Narrative & Impression  MRI CERVICAL SPINE WITHOUT CONTRAST     INDICATION: M25.511: Pain in right shoulder  G89.29: Other chronic pain  M54.2: Cervicalgia  M54.12: Radiculopathy, cervical region. COMPARISON:  X-rays dated 2/17/2022. TECHNIQUE:  Sagittal T1, sagittal T2, sagittal inversion recovery, axial T2, axial  2D merge     IMAGE QUALITY:  Axial GRE sequence is somewhat degraded by motion. FINDINGS:     ALIGNMENT:  Normal alignment of the cervical spine. No compression fracture. No subluxation. No scoliosis. MARROW SIGNAL:  Normal marrow signal is identified within the visualized bony structures. No discrete marrow lesion. CERVICAL AND VISUALIZED THORACIC CORD:  Normal signal within the visualized cord. PREVERTEBRAL AND PARASPINAL SOFT TISSUES:  Normal.     VISUALIZED POSTERIOR FOSSA:  The visualized posterior fossa demonstrates no abnormal signal.     CERVICAL DISC SPACES:     C2-C3:  Normal.     C3-C4:  Normal.     C4-C5:  Normal.     C5-C6:  Normal.     C6-C7: No disc herniation or canal stenosis. Mild left uncovertebral hypertrophy with mild foraminal stenosis. C7-T1:  Normal.     UPPER THORACIC DISC SPACES:  Normal.     IMPRESSION:     Mild left foraminal stenosis at C6-7. No disc herniation, canal or foraminal stenosis.      Orders Placed This Encounter   Procedures    Ambulatory Referral to Chiropractic    EMG 1 Limb

## 2023-12-04 ENCOUNTER — OFFICE VISIT (OUTPATIENT)
Dept: PAIN MEDICINE | Facility: CLINIC | Age: 33
End: 2023-12-04
Payer: MEDICARE

## 2023-12-04 VITALS
DIASTOLIC BLOOD PRESSURE: 80 MMHG | HEART RATE: 115 BPM | BODY MASS INDEX: 21.49 KG/M2 | WEIGHT: 129 LBS | SYSTOLIC BLOOD PRESSURE: 111 MMHG | HEIGHT: 65 IN

## 2023-12-04 DIAGNOSIS — M54.50 CHRONIC BILATERAL LOW BACK PAIN, UNSPECIFIED WHETHER SCIATICA PRESENT: Primary | ICD-10-CM

## 2023-12-04 DIAGNOSIS — M54.2 NECK PAIN: ICD-10-CM

## 2023-12-04 DIAGNOSIS — G89.29 CHRONIC BILATERAL LOW BACK PAIN, UNSPECIFIED WHETHER SCIATICA PRESENT: Primary | ICD-10-CM

## 2023-12-04 DIAGNOSIS — M79.604 RIGHT LEG PAIN: ICD-10-CM

## 2023-12-04 PROCEDURE — 99214 OFFICE O/P EST MOD 30 MIN: CPT | Performed by: NURSE PRACTITIONER

## 2023-12-18 ENCOUNTER — OFFICE VISIT (OUTPATIENT)
Age: 33
End: 2023-12-18
Payer: MEDICARE

## 2023-12-18 VITALS
DIASTOLIC BLOOD PRESSURE: 64 MMHG | SYSTOLIC BLOOD PRESSURE: 108 MMHG | HEART RATE: 98 BPM | BODY MASS INDEX: 21.49 KG/M2 | WEIGHT: 129 LBS | HEIGHT: 65 IN | OXYGEN SATURATION: 97 %

## 2023-12-18 DIAGNOSIS — M99.02 SEGMENTAL DYSFUNCTION OF THORACIC REGION: ICD-10-CM

## 2023-12-18 DIAGNOSIS — M79.18 MYOFASCIAL PAIN: ICD-10-CM

## 2023-12-18 DIAGNOSIS — M99.03 SEGMENTAL DYSFUNCTION OF LUMBAR REGION: ICD-10-CM

## 2023-12-18 DIAGNOSIS — M99.01 SEGMENTAL DYSFUNCTION OF CERVICAL REGION: Primary | ICD-10-CM

## 2023-12-18 DIAGNOSIS — G89.29 CHRONIC BILATERAL LOW BACK PAIN, UNSPECIFIED WHETHER SCIATICA PRESENT: ICD-10-CM

## 2023-12-18 DIAGNOSIS — M54.50 CHRONIC BILATERAL LOW BACK PAIN, UNSPECIFIED WHETHER SCIATICA PRESENT: ICD-10-CM

## 2023-12-18 DIAGNOSIS — M99.04 SEGMENTAL DYSFUNCTION OF SACRAL REGION: ICD-10-CM

## 2023-12-18 DIAGNOSIS — M54.2 NECK PAIN: ICD-10-CM

## 2023-12-18 DIAGNOSIS — M54.59 MECHANICAL LOW BACK PAIN: ICD-10-CM

## 2023-12-18 PROCEDURE — 99204 OFFICE O/P NEW MOD 45 MIN: CPT | Performed by: CHIROPRACTOR

## 2023-12-18 PROCEDURE — 98942 CHIROPRACTIC MANJ 5 REGIONS: CPT | Performed by: CHIROPRACTOR

## 2023-12-18 NOTE — PROGRESS NOTES
Diagnoses and all orders for this visit:    Segmental dysfunction of cervical region    Neck pain  -     Ambulatory Referral to Chiropractic    Chronic bilateral low back pain, unspecified whether sciatica present  -     Ambulatory Referral to Chiropractic    Segmental dysfunction of thoracic region    Segmental dysfunction of lumbar region    Myofascial pain    Mechanical low back pain    Segmental dysfunction of sacral region      ASSESSMENT:  No red flags, radiculopathy or neurologic deficit appreciated clinically. Pt's symptoms and exam findings consistent with mechanical neck pain and lower back pain aggravated with ergonomic stressors.. Pt responded well to extension biased stretches and manual mobilization of the affected spinal and myofascial tissues with increased ROM; trial of conservative tx recommended consisting on Charlee extension biased exercises, graded mobilization/manipulation of the affected tissues, postural/ergonomic education and take home stretches/exercises. If symptoms fail to centralize or neurologic deficit presents, appropriate imaging and referral will be coordinated.  Spent greater than 45 min c pt discussing hx, pe, ddx, tx options and reviewing intake notes/imaging    PROCEDURE CODES: 54554, 64055    TREATMENT:  Fear avoidance behavior discussion, encouraged and reassured pt that natural course of condition is to improve over time with adherence to tx plan and home care strategies. Home care recommendations: walk to tolerance, gradual return to activity to tolerance (avoid anything that peripheralizes symptoms), call if symptoms peripheralize, worsen, or neurologic deficit progresses. Postural education, avoid heavy lifting, and prolonged cervical flexion. Use cervical roll as recommended, Use of lumbar roll as recommended, Ther-ex: IASTM to affected mm hypertonicities (discussed soreness/ecchymosis up to 36 hrs post procedure); cervical axial retraction, Brueggers position, seated  scapular retraction, greater than 15 spent on above mentioned ther-ex. Cervical mobilization/ long axis traction- manual with chin retraction, Thoracic mobilization/manipulation: prone P-A mob, Prone A-P CT manip;     HPI  Monie Moraes is a 33 y.o. female  Chief Complaint   Patient presents with   • Back Pain     Back pain-10   • Neck Pain     Neck pain-10   • Leg Pain     Right leg pain-8   • Arm Pain     Arm pain-8  Both arms      The patient reports she has chronic R sided neck pain that started without incident 3 years ago. She reports since onset its getting worse and pain into the L side now. Burning, sharp, tingling, numbness. The patient reports pain goes UE into extremities. Timing-night time. Extending help improves pain. Improvements with heat- a little. PT for a couple months a few years ago for a couple months with no improvements. Steroid injections in neck. Work- not employed, cleaning house a lot, OCD. MM relaxers- did not help, Motrin, Lyrica- did not help, medical marijuana was suggested. Stress level-4/10. Diet- a mexican- tacos, guacamole. Eats cheese, condensed milk, some days broccoli, carrots, spinach. Water intake- doesn't drink enough. Regular soda drinker-a lot and caffeinated coffee. Exercise- walk a lot outside- 10-12 miles. The patient wakes up with HA and gets them nearly every day. Back pain and moreso R leg pain started about 2 months and leg bothers more than lower back without trauma. The patients previous images are Cervical MRI  5/21/22- C6-7 Mild uncovertebral hypertrophy on L with mild stenosis. 11/21/23- Lumbar MRI- WNL with mild dextroscoliosis.    Back Pain  This is a chronic problem. The current episode started more than 1 year ago. The pain is present in the lumbar spine and sacro-iliac. The pain is at a severity of 5/10. Associated symptoms include headaches and leg pain. Pertinent negatives include no chest pain, fever, numbness or weakness.   Neck Pain    Associated symptoms include headaches and leg pain. Pertinent negatives include no chest pain, fever, numbness or weakness.   Leg Pain   Pertinent negatives include no numbness.   Arm Pain   Pertinent negatives include no chest pain or numbness.     Past Medical History:   Diagnosis Date   • Abnormal Pap smear of cervix     2009 cryo; 6/2019 LSIL/AGC pap; 6/2019 colpo HSIL; 8/2019 LEEP HSIL w/neg margins; 6/2020 NILM pap/neg HPV; 6/2020 NILM pap/neg HPV   • ADHD    • Anemia    • Bipolar 1 disorder (HCC)    • Depression    • Epilepsy (HCC)     last episode 2000   • GERD (gastroesophageal reflux disease)    • Migraine    • OCD (obsessive compulsive disorder)    • Urogenital trichomoniasis 2019      Past Surgical History:   Procedure Laterality Date   • FOREIGN BODY REMOVAL N/A 9/20/2023    Procedure: removal of IUD;  Surgeon: Miriam Lafleur MD;  [facility];  Service: Gynecology   • GYNECOLOGIC CRYOSURGERY  2009   • IA COLPOSCOPY CERVIX VAG LOOP [facility] CERVIX N/A 08/09/2019    Procedure: BIOPSY LEEP CERVIX;  Surgeon: Ramiro Carmen MD;  [facility];  Service: Gynecology   • IA LAPS ABD PRTM&OMENTUM DX W/WO SPEC BR/WA SPX N/A 9/20/2023    Procedure: LAPAROSCOPY DIAGNOSTIC;  Surgeon: Miriam Lafleur MD;  [facility];  Service: Gynecology     The following portions of the patient's history were reviewed and updated as appropriate: allergies, past family history, past medical history, past social history, past surgical history, and problem list.  Review of Systems   Constitutional:  Negative for activity change, fatigue, fever and unexpected weight change.   HENT:  Negative for ear pain, hearing loss, sinus pressure, sinus pain, sore throat and tinnitus.    Respiratory:  Negative for chest tightness, shortness of breath, wheezing and stridor.    Cardiovascular:  Negative for chest pain.   Genitourinary:  Negative for flank pain and frequency.   Musculoskeletal:  Positive  for back pain, myalgias, neck pain and neck stiffness. Negative for joint swelling.   Skin:  Negative for color change and pallor.   Neurological:  Positive for headaches. Negative for dizziness, speech difficulty, weakness and numbness.   Psychiatric/Behavioral:  Negative for agitation and sleep disturbance. The patient is not nervous/anxious.      Physical Exam  Constitutional:       General: She is not in acute distress.     Appearance: Normal appearance.   HENT:      Head: Normocephalic.      Mouth/Throat:      Mouth: Mucous membranes are moist.   Eyes:      Extraocular Movements: Extraocular movements intact.      Conjunctiva/sclera: Conjunctivae normal.      Pupils: Pupils are equal, round, and reactive to light.   Neck:      Vascular: No carotid bruit.   Pulmonary:      Effort: Pulmonary effort is normal.   Chest:      Chest wall: No tenderness.   Abdominal:      General: Abdomen is flat.      Palpations: Abdomen is soft.   Musculoskeletal:         General: Tenderness present. No swelling, deformity or signs of injury.      Cervical back: Rigidity, spasms and tenderness present. No swelling, edema, deformity, erythema, signs of trauma, lacerations, torticollis or bony tenderness. No pain with movement. Decreased range of motion.      Thoracic back: Spasms and tenderness present. No swelling, edema, deformity, signs of trauma, lacerations or bony tenderness. Decreased range of motion. Scoliosis present.      Lumbar back: Spasms and tenderness present. No swelling, edema, deformity, signs of trauma or lacerations. Decreased range of motion.        Back:       Right lower leg: No edema.      Left lower leg: No edema.   Lymphadenopathy:      Cervical: No cervical adenopathy.   Skin:     General: Skin is warm.      Coloration: Skin is not jaundiced or pale.      Findings: No bruising or erythema.   Neurological:      Mental Status: She is alert and oriented to person, place, and time.      Cranial Nerves: No  cranial nerve deficit.      Sensory: No sensory deficit.      Motor: No weakness.      Gait: Gait is intact.      Deep Tendon Reflexes: Reflexes are normal and symmetric.   Psychiatric:         Attention and Perception: Attention normal.         Mood and Affect: Mood and affect normal.         Speech: Speech normal.         Behavior: Behavior normal. Behavior is cooperative.         Thought Content: Thought content normal.         Cognition and Memory: Cognition normal.         Judgment: Judgment normal.       SOFT TISSUE ASSESSMENT: Hypertonicity and tenderness palpated B C4-T2 T10-S1 Upper trap, Levatory scap, SCM, rhomboid erector spinae, hip flexor, glute med/min JOINT RESTRICTIONS:C4-T2 T10-S1, R SIJ ORTHO:Charlee repeated flexion peripheralizes, extension centralizes;maximal foraminal compression-neg,cervical distraction- relieving symptoms; Spurlings negative for reproduction of radicular symptoms  Cervical maximal compression- neg, Cervical distraction- neg, Spurlings- neg  Return in about 1 week (around 12/25/2023) for Recheck.

## 2024-01-05 ENCOUNTER — CLINICAL SUPPORT (OUTPATIENT)
Dept: OBGYN CLINIC | Facility: CLINIC | Age: 34
End: 2024-01-05

## 2024-01-05 VITALS
HEART RATE: 93 BPM | WEIGHT: 129 LBS | SYSTOLIC BLOOD PRESSURE: 110 MMHG | BODY MASS INDEX: 21.49 KG/M2 | HEIGHT: 65 IN | DIASTOLIC BLOOD PRESSURE: 75 MMHG

## 2024-01-05 DIAGNOSIS — Z30.09 UNWANTED FERTILITY: Primary | ICD-10-CM

## 2024-01-05 PROCEDURE — 96372 THER/PROPH/DIAG INJ SC/IM: CPT

## 2024-01-05 RX ORDER — MEDROXYPROGESTERONE ACETATE 150 MG/ML
150 INJECTION, SUSPENSION INTRAMUSCULAR ONCE
Status: COMPLETED | OUTPATIENT
Start: 2024-01-05 | End: 2024-01-05

## 2024-01-05 RX ADMIN — MEDROXYPROGESTERONE ACETATE 150 MG: 150 INJECTION, SUSPENSION INTRAMUSCULAR at 14:33

## 2024-01-08 ENCOUNTER — PROCEDURE VISIT (OUTPATIENT)
Age: 34
End: 2024-01-08
Payer: MEDICARE

## 2024-01-08 VITALS
SYSTOLIC BLOOD PRESSURE: 106 MMHG | DIASTOLIC BLOOD PRESSURE: 68 MMHG | WEIGHT: 129 LBS | BODY MASS INDEX: 21.49 KG/M2 | OXYGEN SATURATION: 96 % | HEART RATE: 121 BPM | HEIGHT: 65 IN

## 2024-01-08 DIAGNOSIS — M79.18 MYOFASCIAL PAIN: ICD-10-CM

## 2024-01-08 DIAGNOSIS — M54.59 MECHANICAL LOW BACK PAIN: ICD-10-CM

## 2024-01-08 DIAGNOSIS — M54.2 NECK PAIN: Primary | ICD-10-CM

## 2024-01-08 DIAGNOSIS — G89.29 CHRONIC BILATERAL LOW BACK PAIN, UNSPECIFIED WHETHER SCIATICA PRESENT: ICD-10-CM

## 2024-01-08 DIAGNOSIS — M99.01 SEGMENTAL DYSFUNCTION OF CERVICAL REGION: ICD-10-CM

## 2024-01-08 DIAGNOSIS — M99.04 SEGMENTAL DYSFUNCTION OF SACRAL REGION: ICD-10-CM

## 2024-01-08 DIAGNOSIS — M99.03 SEGMENTAL DYSFUNCTION OF LUMBAR REGION: ICD-10-CM

## 2024-01-08 DIAGNOSIS — M99.02 SEGMENTAL DYSFUNCTION OF THORACIC REGION: ICD-10-CM

## 2024-01-08 DIAGNOSIS — M54.50 CHRONIC BILATERAL LOW BACK PAIN, UNSPECIFIED WHETHER SCIATICA PRESENT: ICD-10-CM

## 2024-01-08 PROCEDURE — 98942 CHIROPRACTIC MANJ 5 REGIONS: CPT | Performed by: CHIROPRACTOR

## 2024-01-08 RX ORDER — DULOXETIN HYDROCHLORIDE 30 MG/1
30 CAPSULE, DELAYED RELEASE ORAL DAILY
COMMUNITY
Start: 2023-12-14

## 2024-01-08 NOTE — PROGRESS NOTES
Diagnoses and all orders for this visit:    Neck pain    Chronic bilateral low back pain, unspecified whether sciatica present    Segmental dysfunction of cervical region    Segmental dysfunction of thoracic region    Myofascial pain    Segmental dysfunction of lumbar region    Mechanical low back pain    Segmental dysfunction of sacral region    Other orders  -     DULoxetine (CYMBALTA) 30 mg delayed release capsule; Take 30 mg by mouth daily        ASSESSMENT:  Pt's symptoms and exam findings consistent with mechanical neck pain and lower back pain aggravated with ergonomic stressors.. Pt responded well to extension biased stretches and manual mobilization of the affected spinal and myofascial tissues with increased ROM; trial of conservative tx recommended consisting on Charlee extension biased exercises, graded mobilization/manipulation of the affected tissues, postural/ergonomic education and take home stretches/exercises. If symptoms fail to centralize or neurologic deficit presents, appropriate imaging and referral will be coordinated.  -Tolerated treatment well with decrease in pain and mm spasm    PROCEDURE CODES: 54123    TREATMENT:  Fear avoidance behavior discussion, encouraged and reassured pt that natural course of condition is to improve over time with adherence to tx plan and home care strategies. Home care recommendations: walk to tolerance, gradual return to activity to tolerance (avoid anything that peripheralizes symptoms), call if symptoms peripheralize, worsen, or neurologic deficit progresses. Postural education, avoid heavy lifting, and prolonged cervical flexion. Use cervical roll as recommended, Use of lumbar roll as recommended, Ther-ex: IASTM to affected mm hypertonicities (discussed soreness/ecchymosis up to 36 hrs post procedure); cervical axial retraction, Brueggers position, seated scapular retraction, greater than 15 spent on above mentioned ther-ex. Cervical mobilization/ long axis  traction- manual with chin retraction, Thoracic mobilization/manipulation: prone P-A mob, Prone A-P CT manip;     HPI  Monie Moraes is a 33 y.o. female  Chief Complaint   Patient presents with   • Back Pain     Lower back pain-8   • Neck Pain     Neck pain-8   • Leg Pain     Right leg pain-8   • Arm Pain     Arm pain-8  Both arms      The patient reports she has chronic R sided neck pain that started without incident 3 years ago. She reports since onset its getting worse and pain into the L side now. Burning, sharp, tingling, numbness. The patient reports pain goes UE into extremities. Timing-night time. Extending help improves pain. Improvements with heat- a little. PT for a couple months a few years ago for a couple months with no improvements. Steroid injections in neck. Work- not employed, cleaning house a lot, OCD. MM relaxers- did not help, Motrin, Lyrica- did not help, medical marijuana was suggested. Stress level-4/10. Diet- a mexican- tacos, guacamole. Eats cheese, condensed milk, some days broccoli, carrots, spinach. Water intake- doesn't drink enough. Regular soda drinker-a lot and caffeinated coffee. Exercise- walk a lot outside- 10-12 miles. The patient wakes up with HA and gets them nearly every day. Back pain and moreso R leg pain started about 2 months and leg bothers more than lower back without trauma. The patients previous images are Cervical MRI  5/21/22- C6-7 Mild uncovertebral hypertrophy on L with mild stenosis. 11/21/23- Lumbar MRI- WNL with mild dextroscoliosis.  1/8- The patient feels the same today. 8/10 pain in all body parts.    Back Pain  This is a chronic problem. The current episode started more than 1 year ago. The pain is present in the lumbar spine and sacro-iliac. The pain is at a severity of 5/10. Associated symptoms include headaches and leg pain.   Neck Pain   Associated symptoms include headaches and leg pain.   Leg Pain     Arm Pain       Past Medical History:    Diagnosis Date   • Abnormal Pap smear of cervix     2009 cryo; 6/2019 LSIL/AGC pap; 6/2019 colpo HSIL; 8/2019 LEEP HSIL w/neg margins; 6/2020 NILM pap/neg HPV; 6/2020 NILM pap/neg HPV   • ADHD    • Anemia    • Bipolar 1 disorder (HCC)    • Depression    • Epilepsy (HCC)     last episode 2000   • GERD (gastroesophageal reflux disease)    • Migraine    • OCD (obsessive compulsive disorder)    • Urogenital trichomoniasis 2019      Past Surgical History:   Procedure Laterality Date   • FOREIGN BODY REMOVAL N/A 9/20/2023    Procedure: removal of IUD;  Surgeon: Miriam Lafleur MD;  Location: [facility];  Service: Gynecology   • GYNECOLOGIC CRYOSURGERY  2009   • MN COLPOSCOPY CERVIX VAG LOOP ELTRD BX CERVIX N/A 08/09/2019    Procedure: BIOPSY LEEP CERVIX;  Surgeon: Ramiro Carmen MD;  Location: [facility];  Service: Gynecology   • MN LAPS ABD PRTM&OMENTUM DX W/WO SPEC BR/WA SPX N/A 9/20/2023    Procedure: LAPAROSCOPY DIAGNOSTIC;  Surgeon: Miriam Lafleur MD;  Location: [facility];  Service: Gynecology     The following portions of the patient's history were reviewed and updated as appropriate: allergies, past family history, past medical history, past social history, past surgical history, and problem list.  Review of Systems   Musculoskeletal:  Positive for back pain, myalgias, neck pain and neck stiffness.   Neurological:  Positive for headaches.   Psychiatric/Behavioral:  Negative for sleep disturbance.      Physical Exam  Musculoskeletal:         General: Tenderness present.      Cervical back: Rigidity, spasms and tenderness present. No swelling, edema, deformity, erythema, signs of trauma, lacerations, torticollis or bony tenderness. No pain with movement. Decreased range of motion.      Thoracic back: Spasms and tenderness present. No swelling, edema, deformity, signs of trauma, lacerations or bony tenderness. Decreased range of motion. Scoliosis present.      Lumbar back: Spasms and tenderness  present. No swelling, edema, deformity, signs of trauma or lacerations. Decreased range of motion.        Back:    Neurological:      Gait: Gait is intact.      Deep Tendon Reflexes: Reflexes are normal and symmetric.       SOFT TISSUE ASSESSMENT: Hypertonicity and tenderness palpated B C4-T2 T10-S1 Upper trap, Levatory scap, SCM, rhomboid erector spinae, hip flexor, glute med/min JOINT RESTRICTIONS:C4-T2 T10-S1, R SIJ ORTHO:Charlee repeated flexion peripheralizes, extension centralizes;maximal foraminal compression-neg,cervical distraction- relieving symptoms; Spurlings negative for reproduction of radicular symptoms  Cervical maximal compression- neg, Cervical distraction- neg, Spurlings- neg  Return in about 1 week (around 1/15/2024) for Recheck.

## 2024-01-15 ENCOUNTER — OFFICE VISIT (OUTPATIENT)
Dept: OBGYN CLINIC | Facility: CLINIC | Age: 34
End: 2024-01-15

## 2024-01-15 VITALS
SYSTOLIC BLOOD PRESSURE: 111 MMHG | DIASTOLIC BLOOD PRESSURE: 77 MMHG | WEIGHT: 130 LBS | HEART RATE: 108 BPM | BODY MASS INDEX: 21.63 KG/M2

## 2024-01-15 DIAGNOSIS — R10.2 PELVIC PAIN: Primary | ICD-10-CM

## 2024-01-15 PROCEDURE — 99213 OFFICE O/P EST LOW 20 MIN: CPT | Performed by: NURSE PRACTITIONER

## 2024-01-15 PROCEDURE — 87591 N.GONORRHOEAE DNA AMP PROB: CPT | Performed by: NURSE PRACTITIONER

## 2024-01-15 PROCEDURE — 87491 CHLMYD TRACH DNA AMP PROBE: CPT | Performed by: NURSE PRACTITIONER

## 2024-01-15 RX ORDER — IBUPROFEN 600 MG/1
600 TABLET ORAL EVERY 6 HOURS PRN
Qty: 30 TABLET | Refills: 0 | Status: SHIPPED | OUTPATIENT
Start: 2024-01-15

## 2024-01-15 NOTE — PROGRESS NOTES
PROBLEM GYNECOLOGICAL VISIT    Monie Moraes is a 33 y.o. female who presents today with complaint of pelvic pain.  Her general medical history has been reviewed and she reports it as follows:    Past Medical History:   Diagnosis Date    Abnormal Pap smear of cervix      cryo; 2019 LSIL/AGC pap; 2019 colpo HSIL; 2019 LEEP HSIL w/neg margins; 2020 NILM pap/neg HPV; 2020 NILM pap/neg HPV    ADHD     Anemia     Bipolar 1 disorder (HCC)     Depression     Epilepsy (HCC)     last episode     GERD (gastroesophageal reflux disease)     Migraine     OCD (obsessive compulsive disorder)     Urogenital trichomoniasis      Past Surgical History:   Procedure Laterality Date    FOREIGN BODY REMOVAL N/A 2023    Procedure: removal of IUD;  Surgeon: Miriam Lafleur MD;  Location: [facility];  Service: Gynecology    GYNECOLOGIC CRYOSURGERY      SD COLPOSCOPY CERVIX VAG LOOP ELTRD BX CERVIX N/A 2019    Procedure: BIOPSY LEEP CERVIX;  Surgeon: Ramiro Carmen MD;  Location: [facility];  Service: Gynecology    SD LAPS ABD PRTM&OMENTUM DX W/WO SPEC BR/WA SPX N/A 2023    Procedure: LAPAROSCOPY DIAGNOSTIC;  Surgeon: Miriam Lafluer MD;  Location: [facility];  Service: Gynecology     OB History          0    Para   0    Term   0       0    AB   0    Living   0         SAB   0    IAB   0    Ectopic   0    Multiple   0    Live Births   0               Social History     Tobacco Use    Smoking status: Every Day     Current packs/day: 1.00     Average packs/day: 1 pack/day for 11.0 years (11.0 ttl pk-yrs)     Types: Cigarettes     Passive exposure: Current    Smokeless tobacco: Never   Vaping Use    Vaping status: Never Used   Substance Use Topics    Alcohol use: Yes     Comment: couple times/month    Drug use: Never     Social History     Substance and Sexual Activity   Sexual Activity Not Currently    Partners: Male    Birth control/protection: Injection        Current Outpatient Medications   Medication Instructions    azelastine (ASTELIN) 0.1 % nasal spray 1 spray, Nasal, 2 times daily, Use in each nostril as directed    DULoxetine (CYMBALTA) 30 mg, Oral, Daily    fexofenadine (ALLEGRA) 180 mg, Oral, Daily    lurasidone (LATUDA) 40 mg, Oral, Daily    Magnesium 400 mg, Oral, Daily    medroxyPROGESTERone (DEPO-PROVERA) 150 mg, Intramuscular, Every 3 months    mirtazapine (REMERON) 15 mg tablet No dose, route, or frequency recorded.    neomycin-bacitracin-polymyxin (NEOSPORIN) 5-400-5,000 ointment Topical, 3 times daily    omeprazole (PRILOSEC) 20 mg, Oral, 2 times daily    pregabalin (LYRICA) 50 mg capsule Take 1 PO TID x 5 days, then 1 PO BID x 5 days, then 1 PO daily x 5 days, then stop.    propranolol (INDERAL LA) 60 mg 24 hr capsule TAKE 1 CAPSULE(60 MG) BY MOUTH DAILY    Riboflavin 400 mg, Oral, Daily    Ubrogepant (Ubrelvy) 100 MG tablet Take 1 tablet (100 mg) one time as needed for migraine. May repeat one additional tablet (100 mg) at least two hours after the first dose. Do not use more than two doses per day, or for more than eight days per month.<BR>       History of Present Illness:   Reports pelvic pain L>R for the past 2 weeks.  States pain has been constant but waxes/wanes.  Has used OTC Motrin with minimal relief.  Denies vaginal discharge, odor, itching/irritation.  Does not get menses due to Depoprovera.  Denies dysuria.    Review of Systems:  Review of Systems   Constitutional: Negative.    Gastrointestinal: Negative.    Genitourinary:  Positive for pelvic pain. Negative for dysuria, menstrual problem, vaginal discharge and vaginal pain.       Physical Exam:  /77   Pulse (!) 108   Wt 59 kg (130 lb)   LMP  (LMP Unknown)   BMI 21.63 kg/m²   Physical Exam  Constitutional:       General: She is not in acute distress.  Genitourinary:      Vulva exam comments: Normal.      No vaginal discharge or erythema.   Abdominal:      Palpations: Abdomen  is soft.      Tenderness: There is abdominal tenderness (LLQ>RLQ).   Neurological:      Mental Status: She is alert.   Skin:     General: Skin is warm and dry.   Vitals reviewed.       Assessment:   1. Pelvic pain.    Plan:   1. Cultures ordered: GC/CT.   2. Imaging ordered: pelvic ultrasound.   3. Return to office 1 week after ultrasound performed.   4. Patient's depression screening was assessed with a PHQ-2 score of 3. Their PHQ-9 score was 7. Continue regular follow-up with their psychologist/therapist/psychiatrist who is managing their mental health condition(s).    Reviewed with patient that [facility], but that they will not necessarily be reviewed by me immediately.  Explained that I will review results at my earliest opportunity and contact patient appropriately.

## 2024-01-15 NOTE — PATIENT INSTRUCTIONS
Thank you for your confidence in our team.   We appreciate you and welcome your feedback.   If you receive a survey from us, please take a few moments to let us know how we are doing.   Sincerely,  KARTIK Hodges

## 2024-01-15 NOTE — LETTER
2024    To Monie Moraes  : 1990      This letter is to advise you that your recent CULTURES for gonorrhea and chlamydia were reviewed by me and are NORMAL.  Please contact the office for an appointment if you have any additional concerns.    KARTIK Hodges

## 2024-01-16 LAB
C TRACH DNA SPEC QL NAA+PROBE: NEGATIVE
N GONORRHOEA DNA SPEC QL NAA+PROBE: NEGATIVE

## 2024-01-19 ENCOUNTER — HOSPITAL ENCOUNTER (OUTPATIENT)
Dept: ULTRASOUND IMAGING | Facility: HOSPITAL | Age: 34
Discharge: HOME | End: 2024-01-19
Payer: MEDICARE

## 2024-01-19 DIAGNOSIS — R10.2 PELVIC PAIN: ICD-10-CM

## 2024-01-19 PROCEDURE — 76830 TRANSVAGINAL US NON-OB: CPT

## 2024-01-19 PROCEDURE — 76856 US EXAM PELVIC COMPLETE: CPT

## 2024-01-26 ENCOUNTER — TELEPHONE (OUTPATIENT)
Dept: OBGYN CLINIC | Facility: CLINIC | Age: 34
End: 2024-01-26

## 2024-01-26 ENCOUNTER — OFFICE VISIT (OUTPATIENT)
Dept: OBGYN CLINIC | Facility: CLINIC | Age: 34
End: 2024-01-26

## 2024-01-26 VITALS
HEART RATE: 104 BPM | SYSTOLIC BLOOD PRESSURE: 111 MMHG | DIASTOLIC BLOOD PRESSURE: 73 MMHG | WEIGHT: 126 LBS | BODY MASS INDEX: 20.97 KG/M2

## 2024-01-26 DIAGNOSIS — Z71.2 ENCOUNTER TO DISCUSS TEST RESULTS: Primary | ICD-10-CM

## 2024-01-26 DIAGNOSIS — R39.9 UTI SYMPTOMS: Primary | ICD-10-CM

## 2024-01-26 NOTE — TELEPHONE ENCOUNTER
I called patient to review results of her pelvic ultrasound performed 1/19/2024.  Explained that ultrasound is normal.  She reports that she continues to have pelvic pain and that she has also noticed associated urinary urgency/leaking.    Asked her to go to the lab to provide urine specimen for culture.  She agrees.  Order placed and I will call her with results.

## 2024-02-12 ENCOUNTER — TELEPHONE (OUTPATIENT)
Dept: NEUROLOGY | Facility: CLINIC | Age: 34
End: 2024-02-12

## 2024-02-16 ENCOUNTER — TELEPHONE (OUTPATIENT)
Dept: NEUROLOGY | Facility: CLINIC | Age: 34
End: 2024-02-16

## 2024-02-16 NOTE — TELEPHONE ENCOUNTER
Patient called in to return call they missed.    Explained that they asked for patient to reschedule appointment, same day later time.    Offered slot time of 2:45 pm that was on hold for patient.    Patient gladly accepted.    Changed was made.

## 2024-02-21 ENCOUNTER — OFFICE VISIT (OUTPATIENT)
Dept: NEUROLOGY | Facility: CLINIC | Age: 34
End: 2024-02-21
Payer: MEDICARE

## 2024-02-21 VITALS
BODY MASS INDEX: 20.75 KG/M2 | SYSTOLIC BLOOD PRESSURE: 112 MMHG | TEMPERATURE: 97.9 F | WEIGHT: 124.7 LBS | DIASTOLIC BLOOD PRESSURE: 70 MMHG | HEART RATE: 105 BPM | OXYGEN SATURATION: 100 %

## 2024-02-21 DIAGNOSIS — F41.9 ANXIETY: ICD-10-CM

## 2024-02-21 DIAGNOSIS — G43.109 MIGRAINE WITH AURA AND WITHOUT STATUS MIGRAINOSUS, NOT INTRACTABLE: Primary | ICD-10-CM

## 2024-02-21 DIAGNOSIS — G43.109 MIGRAINE WITH AURA AND WITHOUT STATUS MIGRAINOSUS, NOT INTRACTABLE: ICD-10-CM

## 2024-02-21 PROCEDURE — 99213 OFFICE O/P EST LOW 20 MIN: CPT

## 2024-02-21 RX ORDER — PROPRANOLOL HYDROCHLORIDE 80 MG/1
CAPSULE, EXTENDED RELEASE ORAL
Qty: 90 CAPSULE | Refills: 1 | Status: SHIPPED | OUTPATIENT
Start: 2024-02-21

## 2024-02-21 RX ORDER — PROPRANOLOL HYDROCHLORIDE 80 MG/1
80 CAPSULE, EXTENDED RELEASE ORAL DAILY
Qty: 30 CAPSULE | Refills: 3 | Status: SHIPPED | OUTPATIENT
Start: 2024-02-21 | End: 2024-02-21

## 2024-02-21 NOTE — PATIENT INSTRUCTIONS
Patient Instructions:    Headache Calendar  Please maintain a headache calendar  Consider using phone applications such as Migraine Chin or Migraine Diary    Headache/migraine treatment:     Rescue medications (for immediate treatment of a headache):   It is ok to take ibuprofen, acetaminophen or naproxen (Advil, Tylenol,  Aleve, Excedrin) if they help your headaches you should limit these to No more than 3 times a week to avoid medication overuse/rebound headaches.     For your more moderate to severe migraines take this medication early     - Continue Ubrelvy 100 mg, take 1 tablet by mouth once at the onset of a migraine headache.  May repeat this dosage in 2 hours if needed.    Prescription preventive medications for headaches/migraines   (to take every day to help prevent headaches - not to take at the time of headache):  - Increasing propranolol long-acting 60 mg daily to propranolol long-acting 80 mg daily for migraine/headache prevention.  Patient was compliant on increasing her dosage.  Her blood pressure is at around 112/70 on a consistent basis.  Her pulse is still about over 100 bpm at this point in time, therefore I do believe that it is appropriate for her to increase her dosage a little bit further to continue to try to help with her headaches and migraines    *Typically these types of medications take time until you see the benefit, although some may see improvement in days, often it may take weeks, especially if the medication is being titrated up to a beneficial level. Please contact us if there are any concerns or questions regarding the medication.     Over the counter preventive supplements for headaches/migraines (if you try, try for 3 months straight)  (to take every day to help prevent headaches - not to take at the time of headache):  There are combo pills online of these - none of which regulated by FDA and double check dosing - take appropriate dose only once a day- prevent a migraine,  migravent, mind ease, migrelief   [] Magnesium 400mg daily (If any diarrhea or upset stomach, decrease dose  as tolerated)  [] Riboflavin (Vitamin B2) 400mg daily (may make your urine bright/neon yellow)    - All supplements can be purchased online     Lifestyle Recommendations:  [x] SLEEP - Maintain a regular sleep schedule: Adults need at least 7-8 hours of uninterrupted a night. Maintain good sleep hygiene:  Going to bed and waking up at consistent times, avoiding excessive daytime naps, avoiding caffeinated beverages in the evening, avoid excessive stimulation in the evening and generally using bed primarily for sleeping.  One hour before bedtime would recommend turning lights down lower, decreasing your activity (may read quietly, listen to music at a low volume). When you get into bed, should eliminate all technology (no texting, emailing, playing with your phone, iPad or tablet in bed).  [x] HYDRATION - Maintain good hydration.  Drink  2L of fluid a day (4 typical small water bottles)  [x] DIET - Maintain good nutrition. In particular don't skip meals and try and eat healthy balanced meals regularly.  [x] TRIGGERS - Look for other triggers and avoid them: Limit caffeine to 1-2 cups a day or less. Avoid dietary triggers that you have noticed bring on your headaches (this could include aged cheese, peanuts, MSG, aspartame and nitrates).  [x] EXERCISE - physical exercise as we all know is good for you in many ways, and not only is good for your heart, but also is beneficial for your mental health, cognitive health and  chronic pain/headaches. I would encourage at the least 5 days of physical exercise weekly for at least 30 minutes.     Education and Follow-up  [x] Please call with any questions or concerns. Of course if any new concerning symptoms go to the emergency department.  [x] Follow up in 6 months with [facility]

## 2024-02-21 NOTE — PROGRESS NOTES
[facility]  PATIENT:  Monie Moraes  MRN:  [id number]  :  1990  DATE OF SERVICE:  2024      Assessment/Plan:     Migraine with aura without status migrainosus:    I had the pleasure of seeing Monie today in the office at [facility] in [place].  She is presenting today for an office visit follow-up in regard to her migraine headaches.  The patient states that currently, she is still receiving a headache almost every day.  She notes that she has been doing much better with her anxiety and her mental health at this time which did seem to help slightly for the headaches.  She does note that the Ubrelvy for abortive therapy seems to be providing a significant amount of relief for her at this time.  She does not feel as though the propranolol extended release at 60 mg daily seem to be helping.  Advised the patient that we could certainly increase to 80 mg daily and see if this would help.  Advised the patient to continue to keep a close eye on her blood pressure and her heart rate while taking the medication.  Outside of this, no other questions or concerns and the patient to follow-up in 6 months time with myself.    Patient Instructions:    Headache Calendar  Please maintain a headache calendar  Consider using phone applications such as Migraine Chin or Migraine Diary    Headache/migraine treatment:     Rescue medications (for immediate treatment of a headache):   It is ok to take ibuprofen, acetaminophen or naproxen (Advil, Tylenol,  Aleve, Excedrin) if they help your headaches you should limit these to No more than 3 times a week to avoid medication overuse/rebound headaches.     For your more moderate to severe migraines take this medication early     - Continue Ubrelvy 100 mg, take 1 tablet by mouth once at the onset of a migraine headache.  May repeat this dosage in 2 hours if needed.    Prescription preventive medications for  headaches/migraines   (to take every day to help prevent headaches - not to take at the time of headache):  - Increasing propranolol long-acting 60 mg daily to propranolol long-acting 80 mg daily for migraine/headache prevention.  Patient was compliant on increasing her dosage.  Her blood pressure is at around 112/70 on a consistent basis.  Her pulse is still about over 100 bpm at this point in time, therefore I do believe that it is appropriate for her to increase her dosage a little bit further to continue to try to help with her headaches and migraines    *Typically these types of medications take time until you see the benefit, although some may see improvement in days, often it may take weeks, especially if the medication is being titrated up to a beneficial level. Please contact us if there are any concerns or questions regarding the medication.     Over the counter preventive supplements for headaches/migraines (if you try, try for 3 months straight)  (to take every day to help prevent headaches - not to take at the time of headache):  There are combo pills online of these - none of which regulated by FDA and double check dosing - take appropriate dose only once a day- prevent a migraine, migravent, mind ease, migrelief   [] Magnesium 400mg daily (If any diarrhea or upset stomach, decrease dose  as tolerated)  [] Riboflavin (Vitamin B2) 400mg daily (may make your urine bright/neon yellow)    - All supplements can be purchased online     Lifestyle Recommendations:  [x] SLEEP - Maintain a regular sleep schedule: Adults need at least 7-8 hours of uninterrupted a night. Maintain good sleep hygiene:  Going to bed and waking up at consistent times, avoiding excessive daytime naps, avoiding caffeinated beverages in the evening, avoid excessive stimulation in the evening and generally using bed primarily for sleeping.  One hour before bedtime would recommend turning lights down lower, decreasing your activity (may  read quietly, listen to music at a low volume). When you get into bed, should eliminate all technology (no texting, emailing, playing with your phone, iPad or tablet in bed).  [x] HYDRATION - Maintain good hydration.  Drink  2L of fluid a day (4 typical small water bottles)  [x] DIET - Maintain good nutrition. In particular don't skip meals and try and eat healthy balanced meals regularly.  [x] TRIGGERS - Look for other triggers and avoid them: Limit caffeine to 1-2 cups a day or less. Avoid dietary triggers that you have noticed bring on your headaches (this could include aged cheese, peanuts, MSG, aspartame and nitrates).  [x] EXERCISE - physical exercise as we all know is good for you in many ways, and not only is good for your heart, but also is beneficial for your mental health, cognitive health and  chronic pain/headaches. I would encourage at the least 5 days of physical exercise weekly for at least 30 minutes.     Education and Follow-up  [x] Please call with any questions or concerns. Of course if any new concerning symptoms go to the emergency department.  [x] Follow up in 6 months with P[facility]     History of Present Illness:     For Review:    We had the pleasure of evaluating Monie Moraes in neurological follow up  today for headaches.  As you know,  she is a 33 y.o.   female with a past history of migraine headaches. Patient was last seen in the office at [facility] on 09/27/2023 by myself.  At the last, patient was presenting as an office visit follow-up in regard to migraine headaches that she had been experiencing.  It is noted that her migraines were approximately around the same frequency and severity at the last visit.  Noted that they were occurring quite often.  Felt as though combination of the patient's medications that she was taking for anxiety and depression could potentially be contributing to some of her increased anxiety and depression as well.  The  patient states for the Ubrelvy she is often having to take more than 2 tablets in a day to get rid of the headaches.  It was noted at the last visit she felt as though the tobacco had not been significant headaches and she would like to try a different preventative medication regimen.  Advised patient that we would slowly taper down off of the Depakote.  Then, patient could potentially start propranolol long-acting 60 mg daily for migraine prevention.  Is noted that the patient have to keep a close eye on her blood pressure and her pulse while taking medication.  Also believe that this would be better than the patient taking a another antidepressant medication due to the fact that she was taking multiple mood stabilizing medications already.  She was to continue with the Ubrelvy 100 mg for abortive therapy, she can still take over-the-counter Tylenol and over-the-counter ibuprofen with the medication as well.      Current medical illnesses: Migraine with aura, history of epilepsy, cervical radiculopathy, cervical spondylosis, multiple environmental allergies, dizziness, history of ADHD, bipolar 1 disorder         What medications do you take or have you taken for your headaches?   Current Preventive:   Propranolol, Latuda, riboflavin, magnesium     Current Abortive:   Ubrelvy, Tylenol      Prior Preventive:   Topamax, gabapentin, Depakote, Lyrica, Remeron     Prior Abortive:   Sumatriptan, rizatriptan, Robaxin, ibuprofen, Toradol     Interval updates as of 2/21/2024:    Anxiety and mental health doing well at this time she states.  Feels as though this has been more relatively stable as of late.  Still having headaches almost daily.  She does note that the Ubrelvy provides a significant amount of relief with the headaches.  The patient notes that propranolol has not really seem to help decrease the intensity or severity of the headaches thus far.  Patient open to potentially increasing the medication as well.    How  often do the headaches occur?   Almost daily     Are you ever headache free? yes    What time of the day do the headaches start?   In the afternoon     How long do the headaches last?   Ubrelvy has been providing her with a significant amount of relief with headaches. 1-2 hours with Ubrelvy will usually seem to take the headache.     Describe your usual headache ?  Throbbing, pounding, pressure, squeezing, dull, nagging, ice-pick like, stabbing, sharp, shooting, electric, tight band    Where is your headache located?   Right side of the head, temple area    What is the intensity of pain?   Pressure type pain     Associated symptoms:   Nausea, vomiting  Photophobia, phonophobia,   Problem with concentration  Floaters in her vision   light-headed or dizzy, stiff or sore neck,   Hands or feet tingle or feel numb, prefer to be alone and in a dark room, unable to work    Headache history with updates:  Headache are worse if the patient: laying down will make them worse, missing meals makes them worse     Any positional change headaches? No positional change headaches     Headache triggers:  skipping meals     Aura/warning and how long does it last ? Yes,  still having spots in her vision before the headache comes on     What time of the year do headaches occur more frequently? do not seem to be related to any time of the year    Have you seen someone else for headaches or pain? Yes    Are you current pregnant or planning on getting pregnant? Yes, DepoProvera injections for birth control     Have you ever had any Brain imaging? Yes, MRI brain without contrast on 02/20/2023     Reviewed old notes from physician seen in the past- see above HPI for summary of previous encounters.       Past Medical History:   Diagnosis Date    Abnormal Pap smear of cervix     2009 cryo; 6/2019 LSIL/AGC pap; 6/2019 colpo HSIL; 8/2019 LEEP HSIL w/neg margins; 6/2020 NILM pap/neg HPV; 6/2020 NILM pap/neg HPV    ADHD     Anemia     Bipolar 1  disorder (HCC)     Depression     Epilepsy (HCC)     last episode 2000    GERD (gastroesophageal reflux disease)     Migraine     OCD (obsessive compulsive disorder)     Urogenital trichomoniasis 2019       Patient Active Problem List   Diagnosis    Smoking    Anxiety    Depression    ADHD    Bipolar 1 disorder (HCC)    Migraine with aura and without status migrainosus, not intractable    Numbness and tingling of right arm    Cervical radiculopathy    Cervical spondylosis    Shingles    GERD (gastroesophageal reflux disease)    Epilepsy (HCC)    Low back pain with sciatica    Numbness and tingling in left arm    Prediabetes       Medications:      Current Outpatient Medications   Medication Sig Dispense Refill    DULoxetine (CYMBALTA) 30 mg delayed release capsule Take 30 mg by mouth daily      lurasidone (LATUDA) 40 mg tablet Take 40 mg by mouth in the morning      Magnesium 400 MG TABS Take 1 tablet (400 mg total) by mouth in the morning 90 tablet 3    medroxyPROGESTERone (DEPO-PROVERA) 150 mg/mL injection Inject 1 mL (150 mg total) into a muscle every 3 (three) months 1 mL 3    omeprazole (PriLOSEC) 20 mg delayed release capsule TAKE 1 CAPSULE(20 MG) BY MOUTH TWICE DAILY FOR 14 DAYS 28 capsule 0    propranolol (INDERAL LA) 60 mg 24 hr capsule TAKE 1 CAPSULE(60 MG) BY MOUTH DAILY 90 capsule 3    Riboflavin 400 MG TABS Take 1 tablet (400 mg total) by mouth in the morning 90 tablet 3    Ubrogepant (Ubrelvy) 100 MG tablet Take 1 tablet (100 mg) one time as needed for migraine. May repeat one additional tablet (100 mg) at least two hours after the first dose. Do not use more than two doses per day, or for more than eight days per month. 9 tablet 11    azelastine (ASTELIN) 0.1 % nasal spray 1 spray into each nostril 2 (two) times a day Use in each nostril as directed (Patient not taking: Reported on 2/21/2024) 30 mL 1    fexofenadine (ALLEGRA) 180 MG tablet Take 1 tablet (180 mg total) by mouth daily (Patient not  taking: Reported on 2/21/2024) 90 tablet 1    ibuprofen (MOTRIN) 600 mg tablet Take 1 tablet (600 mg total) by mouth every 6 (six) hours as needed for mild pain (Patient not taking: Reported on 2/21/2024) 30 tablet 0    mirtazapine (REMERON) 15 mg tablet  (Patient not taking: Reported on 2/21/2024)      neomycin-bacitracin-polymyxin (NEOSPORIN) 5-400-5,000 ointment Apply topically 3 (three) times a day (Patient not taking: Reported on 12/18/2023) 3.5 g 0    pregabalin (LYRICA) 50 mg capsule Take 1 PO TID x 5 days, then 1 PO BID x 5 days, then 1 PO daily x 5 days, then stop. (Patient not taking: Reported on 12/18/2023) 30 capsule 0     No current facility-administered medications for this visit.        Allergies:    No Known Allergies    Family History:     Family History   Problem Relation Age of Onset    Bipolar disorder Mother     Diabetes Mother     Migraines Mother     Miscarriages / Stillbirths Mother     No Known Problems Father     Depression Sister     Asthma Sister     Osteoporosis Maternal Grandmother     Cancer Maternal Grandfather         lung    Breast cancer Neg Hx     Colon cancer Neg Hx     Ovarian cancer Neg Hx        Social History:     Social History     Socioeconomic History    Marital status: Single     Spouse name: Not on file    Number of children: Not on file    Years of education: Not on file    Highest education level: Not on file   Occupational History    Not on file   Tobacco Use    Smoking status: Every Day     Current packs/day: 1.00     Average packs/day: 1 pack/day for 11.0 years (11.0 ttl pk-yrs)     Types: Cigarettes     Passive exposure: Current    Smokeless tobacco: Never   Vaping Use    Vaping status: Never Used   Substance and Sexual Activity    Alcohol use: Yes     Comment: couple times/month    Drug use: Never    Sexual activity: Not Currently     Partners: Male     Birth control/protection: Injection   Other Topics Concern    Not on file   Social History Narrative    Not on  file     Social Determinants of Health     Financial Resource Strain: Low Risk  (1/5/2024)    Overall Financial Resource Strain (CARDIA)     Difficulty of Paying Living Expenses: Not hard at all   Food Insecurity: No Food Insecurity (1/5/2024)    Hunger Vital Sign     Worried About Running Out of Food in the Last Year: Never true     Ran Out of Food in the Last Year: Never true   Transportation Needs: No Transportation Needs (1/5/2024)    PRAPARE - Transportation     Lack of Transportation (Medical): No     Lack of Transportation (Non-Medical): No   Physical Activity: Inactive (6/16/2021)    Exercise Vital Sign     Days of Exercise per Week: 0 days     Minutes of Exercise per Session: 0 min   Stress: No Stress Concern Present (6/16/2021)    [place] [employer] - Occupational Stress Questionnaire     Feeling of Stress : Only a little   Social Connections: Not on file   Intimate Partner Violence: Not At Risk (6/16/2021)    Humiliation, Afraid, Rape, and Kick questionnaire     Fear of Current or Ex-Partner: No     Emotionally Abused: No     Physically Abused: No     Sexually Abused: No   Housing Stability: Low Risk  (7/29/2022)    Housing Stability Vital Sign     Unable to Pay for Housing in the Last Year: No     Number of Places Lived in the Last Year: 1     Unstable Housing in the Last Year: No         Objective:     Physical Exam:                                                                 Vitals:            Constitutional:    /70 (BP Location: Right arm, Patient Position: Sitting, Cuff Size: Adult)   Pulse 105   Temp 97.9 °F (36.6 °C) (Temporal)   Wt 56.6 kg (124 lb 11.2 oz)   SpO2 100%   BMI 20.75 kg/m²   BP Readings from Last 3 Encounters:   02/21/24 112/70   01/26/24 111/73   01/15/24 111/77     Pulse Readings from Last 3 Encounters:   02/21/24 105   01/26/24 104   01/15/24 (!) 108         Well developed, well nourished, well groomed. No dysmorphic features.       Psychiatric:   Normal behavior and appropriate affect        Neurological Examination:     Mental status/cognitive function:   Orientated to time, place and person. Recent and remote memory intact. Attention span and concentration as well as fund of knowledge are appropriate for age. Normal language and spontaneous speech.     Cranial Nerves:  II-visual fields full.   III, IV, VI-Pupils were equal, round, and reactive to light and accomodation. Extraocular movements were full and conjugate without nystagmus. Conjugate gaze, normal smooth pursuits, normal saccades   V-facial sensation symmetric.    VII-facial expression symmetric, intact forehead wrinkle, strong eye closure, symmetric smile    VIII-hearing grossly intact bilaterally   IX, X-palate elevation symmetric, no dysarthria.   XI-shoulder shrug strength intact    XII-tongue protrusion midline.    Motor Exam: symmetric bulk and tone throughout, no pronator drift. Power/strength 5/5 bilateral upper and lower extremities except for the right upper extremity.  4/5 strength of the right upper extremity (previously known, due to cervical radiculopathy)    Sensory: grossly intact light touch in all extremities.   Reflexes: brachioradialis 1+, biceps 1+, knee 1+ bilaterally  Coordination: Finger nose finger intact bilaterally, no apparent dysmetria, ataxia or tremor noted  Gait: steady casual and tandem gait.       Review of Systems:     Review of Systems   Constitutional:  Negative for appetite change, fatigue and fever.   HENT: Negative.  Negative for hearing loss, tinnitus, trouble swallowing and voice change.    Eyes: Negative.  Negative for photophobia, pain and visual disturbance.   Respiratory: Negative.  Negative for shortness of breath.    Cardiovascular: Negative.  Negative for palpitations.   Gastrointestinal: Negative.  Negative for nausea and vomiting.   Endocrine: Negative.  Negative for cold intolerance.   Genitourinary: Negative.  Negative for dysuria, frequency  and urgency.   Musculoskeletal:  Negative for back pain, gait problem, myalgias, neck pain and neck stiffness.   Skin: Negative.  Negative for rash.   Allergic/Immunologic: Negative.    Neurological:  Positive for headaches. Negative for dizziness, tremors, seizures, syncope, facial asymmetry, speech difficulty, weakness, light-headedness and numbness.   Hematological: Negative.  Does not bruise/bleed easily.   Psychiatric/Behavioral: Negative.  Negative for confusion, hallucinations and sleep disturbance.    All other systems reviewed and are negative.    I personally reviewed the ROS entered by the MA    I spent 20 minutes in total time for this visit.    Author:  Abel Cleaning PA-C 2/21/2024 3:03 PM

## 2024-02-29 ENCOUNTER — TELEPHONE (OUTPATIENT)
Dept: NEUROLOGY | Facility: CLINIC | Age: 34
End: 2024-02-29

## 2024-02-29 ENCOUNTER — PATIENT MESSAGE (OUTPATIENT)
Dept: NEUROLOGY | Facility: CLINIC | Age: 34
End: 2024-02-29

## 2024-02-29 NOTE — TELEPHONE ENCOUNTER
leann Moraes   to  [facility] (supporting Abel Cleaning PA-C)         2/29/24 12:45 PM  My Ubrelvy needs a pre authorization I called the pharmacy and they said to give you guys a call.   ___________    PA submitted via [facility], key [id number], determination pending.

## 2024-03-04 NOTE — TELEPHONE ENCOUNTER
Called pt. Left her a detailed message on voice mail making her aware of the approval. Called [facility] and left a message on the prescriber voice mail making the pharmacist aware of the approval.

## 2024-03-25 ENCOUNTER — CLINICAL SUPPORT (OUTPATIENT)
Dept: OBGYN CLINIC | Facility: CLINIC | Age: 34
End: 2024-03-25

## 2024-03-25 VITALS
HEIGHT: 65 IN | HEART RATE: 102 BPM | DIASTOLIC BLOOD PRESSURE: 69 MMHG | WEIGHT: 123 LBS | SYSTOLIC BLOOD PRESSURE: 102 MMHG | BODY MASS INDEX: 20.49 KG/M2

## 2024-03-25 DIAGNOSIS — Z30.09 UNWANTED FERTILITY: Primary | ICD-10-CM

## 2024-03-25 PROCEDURE — 96372 THER/PROPH/DIAG INJ SC/IM: CPT

## 2024-03-25 RX ORDER — MEDROXYPROGESTERONE ACETATE 150 MG/ML
150 INJECTION, SUSPENSION INTRAMUSCULAR ONCE
Status: COMPLETED | OUTPATIENT
Start: 2024-03-25 | End: 2024-03-25

## 2024-03-25 RX ADMIN — MEDROXYPROGESTERONE ACETATE 150 MG: 150 INJECTION, SUSPENSION INTRAMUSCULAR at 11:17

## 2024-03-25 NOTE — PROGRESS NOTES
PT stated she has one refill of depo but needs another one when her up coming annual appt comes.   PT also Given Depo shot on her right deltoid.     NDC#:39938-356-65  Lot#:[id number]  Exp date:03/30/2026

## 2024-05-01 DIAGNOSIS — G43.109 MIGRAINE WITH AURA AND WITHOUT STATUS MIGRAINOSUS, NOT INTRACTABLE: ICD-10-CM

## 2024-05-01 NOTE — TELEPHONE ENCOUNTER
Monie Moraes   to  [facility] (supporting Abel Cleaning PA-C)         24  2:33 PM  I need more refills of my Ubrelvy pharmacy is saying the refills I had  thanks    _____________________________________________________________________    Pt is requesting refills of Ubrelvy. Last OV was 24. Routed to provider to review the refill request.

## 2024-06-24 ENCOUNTER — OFFICE VISIT (OUTPATIENT)
Dept: FAMILY MEDICINE CLINIC | Facility: CLINIC | Age: 34
End: 2024-06-24

## 2024-06-24 ENCOUNTER — TELEPHONE (OUTPATIENT)
Age: 34
End: 2024-06-24

## 2024-06-24 ENCOUNTER — ANNUAL EXAM (OUTPATIENT)
Dept: OBGYN CLINIC | Facility: CLINIC | Age: 34
End: 2024-06-24

## 2024-06-24 ENCOUNTER — HOSPITAL ENCOUNTER (OUTPATIENT)
Dept: RADIOLOGY | Facility: HOSPITAL | Age: 34
Discharge: HOME | End: 2024-06-24
Payer: MEDICARE

## 2024-06-24 VITALS
RESPIRATION RATE: 18 BRPM | DIASTOLIC BLOOD PRESSURE: 80 MMHG | BODY MASS INDEX: 20.44 KG/M2 | HEART RATE: 96 BPM | SYSTOLIC BLOOD PRESSURE: 121 MMHG | OXYGEN SATURATION: 98 % | HEIGHT: 65 IN | WEIGHT: 122.7 LBS | TEMPERATURE: 97.9 F

## 2024-06-24 VITALS
HEART RATE: 101 BPM | HEIGHT: 65 IN | BODY MASS INDEX: 20.49 KG/M2 | DIASTOLIC BLOOD PRESSURE: 73 MMHG | SYSTOLIC BLOOD PRESSURE: 109 MMHG | WEIGHT: 123 LBS

## 2024-06-24 DIAGNOSIS — Z11.59 ENCOUNTER FOR SCREENING FOR OTHER VIRAL DISEASES: ICD-10-CM

## 2024-06-24 DIAGNOSIS — F17.200 SMOKING: ICD-10-CM

## 2024-06-24 DIAGNOSIS — Z11.51 SCREENING FOR HPV (HUMAN PAPILLOMAVIRUS): ICD-10-CM

## 2024-06-24 DIAGNOSIS — Z12.39 ENCOUNTER FOR BREAST CANCER SCREENING USING NON-MAMMOGRAM MODALITY: ICD-10-CM

## 2024-06-24 DIAGNOSIS — Z30.09 UNWANTED FERTILITY: ICD-10-CM

## 2024-06-24 DIAGNOSIS — R19.5 MUCUS IN STOOL: ICD-10-CM

## 2024-06-24 DIAGNOSIS — R14.0 BLOATING: ICD-10-CM

## 2024-06-24 DIAGNOSIS — Z11.3 SCREEN FOR STD (SEXUALLY TRANSMITTED DISEASE): ICD-10-CM

## 2024-06-24 DIAGNOSIS — Z01.419 ENCOUNTER FOR GYNECOLOGICAL EXAMINATION WITHOUT ABNORMAL FINDING: Primary | ICD-10-CM

## 2024-06-24 DIAGNOSIS — Z12.4 SCREENING FOR CERVICAL CANCER: ICD-10-CM

## 2024-06-24 DIAGNOSIS — K21.9 GASTROESOPHAGEAL REFLUX DISEASE WITHOUT ESOPHAGITIS: Primary | ICD-10-CM

## 2024-06-24 PROCEDURE — 96372 THER/PROPH/DIAG INJ SC/IM: CPT | Performed by: NURSE PRACTITIONER

## 2024-06-24 PROCEDURE — G0101 CA SCREEN;PELVIC/BREAST EXAM: HCPCS | Performed by: NURSE PRACTITIONER

## 2024-06-24 PROCEDURE — G0476 HPV COMBO ASSAY CA SCREEN: HCPCS | Performed by: NURSE PRACTITIONER

## 2024-06-24 PROCEDURE — 87491 CHLMYD TRACH DNA AMP PROBE: CPT | Performed by: NURSE PRACTITIONER

## 2024-06-24 PROCEDURE — G0145 SCR C/V CYTO,THINLAYER,RESCR: HCPCS | Performed by: NURSE PRACTITIONER

## 2024-06-24 PROCEDURE — 87591 N.GONORRHOEAE DNA AMP PROB: CPT | Performed by: NURSE PRACTITIONER

## 2024-06-24 PROCEDURE — 99213 OFFICE O/P EST LOW 20 MIN: CPT | Performed by: NURSE PRACTITIONER

## 2024-06-24 PROCEDURE — G2211 COMPLEX E/M VISIT ADD ON: HCPCS | Performed by: NURSE PRACTITIONER

## 2024-06-24 PROCEDURE — 74018 RADEX ABDOMEN 1 VIEW: CPT

## 2024-06-24 RX ORDER — MEDROXYPROGESTERONE ACETATE 150 MG/ML
150 INJECTION, SUSPENSION INTRAMUSCULAR
Qty: 1 ML | Refills: 3 | Status: SHIPPED | OUTPATIENT
Start: 2024-06-24

## 2024-06-24 RX ORDER — MEDROXYPROGESTERONE ACETATE 150 MG/ML
150 INJECTION, SUSPENSION INTRAMUSCULAR ONCE
Status: COMPLETED | OUTPATIENT
Start: 2024-06-24 | End: 2024-06-24

## 2024-06-24 RX ADMIN — MEDROXYPROGESTERONE ACETATE 150 MG: 150 INJECTION, SUSPENSION INTRAMUSCULAR at 13:45

## 2024-06-24 NOTE — PROGRESS NOTES
ANNUAL GYNECOLOGICAL EXAMINATION    Monie Moraes is a 34 y.o. female who presents today for annual GYN exam.  Her last pap smear was performed 2021 and result was NILM with negative HPV.  She had HIV screening performed 2023 and it was negative.  She reports menses as absent due to Depoprovera therapy.  Her general medical history has been reviewed and she reports it as follows:    Past Medical History:   Diagnosis Date    Abnormal Pap smear of cervix      cryo; 2019 LSIL/AGC pap; 2019 colpo HSIL; 2019 LEEP HSIL w/neg margins; 2020 NILM pap/neg HPV; 2020 NILM pap/neg HPV    ADHD     Anemia     Bipolar 1 disorder (HCC)     Depression     Epilepsy (HCC)     last episode     GERD (gastroesophageal reflux disease)     Migraine     OCD (obsessive compulsive disorder)     Prediabetes     Urogenital trichomoniasis      Past Surgical History:   Procedure Laterality Date    FOREIGN BODY REMOVAL N/A 2023    Procedure: removal of IUD;  Surgeon: Miriam Lafleur MD;  [facility];  Service: Gynecology    GYNECOLOGIC CRYOSURGERY  2009    AR COLPOSCOPY CERVIX VAG LOOP ELTRD BX CERVIX N/A 2019    Procedure: BIOPSY LEEP CERVIX;  Surgeon: Ramiro Carmen MD;  [facility];  Service: Gynecology    AR LAPS ABD PRTM&OMENTUM DX W/WO SPEC BR/WA SPX N/A 2023    Procedure: LAPAROSCOPY DIAGNOSTIC;  Surgeon: Miriam Lafleur MD;  [facility];  Service: Gynecology     OB History          0    Para   0    Term   0       0    AB   0    Living   0         SAB   0    IAB   0    Ectopic   0    Multiple   0    Live Births   0               Social History     Tobacco Use    Smoking status: Every Day     Current packs/day: 1.00     Average packs/day: 1 pack/day for 11.0 years (11.0 ttl pk-yrs)     Types: Cigarettes     Passive exposure: Current    Smokeless tobacco: Never   Vaping Use    Vaping status: Never Used   Substance Use Topics     "Alcohol use: Yes     Comment: couple times/month    Drug use: Never     Social History     Substance and Sexual Activity   Sexual Activity Not Currently    Partners: Male    Birth control/protection: Injection     Cancer-related family history includes Cancer in her maternal grandfather. There is no history of Breast cancer, Colon cancer, or Ovarian cancer.    Current Outpatient Medications   Medication Instructions    DULoxetine (CYMBALTA) 30 mg, Oral, Daily    lurasidone (LATUDA) 40 mg, Oral, Daily    Magnesium 400 mg, Oral, Daily    medroxyPROGESTERone (DEPO-PROVERA) 150 mg, Intramuscular, Every 3 months    omeprazole (PRILOSEC) 20 mg, Oral, 2 times daily    propranolol (INDERAL LA) 80 mg 24 hr capsule TAKE 1 CAPSULE(80 MG) BY MOUTH DAILY    Ubrogepant (Ubrelvy) 100 MG tablet Take 1 tablet (100 mg) one time as needed for migraine. May repeat one additional tablet (100 mg) at least two hours after the first dose. Do not use more than two doses per day, or for more than eight days per month.<BR>       Review of Systems:  Review of Systems   Constitutional: Negative.    Gastrointestinal: Negative.    Genitourinary:  Positive for menstrual problem (amenorrhea due to Depoprovera). Negative for difficulty urinating, pelvic pain and vaginal discharge.   Skin: Negative.        Physical Exam:  /73 (BP Location: Left arm, Patient Position: Sitting, Cuff Size: Standard)   Pulse 101   Ht 5' 5\" (1.651 m)   Wt 55.8 kg (123 lb)   BMI 20.47 kg/m²   Physical Exam  Constitutional:       General: She is not in acute distress.     Appearance: She is well-developed.   Genitourinary:      Vulva normal.      No lesions in the vagina.        Right Adnexa: not tender and no mass present.     Left Adnexa: not tender and no mass present.     No cervical motion tenderness or lesion.      Uterus is not tender.   Breasts:     Right: No mass, nipple discharge, skin change or tenderness.      Left: No mass, nipple discharge, skin " change or tenderness.   Neck:      Thyroid: No thyromegaly.   Cardiovascular:      Rate and Rhythm: Normal rate and regular rhythm.   Pulmonary:      Effort: Pulmonary effort is normal.   Abdominal:      Palpations: Abdomen is soft.      Tenderness: There is no abdominal tenderness.   Musculoskeletal:      Cervical back: Neck supple.   Neurological:      Mental Status: She is alert and oriented to person, place, and time.   Skin:     General: Skin is warm and dry.   Vitals reviewed.       Assessment/Plan:   1. Normal well-woman GYN exam.  2. Cervical cancer screening:  Normal cervical exam.  Pap smear done with HPV co-testing.  Has received full HPV vaccine series in the past.   3. STD screening:  Orders placed for vaginal GC/CT cultures.  Orders placed for serum anti-HIV, anti-HCV, HbsAg, syphilis panel.   4. Breast cancer screening:  Normal breast exam.  Reviewed breast self-awareness.   5. Depression Screening: Patient's depression screening was assessed with a PHQ-2 score of 0. Their PHQ-9 score was 6. Continue regular follow-up with their psychologist/therapist/psychiatrist who is managing their mental health condition(s).   6. BMI Counseling: Body mass index is 20.47 kg/m².  No intervention indicated.   7. Contraception:  Depoprovera injection every 3 months.  Given injection today.  Rx refills sent to pharmacy for another year.   8. Return to office in 1 year for annual GYN exam.    Reviewed with patient that test results are [facility] immediately, but that they will not necessarily be reviewed by me immediately.  Explained that I will review results at my earliest opportunity and contact patient appropriately.

## 2024-06-24 NOTE — PATIENT INSTRUCTIONS
Abdominal Pain   WHAT YOU NEED TO KNOW:   Abdominal pain can be dull, achy, or sharp. You may have pain in one area of your abdomen, or in your entire abdomen. Your pain may be caused by a condition such as constipation, food sensitivity or poisoning, infection, or a blockage. Abdominal pain can also be from a hernia, appendicitis, or an ulcer. Liver, gallbladder, or kidney conditions can also cause abdominal pain. The cause of your abdominal pain may not be known.       DISCHARGE INSTRUCTIONS:   Call your local emergency number (911 in the [place]) if:   You have chest pain or shortness of breath.      Return to the emergency department if:   You have pulsing pain in your upper abdomen or lower back that suddenly becomes constant.    Your pain is in the right lower abdominal area and worsens with movement.    You have a fever over 100.4°F (38°C) or shaking chills.    You are vomiting and cannot keep food or liquids down.    Your pain does not improve or gets worse over the next 8 to 12 hours.    You see blood in your vomit or bowel movements, or they look black and tarry.    Your skin or the whites of your eyes turn yellow.    You are a woman and have a large amount of vaginal bleeding that is not your monthly period.    Call your doctor if:   You have pain in your lower back.    You are a man and have pain in your testicles.    You have pain when you urinate.    You have questions or concerns about your condition or care.    Medicines:  You may need any of the following:  Medicines  may be given to calm your stomach or prevent vomiting.    Prescription pain medicine  may be given. Ask your healthcare provider how to take this medicine safely. Some prescription pain medicines contain acetaminophen. Do not take other medicines that contain acetaminophen without talking to your healthcare provider. Too much acetaminophen may cause liver damage. Prescription pain medicine may cause constipation. Ask your healthcare  provider how to prevent or treat constipation.     Take your medicine as directed.  Contact your healthcare provider if you think your medicine is not helping or if you have side effects. Tell your provider if you are allergic to any medicine. Keep a list of the medicines, vitamins, and herbs you take. Include the amounts, and when and why you take them. Bring the list or the pill bottles to follow-up visits. Carry your medicine list with you in case of an emergency.    Manage or prevent abdominal pain:   Apply heat  on your abdomen for 20 to 30 minutes every 2 hours for as many days as directed. Heat helps decrease pain and muscle spasms.    Make changes to the foods you eat, if needed.  Do not eat foods that cause abdominal pain or other symptoms. Eat small meals more often. The following changes may also help:    Eat more high-fiber foods if you are constipated.  High-fiber foods include fruits, vegetables, whole-grain foods, and legumes such as christiansen beans.         Do not eat foods that cause gas if you have bloating.  Examples include broccoli, cabbage, beans, and carbonated drinks.    Do not eat foods or drinks that contain sorbitol or fructose if you have diarrhea and bloating.  Some examples are fruit juices, candy, jelly, and sugar-free gum.    Do not eat high-fat foods.  Examples include fried foods, cheeseburgers, hot dogs, and desserts.    Make changes to the liquids you drink, if needed.  Do not drink liquids that cause pain or make it worse, such as orange juice. Drink liquids throughout the day to stay hydrated. The following changes may also help:    Drink more liquids to prevent dehydration from diarrhea or vomiting.  Ask your healthcare provider how much liquid to drink each day and which liquids are best for you.    Limit or do not have caffeine.  Caffeine may make symptoms such as heartburn or nausea worse.    Limit or do not drink alcohol.  Alcohol can make your abdominal pain worse. Ask your  healthcare provider if it is okay for you to drink alcohol. Also ask how much is okay for you to drink. A drink of alcohol is 12 ounces of beer, ½ ounce of liquor, or 5 ounces of wine.    Keep a diary of your abdominal pain.  A diary may help your healthcare provider learn what is causing your pain. Include when the pain happens, how long it lasts, and what the pain feels like. Write down any other symptoms you have with abdominal pain. Also write down what you eat, and any symptoms you have after you eat.    Manage stress.  Stress may cause abdominal pain. Your healthcare provider may recommend relaxation techniques and deep breathing exercises to help decrease your stress. Your healthcare provider may recommend you talk to someone about your stress or anxiety, such as a counselor or a friend. Get plenty of sleep. Exercise regularly.         Do not smoke.  Nicotine and other chemicals in cigarettes can damage your esophagus and stomach. Ask your healthcare provider for information if you currently smoke and need help to quit. E-cigarettes or smokeless tobacco still contain nicotine. Talk to your healthcare provider before you use these products.    Follow up with your doctor as directed:  Write down your questions so you remember to ask them during your visits.  © Copyright [employer] 2023 Information is for End User's use only and may not be sold, redistributed or otherwise used for commercial purposes.  The above information is an  only. It is not intended as medical advice for individual conditions or treatments. Talk to your doctor, nurse or pharmacist before following any medical regimen to see if it is safe and effective for you.

## 2024-06-24 NOTE — PROGRESS NOTES
Ambulatory Visit  Name: Monie Moraes      : 1990      MRN: [id number]  Encounter Provider: KARTIK Persaud  Encounter Date: 2024   Encounter department: [facility]    Assessment & Plan   1. Gastroesophageal reflux disease without esophagitis  Assessment & Plan:  Continue PPI   Referral to GI  -Discussed diet and lifestyle interventions to improve sx including: avoidance of common triggers (chocolate, caffeine, tomatoes, citrus), eat small meals frequently, remain upright after meals     Orders:  -     Ambulatory Referral to Gastroenterology; Future  2. Bloating  Assessment & Plan:  +abdominal bloating daily, no particular foods make better or worse, + epigastric pain, +blood and mucus in stool   Has BM every 3-4 days   Will check KUB  Referral to GI   Orders:  -     XR abdomen 1 view kub; Future; Expected date: 2024  -     Ambulatory Referral to Gastroenterology; Future  3. Mucus in stool  -     Ambulatory Referral to Gastroenterology; Future  4. Smoking  Assessment & Plan:  -continue to encourage cessation  -patient is aware of pharmacotherapy aids to assist with cessation           History of Present Illness     Monie Moraes is a 34 y.o. female who  has a past medical history of Abnormal Pap smear of cervix, ADHD, Bipolar 1 disorder (HCC), Depression, Epilepsy (HCC), GERD (gastroesophageal reflux disease), Migraine, OCD (obsessive compulsive disorder), and Urogenital trichomoniasis. who presented to the office today for follow up.         The following portions of the patient's history were reviewed and updated as appropriate: allergies, current medications, past family history, past medical history, past social history, past surgical history and problem list.      Abdominal Pain  This is a chronic problem. The current episode started more than 1 month ago. The onset quality is gradual. The problem occurs daily. The problem  has been gradually worsening. The pain is located in the epigastric region. The quality of the pain is aching. The abdominal pain does not radiate. Associated symptoms include constipation. Pertinent negatives include no anorexia, belching, dysuria, fever, flatus, frequency, headaches, hematuria, myalgias, nausea, vomiting or weight loss. Her past medical history is significant for GERD.     Review of Systems   Constitutional:  Negative for activity change, appetite change, chills, fatigue, fever, unexpected weight change and weight loss.   HENT:  Negative for hearing loss, nosebleeds, sinus pain, sneezing, sore throat and trouble swallowing.    Eyes:  Negative for photophobia and visual disturbance.   Respiratory:  Negative for cough, chest tightness, shortness of breath and wheezing.    Cardiovascular:  Negative for chest pain, palpitations and leg swelling.   Gastrointestinal:  Positive for abdominal pain, blood in stool and constipation. Negative for anorexia, flatus, nausea and vomiting.   Genitourinary:  Negative for decreased urine volume, difficulty urinating, dysuria, flank pain, frequency, genital sores, hematuria and urgency.   Musculoskeletal:  Negative for back pain, gait problem and myalgias.   Skin:  Negative for pallor, rash and wound.   Neurological:  Negative for dizziness, seizures, syncope, weakness, numbness and headaches.   Hematological:  Negative for adenopathy. Does not bruise/bleed easily.   Psychiatric/Behavioral:  Negative for confusion, hallucinations, self-injury, sleep disturbance and suicidal ideas. The patient is not nervous/anxious.      Past Medical History:   Diagnosis Date    Abnormal Pap smear of cervix     2009 cryo; 6/2019 LSIL/AGC pap; 6/2019 colpo HSIL; 8/2019 LEEP HSIL w/neg margins; 6/2020 NILM pap/neg HPV; 6/2020 NILM pap/neg HPV    ADHD     Anemia     Bipolar 1 disorder (HCC)     Depression     Epilepsy (HCC)     last episode 2000    GERD (gastroesophageal reflux  disease)     Migraine     OCD (obsessive compulsive disorder)     Urogenital trichomoniasis 2019     Past Surgical History:   Procedure Laterality Date    FOREIGN BODY REMOVAL N/A 9/20/2023    Procedure: removal of IUD;  Surgeon: Miriam Lafleur MD;  [facility];  Service: Gynecology    GYNECOLOGIC CRYOSURGERY  2009    LA COLPOSCOPY CERVIX VAG LOOP ELTRD BX CERVIX N/A 08/09/2019    Procedure: BIOPSY LEEP CERVIX;  Surgeon: Ramiro Carmen MD;  [facility];  Service: Gynecology    LA LAPS ABD PRTM&OMENTUM DX W/WO SPEC BR/WA SPX N/A 9/20/2023    Procedure: LAPAROSCOPY DIAGNOSTIC;  Surgeon: Miriam Lafleur MD;  [facility];  Service: Gynecology     Family History   Problem Relation Age of Onset    Bipolar disorder Mother     Diabetes Mother     Migraines Mother     Miscarriages / Stillbirths Mother     No Known Problems Father     Depression Sister     Asthma Sister     Osteoporosis Maternal Grandmother     Cancer Maternal Grandfather         lung    Breast cancer Neg Hx     Colon cancer Neg Hx     Ovarian cancer Neg Hx      Social History     Tobacco Use    Smoking status: Every Day     Current packs/day: 1.00     Average packs/day: 1 pack/day for 11.0 years (11.0 ttl pk-yrs)     Types: Cigarettes     Passive exposure: Current    Smokeless tobacco: Never   Vaping Use    Vaping status: Never Used   Substance and Sexual Activity    Alcohol use: Yes     Comment: couple times/month    Drug use: Never    Sexual activity: Not Currently     Partners: Male     Birth control/protection: Injection     Current Outpatient Medications on File Prior to Visit   Medication Sig    DULoxetine (CYMBALTA) 30 mg delayed release capsule Take 30 mg by mouth daily    lurasidone (LATUDA) 40 mg tablet Take 40 mg by mouth in the morning    Magnesium 400 MG TABS Take 1 tablet (400 mg total) by mouth in the morning    medroxyPROGESTERone (DEPO-PROVERA) 150 mg/mL injection Inject 1 mL (150 mg total) into  a muscle every 3 (three) months    omeprazole (PriLOSEC) 20 mg delayed release capsule TAKE 1 CAPSULE(20 MG) BY MOUTH TWICE DAILY FOR 14 DAYS    propranolol (INDERAL LA) 80 mg 24 hr capsule TAKE 1 CAPSULE(80 MG) BY MOUTH DAILY    Ubrogepant (Ubrelvy) 100 MG tablet Take 1 tablet (100 mg) one time as needed for migraine. May repeat one additional tablet (100 mg) at least two hours after the first dose. Do not use more than two doses per day, or for more than eight days per month.    [DISCONTINUED] azelastine (ASTELIN) 0.1 % nasal spray 1 spray into each nostril 2 (two) times a day Use in each nostril as directed (Patient not taking: Reported on 2/21/2024)    [DISCONTINUED] fexofenadine (ALLEGRA) 180 MG tablet Take 1 tablet (180 mg total) by mouth daily (Patient not taking: Reported on 2/21/2024)    [DISCONTINUED] ibuprofen (MOTRIN) 600 mg tablet Take 1 tablet (600 mg total) by mouth every 6 (six) hours as needed for mild pain (Patient not taking: Reported on 2/21/2024)    [DISCONTINUED] mirtazapine (REMERON) 15 mg tablet  (Patient not taking: Reported on 2/21/2024)    [DISCONTINUED] neomycin-bacitracin-polymyxin (NEOSPORIN) 5-400-5,000 ointment Apply topically 3 (three) times a day (Patient not taking: Reported on 12/18/2023)    [DISCONTINUED] Riboflavin 400 MG TABS Take 1 tablet (400 mg total) by mouth in the morning     No Known Allergies  Immunization History   Administered Date(s) Administered    COVID-19 MODERNA VACC 0.5 ML IM 03/22/2021, 04/20/2021, 11/29/2021    DTaP 1990, 1990, 1990, 09/30/1992, 05/05/1995    H1N1, All Formulations 12/28/2009    HPV Quadrivalent 05/24/2007, 07/24/2007, 12/24/2007, 05/16/2013, 09/19/2013, 12/30/2013    Hep B, Adolescent or Pediatric 04/21/1998, 08/04/1998, 02/02/1999    HiB 1990, 1990, 1990, 09/30/1992    INFLUENZA 12/28/2009, 12/28/2010, 10/27/2015, 11/01/2016, 09/12/2017, 10/10/2018, 04/12/2021, 10/27/2021    IPV 1990, 1990,  "09/30/1992, 05/05/1995    Influenza, injectable, quadrivalent, preservative free 0.5 mL 09/24/2018, 09/15/2020, 11/01/2022    MMR 06/09/1992, 03/19/1994, 01/12/2005    Meningococcal MCV4P 10/07/2008    Pneumococcal Conjugate Vaccine 20-valent (Pcv20), Polysace 11/01/2022    Pneumococcal Polysaccharide PPV23 06/03/2021    Td (adult), adsorbed 10/13/2003    Tdap 12/28/2009, 10/14/2020     Objective     /80 (BP Location: Left arm, Patient Position: Sitting, Cuff Size: Standard)   Pulse 96   Temp 97.9 °F (36.6 °C) (Temporal)   Resp 18   Ht 5' 5\" (1.651 m)   Wt 55.7 kg (122 lb 11.2 oz)   SpO2 98%   BMI 20.42 kg/m²     Physical Exam  Vitals and nursing note reviewed.   Constitutional:       General: She is not in acute distress.     Appearance: Normal appearance. She is not diaphoretic.   HENT:      Head: Normocephalic.      Right Ear: Tympanic membrane and external ear normal.      Left Ear: Tympanic membrane and external ear normal.      Nose: Nose normal.      Mouth/Throat:      Mouth: Mucous membranes are moist.   Eyes:      General:         Right eye: No discharge.         Left eye: No discharge.      Extraocular Movements: Extraocular movements intact.      Conjunctiva/sclera: Conjunctivae normal.      Pupils: Pupils are equal, round, and reactive to light.   Cardiovascular:      Rate and Rhythm: Normal rate and regular rhythm.      Pulses: Normal pulses.      Heart sounds: Normal heart sounds.   Pulmonary:      Effort: Pulmonary effort is normal. No respiratory distress.      Breath sounds: Normal breath sounds.   Abdominal:      General: Bowel sounds are normal. There is no distension.      Palpations: Abdomen is soft.      Tenderness: There is abdominal tenderness in the epigastric area.   Musculoskeletal:         General: Normal range of motion.      Cervical back: Normal range of motion and neck supple. No rigidity.      Right lower leg: No edema.      Left lower leg: No edema.   Lymphadenopathy: "      Cervical: No cervical adenopathy.   Skin:     General: Skin is warm and dry.      Capillary Refill: Capillary refill takes less than 2 seconds.      Findings: No rash.   Neurological:      General: No focal deficit present.      Mental Status: She is alert and oriented to person, place, and time.   Psychiatric:         Mood and Affect: Mood normal.         Behavior: Behavior normal.       Administrative Statements

## 2024-06-24 NOTE — LETTER
2024    To Monie Moraes  : 1990      This letter is to advise you that your recent PAP SMEAR results were reviewed by me and are NORMAL.  We will see you in 1 year for your annual exam.    KARTIK Hodges

## 2024-06-24 NOTE — ASSESSMENT & PLAN NOTE
+abdominal bloating daily, no particular foods make better or worse, + epigastric pain, +blood and mucus in stool   Has BM every 3-4 days   Will check KUB  Referral to GI

## 2024-06-24 NOTE — ASSESSMENT & PLAN NOTE
-continue to encourage cessation  -patient is aware of pharmacotherapy aids to assist with cessation

## 2024-06-24 NOTE — ADDENDUM NOTE
Addended by: LAST BRAUN on: 6/24/2024 01:45 PM     Modules accepted: Orders     [de-identified] : Edel is a 61-year-old woman with a history of cirrhosis secondary to hepatitis C who was treated with Harvoni and completed therapy in July of 2016. She has had a sustained virologic response. She comes in today for followup. Her main complaint is persistent mid-and right upper quadrant abdominal discomfort which she has had for at least the last 5 years. She has a diagnosis of chronic pancreatitis and was recently evaluated at [facility] approximately one month ago which reports having a CT scan. She has no complaints related to her liver\par \par CT scan August 28, 2018 with a cirrhotic-appearing liver without lesions. Blood tests February 7, 2018 HCV on a not detected, ALT 53, AST 38, creatinine 0.87, total bilirubin 0.4, INR 1.03, platelet count 97,000 alpha-fetoprotein 4.3\par \par

## 2024-06-24 NOTE — ASSESSMENT & PLAN NOTE
Continue PPI   Referral to GI  -Discussed diet and lifestyle interventions to improve sx including: avoidance of common triggers (chocolate, caffeine, tomatoes, citrus), eat small meals frequently, remain upright after meals

## 2024-06-26 DIAGNOSIS — K59.00 CONSTIPATION, UNSPECIFIED CONSTIPATION TYPE: Primary | ICD-10-CM

## 2024-06-26 LAB
C TRACH DNA SPEC QL NAA+PROBE: NEGATIVE
N GONORRHOEA DNA SPEC QL NAA+PROBE: NEGATIVE

## 2024-06-26 RX ORDER — POLYETHYLENE GLYCOL 3350 17 G/17G
17 POWDER, FOR SOLUTION ORAL DAILY
Qty: 850 G | Refills: 0 | Status: SHIPPED | OUTPATIENT
Start: 2024-06-26

## 2024-06-27 LAB
LAB AP GYN PRIMARY INTERPRETATION: NORMAL
Lab: NORMAL

## 2024-07-01 ENCOUNTER — HOSPITAL ENCOUNTER (EMERGENCY)
Facility: HOSPITAL | Age: 34
Discharge: HOME | End: 2024-07-01
Attending: EMERGENCY MEDICINE | Admitting: EMERGENCY MEDICINE
Payer: MEDICARE

## 2024-07-01 VITALS
HEART RATE: 127 BPM | OXYGEN SATURATION: 100 % | BODY MASS INDEX: 20.43 KG/M2 | WEIGHT: 122.8 LBS | SYSTOLIC BLOOD PRESSURE: 136 MMHG | TEMPERATURE: 98.9 F | DIASTOLIC BLOOD PRESSURE: 78 MMHG | RESPIRATION RATE: 18 BRPM

## 2024-07-01 DIAGNOSIS — M94.0 COSTOCHONDRITIS, ACUTE: Primary | ICD-10-CM

## 2024-07-01 DIAGNOSIS — J10.1 INFLUENZA A: ICD-10-CM

## 2024-07-01 DIAGNOSIS — J06.9 UPPER RESPIRATORY INFECTION, ACUTE: ICD-10-CM

## 2024-07-01 LAB
FLUAV RNA RESP QL NAA+PROBE: POSITIVE
FLUBV RNA RESP QL NAA+PROBE: NEGATIVE
RSV RNA RESP QL NAA+PROBE: NEGATIVE
S PYO DNA THROAT QL NAA+PROBE: NOT DETECTED
SARS-COV-2 RNA RESP QL NAA+PROBE: NEGATIVE

## 2024-07-01 PROCEDURE — 0241U HB NFCT DS VIR RESP RNA 4 TRGT: CPT

## 2024-07-01 PROCEDURE — 99283 EMERGENCY DEPT VISIT LOW MDM: CPT

## 2024-07-01 PROCEDURE — 99284 EMERGENCY DEPT VISIT MOD MDM: CPT

## 2024-07-01 PROCEDURE — 87651 STREP A DNA AMP PROBE: CPT

## 2024-07-01 RX ORDER — IBUPROFEN 600 MG/1
600 TABLET ORAL EVERY 6 HOURS PRN
Qty: 30 TABLET | Refills: 0 | Status: SHIPPED | OUTPATIENT
Start: 2024-07-01

## 2024-07-01 RX ORDER — ACETAMINOPHEN 325 MG/1
975 TABLET ORAL ONCE
Status: COMPLETED | OUTPATIENT
Start: 2024-07-01 | End: 2024-07-01

## 2024-07-01 RX ORDER — IBUPROFEN 400 MG/1
800 TABLET ORAL ONCE
Status: COMPLETED | OUTPATIENT
Start: 2024-07-01 | End: 2024-07-01

## 2024-07-01 RX ADMIN — IBUPROFEN 800 MG: 400 TABLET, FILM COATED ORAL at 20:59

## 2024-07-01 RX ADMIN — ACETAMINOPHEN 975 MG: 325 TABLET, FILM COATED ORAL at 20:59

## 2024-07-02 NOTE — ED PROVIDER NOTES
History  Chief Complaint   Patient presents with    Rib Pain     Pt reports L sided rib pain that started about 4 hours ago. Pt also c/o sore throat and runny nose starting 4 days ago.      34-year-old female presenting emergency department with left-sided rib pain that started 4 hours ago.  Patient states that 4 days prior she started with sore throat and runny nose with current mild fatigue.  Patient admits to taking Tylenol Cold and sinus for other symptoms.  Patient denies any exogenous estrogen use or prolonged periods of sitting.  Patient states that the pain is sharp and worsened with inspiration.  Patient states she notices her rib because when she pushes on the ribs she has increased pain and tenderness.  Patient denies any chest pain, palpitations, shortness of breath, chest tightness, chest congestion, myalgias, or N/V/D.        Prior to Admission Medications   Prescriptions Last Dose Informant Patient Reported? Taking?   DULoxetine (CYMBALTA) 30 mg delayed release capsule  Self Yes No   Sig: Take 30 mg by mouth daily   Magnesium 400 MG TABS  Self No No   Sig: Take 1 tablet (400 mg total) by mouth in the morning   Ubrogepant (Ubrelvy) 100 MG tablet   No No   Sig: Take 1 tablet (100 mg) one time as needed for migraine. May repeat one additional tablet (100 mg) at least two hours after the first dose. Do not use more than two doses per day, or for more than eight days per month.   lurasidone (LATUDA) 40 mg tablet  Self Yes No   Sig: Take 40 mg by mouth in the morning   medroxyPROGESTERone (DEPO-PROVERA) 150 mg/mL injection   No No   Sig: Inject 1 mL (150 mg total) into a muscle every 3 (three) months   omeprazole (PriLOSEC) 20 mg delayed release capsule  Self No No   Sig: TAKE 1 CAPSULE(20 MG) BY MOUTH TWICE DAILY FOR 14 DAYS   polyethylene glycol (GLYCOLAX) 17 GM/SCOOP powder   No No   Sig: Take 17 g by mouth daily   propranolol (INDERAL LA) 80 mg 24 hr capsule   No No   Sig: TAKE 1 CAPSULE(80 MG) BY MOUTH  DAILY      Facility-Administered Medications: None       Past Medical History:   Diagnosis Date    Abnormal Pap smear of cervix     2009 cryo; 6/2019 LSIL/AGC pap; 6/2019 colpo HSIL; 8/2019 LEEP HSIL w/neg margins; 6/2020 NILM pap/neg HPV; 6/2020 NILM pap/neg HPV    ADHD     Anemia     Bipolar 1 disorder (HCC)     Depression     Epilepsy (HCC)     last episode 2000    GERD (gastroesophageal reflux disease)     Migraine     OCD (obsessive compulsive disorder)     Prediabetes     Urogenital trichomoniasis 2019       Past Surgical History:   Procedure Laterality Date    FOREIGN BODY REMOVAL N/A 9/20/2023    Procedure: removal of IUD;  Surgeon: Miriam Lafleur MD;  Location: [facility];  Service: Gynecology    GYNECOLOGIC CRYOSURGERY  2009    TX COLPOSCOPY CERVIX VAG LOOP ELTRD BX CERVIX N/A 08/09/2019    Procedure: BIOPSY LEEP CERVIX;  Surgeon: Ramiro Carmen MD;  Location: [facility];  Service: Gynecology    TX LAPS ABD PRTM&OMENTUM DX W/WO SPEC BR/WA SPX N/A 9/20/2023    Procedure: LAPAROSCOPY DIAGNOSTIC;  Surgeon: Miriam Lafleur MD;  Location: [facility];  Service: Gynecology       Family History   Problem Relation Age of Onset    Bipolar disorder Mother     Diabetes Mother     Migraines Mother     Miscarriages / Stillbirths Mother     No Known Problems Father     Depression Sister     Asthma Sister     Osteoporosis Maternal Grandmother     Cancer Maternal Grandfather         lung    Breast cancer Neg Hx     Colon cancer Neg Hx     Ovarian cancer Neg Hx      I have reviewed and agree with the history as documented.    E-Cigarette/Vaping    E-Cigarette Use Never User      E-Cigarette/Vaping Substances    Nicotine No     THC No     CBD No     Flavoring No     Other No     Unknown No      Social History     Tobacco Use    Smoking status: Every Day     Current packs/day: 1.00     Average packs/day: 1 pack/day for 11.0 years (11.0 ttl pk-yrs)     Types: Cigarettes     Passive exposure: Current     Smokeless tobacco: Never   Vaping Use    Vaping status: Never Used   Substance Use Topics    Alcohol use: Yes     Comment: couple times/month    Drug use: Never       Review of Systems   Constitutional:  Positive for fatigue and fever. Negative for appetite change, chills and diaphoresis.   HENT:  Positive for rhinorrhea and sore throat. Negative for congestion, dental problem, ear discharge, ear pain, facial swelling, hearing loss, mouth sores, nosebleeds, sinus pressure, sinus pain and sneezing.    Eyes:  Negative for photophobia, pain, discharge, redness and itching.   Respiratory:  Positive for cough. Negative for choking, chest tightness, shortness of breath, wheezing and stridor.    Cardiovascular:  Negative for chest pain, palpitations and leg swelling.   Gastrointestinal:  Negative for abdominal pain, constipation, diarrhea, nausea and vomiting.   Genitourinary: Negative.    Musculoskeletal:  Negative for arthralgias, back pain, gait problem, joint swelling, myalgias, neck pain and neck stiffness.   Skin:  Negative for pallor, rash and wound.   Neurological:  Positive for headaches. Negative for dizziness, weakness, light-headedness and numbness.       Physical Exam  Physical Exam  Constitutional:       General: She is not in acute distress.     Appearance: Normal appearance. She is not ill-appearing, toxic-appearing or diaphoretic.   HENT:      Head: Normocephalic and atraumatic.      Right Ear: There is no impacted cerumen.      Left Ear: There is no impacted cerumen.      Nose: No congestion or rhinorrhea.      Mouth/Throat:      Mouth: Mucous membranes are moist.      Pharynx: Oropharynx is clear. No oropharyngeal exudate or posterior oropharyngeal erythema.   Eyes:      Extraocular Movements: Extraocular movements intact.      Pupils: Pupils are equal, round, and reactive to light.   Cardiovascular:      Rate and Rhythm: Normal rate and regular rhythm.      Pulses: Normal pulses.      Heart sounds:  Normal heart sounds. No murmur heard.     No friction rub. No gallop.   Pulmonary:      Effort: Pulmonary effort is normal. No respiratory distress.      Breath sounds: Normal breath sounds. No stridor. No wheezing, rhonchi or rales.      Comments: Patient showed reproducible chest wall tenderness over the left seventh rib at the costosternal junction.  Chest:      Chest wall: Tenderness present.   Abdominal:      General: Abdomen is flat. There is no distension.      Palpations: Abdomen is soft. There is no mass.      Tenderness: There is no abdominal tenderness. There is no right CVA tenderness, left CVA tenderness, guarding or rebound.      Hernia: No hernia is present.   Musculoskeletal:      Cervical back: Normal range of motion and neck supple. No rigidity or tenderness.   Skin:     General: Skin is warm.      Capillary Refill: Capillary refill takes less than 2 seconds.      Coloration: Skin is not jaundiced or pale.      Findings: No bruising, erythema, lesion or rash.   Neurological:      General: No focal deficit present.      Mental Status: She is alert and oriented to person, place, and time.         Vital Signs  ED Triage Vitals   Temperature Pulse Respirations Blood Pressure SpO2   07/01/24 2014 07/01/24 2014 07/01/24 2014 07/01/24 2014 07/01/24 2014   98.9 °F (37.2 °C) (!) 127 18 136/78 100 %      Temp Source Heart Rate Source Patient Position - Orthostatic VS BP Location FiO2 (%)   07/01/24 2014 07/01/24 2014 07/01/24 2014 07/01/24 2014 --   Oral Monitor Sitting Right arm       Pain Score       07/01/24 2059       10 - Worst Possible Pain           Vitals:    07/01/24 2014   BP: 136/78   Pulse: (!) 127   Patient Position - Orthostatic VS: Sitting         Visual Acuity      ED Medications  Medications   acetaminophen (TYLENOL) tablet 975 mg (975 mg Oral Given 7/1/24 2059)   ibuprofen (MOTRIN) tablet 800 mg (800 mg Oral Given 7/1/24 2059)       Diagnostic Studies  Results Reviewed       Procedure  Component Value Units Date/Time    FLU/RSV/COVID - if FLU/RSV clinically relevant [[id number]]  (Abnormal) Collected: 07/01/24 2056    Lab Status: Final result Specimen: Nares from Nose Updated: 07/01/24 2208     SARS-CoV-2 Negative     INFLUENZA A PCR Positive     INFLUENZA B PCR Negative     RSV PCR Negative    Narrative:      FOR PEDIATRIC PATIENTS - copy/paste COVID Guidelines URL to browser: [email or web address]    SARS-CoV-2 assay is a Nucleic Acid Amplification assay intended for the  qualitative detection of nucleic acid from SARS-CoV-2 in nasopharyngeal  swabs. Results are for the presumptive identification of SARS-CoV-2 RNA.    Positive results are indicative of infection with SARS-CoV-2, the virus  causing COVID-19, but do not rule out bacterial infection or co-infection  with other viruses. Laboratories within the [place] and its  territories are required to report all positive results to the appropriate  public health authorities. Negative results do not preclude SARS-CoV-2  infection and should not be used as the sole basis for treatment or other  patient management decisions. Negative results must be combined with  clinical observations, patient history, and epidemiological information.  This test has not been FDA cleared or approved.    This test has been authorized by FDA under an Emergency Use Authorization  (EUA). This test is only authorized for the duration of time the  declaration that circumstances exist justifying the authorization of the  emergency use of an in vitro diagnostic tests for detection of SARS-CoV-2  virus and/or diagnosis of COVID-19 infection under section 564(b)(1) of  the Act, 21 U.S.C. 360bbb-3(b)(1), unless the authorization is terminated  or revoked sooner. The test has been validated but independent review by FDA  and CLIA is pending.    Test performed using [employer]: This RT-PCR assay targets N2,  a  region unique to SARS-CoV-2. A conserved region in the E-gene was chosen  for pan-Sarbecovirus detection which includes SARS-CoV-2.    According to CMS-2020-01-R, this platform meets the definition of high-throughput technology.    Strep A PCR [[id number]]  (Normal) Collected: 07/01/24 2056    Lab Status: Final result Specimen: Throat Updated: 07/01/24 2156     STREP A PCR Not Detected                   No orders to display              Procedures  Procedures         ED Course                       PERC Rule for PE      Flowsheet Row Most Recent Value   PERC Rule for PE    Age >=50 0 Filed at: 07/01/2024 2110   HR >=100 1 Filed at: 07/01/2024 2110   O2 Sat on room air < 95% 0 Filed at: 07/01/2024 2110   History of PE or DVT 0 Filed at: 07/01/2024 2110   Recent trauma or surgery 0 Filed at: 07/01/2024 2110   Hemoptysis 0 Filed at: 07/01/2024 2110   Exogenous estrogen 0 Filed at: 07/01/2024 2110   Unilateral leg swelling 0 Filed at: 07/01/2024 2110   PERC Rule for PE Results 1 Filed at: 07/01/2024 2110                SBIRT 20yo+      Flowsheet Row Most Recent Value   Initial Alcohol Screen: US AUDIT-C     1. How often do you have a drink containing alcohol? 1 Filed at: 07/01/2024 2015   2. How many drinks containing alcohol do you have on a typical day you are drinking?  0 Filed at: 07/01/2024 2015   3b. FEMALE Any Age, or MALE 65+: How often do you have 4 or more drinks on one occassion? 0 Filed at: 07/01/2024 2015   Audit-C Score 1 Filed at: 07/01/2024 2015   ALFRED: How many times in the past year have you...    Used an illegal drug or used a prescription medication for non-medical reasons? Never Filed at: 07/01/2024 2015            Wells' Criteria for PE      Flowsheet Row Most Recent Value   Wells' Criteria for PE    Clinical signs and symptoms of DVT 0 Filed at: 07/01/2024 2110   PE is primary diagnosis or equally likely 0 Filed at: 07/01/2024 2110   HR >100 1.5 Filed at: 07/01/2024 2110   Immobilization at least 3  days or Surgery in the previous 4 weeks 0 Filed at: 07/01/2024 2110   Previous, objectively diagnosed PE or DVT 0 Filed at: 07/01/2024 2110   Hemoptysis 0 Filed at: 07/01/2024 2110   Malignancy with treatment within 6 months or palliative 0 Filed at: 07/01/2024 2110   Wells' Criteria Total 1.5 Filed at: 07/01/2024 2110                  Medical Decision Making  34-year-old female presenting emergency department with left-sided rib pain that started 4 hours ago.  Patient states that 4 days prior she started with sore throat and runny nose with current mild fatigue.  Patient admits to taking Tylenol Cold and sinus for other symptoms.  Patient denies any exogenous estrogen use or prolonged periods of sitting. Patient showed reproducible chest wall tenderness over the left seventh rib at the costosternal junction. DDX including but not limited to: chest wall pain, pleurisy, costochondritis, pericarditis, myocarditis, PTX, pneumonia, GI etiology; doubt ACS or MI or dissection or PE or rhabdomyolysis.  Due to heart rate of 127 unable to PERC patient out.  Wells criteria at low 1.5 consistent with a 1.3% risk of PE.  Patient shows no other risk factors such as prolonged sitting or estrogen use so it is highly unlikely.  Due to symptoms of cough, sore throat and nasal congestion COVID/flu/RSV and a strep test was ordered.  Patient positive for influenza A.  Patient discharged in stable condition.  Patient to continue Tylenol Motrin for pain and inflammation.  Patient to continue current medication therapy for influenza-like symptoms.  Discussed that Tylenol cold and flu and the Tylenol written the prescription are the same medication and should not be used at the same time.  Patient to return to the emergency department if she develops high fever uncontrolled by Tylenol, shortness of breath, chest pain, palpitations, extreme shortness of breath, or hemoptysis.  Shared decision making used, patient verbalized understanding  and agrees with current plan.    Amount and/or Complexity of Data Reviewed  Labs: ordered.    Risk  OTC drugs.  Prescription drug management.             Disposition  Final diagnoses:   Costochondritis, acute   Upper respiratory infection, acute   Influenza A     Time reflects when diagnosis was documented in both MDM as applicable and the Disposition within this note       Time User Action Codes Description Comment    7/1/2024  9:35 PM Julio Parks [M94.0] Costochondritis, acute     7/1/2024  9:36 PM Julio Parks [J06.9] Upper respiratory infection, acute     7/1/2024 10:11 PM Julio Parks [J10.1] Influenza A           ED Disposition       ED Disposition   Discharge    Condition   Stable    Date/Time   Mon Jul 1, 2024 2135    Comment   Monie Moraes discharge to home/self care.                   Follow-up Information    None         Discharge Medication List as of 7/1/2024  9:43 PM        START taking these medications    Details   ibuprofen (MOTRIN) 600 mg tablet Take 1 tablet (600 mg total) by mouth every 6 (six) hours as needed for mild pain, Starting Mon 7/1/2024, Normal           CONTINUE these medications which have NOT CHANGED    Details   DULoxetine (CYMBALTA) 30 mg delayed release capsule Take 30 mg by mouth daily, Starting Thu 12/14/2023, Historical Med      lurasidone (LATUDA) 40 mg tablet Take 40 mg by mouth in the morning, Starting Sun 5/29/2022, Historical Med      Magnesium 400 MG TABS Take 1 tablet (400 mg total) by mouth in the morning, Starting Tue 11/1/2022, Normal      medroxyPROGESTERone (DEPO-PROVERA) 150 mg/mL injection Inject 1 mL (150 mg total) into a muscle every 3 (three) months, Starting Mon 6/24/2024, Normal      omeprazole (PriLOSEC) 20 mg delayed release capsule TAKE 1 CAPSULE(20 MG) BY MOUTH TWICE DAILY FOR 14 DAYS, Starting Thu 9/14/2023, Normal      polyethylene glycol (GLYCOLAX) 17 GM/SCOOP powder Take 17 g by mouth daily, Starting Wed 6/26/2024,  Normal      propranolol (INDERAL LA) 80 mg 24 hr capsule TAKE 1 CAPSULE(80 MG) BY MOUTH DAILY, Normal      Ubrogepant (Ubrelvy) 100 MG tablet Take 1 tablet (100 mg) one time as needed for migraine. May repeat one additional tablet (100 mg) at least two hours after the first dose. Do not use more than two doses per day, or for more than eight days per month., Normal             No discharge procedures on file.    PDMP Review         Value Time User    PDMP Reviewed  Yes 1/14/2022 11:43 AM Jacinto Everett PA-C            ED Provider  Electronically Signed by             Julio Parks PA-C  07/02/24 0111     Pt seen indicated a good  PO intake PTA.   Confirmed her mom is food shopping and cooking. Follows a diet low in salt and consistent in Vitamin K.     Pt confirms she has been weighting her self daily; weight at this time appears stable at 170lbs.   Pt denies any questions or concerns regarding HF nutrition therapy or coumadin education.     Per RN documentation, Pt eating 100% of meals, but continues to avoid breakfast.   RD took lunch and dinner request from Pt. will continue to make use Pt is eating well. Pt denies all PO supplements at this time.     Pt denies GI distress, chewing/swallowing difficulty, micronutrient supplements. NKFA

## 2024-07-02 NOTE — DISCHARGE INSTRUCTIONS
Take medication as prescribed  Follow-up with PCP in outpatient setting  Return to the emergency department develop high fever uncontrolled by Tylenol, shortness of breath, chest pain, palpitations, extreme shortness of breath, or, blood.

## 2024-07-19 DIAGNOSIS — Z00.6 ENCOUNTER FOR EXAMINATION FOR NORMAL COMPARISON OR CONTROL IN CLINICAL RESEARCH PROGRAM: ICD-10-CM

## 2024-08-13 ENCOUNTER — CONSULT (OUTPATIENT)
Dept: GASTROENTEROLOGY | Facility: MEDICAL CENTER | Age: 34
End: 2024-08-13
Payer: MEDICARE

## 2024-08-13 VITALS
WEIGHT: 122 LBS | TEMPERATURE: 98.6 F | DIASTOLIC BLOOD PRESSURE: 54 MMHG | SYSTOLIC BLOOD PRESSURE: 116 MMHG | HEIGHT: 65 IN | BODY MASS INDEX: 20.33 KG/M2

## 2024-08-13 DIAGNOSIS — A04.8 H. PYLORI INFECTION: ICD-10-CM

## 2024-08-13 DIAGNOSIS — K21.9 GASTROESOPHAGEAL REFLUX DISEASE WITHOUT ESOPHAGITIS: ICD-10-CM

## 2024-08-13 DIAGNOSIS — R19.5 MUCUS IN STOOL: ICD-10-CM

## 2024-08-13 DIAGNOSIS — R14.0 BLOATING: ICD-10-CM

## 2024-08-13 PROCEDURE — 99204 OFFICE O/P NEW MOD 45 MIN: CPT | Performed by: PHYSICIAN ASSISTANT

## 2024-08-13 NOTE — PROGRESS NOTES
[facility] - Outpatient Consultation  Monie Moraes 34 y.o. female MRN: [id number]  Encounter: [id number]        Assessment and Plan    1. GERD  New onset GERD well-controlled on omeprazole 20 mg once daily.  -Continue omeprazole 20 mg once daily for total of 2 months and if doing well can then titrate off the medication    2. Constipation  The patient states that she is having a daily formed bowel movement that feels complete however she is having bloating and the sensation of rectal pressure in between bowel movements.  She had a KUB obtained revealing moderate constipation.  She does not intake a lot of fiber or water.  She denies any blood in her stool, unintentional weight loss, or other alarm symptoms  -60 ounces of water daily  -20 to 25 g of fiber daily  -The above is ineffective start MiraLAX   -Persistent symptoms despite treatment recommend colonoscopy    Follow-up 3 to 4 months or sooner if needed      ______________________________________________________________________    History of Present Illness  Monie Moraes is a 34 y.o. female here for consultation of GERD and constipation. GERD currently well controlled after starting PPI, omeprazole 20mg once daily. As for her constipation she feels bloated and previously mentioned going every 3-4 days. KUB was obtained and revealed moderate constipation. She states now she is actually having daily bowel movements and it feels complete but she notes mucus and in between bowel movements rectal pressure. No blood in her stool, unintentional weight loss, or other alarm symptoms.       Review of Systems   Constitutional:  Negative for activity change, appetite change, chills, fatigue, fever and unexpected weight change.   Gastrointestinal:  Positive for abdominal pain, constipation, diarrhea and nausea. Negative for abdominal distention, anal bleeding, blood in stool, rectal pain and vomiting.   Genitourinary:   Negative for frequency.   Musculoskeletal:  Negative for arthralgias and myalgias.   Psychiatric/Behavioral:  Negative for confusion.        Past Medical History  Past Medical History:   Diagnosis Date    Abnormal Pap smear of cervix     2009 cryo; 6/2019 LSIL/AGC pap; 6/2019 colpo HSIL; 8/2019 LEEP HSIL w/neg margins; 6/2020 NILM pap/neg HPV; 6/2020 NILM pap/neg HPV    ADHD     Anemia     Bipolar 1 disorder (HCC)     Depression     Epilepsy (HCC)     last episode 2000    GERD (gastroesophageal reflux disease)     Migraine     OCD (obsessive compulsive disorder)     Prediabetes     Urogenital trichomoniasis 2019       Past Social history  Past Surgical History:   Procedure Laterality Date    FOREIGN BODY REMOVAL N/A 9/20/2023    Procedure: removal of IUD;  Surgeon: Miriam Lafleur MD;  Location: [facility];  Service: Gynecology    GYNECOLOGIC CRYOSURGERY  2009    TX COLPOSCOPY CERVIX VAG LOOP ELTRD BX CERVIX N/A 08/09/2019    Procedure: BIOPSY LEEP CERVIX;  Surgeon: Ramiro Carmen MD;  Location: [facility];  Service: Gynecology    TX LAPS ABD PRTM&OMENTUM DX W/WO SPEC BR/WA SPX N/A 9/20/2023    Procedure: LAPAROSCOPY DIAGNOSTIC;  Surgeon: Miriam Lafleur MD;  Location: [facility];  Service: Gynecology     Social History     Socioeconomic History    Marital status: Single     Spouse name: Not on file    Number of children: Not on file    Years of education: Not on file    Highest education level: Not on file   Occupational History    Not on file   Tobacco Use    Smoking status: Every Day     Current packs/day: 1.00     Average packs/day: 1 pack/day for 11.0 years (11.0 ttl pk-yrs)     Types: Cigarettes     Passive exposure: Current    Smokeless tobacco: Never   Vaping Use    Vaping status: Never Used   Substance and Sexual Activity    Alcohol use: Yes     Comment: couple times/month    Drug use: Never    Sexual activity: Not Currently     Partners: Male     Birth control/protection: Injection    Other Topics Concern    Not on file   Social History Narrative    Not on file     Social Determinants of Health     Financial Resource Strain: Low Risk  (6/24/2024)    Overall Financial Resource Strain (CARDIA)     Difficulty of Paying Living Expenses: Not hard at all   Food Insecurity: No Food Insecurity (6/24/2024)    Hunger Vital Sign     Worried About Running Out of Food in the Last Year: Never true     Ran Out of Food in the Last Year: Never true   Transportation Needs: No Transportation Needs (6/24/2024)    PRAPARE - Transportation     Lack of Transportation (Medical): No     Lack of Transportation (Non-Medical): No   Physical Activity: Inactive (6/16/2021)    Exercise Vital Sign     Days of Exercise per Week: 0 days     Minutes of Exercise per Session: 0 min   Stress: No Stress Concern Present (6/16/2021)    [employer] [place] [employer] - Occupational Stress Questionnaire     Feeling of Stress : Only a little   Social Connections: Not on file   Intimate Partner Violence: Not At Risk (6/16/2021)    Humiliation, Afraid, Rape, and Kick questionnaire     Fear of Current or Ex-Partner: No     Emotionally Abused: No     Physically Abused: No     Sexually Abused: No   Housing Stability: Low Risk  (6/24/2024)    Housing Stability Vital Sign     Unable to Pay for Housing in the Last Year: No     Number of Times Moved in the Last Year: 1     Homeless in the Last Year: No     Social History     Substance and Sexual Activity   Alcohol Use Yes    Comment: couple times/month     Social History     Substance and Sexual Activity   Drug Use Never     Social History     Tobacco Use   Smoking Status Every Day    Current packs/day: 1.00    Average packs/day: 1 pack/day for 11.0 years (11.0 ttl pk-yrs)    Types: Cigarettes    Passive exposure: Current   Smokeless Tobacco Never       Past Family History  Family History   Problem Relation Age of Onset    Bipolar disorder Mother     Diabetes Mother     Migraines Mother  "    Miscarriages / Stillbirths Mother     No Known Problems Father     Depression Sister     Asthma Sister     Osteoporosis Maternal Grandmother     Cancer Maternal Grandfather         lung    Breast cancer Neg Hx     Colon cancer Neg Hx     Ovarian cancer Neg Hx        Current Medications  Current Outpatient Medications   Medication Sig Dispense Refill    DULoxetine (CYMBALTA) 30 mg delayed release capsule Take 30 mg by mouth daily      ibuprofen (MOTRIN) 600 mg tablet Take 1 tablet (600 mg total) by mouth every 6 (six) hours as needed for mild pain 30 tablet 0    lurasidone (LATUDA) 40 mg tablet Take 40 mg by mouth in the morning      Magnesium 400 MG TABS Take 1 tablet (400 mg total) by mouth in the morning 90 tablet 3    medroxyPROGESTERone (DEPO-PROVERA) 150 mg/mL injection Inject 1 mL (150 mg total) into a muscle every 3 (three) months 1 mL 3    omeprazole (PriLOSEC) 20 mg delayed release capsule Take 1 capsule (20 mg total) by mouth 2 (two) times a day 30 capsule 2    polyethylene glycol (GLYCOLAX) 17 GM/SCOOP powder Take 17 g by mouth daily 850 g 0    propranolol (INDERAL LA) 80 mg 24 hr capsule TAKE 1 CAPSULE(80 MG) BY MOUTH DAILY 90 capsule 1    Ubrogepant (Ubrelvy) 100 MG tablet Take 1 tablet (100 mg) one time as needed for migraine. May repeat one additional tablet (100 mg) at least two hours after the first dose. Do not use more than two doses per day, or for more than eight days per month. 9 tablet 11     No current facility-administered medications for this visit.       Allergies  No Known Allergies      The following portions of the patient's history were reviewed and updated as appropriate: allergies, current medications, past medical history, past social history, past surgical history and problem list.      Vitals  Vitals:    08/13/24 0855   BP: 116/54   BP Location: Left arm   Temp: 98.6 °F (37 °C)   Weight: 55.3 kg (122 lb)   Height: 5' 5\" (1.651 m)         Physical Exam  Constitutional   General " appearance: Patient is seated and in no acute distress, well appearing and well nourished.   Head and Face   Head and face: Normal.    Eyes   Conjunctiva and lids: No erythema, swelling or discharge.  Anicteric.  Ears, Nose, Mouth, and Throat   Hearing: Normal.    Neck: Supple, trachea midline.  Pulmonary   Respiratory effort: No increased work of breathing or signs of respiratory distress.    Cardiovascular   Examination of extremities for edema and/or varicosities: Normal.    Musculoskeletal   Gait and station: Normal    Skin   Skin and subcutaneous tissue: Warm, dry, and intact. No visible jaundice, lesions or rashes.  Psychiatric   Judgment and insight: Normal  Recent and remote memory:  Normal  Mood and affect: Normal      Results  No visits with results within 1 Day(s) from this visit.   Latest known visit with results is:   Admission on 07/01/2024, Discharged on 07/01/2024   Component Date Value    STREP A PCR 07/01/2024 Not Detected     SARS-CoV-2 07/01/2024 Negative     INFLUENZA A PCR 07/01/2024 Positive (A)     INFLUENZA B PCR 07/01/2024 Negative     RSV PCR 07/01/2024 Negative        Radiology Results  No results found.    Orders  No orders of the defined types were placed in this encounter.      Answers submitted by the patient for this visit:  Abdominal Pain Questionnaire (Submitted on 8/6/2024)  Chief Complaint: Abdominal pain  Chronicity: new  Onset: more than 1 month ago  Onset quality: sudden  Frequency: 2 to 4 times per day  Episode duration: 24 Days  Progression since onset: waxing and waning  Pain location: generalized abdominal region  Pain - numeric: 7/10  Pain quality: burning, cramping, a sensation of fullness, sharp  Radiates to: pelvis, perineum  anorexia: No  belching: No  flatus: Yes  hematochezia: Yes  weight loss: No  Aggravated by: nothing  Relieved by: bowel movements

## 2024-08-13 NOTE — PATIENT INSTRUCTIONS
"Patient Education     High-fiber diet   The Basics   Written by the doctors and editors at [facility]   What is fiber? -- Fiber is a substance found in some fruits, vegetables, and grains. Most fiber passes through your body without being digested. But it can affect how you digest other foods, and it can also improve your bowel movements.  There are 2 kinds of fiber. One kind is called \"soluble fiber\" and is found in fruits, oats, barley, beans, and peas. The other kind is called \"insoluble fiber,\" and is found in wheat, rye, and other grains.  Both kinds of fiber that you eat are called \"dietary fiber.\"  Why is fiber important to my health? -- Fiber can help make your bowel movements softer and more regular. Adding fiber to your diet can help with problems including constipation, hemorrhoids, and diarrhea. Plus, it can help prevent \"accidents\" if you have trouble controlling your bowel movements.  Getting enough fiber can also help lower your risk of heart disease, stroke, and type 2 diabetes. That's because fiber can help lower cholesterol and help control blood sugar.  How much fiber do I need? -- The recommended amount of fiber is 20 to 25 grams a day. The nutrition label on packaged foods can show you how much fiber you are getting in each serving (figure 1).  How can I make sure I'm getting enough fiber? -- To make sure that you're getting enough fiber, eat plenty of the fruits, vegetables, and grains that contain fiber (table 1 and figure 2). Many breakfast cereals also have a lot of fiber.  If you can't get enough fiber from food, you can add wheat bran to the foods you do eat. Or you can take fiber supplements. These come in the form of powders, wafers, or pills. They include psyllium seed (sample brand names: Metamucil, Konsyl), methylcellulose (sample brand name: Citrucel), polycarbophil (sample brand name: FiberCon), and wheat dextrin (sample brand name: Benefiber). If you take a fiber supplement, be sure " "to read the label so you know how much to take. If you're not sure, ask your doctor or nurse.  What are the side effects of fiber? -- When you start eating more fiber, your belly might feel bloated, or you might have gas or cramps. You can avoid these side effects by adding fiber to your diet slowly.  Some people feel worse when they eat more fiber or take fiber supplements. If you feel worse after adding more fiber to your diet, you can try decreasing the amount of fiber to see if that helps.  All topics are updated as new evidence becomes available and our peer review process is complete.  This topic retrieved from [employer] on: Feb 28, 2024.  Topic 81675 Version 10.0  Release: 32.2.4 - C32.58  © 2024 UpToDate, Inc. and/or its affiliates. All rights reserved.  figure 1: Nutrition label - Fiber     This is an example of a nutrition label. To figure out how much fiber is in a food, look for the line that says \"Dietary Fiber.\" It's also important to look at the serving size. This food has 7 grams of fiber in each serving, and each serving is 1 cup.  Graphic 63344 Version 8.0  table 1: Amount of fiber in different foods  Food  Serving  Grams of fiber    Fruits    Apple (with skin) 1 medium apple 4.4   Banana 1 medium banana 3.1   Oranges 1 orange 3.1   Prunes 1 cup, pitted 12.4   Juices    Apple, unsweetened, with added ascorbic acid 1 cup 0.5   Grapefruit, white, canned, sweetened 1 cup 0.2   Grape, unsweetened, with added ascorbic acid 1 cup 0.5   Orange 1 cup 0.7   Vegetables    Cooked   Green beans 1 cup 4.0   Carrots 1/2 cup sliced 2.3   Peas 1 cup 8.8   Potato (baked, with skin) 1 medium potato 3.8   Raw   Cucumber (with peel) 1 cucumber 1.5   Lettuce 1 cup shredded 0.5   Tomato 1 medium tomato 1.5   Spinach 1 cup 0.7   Legumes   Baked beans, canned, no salt added 1 cup 13.9   Kidney beans, canned 1 cup 13.6   Lima beans, canned 1 cup 11.6   Lentils, boiled 1 cup 15.6   Breads, pastas, flours    Bran muffins 1 " medium muffin 5.2   Oatmeal, cooked 1 cup 4.0   White bread 1 slice 0.6   Whole-wheat bread 1 slice 1.9   Pasta and rice, cooked   Macaroni 1 cup 2.5   Rice, brown 1 cup 3.5   Rice, white 1 cup 0.6   Spaghetti (regular) 1 cup 2.5   Nuts    Almonds 1/2 cup 8.7   Peanuts 1/2 cup 7.9   To learn how much fiber and other nutrients are in different foods, visit the [place] Department of Agriculture ([employer]) FoodData Central website.  Graphic 87687 Version 6.0  figure 2: Foods with fiber     Foods with a lot of fiber include prunes, apples, oranges, bananas, peas, green beans, kidney beans, cooked oatmeal, almonds, peanuts, and whole-wheat bread.  Graphic 93371 Version 1.0  Consumer Information Use and Disclaimer   Disclaimer: This generalized information is a limited summary of diagnosis, treatment, and/or medication information. It is not meant to be comprehensive and should be used as a tool to help the user understand and/or assess potential diagnostic and treatment options. It does NOT include all information about conditions, treatments, medications, side effects, or risks that may apply to a specific patient. It is not intended to be medical advice or a substitute for the medical advice, diagnosis, or treatment of a health care provider based on the health care provider's examination and assessment of a patient's specific and unique circumstances. Patients must speak with a health care provider for complete information about their health, medical questions, and treatment options, including any risks or benefits regarding use of medications. This information does not endorse any treatments or medications as safe, effective, or approved for treating a specific patient. UpToDate, Inc. and its affiliates disclaim any warranty or liability relating to this information or the use thereof.The use of this information is governed by the Terms of Use, available at  https://www.[employer] IPuwer.com/en/know/clinical-effectiveness-terms. 2024© [employer] and its affiliates and/or licensors. All rights reserved.  Copyright   © 2024 [employer] and/or its affiliates. All rights reserved.

## 2024-08-15 ENCOUNTER — TELEPHONE (OUTPATIENT)
Age: 34
End: 2024-08-15

## 2024-08-15 NOTE — TELEPHONE ENCOUNTER
Left message on voicemail to confirm patients upcoming appointment, Informed patient of the date, time and location. Advised to call office if they need to reschedule their visit.

## 2024-08-16 ENCOUNTER — OFFICE VISIT (OUTPATIENT)
Age: 34
End: 2024-08-16
Payer: MEDICARE

## 2024-08-16 VITALS
SYSTOLIC BLOOD PRESSURE: 112 MMHG | OXYGEN SATURATION: 99 % | HEART RATE: 88 BPM | WEIGHT: 118.4 LBS | DIASTOLIC BLOOD PRESSURE: 70 MMHG | BODY MASS INDEX: 19.7 KG/M2

## 2024-08-16 DIAGNOSIS — F31.9 BIPOLAR 1 DISORDER (HCC): ICD-10-CM

## 2024-08-16 DIAGNOSIS — F41.9 ANXIETY: ICD-10-CM

## 2024-08-16 DIAGNOSIS — G43.109 MIGRAINE WITH AURA AND WITHOUT STATUS MIGRAINOSUS, NOT INTRACTABLE: ICD-10-CM

## 2024-08-16 DIAGNOSIS — G43.E09 CHRONIC MIGRAINE WITH AURA WITHOUT STATUS MIGRAINOSUS, NOT INTRACTABLE: Primary | ICD-10-CM

## 2024-08-16 PROCEDURE — 99214 OFFICE O/P EST MOD 30 MIN: CPT

## 2024-08-16 RX ORDER — VARENICLINE TARTRATE 0.5 (11)-1
KIT ORAL
COMMUNITY
Start: 2024-08-07

## 2024-08-16 RX ORDER — FREMANEZUMAB-VFRM 225 MG/1.5ML
225 INJECTION SUBCUTANEOUS
Qty: 1.5 ML | Refills: 11 | Status: SHIPPED | OUTPATIENT
Start: 2024-08-16

## 2024-08-16 NOTE — PATIENT INSTRUCTIONS
Patient Instructions:    Headache Calendar  Please maintain a headache calendar  Consider using phone applications such as Migraine Chin or Migraine Diary    Headache/migraine treatment:     Rescue medications (for immediate treatment of a headache):   It is ok to take ibuprofen, acetaminophen or naproxen (Advil, Tylenol,  Aleve, Excedrin) if they help your headaches you should limit these to No more than 3 times a week to avoid medication overuse/rebound headaches.     For your more moderate to severe migraines take this medication early     - Continue Ubrelvy 100 mg, take 1 tablet by mouth once at the onset of a migraine headache.  May repeat this dosage in 2 hours if needed.    Prescription preventive medications for headaches/migraines   (to take every day to help prevent headaches - not to take at the time of headache):  -Continue propranolol long-acting 80 mg once daily for migraine prevention along with Cymbalta 30 mg daily as well.    - Start Ajovy 225 mg/1.5 milliliter once monthly injections for migraine prevention.    *Typically these types of medications take time until you see the benefit, although some may see improvement in days, often it may take weeks, especially if the medication is being titrated up to a beneficial level. Please contact us if there are any concerns or questions regarding the medication.     Over the counter preventive supplements for headaches/migraines (if you try, try for 3 months straight)  (to take every day to help prevent headaches - not to take at the time of headache):  There are combo pills online of these - none of which regulated by FDA and double check dosing - take appropriate dose only once a day- prevent a migraine, migravent, mind ease, migrelief   [x] Magnesium 400mg daily (If any diarrhea or upset stomach, decrease dose  as tolerated)  [x] Riboflavin (Vitamin B2) 400mg daily (may make your urine bright/neon yellow)    - All supplements can be purchased online      Lifestyle Recommendations:  [x] SLEEP - Maintain a regular sleep schedule: Adults need at least 7-8 hours of uninterrupted a night. Maintain good sleep hygiene:  Going to bed and waking up at consistent times, avoiding excessive daytime naps, avoiding caffeinated beverages in the evening, avoid excessive stimulation in the evening and generally using bed primarily for sleeping.  One hour before bedtime would recommend turning lights down lower, decreasing your activity (may read quietly, listen to music at a low volume). When you get into bed, should eliminate all technology (no texting, emailing, playing with your phone, iPad or tablet in bed).  [x] HYDRATION - Maintain good hydration.  Drink  2L of fluid a day (4 typical small water bottles)  [x] DIET - Maintain good nutrition. In particular don't skip meals and try and eat healthy balanced meals regularly.  [x] TRIGGERS - Look for other triggers and avoid them: Limit caffeine to 1-2 cups a day or less. Avoid dietary triggers that you have noticed bring on your headaches (this could include aged cheese, peanuts, MSG, aspartame and nitrates).  [x] EXERCISE - physical exercise as we all know is good for you in many ways, and not only is good for your heart, but also is beneficial for your mental health, cognitive health and  chronic pain/headaches. I would encourage at the least 5 days of physical exercise weekly for at least 30 minutes.     Education and Follow-up  [x] Please call with any questions or concerns. Of course if any new concerning symptoms go to the emergency department.  [x] Follow up in 4 months with [facility]

## 2024-08-16 NOTE — PROGRESS NOTES
[facility]  PATIENT:  Monie Moraes  MRN:  [id number]  :  1990  DATE OF SERVICE:  2024      Assessment/Plan:     Chronic migraine with aura and without status migrainosus:    I had the pleasure of seeing Monie today in the office at [facility] in [place].  She is presenting today for an office visit follow-up appointment in regard to her migraine headaches.  The patient states that since the last appointment her migraine headaches have stayed relatively around the same.  She notes that she has 30/30 days in the month with some type of headache.  She states that since starting the propranolol long-acting 80 mg daily for migraine prevention she has not noticed any significant amount of relief.  With that being said, we are going to try to transition the patient over to a CGRP injectable medication.  Will be initiating Ajovy once monthly injections for migraine prevention.  For abortive therapy, patient states that Ubrelvy works relatively well at eliminating the headaches at onset.  She will continue with Ubrelvy for abortive therapy at this time.  She will still continue along with propranolol long-acting for preventative therapy but will be adding in Ajovy for further coverage for preventative therapy of migraines.    Patient Instructions:    Headache Calendar  Please maintain a headache calendar  Consider using phone applications such as Migraine Chin or Migraine Diary    Headache/migraine treatment:     Rescue medications (for immediate treatment of a headache):   It is ok to take ibuprofen, acetaminophen or naproxen (Advil, Tylenol,  Aleve, Excedrin) if they help your headaches you should limit these to No more than 3 times a week to avoid medication overuse/rebound headaches.     For your more moderate to severe migraines take this medication early     - Continue Ubrelvy 100 mg, take 1 tablet by mouth once at the onset of a migraine  headache.  May repeat this dosage in 2 hours if needed.    Prescription preventive medications for headaches/migraines   (to take every day to help prevent headaches - not to take at the time of headache):  -Continue propranolol long-acting 80 mg once daily for migraine prevention along with Cymbalta 30 mg daily as well.    - Start Ajovy 225 mg/1.5 milliliter once monthly injections for migraine prevention.    *Typically these types of medications take time until you see the benefit, although some may see improvement in days, often it may take weeks, especially if the medication is being titrated up to a beneficial level. Please contact us if there are any concerns or questions regarding the medication.     Over the counter preventive supplements for headaches/migraines (if you try, try for 3 months straight)  (to take every day to help prevent headaches - not to take at the time of headache):  There are combo pills online of these - none of which regulated by FDA and double check dosing - take appropriate dose only once a day- prevent a migraine, migravent, mind ease, migrelief   [x] Magnesium 400mg daily (If any diarrhea or upset stomach, decrease dose  as tolerated)  [x] Riboflavin (Vitamin B2) 400mg daily (may make your urine bright/neon yellow)    - All supplements can be purchased online     Lifestyle Recommendations:  [x] SLEEP - Maintain a regular sleep schedule: Adults need at least 7-8 hours of uninterrupted a night. Maintain good sleep hygiene:  Going to bed and waking up at consistent times, avoiding excessive daytime naps, avoiding caffeinated beverages in the evening, avoid excessive stimulation in the evening and generally using bed primarily for sleeping.  One hour before bedtime would recommend turning lights down lower, decreasing your activity (may read quietly, listen to music at a low volume). When you get into bed, should eliminate all technology (no texting, emailing, playing with your phone,  iPad or tablet in bed).  [x] HYDRATION - Maintain good hydration.  Drink  2L of fluid a day (4 typical small water bottles)  [x] DIET - Maintain good nutrition. In particular don't skip meals and try and eat healthy balanced meals regularly.  [x] TRIGGERS - Look for other triggers and avoid them: Limit caffeine to 1-2 cups a day or less. Avoid dietary triggers that you have noticed bring on your headaches (this could include aged cheese, peanuts, MSG, aspartame and nitrates).  [x] EXERCISE - physical exercise as we all know is good for you in many ways, and not only is good for your heart, but also is beneficial for your mental health, cognitive health and  chronic pain/headaches. I would encourage at the least 5 days of physical exercise weekly for at least 30 minutes.     Education and Follow-up  [x] Please call with any questions or concerns. Of course if any new concerning symptoms go to the emergency department.  [x] Follow up in 4 months with Preet [facility]        History of Present Illness:     For Review:    We had the pleasure of evaluating Monie Moraes in neurological follow up  today for headaches.  As you know,  she is a 34 y.o.   female with a past history of migraine headaches. Patient was last seen in the office at [facility] on 02/21/2024 by myself.  At the last visit, the patient stated that she was still receiving headaches almost every day.  She was noting that her anxiety was doing much better and her mental health was in a much better state which was seeming to help with her migraine headaches.  She noted that Ubrelvy for abortive therapy seems to be providing a significant amount of relief for her.  She did not feel as though the propranolol extended release at 60 mg daily was helpful.  We were going to increase to 80 mg daily and see if this would help more.  Advised the patient to keep a close eye on her blood pressure and her heart rate when taking the medication.   Outside of this, no other questions or concerns from patient at the time of the last appointment.  Advised patient that we will follow-up in about 6 months time for further evaluation and see if she has any improvement with the change to her propranolol dosing.      Current medical illnesses: Migraine with aura, history of epilepsy, cervical radiculopathy, cervical spondylosis, multiple environmental allergies, dizziness, history of ADHD, bipolar 1 disorder        What medications do you take or have you taken for your headaches?   Current Preventive:   Propranolol, Cymbalta, Latuda, riboflavin, magnesium     Current Abortive:   Ubrelvy, Tylenol      Prior Preventive:   Topamax, gabapentin, Depakote, Lyrica, Remeron     Prior Abortive:   Sumatriptan, rizatriptan, Robaxin, ibuprofen, Toradol     Interval updates as of 8/16/2024:    30/30 days in the month with some type of migraine headache.  Patient notes that the propranolol long-acting has not been significantly helpful for preventative therapy.  She is still getting headaches almost every day.  However, she notes that the Ubrelvy has been working well for abortive therapy and having no issues in regards to continuing to take medication.  The patient is currently on contraception with Depo-Provera injections every 3 months.  She is currently not trying to get pregnant at this time she states.    Reviewed old notes from physician seen in the past- see above HPI for summary of previous encounters.       Past Medical History:   Diagnosis Date    Abnormal Pap smear of cervix     2009 cryo; 6/2019 LSIL/AGC pap; 6/2019 colpo HSIL; 8/2019 LEEP HSIL w/neg margins; 6/2020 NILM pap/neg HPV; 6/2020 NILM pap/neg HPV    ADHD     Anemia     Bipolar 1 disorder (HCC)     Depression     Epilepsy (HCC)     last episode 2000    GERD (gastroesophageal reflux disease)     Migraine     OCD (obsessive compulsive disorder)     Prediabetes     Urogenital trichomoniasis 2019       Patient  Active Problem List   Diagnosis    Smoking    Anxiety    Depression    ADHD    Bipolar 1 disorder (HCC)    Migraine with aura and without status migrainosus, not intractable    Numbness and tingling of right arm    Cervical radiculopathy    Cervical spondylosis    Shingles    GERD (gastroesophageal reflux disease)    Epilepsy (HCC)    Low back pain with sciatica    Numbness and tingling in left arm    Prediabetes    Bloating       Medications:      Current Outpatient Medications   Medication Sig Dispense Refill    DULoxetine (CYMBALTA) 30 mg delayed release capsule Take 30 mg by mouth daily      ibuprofen (MOTRIN) 600 mg tablet Take 1 tablet (600 mg total) by mouth every 6 (six) hours as needed for mild pain 30 tablet 0    lurasidone (LATUDA) 40 mg tablet Take 40 mg by mouth in the morning      Magnesium 400 MG TABS Take 1 tablet (400 mg total) by mouth in the morning 90 tablet 3    medroxyPROGESTERone (DEPO-PROVERA) 150 mg/mL injection Inject 1 mL (150 mg total) into a muscle every 3 (three) months 1 mL 3    omeprazole (PriLOSEC) 20 mg delayed release capsule Take 1 capsule (20 mg total) by mouth 2 (two) times a day 30 capsule 2    polyethylene glycol (GLYCOLAX) 17 GM/SCOOP powder Take 17 g by mouth daily 850 g 0    propranolol (INDERAL LA) 80 mg 24 hr capsule TAKE 1 CAPSULE(80 MG) BY MOUTH DAILY 90 capsule 1    Ubrogepant (Ubrelvy) 100 MG tablet Take 1 tablet (100 mg) one time as needed for migraine. May repeat one additional tablet (100 mg) at least two hours after the first dose. Do not use more than two doses per day, or for more than eight days per month. 9 tablet 11    Varenicline Tartrate, Starter, 0.5 MG X 11 & 1 MG X 42 TBPK        No current facility-administered medications for this visit.        Allergies:    No Known Allergies    Family History:     Family History   Problem Relation Age of Onset    Bipolar disorder Mother     Diabetes Mother     Migraines Mother     Miscarriages / Stillbirths Mother      No Known Problems Father     Depression Sister     Asthma Sister     Osteoporosis Maternal Grandmother     Cancer Maternal Grandfather         lung    Breast cancer Neg Hx     Colon cancer Neg Hx     Ovarian cancer Neg Hx        Social History:     Social History     Socioeconomic History    Marital status: Single     Spouse name: Not on file    Number of children: Not on file    Years of education: Not on file    Highest education level: Not on file   Occupational History    Not on file   Tobacco Use    Smoking status: Every Day     Current packs/day: 1.00     Average packs/day: 1 pack/day for 11.0 years (11.0 ttl pk-yrs)     Types: Cigarettes     Passive exposure: Current    Smokeless tobacco: Never   Vaping Use    Vaping status: Never Used   Substance and Sexual Activity    Alcohol use: Yes     Comment: couple times/month    Drug use: Never    Sexual activity: Not Currently     Partners: Male     Birth control/protection: Injection   Other Topics Concern    Not on file   Social History Narrative    Not on file     Social Determinants of Health     Financial Resource Strain: Low Risk  (6/24/2024)    Overall Financial Resource Strain (CARDIA)     Difficulty of Paying Living Expenses: Not hard at all   Food Insecurity: No Food Insecurity (6/24/2024)    Hunger Vital Sign     Worried About Running Out of Food in the Last Year: Never true     Ran Out of Food in the Last Year: Never true   Transportation Needs: No Transportation Needs (6/24/2024)    PRAPARE - Transportation     Lack of Transportation (Medical): No     Lack of Transportation (Non-Medical): No   Physical Activity: Inactive (6/16/2021)    Exercise Vital Sign     Days of Exercise per Week: 0 days     Minutes of Exercise per Session: 0 min   Stress: No Stress Concern Present (6/16/2021)    [employer] - Occupational Stress Questionnaire     Feeling of Stress : Only a little   Social Connections: Not on file   Intimate Partner  Violence: Not At Risk (6/16/2021)    Humiliation, Afraid, Rape, and Kick questionnaire     Fear of Current or Ex-Partner: No     Emotionally Abused: No     Physically Abused: No     Sexually Abused: No   Housing Stability: Low Risk  (6/24/2024)    Housing Stability Vital Sign     Unable to Pay for Housing in the Last Year: No     Number of Times Moved in the Last Year: 1     Homeless in the Last Year: No         Objective:     Physical Exam:                                                                 Vitals:            Constitutional:    /70 (BP Location: Right arm, Patient Position: Sitting, Cuff Size: Adult)   Pulse 88   Wt 53.7 kg (118 lb 6.4 oz)   SpO2 99%   BMI 19.70 kg/m²   BP Readings from Last 3 Encounters:   08/16/24 112/70   08/13/24 116/54   07/01/24 136/78     Pulse Readings from Last 3 Encounters:   08/16/24 88   07/01/24 (!) 127   06/24/24 101         Well developed, well nourished, well groomed. No dysmorphic features.       Psychiatric:  Normal behavior and appropriate affect        Neurological Examination:     Mental status/cognitive function:   Orientated to time, place and person. Recent and remote memory intact. Attention span and concentration as well as fund of knowledge are appropriate for age. Normal language and spontaneous speech.     Cranial Nerves:  II-visual fields full.   III, IV, VI-Pupils were equal, round, and reactive to light and accomodation. Extraocular movements were full and conjugate without nystagmus. Conjugate gaze, normal smooth pursuits, normal saccades   V-facial sensation symmetric.    VII-facial expression symmetric, intact forehead wrinkle, strong eye closure, symmetric smile    VIII-hearing grossly intact bilaterally   IX, X-palate elevation symmetric, no dysarthria.   XI-shoulder shrug strength intact    XII-tongue protrusion midline.    Motor Exam: symmetric bulk and tone throughout, no pronator drift. Power/strength 5/5 bilateral upper and lower  extremities except for 4/5 strength of the right upper extremity (previously known, due to cervical radiculopathy) no atrophy, fasciculations or abnormal movements noted.   Sensory: grossly intact light touch in all extremities.   Reflexes: brachioradialis 1+, biceps 1+, knee 1+ bilaterally  Coordination: Finger nose finger intact bilaterally, no apparent dysmetria, ataxia or tremor noted  Gait: steady casual and tandem gait.       Review of Systems:     Review of Systems   Constitutional:  Negative for appetite change, fatigue and fever.   HENT: Negative.  Negative for hearing loss, tinnitus, trouble swallowing and voice change.    Eyes: Negative.  Negative for photophobia, pain and visual disturbance.   Respiratory: Negative.  Negative for shortness of breath.    Cardiovascular: Negative.  Negative for palpitations.   Gastrointestinal: Negative.  Negative for nausea and vomiting.   Endocrine: Negative.  Negative for cold intolerance.   Genitourinary: Negative.  Negative for dysuria, frequency and urgency.   Musculoskeletal:  Negative for back pain, gait problem, myalgias, neck pain and neck stiffness.   Skin: Negative.  Negative for rash.   Allergic/Immunologic: Negative.    Neurological:  Positive for headaches. Negative for dizziness, tremors, seizures, syncope, facial asymmetry, speech difficulty, weakness, light-headedness and numbness.   Hematological: Negative.  Does not bruise/bleed easily.   Psychiatric/Behavioral: Negative.  Negative for confusion, hallucinations and sleep disturbance.    All other systems reviewed and are negative.    I personally reviewed the ROS entered by the MA    I spent 15 minutes in total time for this visit.    Author:  Abel Cleaning PA-C 8/16/2024 3:15 PM

## 2024-08-19 ENCOUNTER — TELEPHONE (OUTPATIENT)
Age: 34
End: 2024-08-19

## 2024-08-20 NOTE — TELEPHONE ENCOUNTER
Recd approval via [facility] for ajovy, approved through 2/20/2025; my chart message to patient to advise.

## 2024-09-10 ENCOUNTER — CLINICAL SUPPORT (OUTPATIENT)
Dept: OBGYN CLINIC | Facility: CLINIC | Age: 34
End: 2024-09-10

## 2024-09-10 VITALS
DIASTOLIC BLOOD PRESSURE: 79 MMHG | BODY MASS INDEX: 19.86 KG/M2 | HEART RATE: 103 BPM | SYSTOLIC BLOOD PRESSURE: 115 MMHG | HEIGHT: 65 IN | WEIGHT: 119.2 LBS

## 2024-09-10 DIAGNOSIS — Z30.09 UNWANTED FERTILITY: Primary | ICD-10-CM

## 2024-09-10 PROCEDURE — 96372 THER/PROPH/DIAG INJ SC/IM: CPT

## 2024-09-10 RX ORDER — MEDROXYPROGESTERONE ACETATE 150 MG/ML
150 INJECTION, SUSPENSION INTRAMUSCULAR ONCE
Status: COMPLETED | OUTPATIENT
Start: 2024-09-10 | End: 2024-09-10

## 2024-09-10 RX ADMIN — MEDROXYPROGESTERONE ACETATE 150 MG: 150 INJECTION, SUSPENSION INTRAMUSCULAR at 16:17

## 2024-10-02 ENCOUNTER — TELEPHONE (OUTPATIENT)
Dept: OBGYN CLINIC | Facility: CLINIC | Age: 34
End: 2024-10-02

## 2024-10-02 NOTE — TELEPHONE ENCOUNTER
Spoke to pt to confirm why coming to appt today & says its for shingles. I explained that is a family doctor issue & appt should be made with them. Pt stated she thought this was a family doctor appt not GYN. Appt will be canceled & she will call family dr office.

## 2024-10-09 DIAGNOSIS — A04.8 H. PYLORI INFECTION: ICD-10-CM

## 2024-10-25 ENCOUNTER — OFFICE VISIT (OUTPATIENT)
Dept: FAMILY MEDICINE CLINIC | Facility: CLINIC | Age: 34
End: 2024-10-25

## 2024-10-25 VITALS
DIASTOLIC BLOOD PRESSURE: 70 MMHG | SYSTOLIC BLOOD PRESSURE: 96 MMHG | HEART RATE: 79 BPM | WEIGHT: 120 LBS | BODY MASS INDEX: 19.99 KG/M2 | RESPIRATION RATE: 16 BRPM | OXYGEN SATURATION: 95 % | HEIGHT: 65 IN | TEMPERATURE: 97.5 F

## 2024-10-25 DIAGNOSIS — E87.6 HYPOKALEMIA: ICD-10-CM

## 2024-10-25 DIAGNOSIS — Z13.0 SCREENING, ANEMIA, DEFICIENCY, IRON: Primary | ICD-10-CM

## 2024-10-25 DIAGNOSIS — Z13.220 SCREENING CHOLESTEROL LEVEL: ICD-10-CM

## 2024-10-25 DIAGNOSIS — F41.9 ANXIETY: ICD-10-CM

## 2024-10-25 DIAGNOSIS — R73.03 PREDIABETES: ICD-10-CM

## 2024-10-25 PROCEDURE — G2211 COMPLEX E/M VISIT ADD ON: HCPCS | Performed by: NURSE PRACTITIONER

## 2024-10-25 PROCEDURE — 99213 OFFICE O/P EST LOW 20 MIN: CPT | Performed by: NURSE PRACTITIONER

## 2024-10-25 NOTE — PROGRESS NOTES
Ambulatory Visit  Name: Monie Moraes      : 1990      MRN: [id number]  Encounter Provider: KARTIK Persaud  Encounter Date: 10/25/2024   Encounter department: [facility]    Assessment & Plan  Screening, anemia, deficiency, iron    Orders:    CBC and differential; Future    Prediabetes    Orders:    Comprehensive metabolic panel; Future    Hemoglobin A1C; Future    Anxiety    Orders:    TSH, 3rd generation with Free T4 reflex; Future    Hypokalemia    Orders:    Comprehensive metabolic panel; Future    Screening cholesterol level    Orders:    Lipid panel; Future       History of Present Illness     Patient is a 35 YO female who  has a past medical history of Abnormal Pap smear of cervix, ADHD, Anemia, Bipolar 1 disorder (HCC), Depression, Epilepsy (HCC), GERD (gastroesophageal reflux disease), Migraine, OCD (obsessive compulsive disorder), Prediabetes, and Urogenital trichomoniasis (2019).    Patient reports generalized fatigue for last several months. Notes that she alternates between overeating and hardly any appetite. She at times has increased anxiety.     Fatigue  This is a new problem. The current episode started 1 to 4 weeks ago. The problem occurs constantly. The problem has been waxing and waning. Associated symptoms include anorexia and fatigue. Pertinent negatives include no change in bowel habit, chest pain, chills, congestion, coughing, joint swelling, myalgias, neck pain, urinary symptoms, vertigo, visual change or vomiting.         Review of Systems   Constitutional:  Positive for fatigue. Negative for chills.   HENT:  Negative for congestion.    Respiratory:  Negative for cough.    Cardiovascular:  Negative for chest pain.   Gastrointestinal:  Positive for anorexia. Negative for change in bowel habit and vomiting.   Musculoskeletal:  Negative for joint swelling, myalgias and neck pain.   Neurological:  Negative for vertigo.  "          Objective     BP 96/70 (BP Location: Right arm, Patient Position: Sitting, Cuff Size: Standard)   Pulse 79   Temp 97.5 °F (36.4 °C) (Temporal)   Resp 16   Ht 5' 5\" (1.651 m)   Wt 54.4 kg (120 lb)   SpO2 95%   BMI 19.97 kg/m²     Physical Exam  Vitals and nursing note reviewed.   Constitutional:       General: She is not in acute distress.     Appearance: She is well-developed.   HENT:      Head: Normocephalic and atraumatic.      Right Ear: Tympanic membrane and external ear normal.      Left Ear: Tympanic membrane and external ear normal.      Nose: Nose normal.      Mouth/Throat:      Mouth: Mucous membranes are moist.   Eyes:      Conjunctiva/sclera: Conjunctivae normal.   Cardiovascular:      Rate and Rhythm: Normal rate and regular rhythm.   Pulmonary:      Effort: Pulmonary effort is normal. No respiratory distress.      Breath sounds: Normal breath sounds.   Musculoskeletal:         General: No swelling.      Cervical back: Neck supple.   Lymphadenopathy:      Cervical: No cervical adenopathy.   Skin:     General: Skin is warm and dry.      Capillary Refill: Capillary refill takes less than 2 seconds.   Neurological:      Mental Status: She is alert and oriented to person, place, and time.   Psychiatric:         Mood and Affect: Mood normal.         "

## 2024-10-28 ENCOUNTER — APPOINTMENT (OUTPATIENT)
Dept: LAB | Facility: HOSPITAL | Age: 34
End: 2024-10-28
Payer: MEDICARE

## 2024-10-28 DIAGNOSIS — E87.6 HYPOKALEMIA: ICD-10-CM

## 2024-10-28 DIAGNOSIS — Z11.59 ENCOUNTER FOR SCREENING FOR OTHER VIRAL DISEASES: ICD-10-CM

## 2024-10-28 DIAGNOSIS — Z00.6 ENCOUNTER FOR EXAMINATION FOR NORMAL COMPARISON OR CONTROL IN CLINICAL RESEARCH PROGRAM: ICD-10-CM

## 2024-10-28 DIAGNOSIS — Z13.220 SCREENING CHOLESTEROL LEVEL: ICD-10-CM

## 2024-10-28 DIAGNOSIS — Z13.0 SCREENING, ANEMIA, DEFICIENCY, IRON: ICD-10-CM

## 2024-10-28 DIAGNOSIS — Z11.3 SCREEN FOR STD (SEXUALLY TRANSMITTED DISEASE): ICD-10-CM

## 2024-10-28 DIAGNOSIS — B02.8 HERPES ZOSTER WITH OTHER COMPLICATION: ICD-10-CM

## 2024-10-28 DIAGNOSIS — R73.03 PREDIABETES: ICD-10-CM

## 2024-10-28 DIAGNOSIS — F41.9 ANXIETY: ICD-10-CM

## 2024-10-28 LAB
ALBUMIN SERPL BCG-MCNC: 4.6 G/DL (ref 3.5–5)
ALP SERPL-CCNC: 62 U/L (ref 34–104)
ALT SERPL W P-5'-P-CCNC: 10 U/L (ref 7–52)
ANION GAP SERPL CALCULATED.3IONS-SCNC: 5 MMOL/L (ref 4–13)
AST SERPL W P-5'-P-CCNC: 16 U/L (ref 13–39)
BASOPHILS # BLD AUTO: 0.06 THOUSANDS/ÂΜL (ref 0–0.1)
BASOPHILS NFR BLD AUTO: 1 % (ref 0–1)
BILIRUB SERPL-MCNC: 0.39 MG/DL (ref 0.2–1)
BUN SERPL-MCNC: 11 MG/DL (ref 5–25)
CALCIUM SERPL-MCNC: 9.5 MG/DL (ref 8.4–10.2)
CHLORIDE SERPL-SCNC: 109 MMOL/L (ref 96–108)
CHOLEST SERPL-MCNC: 184 MG/DL
CO2 SERPL-SCNC: 23 MMOL/L (ref 21–32)
CREAT SERPL-MCNC: 0.76 MG/DL (ref 0.6–1.3)
EOSINOPHIL # BLD AUTO: 0.14 THOUSAND/ÂΜL (ref 0–0.61)
EOSINOPHIL NFR BLD AUTO: 2 % (ref 0–6)
ERYTHROCYTE [DISTWIDTH] IN BLOOD BY AUTOMATED COUNT: 14.3 % (ref 11.6–15.1)
EST. AVERAGE GLUCOSE BLD GHB EST-MCNC: 123 MG/DL
GFR SERPL CREATININE-BSD FRML MDRD: 103 ML/MIN/1.73SQ M
GLUCOSE P FAST SERPL-MCNC: 103 MG/DL (ref 65–99)
HBA1C MFR BLD: 5.9 %
HBV SURFACE AG SER QL: NORMAL
HCT VFR BLD AUTO: 44.5 % (ref 34.8–46.1)
HCV AB SER QL: NORMAL
HDLC SERPL-MCNC: 27 MG/DL
HGB BLD-MCNC: 14.4 G/DL (ref 11.5–15.4)
HIV 1+2 AB+HIV1 P24 AG SERPL QL IA: NORMAL
HIV 2 AB SERPL QL IA: NORMAL
HIV1 AB SERPL QL IA: NORMAL
HIV1 P24 AG SERPL QL IA: NORMAL
IMM GRANULOCYTES # BLD AUTO: 0.02 THOUSAND/UL (ref 0–0.2)
IMM GRANULOCYTES NFR BLD AUTO: 0 % (ref 0–2)
LDLC SERPL CALC-MCNC: 130 MG/DL (ref 0–100)
LYMPHOCYTES # BLD AUTO: 3.09 THOUSANDS/ÂΜL (ref 0.6–4.47)
LYMPHOCYTES NFR BLD AUTO: 36 % (ref 14–44)
MCH RBC QN AUTO: 30.7 PG (ref 26.8–34.3)
MCHC RBC AUTO-ENTMCNC: 32.4 G/DL (ref 31.4–37.4)
MCV RBC AUTO: 95 FL (ref 82–98)
MONOCYTES # BLD AUTO: 0.53 THOUSAND/ÂΜL (ref 0.17–1.22)
MONOCYTES NFR BLD AUTO: 6 % (ref 4–12)
NEUTROPHILS # BLD AUTO: 4.86 THOUSANDS/ÂΜL (ref 1.85–7.62)
NEUTS SEG NFR BLD AUTO: 55 % (ref 43–75)
NONHDLC SERPL-MCNC: 157 MG/DL
NRBC BLD AUTO-RTO: 0 /100 WBCS
PLATELET # BLD AUTO: 315 THOUSANDS/UL (ref 149–390)
PMV BLD AUTO: 9.9 FL (ref 8.9–12.7)
POTASSIUM SERPL-SCNC: 3.8 MMOL/L (ref 3.5–5.3)
PROT SERPL-MCNC: 7.4 G/DL (ref 6.4–8.4)
RBC # BLD AUTO: 4.69 MILLION/UL (ref 3.81–5.12)
SODIUM SERPL-SCNC: 137 MMOL/L (ref 135–147)
TREPONEMA PALLIDUM IGG+IGM AB [PRESENCE] IN SERUM OR PLASMA BY IMMUNOASSAY: NORMAL
TRIGL SERPL-MCNC: 133 MG/DL
TSH SERPL DL<=0.05 MIU/L-ACNC: 1.39 UIU/ML (ref 0.45–4.5)
WBC # BLD AUTO: 8.7 THOUSAND/UL (ref 4.31–10.16)

## 2024-10-28 PROCEDURE — 86803 HEPATITIS C AB TEST: CPT

## 2024-10-28 PROCEDURE — 87389 HIV-1 AG W/HIV-1&-2 AB AG IA: CPT

## 2024-10-28 PROCEDURE — 84443 ASSAY THYROID STIM HORMONE: CPT

## 2024-10-28 PROCEDURE — 36415 COLL VENOUS BLD VENIPUNCTURE: CPT

## 2024-10-28 PROCEDURE — 86780 TREPONEMA PALLIDUM: CPT

## 2024-10-28 PROCEDURE — 86695 HERPES SIMPLEX TYPE 1 TEST: CPT

## 2024-10-28 PROCEDURE — 86696 HERPES SIMPLEX TYPE 2 TEST: CPT

## 2024-10-28 PROCEDURE — 80053 COMPREHEN METABOLIC PANEL: CPT

## 2024-10-28 PROCEDURE — 83036 HEMOGLOBIN GLYCOSYLATED A1C: CPT

## 2024-10-28 PROCEDURE — 87340 HEPATITIS B SURFACE AG IA: CPT

## 2024-10-28 PROCEDURE — 80061 LIPID PANEL: CPT

## 2024-10-28 PROCEDURE — 85025 COMPLETE CBC W/AUTO DIFF WBC: CPT

## 2024-10-29 LAB
BACTERIA UR CULT: NORMAL
HSV2 IGG SERPL QL IA: NEGATIVE
HSV2 IGG SERPL QL IA: POSITIVE

## 2024-11-05 LAB
APOB+LDLR+PCSK9 GENE MUT ANL BLD/T: NOT DETECTED
BRCA1+BRCA2 DEL+DUP + FULL MUT ANL BLD/T: NOT DETECTED
MLH1+MSH2+MSH6+PMS2 GN DEL+DUP+FUL M: NOT DETECTED

## 2024-11-13 ENCOUNTER — OFFICE VISIT (OUTPATIENT)
Dept: FAMILY MEDICINE CLINIC | Facility: CLINIC | Age: 34
End: 2024-11-13

## 2024-11-13 VITALS
WEIGHT: 121 LBS | DIASTOLIC BLOOD PRESSURE: 80 MMHG | OXYGEN SATURATION: 100 % | HEART RATE: 109 BPM | TEMPERATURE: 97.6 F | RESPIRATION RATE: 16 BRPM | SYSTOLIC BLOOD PRESSURE: 110 MMHG | BODY MASS INDEX: 20.16 KG/M2 | HEIGHT: 65 IN

## 2024-11-13 DIAGNOSIS — Z00.00 MEDICARE ANNUAL WELLNESS VISIT, SUBSEQUENT: ICD-10-CM

## 2024-11-13 DIAGNOSIS — K21.9 GASTROESOPHAGEAL REFLUX DISEASE WITHOUT ESOPHAGITIS: Primary | ICD-10-CM

## 2024-11-13 DIAGNOSIS — E78.00 ELEVATED LDL CHOLESTEROL LEVEL: ICD-10-CM

## 2024-11-13 DIAGNOSIS — F17.200 SMOKING: ICD-10-CM

## 2024-11-13 DIAGNOSIS — R73.03 PREDIABETES: ICD-10-CM

## 2024-11-13 PROCEDURE — 99213 OFFICE O/P EST LOW 20 MIN: CPT | Performed by: NURSE PRACTITIONER

## 2024-11-13 PROCEDURE — G0439 PPPS, SUBSEQ VISIT: HCPCS | Performed by: NURSE PRACTITIONER

## 2024-11-13 RX ORDER — MIRTAZAPINE 15 MG/1
TABLET, FILM COATED ORAL
COMMUNITY
Start: 2024-09-08

## 2024-11-13 NOTE — ASSESSMENT & PLAN NOTE
Continue PPI     -Discussed diet and lifestyle interventions to improve sx including: avoidance of common triggers (chocolate, caffeine, tomatoes, citrus), eat small meals frequently, remain upright after meals

## 2024-11-13 NOTE — ASSESSMENT & PLAN NOTE
Lab Results   Component Value Date    CHOLESTEROL 184 10/28/2024    CHOLESTEROL 166 06/04/2021     Lab Results   Component Value Date    HDL 27 (L) 10/28/2024    HDL 25 (L) 06/04/2021     Lab Results   Component Value Date    TRIG 133 10/28/2024    TRIG 159 (H) 06/04/2021     Lab Results   Component Value Date    NONHDLC 157 10/28/2024    NONHDLC 141 06/04/2021     Lab Results   Component Value Date    LDLCALC 130 (H) 10/28/2024     Discussed diet/lifestyle modifications

## 2024-11-13 NOTE — ASSESSMENT & PLAN NOTE
Lab Results   Component Value Date    HGBA1C 5.9 (H) 10/28/2024     Reviewed diet/ lifestyle modifications

## 2024-11-13 NOTE — PROGRESS NOTES
Ambulatory Visit  Name: Monie Moraes      : 1990      MRN: [id number]  Encounter Provider: KARTIK Persaud  Encounter Date: 2024   Encounter department: [facility]    Assessment & Plan  Gastroesophageal reflux disease without esophagitis  Continue PPI     -Discussed diet and lifestyle interventions to improve sx including: avoidance of common triggers (chocolate, caffeine, tomatoes, citrus), eat small meals frequently, remain upright after meals            Smoking  -continue to encourage cessation  -patient is aware of pharmacotherapy aids to assist with cessation           Prediabetes  Lab Results   Component Value Date    HGBA1C 5.9 (H) 10/28/2024     Reviewed diet/ lifestyle modifications          Medicare annual wellness visit, subsequent         Elevated LDL cholesterol level  Lab Results   Component Value Date    CHOLESTEROL 184 10/28/2024    CHOLESTEROL 166 2021     Lab Results   Component Value Date    HDL 27 (L) 10/28/2024    HDL 25 (L) 2021     Lab Results   Component Value Date    TRIG 133 10/28/2024    TRIG 159 (H) 2021     Lab Results   Component Value Date    NONHDLC 157 10/28/2024    NONHDLC 141 2021     Lab Results   Component Value Date    LDLCALC 130 (H) 10/28/2024     Discussed diet/lifestyle modifications            Tobacco Cessation Counseling: Tobacco cessation counseling was provided. The patient is sincerely urged to quit consumption of tobacco. She is not ready to quit tobacco.       Preventive health issues were discussed with patient, and age appropriate screening tests were ordered as noted in patient's After Visit Summary. Personalized health advice and appropriate referrals for health education or preventive services given if needed, as noted in patient's After Visit Summary.    History of Present Illness     Patient is a 35 YO female who  has a past medical history of Abnormal Pap smear of  cervix, ADHD, Anemia, Bipolar 1 disorder (HCC), Depression, Epilepsy (HCC), GERD (gastroesophageal reflux disease), Migraine, OCD (obsessive compulsive disorder), Prediabetes, and Urogenital trichomoniasis (2019).    She presents today for Medicare visit and follow up.        Patient Care Team:  KARTIK Persaud as PCP - General (Family Medicine)    Review of Systems   Constitutional:  Negative for chills and fever.   HENT:  Negative for ear pain and sore throat.    Eyes:  Negative for pain and visual disturbance.   Respiratory:  Negative for cough and shortness of breath.    Cardiovascular:  Negative for chest pain and palpitations.   Gastrointestinal:  Negative for abdominal pain and vomiting.   Genitourinary:  Negative for dysuria and hematuria.   Musculoskeletal:  Negative for arthralgias and back pain.   Skin:  Negative for color change and rash.   Neurological:  Negative for seizures and syncope.   All other systems reviewed and are negative.    Medical History Reviewed by provider this encounter:  Problems       Annual Wellness Visit Questionnaire       Health Risk Assessment:   Patient rates overall health as good. Patient feels that their physical health rating is same. Patient is satisfied with their life. Eyesight was rated as same. Hearing was rated as slightly worse. Patient feels that their emotional and mental health rating is same. Patients states they are often angry. Patient states they are always unusually tired/fatigued. Pain experienced in the last 7 days has been some. Patient's pain rating has been 8/10. Patient states that she has experienced no weight loss or gain in last 6 months.     Fall Risk Screening:   In the past year, patient has experienced: no history of falling in past year      Urinary Incontinence Screening:   Patient has not leaked urine accidently in the last six months.     Home Safety:  Patient does not have trouble with stairs inside or outside of their home.  Patient has working smoke alarms and has no working carbon monoxide detector. Home safety hazards include: none.     Nutrition:   Current diet is Unhealthy.     Medications:   Patient is not currently taking any over-the-counter supplements. Patient is able to manage medications.     Activities of Daily Living (ADLs)/Instrumental Activities of Daily Living (IADLs):   Walk and transfer into and out of bed and chair?: Yes  Dress and groom yourself?: Yes    Bathe or shower yourself?: Yes    Feed yourself? Yes  Do your laundry/housekeeping?: Yes  Manage your money, pay your bills and track your expenses?: Yes  Make your own meals?: Yes    Do your own shopping?: Yes    Previous Hospitalizations:   Any hospitalizations or ED visits within the last 12 months?: Yes    How many hospitalizations have you had in the last year?: 1-2    Advance Care Planning:   Living will: No    Durable POA for healthcare: No    Advanced directive: No      PREVENTIVE SCREENINGS      Cardiovascular Screening:    General: Screening Current      Diabetes Screening:     General: Screening Current      Breast Cancer Screening:     General: Screening Not Indicated      Cervical Cancer Screening:    General: Screening Current      Lung Cancer Screening:     General: Screening Not Indicated      Hepatitis C Screening:    General: Screening Current    Screening, Brief Intervention, and Referral to Treatment (SBIRT)    Screening  Typical number of drinks in a day: 0  Typical number of drinks in a week: 2  Interpretation: Low risk drinking behavior.    AUDIT-C Screenin) How often did you have a drink containing alcohol in the past year? monthly or less  2) How many drinks did you have on a typical day when you were drinking in the past year? 0  3) How often did you have 6 or more drinks on one occasion in the past year? never    AUDIT-C Score: 1  Interpretation: Score 0-2 (female): Negative screen for alcohol misuse    Single Item Drug  "Screening:  How often have you used an illegal drug (including marijuana) or a prescription medication for non-medical reasons in the past year? never    Single Item Drug Screen Score: 0  Interpretation: Negative screen for possible drug use disorder    Social Drivers of Health     Financial Resource Strain: Low Risk  (11/11/2024)    Overall Financial Resource Strain (CARDIA)     Difficulty of Paying Living Expenses: Not hard at all   Food Insecurity: No Food Insecurity (11/11/2024)    Hunger Vital Sign     Worried About Running Out of Food in the Last Year: Never true     Ran Out of Food in the Last Year: Never true   Transportation Needs: No Transportation Needs (11/11/2024)    PRAPARE - Transportation     Lack of Transportation (Medical): No     Lack of Transportation (Non-Medical): No   Housing Stability: Low Risk  (11/11/2024)    Housing Stability Vital Sign     Unable to Pay for Housing in the Last Year: No     Number of Times Moved in the Last Year: 1     Homeless in the Last Year: No   Utilities: Not At Risk (11/11/2024)    [facility] Utilities     Threatened with loss of utilities: No     No results found.    Objective     /80 (BP Location: Left arm, Patient Position: Sitting, Cuff Size: Standard)   Pulse (!) 109   Temp 97.6 °F (36.4 °C) (Temporal)   Resp 16   Ht 5' 5\" (1.651 m)   Wt 54.9 kg (121 lb)   SpO2 100%   BMI 20.14 kg/m²     Physical Exam  Vitals and nursing note reviewed.   Constitutional:       General: She is not in acute distress.     Appearance: She is well-developed.   HENT:      Head: Normocephalic and atraumatic.      Right Ear: Tympanic membrane and external ear normal.      Left Ear: Tympanic membrane and external ear normal.      Nose: Nose normal.      Mouth/Throat:      Mouth: Mucous membranes are moist.   Eyes:      Conjunctiva/sclera: Conjunctivae normal.   Cardiovascular:      Rate and Rhythm: Normal rate and regular rhythm.   Pulmonary:      Effort: Pulmonary effort is " normal. No respiratory distress.      Breath sounds: Normal breath sounds.   Musculoskeletal:         General: No swelling.      Cervical back: Neck supple.   Lymphadenopathy:      Cervical: No cervical adenopathy.   Skin:     General: Skin is warm and dry.      Capillary Refill: Capillary refill takes less than 2 seconds.   Neurological:      Mental Status: She is alert and oriented to person, place, and time.   Psychiatric:         Mood and Affect: Mood normal.

## 2024-11-27 ENCOUNTER — CLINICAL SUPPORT (OUTPATIENT)
Dept: OBGYN CLINIC | Facility: CLINIC | Age: 34
End: 2024-11-27

## 2024-11-27 VITALS
DIASTOLIC BLOOD PRESSURE: 72 MMHG | BODY MASS INDEX: 19.3 KG/M2 | HEART RATE: 100 BPM | WEIGHT: 116 LBS | SYSTOLIC BLOOD PRESSURE: 100 MMHG

## 2024-11-27 DIAGNOSIS — Z30.09 UNWANTED FERTILITY: Primary | ICD-10-CM

## 2024-11-27 PROCEDURE — 96372 THER/PROPH/DIAG INJ SC/IM: CPT

## 2024-11-27 RX ORDER — MEDROXYPROGESTERONE ACETATE 150 MG/ML
150 INJECTION, SUSPENSION INTRAMUSCULAR ONCE
Status: COMPLETED | OUTPATIENT
Start: 2024-11-27 | End: 2024-11-27

## 2024-11-27 RX ADMIN — MEDROXYPROGESTERONE ACETATE 150 MG: 150 INJECTION, SUSPENSION INTRAMUSCULAR at 11:53

## 2024-12-18 ENCOUNTER — TELEPHONE (OUTPATIENT)
Dept: NEUROLOGY | Facility: CLINIC | Age: 34
End: 2024-12-18

## 2024-12-19 ENCOUNTER — OFFICE VISIT (OUTPATIENT)
Dept: GASTROENTEROLOGY | Facility: MEDICAL CENTER | Age: 34
End: 2024-12-19
Payer: MEDICARE

## 2024-12-19 VITALS
DIASTOLIC BLOOD PRESSURE: 70 MMHG | BODY MASS INDEX: 19.96 KG/M2 | SYSTOLIC BLOOD PRESSURE: 101 MMHG | HEIGHT: 65 IN | WEIGHT: 119.8 LBS | TEMPERATURE: 97.8 F | HEART RATE: 109 BPM

## 2024-12-19 DIAGNOSIS — K59.00 CONSTIPATION, UNSPECIFIED CONSTIPATION TYPE: Primary | ICD-10-CM

## 2024-12-19 DIAGNOSIS — K21.9 GASTROESOPHAGEAL REFLUX DISEASE WITHOUT ESOPHAGITIS: ICD-10-CM

## 2024-12-19 PROCEDURE — 99213 OFFICE O/P EST LOW 20 MIN: CPT | Performed by: NURSE PRACTITIONER

## 2024-12-19 RX ORDER — LINACLOTIDE 145 UG/1
145 CAPSULE, GELATIN COATED ORAL DAILY
Qty: 30 CAPSULE | Refills: 3 | Status: SHIPPED | OUTPATIENT
Start: 2024-12-19

## 2024-12-19 NOTE — ASSESSMENT & PLAN NOTE
Heartburn/reflux controlled with omeprazole 20 mg daily.  Denies any nausea, vomiting or dysphagia.  Will have her try to stop her PPI and see if symptoms return.    -Stop PPI

## 2024-12-23 DIAGNOSIS — F41.9 ANXIETY: ICD-10-CM

## 2024-12-23 DIAGNOSIS — G43.109 MIGRAINE WITH AURA AND WITHOUT STATUS MIGRAINOSUS, NOT INTRACTABLE: ICD-10-CM

## 2024-12-23 RX ORDER — PROPRANOLOL HYDROCHLORIDE 80 MG/1
CAPSULE, EXTENDED RELEASE ORAL
Qty: 90 CAPSULE | Refills: 1 | Status: SHIPPED | OUTPATIENT
Start: 2024-12-23

## 2024-12-26 ENCOUNTER — HOSPITAL ENCOUNTER (EMERGENCY)
Facility: HOSPITAL | Age: 34
Discharge: LEFT BEFORE DISCHARGE | End: 2024-12-26
Payer: MEDICARE

## 2024-12-26 VITALS
DIASTOLIC BLOOD PRESSURE: 85 MMHG | TEMPERATURE: 97.4 F | HEART RATE: 86 BPM | RESPIRATION RATE: 20 BRPM | SYSTOLIC BLOOD PRESSURE: 107 MMHG | OXYGEN SATURATION: 98 %

## 2024-12-26 LAB
ATRIAL RATE: 104 BPM
P AXIS: 59 DEGREES
PR INTERVAL: 126 MS
QRS AXIS: 57 DEGREES
QRSD INTERVAL: 62 MS
QT INTERVAL: 340 MS
QTC INTERVAL: 447 MS
T WAVE AXIS: 42 DEGREES
VENTRICULAR RATE: 104 BPM

## 2024-12-26 PROCEDURE — 99284 EMERGENCY DEPT VISIT MOD MDM: CPT

## 2024-12-26 PROCEDURE — 93010 ELECTROCARDIOGRAM REPORT: CPT | Performed by: INTERNAL MEDICINE

## 2024-12-26 PROCEDURE — 93005 ELECTROCARDIOGRAM TRACING: CPT

## 2025-01-13 ENCOUNTER — RA CDI HCC (OUTPATIENT)
Dept: OTHER | Facility: HOSPITAL | Age: 35
End: 2025-01-13

## 2025-01-13 NOTE — PROGRESS NOTES
HCC coding opportunities       Chart reviewed, no opportunity found: CHART REVIEWED, NO OPPORTUNITY FOUND        Patients Insurance     Medicare Insurance: [employer]          This is a reminder to assess all HCC (risk adjustment) codes for the year 2025 as patients SYLWIA scores reset to zero with the New year.

## 2025-01-13 NOTE — ASSESSMENT & PLAN NOTE
Stable, following with Dr. Lucia Barrow   She is taking Latuda daily, Remeron daily, also on Depakote for migraines None

## 2025-01-16 ENCOUNTER — OFFICE VISIT (OUTPATIENT)
Dept: NEUROLOGY | Facility: CLINIC | Age: 35
End: 2025-01-16
Payer: MEDICARE

## 2025-01-16 VITALS
WEIGHT: 117.7 LBS | HEIGHT: 65 IN | SYSTOLIC BLOOD PRESSURE: 110 MMHG | BODY MASS INDEX: 19.61 KG/M2 | DIASTOLIC BLOOD PRESSURE: 70 MMHG

## 2025-01-16 DIAGNOSIS — G43.109 MIGRAINE WITH AURA AND WITHOUT STATUS MIGRAINOSUS, NOT INTRACTABLE: Primary | ICD-10-CM

## 2025-01-16 DIAGNOSIS — F41.9 ANXIETY: ICD-10-CM

## 2025-01-16 PROCEDURE — 99213 OFFICE O/P EST LOW 20 MIN: CPT

## 2025-01-16 NOTE — PATIENT INSTRUCTIONS
Headache Calendar  Please maintain a headache calendar  Consider using phone applications such as Migraine Chin or Migraine Diary    Headache/migraine treatment:     Rescue medications (for immediate treatment of a headache):   It is ok to take ibuprofen, acetaminophen or naproxen (Advil, Tylenol,  Aleve, Excedrin) if they help your headaches you should limit these to No more than 3 times a week to avoid medication overuse/rebound headaches.     For your more moderate to severe migraines take this medication early     - Continue Ubrelvy 100 mg, take 1 tablet by mouth once at the onset of a migraine headache.  May repeat this dosage in 2 hours if needed.    Prescription preventive medications for headaches/migraines   (to take every day to help prevent headaches - not to take at the time of headache):  - Continue Cymbalta 30 mg daily     - Continue Ajovy 225 mg/1.5 mL once monthly injections for migraine prevention.    *Typically these types of medications take time until you see the benefit, although some may see improvement in days, often it may take weeks, especially if the medication is being titrated up to a beneficial level. Please contact us if there are any concerns or questions regarding the medication.     Over the counter preventive supplements for headaches/migraines (if you try, try for 3 months straight)  (to take every day to help prevent headaches - not to take at the time of headache):  There are combo pills online of these - none of which regulated by FDA and double check dosing - take appropriate dose only once a day- prevent a migraine, migravent, mind ease, migrelief   [x] Magnesium 400mg daily (If any diarrhea or upset stomach, decrease dose  as tolerated)  [x] Riboflavin (Vitamin B2) 400mg daily (may make your urine bright/neon yellow)    - All supplements can be purchased online     Lifestyle Recommendations:  [x] SLEEP - Maintain a regular sleep schedule: Adults need at least 7-8 hours of  uninterrupted a night. Maintain good sleep hygiene:  Going to bed and waking up at consistent times, avoiding excessive daytime naps, avoiding caffeinated beverages in the evening, avoid excessive stimulation in the evening and generally using bed primarily for sleeping.  One hour before bedtime would recommend turning lights down lower, decreasing your activity (may read quietly, listen to music at a low volume). When you get into bed, should eliminate all technology (no texting, emailing, playing with your phone, iPad or tablet in bed).  [x] HYDRATION - Maintain good hydration.  Drink  2L of fluid a day (4 typical small water bottles)  [x] DIET - Maintain good nutrition. In particular don't skip meals and try and eat healthy balanced meals regularly.  [x] TRIGGERS - Look for other triggers and avoid them: Limit caffeine to 1-2 cups a day or less. Avoid dietary triggers that you have noticed bring on your headaches (this could include aged cheese, peanuts, MSG, aspartame and nitrates).  [x] EXERCISE - physical exercise as we all know is good for you in many ways, and not only is good for your heart, but also is beneficial for your mental health, cognitive health and  chronic pain/headaches. I would encourage at the least 5 days of physical exercise weekly for at least 30 minutes.     Education and Follow-up  [x] Please call with any questions or concerns. Of course if any new concerning symptoms go to the emergency department.  [x] Follow up in 4 months with Preet ART

## 2025-01-16 NOTE — PROGRESS NOTES
Name: Monie Moraes      : 1990      MRN: [id number]  Encounter Provider: Abel Cleaning PA-C  Encounter Date: 2025   Encounter department: [facility]  :  Assessment & Plan  Migraine with aura and without status migrainosus, not intractable  I had the pleasure of seeing Monie today in the office at [facility] in [place].  She is presenting today for an office follow-up in regard to her migraine headaches with aura.  She states that at this time she has significantly reduced her headache frequency and severity.  She notes that she is now experiencing about 15/30 days in the month with any type of headache.  The patient notes that this drastic change had occurred after she started Ajovy once monthly injections.  She noted that she has now had 5 injections of the Ajovy and doing relatively well.  She stopped taking the propranolol because the Ajovy was working so well for her.  She still continues to take Ubrelvy for abortive therapy which continues to be effective at eliminating her migraine headaches at onset for the most part.  At this time, no other questions or concerns.  Advised the patient that we would go a few more months with seeing how the Ajovy works for preventative therapy.  If still having significant amount of headache days a month we can consider adding on a different agent as well.  However, for the most part very happy with the patient's progression with the Ajovy for migraine prevention.  Advised patient to follow-up in approximately 4 months time with Preet ART.       Anxiety  - Continue Cymbalta 30 mg daily          Patient Instructions   Headache Calendar  Please maintain a headache calendar  Consider using phone applications such as Migraine Chin or Migraine Diary    Headache/migraine treatment:     Rescue medications (for immediate treatment of a headache):   It is ok to take ibuprofen, acetaminophen or  naproxen (Advil, Tylenol,  Aleve, Excedrin) if they help your headaches you should limit these to No more than 3 times a week to avoid medication overuse/rebound headaches.     For your more moderate to severe migraines take this medication early     - Continue Ubrelvy 100 mg, take 1 tablet by mouth once at the onset of a migraine headache.  May repeat this dosage in 2 hours if needed.    Prescription preventive medications for headaches/migraines   (to take every day to help prevent headaches - not to take at the time of headache):  - Continue Cymbalta 30 mg daily     - Continue Ajovy 225 mg/1.5 mL once monthly injections for migraine prevention.    *Typically these types of medications take time until you see the benefit, although some may see improvement in days, often it may take weeks, especially if the medication is being titrated up to a beneficial level. Please contact us if there are any concerns or questions regarding the medication.     Over the counter preventive supplements for headaches/migraines (if you try, try for 3 months straight)  (to take every day to help prevent headaches - not to take at the time of headache):  There are combo pills online of these - none of which regulated by FDA and double check dosing - take appropriate dose only once a day- prevent a migraine, migravent, mind ease, migrelief   [x] Magnesium 400mg daily (If any diarrhea or upset stomach, decrease dose  as tolerated)  [x] Riboflavin (Vitamin B2) 400mg daily (may make your urine bright/neon yellow)    - All supplements can be purchased online     Lifestyle Recommendations:  [x] SLEEP - Maintain a regular sleep schedule: Adults need at least 7-8 hours of uninterrupted a night. Maintain good sleep hygiene:  Going to bed and waking up at consistent times, avoiding excessive daytime naps, avoiding caffeinated beverages in the evening, avoid excessive stimulation in the evening and generally using bed primarily for sleeping.  One  hour before bedtime would recommend turning lights down lower, decreasing your activity (may read quietly, listen to music at a low volume). When you get into bed, should eliminate all technology (no texting, emailing, playing with your phone, iPad or tablet in bed).  [x] HYDRATION - Maintain good hydration.  Drink  2L of fluid a day (4 typical small water bottles)  [x] DIET - Maintain good nutrition. In particular don't skip meals and try and eat healthy balanced meals regularly.  [x] TRIGGERS - Look for other triggers and avoid them: Limit caffeine to 1-2 cups a day or less. Avoid dietary triggers that you have noticed bring on your headaches (this could include aged cheese, peanuts, MSG, aspartame and nitrates).  [x] EXERCISE - physical exercise as we all know is good for you in many ways, and not only is good for your heart, but also is beneficial for your mental health, cognitive health and  chronic pain/headaches. I would encourage at the least 5 days of physical exercise weekly for at least 30 minutes.     Education and Follow-up  [x] Please call with any questions or concerns. Of course if any new concerning symptoms go to the emergency department.  [x] Follow up in 4 months with rPeet ART      History of Present Illness   HPI      For Review:    The patient was last seen in the office on 08/16/2024 by myself.  At the time the last visit the patient was presenting for a follow-up appointment in regard to her migraine headaches.  She noted that she was still having the same intensity and frequency of the headaches as last visit.  She was still having about 30/30 days a month with some type of headache.  She notes that since starting propranolol long-acting 80 mg daily that there was no significant relief in regard to her migraines.  At the last visit I had recommended transitioning the patient to oral CGRP injectable medication.  Initiating Ajovy once monthly injections for migraine prevention.  For abortive  therapy patient stated that Ubrelvy was working very well at eliminating the headaches at onset.  Advised that she would continue Ubrelvy for abortive therapy.  She would still continue propranolol long-acting as well for preventative therapy.      Current medical illnesses: Migraine with aura, history of epilepsy, cervical radiculopathy, cervical spondylosis, multiple environmental allergies, dizziness, history of ADHD, bipolar 1 disorder        What medications do you take or have you taken for your headaches?   Current Preventive:   Ajovy, Cymbalta, Latuda     Current Abortive:   Ubrelvy, Tylenol      Prior Preventive:   Topamax, gabapentin, Depakote, Lyrica, Remeron, propranolol      Prior Abortive:   Sumatriptan, rizatriptan, Robaxin, ibuprofen, Toradol     Interval updates as of 1/16/2025:    Reduced headache frequency from everyday with a headache to having headaches 15/30 days in the month with Ajovy. Has had about 5 injections of Ajovy since her last visit. Stopped taking propranolol because her Ajovy was working well for her. Ubrlevy 100 mg seems to be working at getting rid of headaches when she has them. Sometimes the medication dulls the headache, other times getting rid of it completely. Takes about an hour for Ubrlevy to start working.     Review of Systems   Constitutional:  Negative for appetite change, fatigue and fever.   HENT: Negative.  Negative for hearing loss, tinnitus, trouble swallowing and voice change.    Eyes: Negative.  Negative for photophobia, pain and visual disturbance.   Respiratory: Negative.  Negative for shortness of breath.    Cardiovascular: Negative.  Negative for palpitations.   Gastrointestinal: Negative.  Negative for nausea and vomiting.   Endocrine: Negative.  Negative for cold intolerance.   Genitourinary: Negative.  Negative for dysuria, frequency and urgency.   Musculoskeletal:  Negative for back pain, gait problem, myalgias, neck pain and neck stiffness.   Skin:  Negative.  Negative for rash.   Allergic/Immunologic: Negative.    Neurological: Negative.  Negative for dizziness, tremors, seizures, syncope, facial asymmetry, speech difficulty, weakness, light-headedness, numbness and headaches.   Hematological: Negative.  Does not bruise/bleed easily.   Psychiatric/Behavioral: Negative.  Negative for confusion, hallucinations and sleep disturbance.     I have personally reviewed the MA's review of systems and made changes as necessary.    Medical History Reviewed by provider this encounter:  Tobacco  Allergies  Meds  Problems  Med Hx  Surg Hx  Fam Hx     .  Past Medical History   Past Medical History:   Diagnosis Date    Abnormal Pap smear of cervix     2009 cryo; 6/2019 LSIL/AGC pap; 6/2019 colpo HSIL; 8/2019 LEEP HSIL w/neg margins; 6/2020 NILM pap/neg HPV; 6/2020 NILM pap/neg HPV    ADHD     Anemia     Bipolar 1 disorder (HCC)     Depression     Epilepsy (HCC)     last episode 2000    GERD (gastroesophageal reflux disease)     Migraine     OCD (obsessive compulsive disorder)     Prediabetes     Urogenital trichomoniasis 2019     Past Surgical History:   Procedure Laterality Date    FOREIGN BODY REMOVAL N/A 9/20/2023    Procedure: removal of IUD;  Surgeon: Miriam Lafleur MD;  Location: [facility];  Service: Gynecology    GYNECOLOGIC CRYOSURGERY  2009    RI COLPOSCOPY CERVIX VAG LOOP ELTRD BX CERVIX N/A 08/09/2019    Procedure: BIOPSY LEEP CERVIX;  Surgeon: Ramiro Carmen MD;  Location: [facility];  Service: Gynecology    RI LAPS ABD PRTM&OMENTUM DX W/WO SPEC BR/WA SPX N/A 9/20/2023    Procedure: LAPAROSCOPY DIAGNOSTIC;  Surgeon: Miriam Lafleur MD;  Location: [facility];  Service: Gynecology     Family History   Problem Relation Age of Onset    Bipolar disorder Mother     Diabetes Mother     Migraines Mother     Miscarriages / Stillbirths Mother     No Known Problems Father     Depression Sister     Asthma Sister     Osteoporosis Maternal Grandmother      Cancer Maternal Grandfather         lung    Breast cancer Neg Hx     Colon cancer Neg Hx     Ovarian cancer Neg Hx       reports that she has been smoking cigarettes. She has a 11 pack-year smoking history. She has been exposed to tobacco smoke. She has never used smokeless tobacco. She reports current alcohol use. She reports that she does not use drugs.  Current Outpatient Medications on File Prior to Visit   Medication Sig Dispense Refill    DULoxetine (CYMBALTA) 30 mg delayed release capsule Take 30 mg by mouth daily      fremanezumab-vfrm (Ajovy) 225 MG/1.5ML auto-injector Inject 1.5 mL (225 mg total) under the skin every 30 (thirty) days 1.5 mL 11    linaCLOtide (Linzess) 145 MCG CAPS Take 1 capsule (145 mcg total) by mouth in the morning 30 capsule 3    lurasidone (LATUDA) 40 mg tablet Take 40 mg by mouth in the morning      medroxyPROGESTERone (DEPO-PROVERA) 150 mg/mL injection Inject 1 mL (150 mg total) into a muscle every 3 (three) months 1 mL 3    mirtazapine (REMERON) 15 mg tablet       omeprazole (PriLOSEC) 20 mg delayed release capsule TAKE 1 CAPSULE(20 MG) BY MOUTH TWICE DAILY 180 capsule 1    Ubrogepant (Ubrelvy) 100 MG tablet Take 1 tablet (100 mg) one time as needed for migraine. May repeat one additional tablet (100 mg) at least two hours after the first dose. Do not use more than two doses per day, or for more than eight days per month. 9 tablet 11    ibuprofen (MOTRIN) 600 mg tablet Take 1 tablet (600 mg total) by mouth every 6 (six) hours as needed for mild pain (Patient not taking: Reported on 9/10/2024) 30 tablet 0    polyethylene glycol (GLYCOLAX) 17 GM/SCOOP powder Take 17 g by mouth daily (Patient not taking: Reported on 11/13/2024) 850 g 0    [DISCONTINUED] Magnesium 400 MG TABS Take 1 tablet (400 mg total) by mouth in the morning 90 tablet 3    [DISCONTINUED] propranolol (INDERAL LA) 80 mg 24 hr capsule TAKE 1 CAPSULE(80 MG) BY MOUTH DAILY 90 capsule 1     No current  facility-administered medications on file prior to visit.   No Known Allergies   Current Outpatient Medications on File Prior to Visit   Medication Sig Dispense Refill    DULoxetine (CYMBALTA) 30 mg delayed release capsule Take 30 mg by mouth daily      fremanezumab-vfrm (Ajovy) 225 MG/1.5ML auto-injector Inject 1.5 mL (225 mg total) under the skin every 30 (thirty) days 1.5 mL 11    linaCLOtide (Linzess) 145 MCG CAPS Take 1 capsule (145 mcg total) by mouth in the morning 30 capsule 3    lurasidone (LATUDA) 40 mg tablet Take 40 mg by mouth in the morning      medroxyPROGESTERone (DEPO-PROVERA) 150 mg/mL injection Inject 1 mL (150 mg total) into a muscle every 3 (three) months 1 mL 3    mirtazapine (REMERON) 15 mg tablet       omeprazole (PriLOSEC) 20 mg delayed release capsule TAKE 1 CAPSULE(20 MG) BY MOUTH TWICE DAILY 180 capsule 1    Ubrogepant (Ubrelvy) 100 MG tablet Take 1 tablet (100 mg) one time as needed for migraine. May repeat one additional tablet (100 mg) at least two hours after the first dose. Do not use more than two doses per day, or for more than eight days per month. 9 tablet 11    ibuprofen (MOTRIN) 600 mg tablet Take 1 tablet (600 mg total) by mouth every 6 (six) hours as needed for mild pain (Patient not taking: Reported on 9/10/2024) 30 tablet 0    polyethylene glycol (GLYCOLAX) 17 GM/SCOOP powder Take 17 g by mouth daily (Patient not taking: Reported on 11/13/2024) 850 g 0    [DISCONTINUED] Magnesium 400 MG TABS Take 1 tablet (400 mg total) by mouth in the morning 90 tablet 3    [DISCONTINUED] propranolol (INDERAL LA) 80 mg 24 hr capsule TAKE 1 CAPSULE(80 MG) BY MOUTH DAILY 90 capsule 1     No current facility-administered medications on file prior to visit.      Social History     Tobacco Use    Smoking status: Every Day     Current packs/day: 1.00     Average packs/day: 1 pack/day for 11.0 years (11.0 ttl pk-yrs)     Types: Cigarettes     Passive exposure: Current    Smokeless tobacco: Never  "  Vaping Use    Vaping status: Never Used   Substance and Sexual Activity    Alcohol use: Yes     Comment: couple times/month    Drug use: Never    Sexual activity: Not Currently     Partners: Male     Birth control/protection: Injection        Objective   /70 (BP Location: Left arm, Patient Position: Sitting, Cuff Size: Standard)   Ht 5' 5\" (1.651 m)   Wt 53.4 kg (117 lb 11.2 oz)   BMI 19.59 kg/m²     Physical Exam  Neurological Exam    Physical Exam:                                                                 Vitals:            Constitutional:    /70 (BP Location: Left arm, Patient Position: Sitting, Cuff Size: Standard)   Ht 5' 5\" (1.651 m)   Wt 53.4 kg (117 lb 11.2 oz)   BMI 19.59 kg/m²   BP Readings from Last 3 Encounters:   01/16/25 110/70   12/26/24 107/85   12/19/24 101/70     Pulse Readings from Last 3 Encounters:   12/26/24 86   12/19/24 (!) 109   11/27/24 100         Well developed, well nourished, well groomed. No dysmorphic features.       Psychiatric:  Normal behavior and appropriate affect        Neurological Examination:     Mental status/cognitive function:   Orientated to time, place and person. Recent and remote memory intact. Attention span and concentration as well as fund of knowledge are appropriate for age. Normal language and spontaneous speech.    Cranial Nerves:  II-visual fields full.   III, IV, VI-Pupils were equal, round, and reactive to light and accomodation. Extraocular movements were full and conjugate without nystagmus. Conjugate gaze, normal smooth pursuits, normal saccades   V-facial sensation symmetric.    VII-facial expression symmetric, intact forehead wrinkle, strong eye closure, symmetric smile    VIII-hearing grossly intact bilaterally   IX, X-palate elevation symmetric, no dysarthria.   XI-shoulder shrug strength intact    XII-tongue protrusion midline.    Motor Exam: symmetric bulk and tone throughout, no pronator drift. Power/strength 5/5 bilateral " upper and lower extremities except for 4/5 strength of the right upper extremity (previously known, due to cervical radiculopathy). no atrophy, fasciculations or abnormal movements noted.   Sensory: grossly intact light touch in all extremities.   Reflexes: brachioradialis 1+, biceps 1+, knee 1+ bilaterally  Coordination: Finger nose finger intact bilaterally, no apparent dysmetria, ataxia or tremor noted  Gait: steady casual and tandem gait.      Administrative Statements   I have spent a total time of 20 minutes in caring for this patient on the day of the visit/encounter including Risks and benefits of tx options, Instructions for management, Patient and family education, Importance of tx compliance, Risk factor reductions, Impressions, Counseling / Coordination of care, Documenting in the medical record, Reviewing / ordering tests, medicine, procedures  , and Obtaining or reviewing history  .

## 2025-01-16 NOTE — ASSESSMENT & PLAN NOTE
I had the pleasure of seeing Monie today in the office at [facility] in [place].  She is presenting today for an office follow-up in regard to her migraine headaches with aura.  She states that at this time she has significantly reduced her headache frequency and severity.  She notes that she is now experiencing about 15/30 days in the month with any type of headache.  The patient notes that this drastic change had occurred after she started Ajovy once monthly injections.  She noted that she has now had 5 injections of the Ajovy and doing relatively well.  She stopped taking the propranolol because the Ajovy was working so well for her.  She still continues to take Ubrelvy for abortive therapy which continues to be effective at eliminating her migraine headaches at onset for the most part.  At this time, no other questions or concerns.  Advised the patient that we would go a few more months with seeing how the Ajovy works for preventative therapy.  If still having significant amount of headache days a month we can consider adding on a different agent as well.  However, for the most part very happy with the patient's progression with the Ajovy for migraine prevention.  Advised patient to follow-up in approximately 4 months time with Preet ALMODOVAR

## 2025-01-21 ENCOUNTER — TELEPHONE (OUTPATIENT)
Dept: FAMILY MEDICINE CLINIC | Facility: CLINIC | Age: 35
End: 2025-01-21

## 2025-02-19 ENCOUNTER — TELEPHONE (OUTPATIENT)
Dept: NEUROLOGY | Facility: CLINIC | Age: 35
End: 2025-02-19

## 2025-02-19 ENCOUNTER — CLINICAL SUPPORT (OUTPATIENT)
Dept: OBGYN CLINIC | Facility: CLINIC | Age: 35
End: 2025-02-19

## 2025-02-19 ENCOUNTER — TELEPHONE (OUTPATIENT)
Dept: OBGYN CLINIC | Facility: CLINIC | Age: 35
End: 2025-02-19

## 2025-02-19 VITALS
BODY MASS INDEX: 19.89 KG/M2 | HEIGHT: 65 IN | DIASTOLIC BLOOD PRESSURE: 78 MMHG | WEIGHT: 119.4 LBS | HEART RATE: 80 BPM | SYSTOLIC BLOOD PRESSURE: 112 MMHG

## 2025-02-19 DIAGNOSIS — Z30.42 ENCOUNTER FOR SURVEILLANCE OF INJECTABLE CONTRACEPTIVE: Primary | ICD-10-CM

## 2025-02-19 DIAGNOSIS — Z30.09 UNWANTED FERTILITY: ICD-10-CM

## 2025-02-19 PROCEDURE — 96372 THER/PROPH/DIAG INJ SC/IM: CPT

## 2025-02-19 RX ORDER — MEDROXYPROGESTERONE ACETATE 150 MG/ML
150 INJECTION, SUSPENSION INTRAMUSCULAR
Status: SHIPPED | OUTPATIENT
Start: 2025-02-19 | End: 2026-02-14

## 2025-02-19 RX ORDER — MEDROXYPROGESTERONE ACETATE 150 MG/ML
150 INJECTION, SUSPENSION INTRAMUSCULAR
Qty: 1 ML | Refills: 2 | Status: SHIPPED | OUTPATIENT
Start: 2025-02-19

## 2025-02-19 RX ADMIN — MEDROXYPROGESTERONE ACETATE 150 MG: 150 INJECTION, SUSPENSION INTRAMUSCULAR at 11:27

## 2025-02-24 NOTE — TELEPHONE ENCOUNTER
Ubrelvy is approved through 2/20/26. Called [facility] and left a message for the pharmacist making them aware of the approval. Sent pt a message through [employer].

## 2025-03-18 ENCOUNTER — TELEPHONE (OUTPATIENT)
Dept: FAMILY MEDICINE CLINIC | Facility: CLINIC | Age: 35
End: 2025-03-18

## 2025-03-18 NOTE — TELEPHONE ENCOUNTER
Patient call to request rafiq due to chest pain, I offer her an rafiq with a different provider and advise her to go to emergency room. Patient prefer to wait for next available rafiq with her pcp.

## 2025-04-10 ENCOUNTER — APPOINTMENT (OUTPATIENT)
Dept: LAB | Facility: HOSPITAL | Age: 35
End: 2025-04-10
Payer: MEDICARE

## 2025-04-10 ENCOUNTER — OFFICE VISIT (OUTPATIENT)
Dept: FAMILY MEDICINE CLINIC | Facility: CLINIC | Age: 35
End: 2025-04-10

## 2025-04-10 ENCOUNTER — TELEPHONE (OUTPATIENT)
Age: 35
End: 2025-04-10

## 2025-04-10 DIAGNOSIS — R00.0 TACHYCARDIA: ICD-10-CM

## 2025-04-10 DIAGNOSIS — R09.81 SINUS CONGESTION: Primary | ICD-10-CM

## 2025-04-10 DIAGNOSIS — G43.109 MIGRAINE WITH AURA AND WITHOUT STATUS MIGRAINOSUS, NOT INTRACTABLE: Primary | ICD-10-CM

## 2025-04-10 LAB
ALBUMIN SERPL BCG-MCNC: 4.9 G/DL (ref 3.5–5)
ALP SERPL-CCNC: 65 U/L (ref 34–104)
ALT SERPL W P-5'-P-CCNC: 12 U/L (ref 7–52)
ANION GAP SERPL CALCULATED.3IONS-SCNC: 8 MMOL/L (ref 4–13)
AST SERPL W P-5'-P-CCNC: 19 U/L (ref 13–39)
BASOPHILS # BLD AUTO: 0.04 THOUSANDS/ÂΜL (ref 0–0.1)
BASOPHILS NFR BLD AUTO: 1 % (ref 0–1)
BILIRUB SERPL-MCNC: 0.44 MG/DL (ref 0.2–1)
BUN SERPL-MCNC: 11 MG/DL (ref 5–25)
CALCIUM SERPL-MCNC: 10 MG/DL (ref 8.4–10.2)
CHLORIDE SERPL-SCNC: 107 MMOL/L (ref 96–108)
CO2 SERPL-SCNC: 25 MMOL/L (ref 21–32)
CREAT SERPL-MCNC: 0.74 MG/DL (ref 0.6–1.3)
EOSINOPHIL # BLD AUTO: 0.19 THOUSAND/ÂΜL (ref 0–0.61)
EOSINOPHIL NFR BLD AUTO: 2 % (ref 0–6)
ERYTHROCYTE [DISTWIDTH] IN BLOOD BY AUTOMATED COUNT: 13.7 % (ref 11.6–15.1)
GFR SERPL CREATININE-BSD FRML MDRD: 106 ML/MIN/1.73SQ M
GLUCOSE SERPL-MCNC: 112 MG/DL (ref 65–140)
HCT VFR BLD AUTO: 43.8 % (ref 34.8–46.1)
HGB BLD-MCNC: 14.2 G/DL (ref 11.5–15.4)
IMM GRANULOCYTES # BLD AUTO: 0.02 THOUSAND/UL (ref 0–0.2)
IMM GRANULOCYTES NFR BLD AUTO: 0 % (ref 0–2)
LYMPHOCYTES # BLD AUTO: 3.99 THOUSANDS/ÂΜL (ref 0.6–4.47)
LYMPHOCYTES NFR BLD AUTO: 47 % (ref 14–44)
MCH RBC QN AUTO: 30.7 PG (ref 26.8–34.3)
MCHC RBC AUTO-ENTMCNC: 32.4 G/DL (ref 31.4–37.4)
MCV RBC AUTO: 95 FL (ref 82–98)
MONOCYTES # BLD AUTO: 0.4 THOUSAND/ÂΜL (ref 0.17–1.22)
MONOCYTES NFR BLD AUTO: 5 % (ref 4–12)
NEUTROPHILS # BLD AUTO: 3.79 THOUSANDS/ÂΜL (ref 1.85–7.62)
NEUTS SEG NFR BLD AUTO: 45 % (ref 43–75)
NRBC BLD AUTO-RTO: 0 /100 WBCS
PLATELET # BLD AUTO: 293 THOUSANDS/UL (ref 149–390)
PMV BLD AUTO: 10.3 FL (ref 8.9–12.7)
POTASSIUM SERPL-SCNC: 4.2 MMOL/L (ref 3.5–5.3)
PROT SERPL-MCNC: 7.7 G/DL (ref 6.4–8.4)
RBC # BLD AUTO: 4.63 MILLION/UL (ref 3.81–5.12)
SODIUM SERPL-SCNC: 140 MMOL/L (ref 135–147)
TSH SERPL DL<=0.05 MIU/L-ACNC: 1.37 UIU/ML (ref 0.45–4.5)
WBC # BLD AUTO: 8.43 THOUSAND/UL (ref 4.31–10.16)

## 2025-04-10 PROCEDURE — 84443 ASSAY THYROID STIM HORMONE: CPT

## 2025-04-10 PROCEDURE — 80053 COMPREHEN METABOLIC PANEL: CPT

## 2025-04-10 PROCEDURE — 99213 OFFICE O/P EST LOW 20 MIN: CPT | Performed by: NURSE PRACTITIONER

## 2025-04-10 PROCEDURE — 36415 COLL VENOUS BLD VENIPUNCTURE: CPT

## 2025-04-10 PROCEDURE — G2211 COMPLEX E/M VISIT ADD ON: HCPCS | Performed by: NURSE PRACTITIONER

## 2025-04-10 PROCEDURE — 85025 COMPLETE CBC W/AUTO DIFF WBC: CPT

## 2025-04-10 RX ORDER — CETIRIZINE HYDROCHLORIDE 10 MG/1
10 TABLET ORAL DAILY
Qty: 90 TABLET | Refills: 1 | Status: SHIPPED | OUTPATIENT
Start: 2025-04-10

## 2025-04-10 RX ORDER — LURASIDONE HYDROCHLORIDE 20 MG/1
TABLET, FILM COATED ORAL
COMMUNITY
Start: 2025-03-27 | End: 2025-04-10

## 2025-04-10 RX ORDER — FLUTICASONE PROPIONATE 50 MCG
1 SPRAY, SUSPENSION (ML) NASAL DAILY
Qty: 16 G | Refills: 1 | Status: SHIPPED | OUTPATIENT
Start: 2025-04-10

## 2025-04-10 NOTE — TELEPHONE ENCOUNTER
Monie tao [facility] (supporting Abel Cleaning PA-C)         4/10/25 10:33 AM  Pharmacy is saying I need a new authorization for my ajovy prescription.

## 2025-04-10 NOTE — PROGRESS NOTES
Name: Monie Moraes      : 1990      MRN: [id number]  Encounter Provider: KARTIK Persaud  Encounter Date: 4/10/2025   Encounter department: [facility]  :  Assessment & Plan  Sinus congestion    Orders:    fluticasone (FLONASE) 50 mcg/act nasal spray; 1 spray into each nostril daily    cetirizine (ZyrTEC) 10 mg tablet; Take 1 tablet (10 mg total) by mouth daily    Tachycardia    Resting tachycardia in office today, will check labs, EKG, Holter  ED parameters reviewed     Orders:    CBC and differential; Future    Comprehensive metabolic panel; Future    TSH, 3rd generation with Free T4 reflex; Future    ECG 12 lead; Future           History of Present Illness   Patient is a 35 YO female who  has a past medical history of Abnormal Pap smear of cervix, ADHD, Anemia, Bipolar 1 disorder (HCC), Depression, Epilepsy (HCC), GERD (gastroesophageal reflux disease), Migraine, OCD (obsessive compulsive disorder), Prediabetes, and Urogenital trichomoniasis ().    She presents today for follow up. She reports frequent episodes of shortness of breath and palpitations. She is having chest tightness and difficulty taking a deep breath at times.     The following portions of the patient's history were reviewed and updated as appropriate: allergies, current medications, past family history, past medical history, past social history, past surgical history and problem list.      Palpitations  This is a new problem. The current episode started more than 1 month ago. The problem occurs daily. The problem has been gradually worsening. Pertinent negatives include no abdominal pain, arthralgias, chest pain, chills, coughing, fever, rash, sore throat or vomiting. Nothing aggravates the symptoms. She has tried rest for the symptoms. The treatment provided mild relief.     Review of Systems   Constitutional:  Negative for chills and fever.   HENT:  Negative for ear pain and  "sore throat.    Eyes:  Negative for pain and visual disturbance.   Respiratory:  Positive for shortness of breath. Negative for cough.    Cardiovascular:  Positive for palpitations. Negative for chest pain.   Gastrointestinal:  Negative for abdominal pain and vomiting.   Genitourinary:  Negative for dysuria and hematuria.   Musculoskeletal:  Negative for arthralgias and back pain.   Skin:  Negative for color change and rash.   Neurological:  Negative for seizures and syncope.   All other systems reviewed and are negative.      Objective   /70 (BP Location: Left arm, Patient Position: Sitting, Cuff Size: Standard)   Temp 98 °F (36.7 °C) (Temporal)   Resp 16   Ht 5' 5\" (1.651 m)   Wt 54 kg (119 lb)   BMI 19.80 kg/m²      Physical Exam  Vitals and nursing note reviewed.   Constitutional:       General: She is not in acute distress.     Appearance: She is well-developed.   HENT:      Head: Normocephalic and atraumatic.      Right Ear: External ear normal.      Left Ear: External ear normal.   Eyes:      Conjunctiva/sclera: Conjunctivae normal.   Cardiovascular:      Rate and Rhythm: Regular rhythm. Tachycardia present.   Pulmonary:      Effort: Pulmonary effort is normal. No respiratory distress.      Breath sounds: Normal breath sounds.   Musculoskeletal:         General: No swelling.      Cervical back: Neck supple.   Skin:     General: Skin is warm and dry.      Capillary Refill: Capillary refill takes less than 2 seconds.   Neurological:      Mental Status: She is alert and oriented to person, place, and time.   Psychiatric:         Mood and Affect: Mood normal.         "

## 2025-04-11 VITALS
HEIGHT: 65 IN | WEIGHT: 119 LBS | HEART RATE: 110 BPM | TEMPERATURE: 98 F | DIASTOLIC BLOOD PRESSURE: 70 MMHG | RESPIRATION RATE: 16 BRPM | BODY MASS INDEX: 19.83 KG/M2 | SYSTOLIC BLOOD PRESSURE: 102 MMHG

## 2025-04-15 NOTE — TELEPHONE ENCOUNTER
Ajovy has been denied. Per insurance, pt must have tried and failed all three formulary alternatives: Aimovig, Emgality, and Qulipta. Did submit that this was a continuation of therapy when the PA was submitted. Routed to provider to advise.

## 2025-04-16 RX ORDER — GALCANEZUMAB 120 MG/ML
120 INJECTION, SOLUTION SUBCUTANEOUS
Qty: 1 ML | Refills: 11 | Status: SHIPPED | OUTPATIENT
Start: 2025-04-16

## 2025-04-16 NOTE — TELEPHONE ENCOUNTER
Abel Cleaning PA-C  [employer] (9:02 AM)       We can try Emgality instead if she would like a different injectable. Thanks!    -Preet   _____________________________________________    Spoke with pt. She would like to try Emgality. Please sent a script to [facility], as she is overdue for an injection. The Emgality will most likely require a prior auth.

## 2025-04-16 NOTE — TELEPHONE ENCOUNTER
Urgent prior auth for Emgality [facility]. Awaiting determination.

## 2025-04-16 NOTE — TELEPHONE ENCOUNTER
Prior auth for Emgality started through [facility], Key [id number]. Awaiting next steps/clinical questions.

## 2025-04-16 NOTE — TELEPHONE ENCOUNTER
Starting Emgality 120 mg/mL once monthly injections for migraine prevention.  Patient's insurance no longer covers Ajovy, recommended Emgality, Aimovig, or Qulipta as preferred alternatives.  No need for initial loading dose as the patient had been previously using Ajovy once monthly injections.    Abel Cleaning PA-C   04/16/2025

## 2025-04-17 ENCOUNTER — APPOINTMENT (OUTPATIENT)
Dept: LAB | Facility: HOSPITAL | Age: 35
End: 2025-04-17
Payer: MEDICARE

## 2025-04-17 DIAGNOSIS — R00.0 TACHYCARDIA: ICD-10-CM

## 2025-04-17 LAB
ATRIAL RATE: 88 BPM
P AXIS: 67 DEGREES
PR INTERVAL: 140 MS
QRS AXIS: 40 DEGREES
QRSD INTERVAL: 68 MS
QT INTERVAL: 376 MS
QTC INTERVAL: 455 MS
T WAVE AXIS: 40 DEGREES
VENTRICULAR RATE: 88 BPM

## 2025-04-17 PROCEDURE — 93005 ELECTROCARDIOGRAM TRACING: CPT

## 2025-04-17 PROCEDURE — 93010 ELECTROCARDIOGRAM REPORT: CPT

## 2025-04-17 NOTE — TELEPHONE ENCOUNTER
Recd approval from [facility]: Message from Plan  Approved. . Authorization Expiration Date: October 16, 2025    Called patient to advise, reached vm (no consent on file) left message tcb; also sending my chart message.

## 2025-04-23 ENCOUNTER — TELEPHONE (OUTPATIENT)
Dept: FAMILY MEDICINE CLINIC | Facility: CLINIC | Age: 35
End: 2025-04-23

## 2025-04-24 ENCOUNTER — APPOINTMENT (EMERGENCY)
Dept: RADIOLOGY | Facility: HOSPITAL | Age: 35
End: 2025-04-24
Payer: MEDICARE

## 2025-04-24 ENCOUNTER — HOSPITAL ENCOUNTER (EMERGENCY)
Facility: HOSPITAL | Age: 35
Discharge: HOME | End: 2025-04-25
Attending: EMERGENCY MEDICINE
Payer: MEDICARE

## 2025-04-24 DIAGNOSIS — R07.9 CHEST PAIN: Primary | ICD-10-CM

## 2025-04-24 DIAGNOSIS — R00.0 TACHYCARDIA: Primary | ICD-10-CM

## 2025-04-24 DIAGNOSIS — R11.0 NAUSEA: ICD-10-CM

## 2025-04-24 LAB
ALBUMIN SERPL BCG-MCNC: 4.8 G/DL (ref 3.5–5)
ALP SERPL-CCNC: 57 U/L (ref 34–104)
ALT SERPL W P-5'-P-CCNC: 10 U/L (ref 7–52)
ANION GAP SERPL CALCULATED.3IONS-SCNC: 6 MMOL/L (ref 4–13)
AST SERPL W P-5'-P-CCNC: 18 U/L (ref 13–39)
BASOPHILS # BLD AUTO: 0.09 THOUSANDS/ÂΜL (ref 0–0.1)
BASOPHILS NFR BLD AUTO: 1 % (ref 0–1)
BILIRUB SERPL-MCNC: 0.39 MG/DL (ref 0.2–1)
BUN SERPL-MCNC: 14 MG/DL (ref 5–25)
CALCIUM SERPL-MCNC: 9.8 MG/DL (ref 8.4–10.2)
CARDIAC TROPONIN I PNL SERPL HS: <2 NG/L (ref ?–50)
CHLORIDE SERPL-SCNC: 109 MMOL/L (ref 96–108)
CO2 SERPL-SCNC: 25 MMOL/L (ref 21–32)
CREAT SERPL-MCNC: 0.64 MG/DL (ref 0.6–1.3)
EOSINOPHIL # BLD AUTO: 0.25 THOUSAND/ÂΜL (ref 0–0.61)
EOSINOPHIL NFR BLD AUTO: 2 % (ref 0–6)
ERYTHROCYTE [DISTWIDTH] IN BLOOD BY AUTOMATED COUNT: 13.4 % (ref 11.6–15.1)
EXT PREGNANCY TEST URINE: NEGATIVE
EXT. CONTROL: NORMAL
GFR SERPL CREATININE-BSD FRML MDRD: 117 ML/MIN/1.73SQ M
GLUCOSE SERPL-MCNC: 93 MG/DL (ref 65–140)
HCT VFR BLD AUTO: 40.3 % (ref 34.8–46.1)
HGB BLD-MCNC: 13.2 G/DL (ref 11.5–15.4)
IMM GRANULOCYTES # BLD AUTO: 0.03 THOUSAND/UL (ref 0–0.2)
IMM GRANULOCYTES NFR BLD AUTO: 0 % (ref 0–2)
LYMPHOCYTES # BLD AUTO: 4.66 THOUSANDS/ÂΜL (ref 0.6–4.47)
LYMPHOCYTES NFR BLD AUTO: 42 % (ref 14–44)
MCH RBC QN AUTO: 30.6 PG (ref 26.8–34.3)
MCHC RBC AUTO-ENTMCNC: 32.8 G/DL (ref 31.4–37.4)
MCV RBC AUTO: 94 FL (ref 82–98)
MONOCYTES # BLD AUTO: 0.54 THOUSAND/ÂΜL (ref 0.17–1.22)
MONOCYTES NFR BLD AUTO: 5 % (ref 4–12)
NEUTROPHILS # BLD AUTO: 5.46 THOUSANDS/ÂΜL (ref 1.85–7.62)
NEUTS SEG NFR BLD AUTO: 50 % (ref 43–75)
NRBC BLD AUTO-RTO: 0 /100 WBCS
PLATELET # BLD AUTO: 249 THOUSANDS/UL (ref 149–390)
PMV BLD AUTO: 10.9 FL (ref 8.9–12.7)
POTASSIUM SERPL-SCNC: 3.5 MMOL/L (ref 3.5–5.3)
PROT SERPL-MCNC: 7.4 G/DL (ref 6.4–8.4)
RBC # BLD AUTO: 4.31 MILLION/UL (ref 3.81–5.12)
SODIUM SERPL-SCNC: 140 MMOL/L (ref 135–147)
WBC # BLD AUTO: 11.03 THOUSAND/UL (ref 4.31–10.16)

## 2025-04-24 PROCEDURE — 99285 EMERGENCY DEPT VISIT HI MDM: CPT

## 2025-04-24 PROCEDURE — 80053 COMPREHEN METABOLIC PANEL: CPT

## 2025-04-24 PROCEDURE — 71045 X-RAY EXAM CHEST 1 VIEW: CPT

## 2025-04-24 PROCEDURE — 93005 ELECTROCARDIOGRAM TRACING: CPT

## 2025-04-24 PROCEDURE — 85025 COMPLETE CBC W/AUTO DIFF WBC: CPT

## 2025-04-24 PROCEDURE — 84484 ASSAY OF TROPONIN QUANT: CPT

## 2025-04-24 PROCEDURE — 36415 COLL VENOUS BLD VENIPUNCTURE: CPT

## 2025-04-24 PROCEDURE — 96374 THER/PROPH/DIAG INJ IV PUSH: CPT

## 2025-04-24 PROCEDURE — 81025 URINE PREGNANCY TEST: CPT

## 2025-04-24 RX ORDER — ACETAMINOPHEN 325 MG/1
650 TABLET ORAL ONCE
Status: COMPLETED | OUTPATIENT
Start: 2025-04-25 | End: 2025-04-24

## 2025-04-24 RX ORDER — MAGNESIUM HYDROXIDE/ALUMINUM HYDROXICE/SIMETHICONE 120; 1200; 1200 MG/30ML; MG/30ML; MG/30ML
30 SUSPENSION ORAL ONCE
Status: DISCONTINUED | OUTPATIENT
Start: 2025-04-25 | End: 2025-04-25 | Stop reason: HOSPADM

## 2025-04-24 RX ORDER — KETOROLAC TROMETHAMINE 30 MG/ML
15 INJECTION, SOLUTION INTRAMUSCULAR; INTRAVENOUS ONCE
Status: COMPLETED | OUTPATIENT
Start: 2025-04-24 | End: 2025-04-24

## 2025-04-24 RX ORDER — FAMOTIDINE 20 MG/1
20 TABLET, FILM COATED ORAL ONCE
Status: COMPLETED | OUTPATIENT
Start: 2025-04-24 | End: 2025-04-24

## 2025-04-24 RX ADMIN — KETOROLAC TROMETHAMINE 15 MG: 30 INJECTION, SOLUTION INTRAMUSCULAR; INTRAVENOUS at 23:03

## 2025-04-24 RX ADMIN — FAMOTIDINE 20 MG: 20 TABLET, FILM COATED ORAL at 22:59

## 2025-04-24 RX ADMIN — ACETAMINOPHEN 650 MG: 325 TABLET, FILM COATED ORAL at 23:58

## 2025-04-25 VITALS
OXYGEN SATURATION: 99 % | TEMPERATURE: 98.4 F | BODY MASS INDEX: 19.81 KG/M2 | RESPIRATION RATE: 20 BRPM | WEIGHT: 119.05 LBS | SYSTOLIC BLOOD PRESSURE: 118 MMHG | DIASTOLIC BLOOD PRESSURE: 75 MMHG | HEART RATE: 84 BPM

## 2025-04-25 LAB
ATRIAL RATE: 89 BPM
P AXIS: 72 DEGREES
PR INTERVAL: 130 MS
QRS AXIS: 63 DEGREES
QRSD INTERVAL: 72 MS
QT INTERVAL: 358 MS
QTC INTERVAL: 435 MS
T WAVE AXIS: 61 DEGREES
VENTRICULAR RATE: 89 BPM

## 2025-04-25 PROCEDURE — 96375 TX/PRO/DX INJ NEW DRUG ADDON: CPT

## 2025-04-25 PROCEDURE — 93010 ELECTROCARDIOGRAM REPORT: CPT | Performed by: STUDENT IN AN ORGANIZED HEALTH CARE EDUCATION/TRAINING PROGRAM

## 2025-04-25 RX ORDER — ONDANSETRON 2 MG/ML
4 INJECTION INTRAMUSCULAR; INTRAVENOUS ONCE
Status: COMPLETED | OUTPATIENT
Start: 2025-04-25 | End: 2025-04-25

## 2025-04-25 RX ADMIN — ONDANSETRON 4 MG: 2 INJECTION INTRAMUSCULAR; INTRAVENOUS at 00:20

## 2025-04-25 NOTE — ED ATTENDING ATTESTATION
4/24/2025  ILucio Jr, DO, saw and evaluated the patient. I have discussed the patient with the resident/non-physician practitioner and agree with the resident's/non-physician practitioner's findings, Plan of Care, and MDM as documented in the resident's/non-physician practitioner's note, except where noted. All available labs and Radiology studies were reviewed.  I was present for key portions of any procedure(s) performed by the resident/non-physician practitioner and I was immediately available to provide assistance.       At this point I agree with the current assessment done in the Emergency Department.  I have conducted an independent evaluation of this patient a history and physical is as follows:    Final Diagnosis:  1. Chest pain    2. Nausea            MDM       DDX including but not limited to: chest wall pain, costochondritis, pleurisy, pericarditis, myocarditis, PTX, pneumonia, GI etiology; doubt ACS or MI or dissection or PE or rhabdomyolysis.    - Given patient's concerns, will do a cardiac workup.   -   - Will do an EKG for strain; arrhythmia, heart block, ST changes to rule out STEMI, pericarditis.  - Troponin for same as per protocol for evaluation of ACS.   - CBC to evaluate for anemia, evaluate for leukocytosis as sign of infectious source of symptoms.  - CMP to evaluate for electrolyte derangement, assess renal and hepatic function.  - BNP to evaluate for CHF and fluid overload.  - Will check CXR for pneumonia, pneumothorax, pleural effusion, signs of fluid overload.  - Disposition per workup.     Heart Score:  0    Overall workup is negative.  No clinical decompensation during ED observation time.  Overall symptoms are improved.  Heart score is 0 and patient has low risk factors for ACS or PE.  Will discharge home with PCP follow-up.        Lab Results:   Abnormal Labs Reviewed   CBC AND DIFFERENTIAL - Abnormal; Notable for the following components:       Result Value    WBC 11.03  (*)     Absolute Lymphocytes 4.66 (*)     All other components within normal limits   COMPREHENSIVE METABOLIC PANEL - Abnormal; Notable for the following components:    Chloride 109 (*)     All other components within normal limits    Narrative:     National Kidney Disease Foundation guidelines for Chronic Kidney Disease (CKD):     Stage 1 with normal or high GFR (GFR > 90 mL/min/1.73 square meters)    Stage 2 Mild CKD (GFR = 60-89 mL/min/1.73 square meters)    Stage 3A Moderate CKD (GFR = 45-59 mL/min/1.73 square meters)    Stage 3B Moderate CKD (GFR = 30-44 mL/min/1.73 square meters)    Stage 4 Severe CKD (GFR = 15-29 mL/min/1.73 square meters)    Stage 5 End Stage CKD (GFR <15 mL/min/1.73 square meters)  Note: GFR calculation is accurate only with a steady state creatinine     Lab Results: I have personally reviewed pertinent lab results.    Imaging:   XR chest portable   ED Interpretation   No acute cardiopulmonary disease        I have personally reviewed pertinent reports.    EKG, Pathology, and Other Studies: I have personally reviewed pertinent films in PACS    Clinical Impression:    Final diagnoses:   Chest pain   Nausea         Disposition    discharged           New Prescriptions:    Discharge Medication List as of 4/25/2025 12:30 AM               Follow-up Instructions:    [facility] Emergency Department  [address] [place] [id number] [phone]  Go to   If symptoms worsen    KARTIK Persaud  [address] [place] [id number]  [phone]    Call   As needed          History of Present Illness   Monie Moraes is a 34 y.o. female who presents with Chest Pain (States pain in middle of chest starting around 1700 today. States started feeling dizzy as if drunk starting about 1 hr prior to arrival. )    has a past medical history of Abnormal Pap smear of cervix, ADHD, Anemia, Bipolar 1 disorder (HCC), Depression, Epilepsy (HCC), GERD  (gastroesophageal reflux disease), Migraine, OCD (obsessive compulsive disorder), Prediabetes, and Urogenital trichomoniasis (2019)..         Objective     Vitals:    04/24/25 2155 04/24/25 2315 04/25/25 0015   BP: 121/78 136/81 118/75   BP Location: Right arm Right arm Right arm   Pulse: 89 76 84   Resp: 17 19 20   Temp: 98.4 °F (36.9 °C)     SpO2: 100% 98% 99%   Weight: 54 kg (119 lb 0.8 oz)       Body mass index is 19.81 kg/m².  No intake or output data in the 24 hours ending 04/25/25 0037  Invasive Devices       None                   ED Course         Critical Care Time  Procedures

## 2025-04-25 NOTE — ED PROVIDER NOTES
Time reflects when diagnosis was documented in both MDM as applicable and the Disposition within this note       Time User Action Codes Description Comment    4/25/2025 12:26 AM Rogerio Charlton [R07.9] Chest pain     4/25/2025 12:27 AM Rogerio Charlton [R11.0] Nausea           ED Disposition       ED Disposition   Discharge    Condition   Stable    Date/Time   Fri Apr 25, 2025 12:25 AM    Comment   Monie Santana Moraes discharge to home/self care.                   Assessment & Plan       Medical Decision Making  Patient is a 34-year-old female presenting for evaluation of a 5-hour history of chest pain.  Associated nausea since onset of chest pain and dizziness for 1 hour prior to arrival.  Chest pain located centrally, described as a pressure sensation with intermittent shooting pain.  No radiation into the back, up the neck or down the arms.    She is overall well-appearing on exam, lungs clear to auscultation bilaterally.  Chest pain reproducible to palpation.  Nonfocal neuro exam.  Symptoms likely secondary to acid reflux given documented history and no maintenance medications.  Will get basic cardiac labs including chest x-ray and give Toradol and Pepcid.     EKG performed on arrival showing normal sinus rhythm with rate of 89 bpm.  Normal axis.  No ST segment elevations or depressions.  No QT prolongation or abnormal WA interval.  Workup revealing a mild leukocytosis but overall unremarkable.  Troponin less than 2.  Heart score of 1.  Reviewed results of workup with patient and provided reassurance that her chest pain is not cardiac in origin, and may be secondary to acid reflux, for which she has a documented diagnosis of.  Patient reported resolution of her dizziness and significant improvement in her chest pain.  She states she feels comfortable going home at this time.  Recommended continued compliance with once daily Prilosec that she is prescribed for acid reflux.  Also recommended over-the-counter  analgesics such as Tylenol or Motrin for any continued chest pain or discomfort.  She was directed to follow-up with her primary care provider if she does not experience resolution of her symptoms.  Discussed return precautions with the patient.  Patient understands and agrees to stated plan.    Amount and/or Complexity of Data Reviewed  Labs: ordered. Decision-making details documented in ED Course.  Radiology: ordered and independent interpretation performed. Decision-making details documented in ED Course.    Risk  OTC drugs.  Prescription drug management.        ED Course as of 04/25/25 0747   Thu Apr 24, 2025 2317 PREGNANCY TEST URINE: Negative   2334 hs TnI 0hr: <2   2334 Comprehensive metabolic panel(!)  Unremarkable    2335 WBC(!): 11.03   2357 EKG performed on arrival showing normal sinus rhythm with rate of 89 bpm.  Normal axis.  No ST segment elevations or depressions.  No QT prolongation or abnormal OR interval.   Fri Apr 25, 2025   0015 XR chest portable  No acute cardiopulmonary disease   0015 Patient reassessed at bedside, she reports resolution of her dizziness and some improvement in her chest pain.  Reviewed results from workup, provided reassurance that her chest pain is not cardiac in origin. She states she feels comfortable going home at this time. Will give dose of zofran and d/c        Medications   ketorolac (TORADOL) injection 15 mg (15 mg Intravenous Given 4/24/25 2303)   famotidine (PEPCID) tablet 20 mg (20 mg Oral Given 4/24/25 2259)   acetaminophen (TYLENOL) tablet 650 mg (650 mg Oral Given 4/24/25 2358)   ondansetron (ZOFRAN) injection 4 mg (4 mg Intravenous Given 4/25/25 0020)       ED Risk Strat Scores   HEART Risk Score      Flowsheet Row Most Recent Value   Heart Score Risk Calculator    History 0 Filed at: 04/25/2025 0000   ECG 0 Filed at: 04/25/2025 0000   Age 0 Filed at: 04/25/2025 0000   Risk Factors 1 Filed at: 04/25/2025 0000   Troponin 0 Filed at: 04/25/2025 0000   HEART  Score 1 Filed at: 04/25/2025 0000          HEART Risk Score      Flowsheet Row Most Recent Value   Heart Score Risk Calculator    History 0 Filed at: 04/25/2025 0000   ECG 0 Filed at: 04/25/2025 0000   Age 0 Filed at: 04/25/2025 0000   Risk Factors 1 Filed at: 04/25/2025 0000   Troponin 0 Filed at: 04/25/2025 0000   HEART Score 1 Filed at: 04/25/2025 0000                      No data recorded        SBIRT 22yo+      Flowsheet Row Most Recent Value   Initial Alcohol Screen: US AUDIT-C     1. How often do you have a drink containing alcohol? 0 Filed at: 04/24/2025 2201   2. How many drinks containing alcohol do you have on a typical day you are drinking?  0 Filed at: 04/24/2025 2201   3b. FEMALE Any Age, or MALE 65+: How often do you have 4 or more drinks on one occassion? 0 Filed at: 04/24/2025 2201   Audit-C Score 0 Filed at: 04/24/2025 2201   ALFRED: How many times in the past year have you...    Used an illegal drug or used a prescription medication for non-medical reasons? Never Filed at: 04/24/2025 2201            Wells' Criteria for PE      Flowsheet Row Most Recent Value   Wells' Criteria for PE    Clinical signs and symptoms of DVT 0 Filed at: 04/24/2025 2222   PE is primary diagnosis or equally likely 0 Filed at: 04/24/2025 2222   HR >100 0 Filed at: 04/24/2025 2222   Immobilization at least 3 days or Surgery in the previous 4 weeks 0 Filed at: 04/24/2025 2222   Previous, objectively diagnosed PE or DVT 0 Filed at: 04/24/2025 2222   Hemoptysis 0 Filed at: 04/24/2025 2222   Malignancy with treatment within 6 months or palliative 0 Filed at: 04/24/2025 2222   Wells' Criteria Total 0 Filed at: 04/24/2025 2222                        History of Present Illness       Chief Complaint   Patient presents with    Chest Pain     States pain in middle of chest starting around 1700 today. States started feeling dizzy as if drunk starting about 1 hr prior to arrival.        Past Medical History:   Diagnosis Date    Abnormal  Pap smear of cervix     2009 cryo; 6/2019 LSIL/AGC pap; 6/2019 colpo HSIL; 8/2019 LEEP HSIL w/neg margins; 6/2020 NILM pap/neg HPV; 6/2020 NILM pap/neg HPV    ADHD     Anemia     Bipolar 1 disorder (HCC)     Depression     Epilepsy (HCC)     last episode 2000    GERD (gastroesophageal reflux disease)     Migraine     OCD (obsessive compulsive disorder)     Prediabetes     Urogenital trichomoniasis 2019      Past Surgical History:   Procedure Laterality Date    FOREIGN BODY REMOVAL N/A 9/20/2023    Procedure: removal of IUD;  Surgeon: Miriam Lafleur MD;  Location: [facility];  Service: Gynecology    GYNECOLOGIC CRYOSURGERY  2009    [place] COLPOSCOPY CERVIX VAG LOOP ELTRD BX CERVIX N/A 08/09/2019    Procedure: BIOPSY LEEP CERVIX;  Surgeon: Ramiro Carmen MD;  Location: [facility];  Service: Gynecology    [place] LAPS ABD PRTM&OMENTUM DX W/WO SPEC BR/WA SPX N/A 9/20/2023    Procedure: LAPAROSCOPY DIAGNOSTIC;  Surgeon: Miriam Lafleur MD;  Location: [facility];  Service: Gynecology      Family History   Problem Relation Age of Onset    Bipolar disorder Mother     Diabetes Mother     Migraines Mother     Miscarriages / Stillbirths Mother     No Known Problems Father     Depression Sister     Asthma Sister     Osteoporosis Maternal Grandmother     Cancer Maternal Grandfather         lung    Breast cancer Neg Hx     Colon cancer Neg Hx     Ovarian cancer Neg Hx       Social History     Tobacco Use    Smoking status: Every Day     Current packs/day: 1.00     Average packs/day: 1 pack/day for 11.0 years (11.0 ttl pk-yrs)     Types: Cigarettes     Passive exposure: Current    Smokeless tobacco: Never   Vaping Use    Vaping status: Never Used   Substance Use Topics    Alcohol use: Yes     Comment: couple times/month    Drug use: Never      E-Cigarette/Vaping    E-Cigarette Use Never User       E-Cigarette/Vaping Substances    Nicotine No     THC No     CBD No     Flavoring No     Other No     Unknown No       I have  reviewed and agree with the history as documented.     Monie is a 34-year-old female with past medical history of migraines, psychiatric disorders, GERD presenting to the emergency department for a 5-hour history of chest pain.  Symptoms came on suddenly, primarily located in the center of her chest.  She states the pain waxes and wanes, she describes it as a pressure sensation with intermittent shooting pain.  She endorses some associated shortness of breath with the chest pressure sensation.  She also states she had onset of dizziness, described as room spinning, about 1 hour prior to arrival.  She has also had some nausea, but no vomiting since the onset of chest pain.  She did not take anything for symptoms prior to arrival.  She denies any headaches, lightheadedness, abdominal pain, vomiting, back pain, syncope.      Chest Pain  Associated symptoms: dizziness, nausea and shortness of breath    Associated symptoms: no abdominal pain, no back pain, no cough, no fatigue, no fever, no headache, no numbness, not vomiting and no weakness        Review of Systems   Constitutional:  Negative for chills, fatigue and fever.   HENT:  Positive for congestion. Negative for ear pain, rhinorrhea, sinus pressure and sore throat.    Eyes:  Negative for visual disturbance.   Respiratory:  Positive for shortness of breath. Negative for cough.    Cardiovascular:  Positive for chest pain.   Gastrointestinal:  Positive for nausea. Negative for abdominal pain and vomiting.   Genitourinary: Negative.    Musculoskeletal:  Negative for back pain and neck pain.   Neurological:  Positive for dizziness. Negative for weakness, light-headedness, numbness and headaches.   All other systems reviewed and are negative.          Objective       ED Triage Vitals [04/24/25 2155]   Temperature Pulse Blood Pressure Respirations SpO2 Patient Position - Orthostatic VS   98.4 °F (36.9 °C) 89 121/78 17 100 % Lying      Temp src Heart Rate Source BP  Location FiO2 (%) Pain Score    -- Monitor Right arm -- 5      Vitals      Date and Time Temp Pulse SpO2 Resp BP Pain Score FACES Pain Rating User   04/25/25 0015 -- 84 99 % 20 118/75 -- -- AA   04/24/25 2358 -- -- -- -- -- 5 -- AA   04/24/25 2329 -- -- -- -- -- 5 -- AA   04/24/25 2315 -- 76 98 % 19 136/81 -- -- AA   04/24/25 2303 -- -- -- -- -- 5 -- AA   04/24/25 2155 98.4 °F (36.9 °C) 89 100 % 17 121/78 5 --             Physical Exam  Vitals and nursing note reviewed.   Constitutional:       General: She is not in acute distress.     Appearance: Normal appearance. She is not ill-appearing.   HENT:      Head: Normocephalic and atraumatic.      Nose: Nose normal.      Mouth/Throat:      Mouth: Mucous membranes are moist.   Eyes:      General:         Right eye: No discharge.         Left eye: No discharge.      Extraocular Movements: Extraocular movements intact.      Conjunctiva/sclera: Conjunctivae normal.      Pupils: Pupils are equal, round, and reactive to light.   Cardiovascular:      Rate and Rhythm: Normal rate and regular rhythm.      Pulses: Normal pulses.   Pulmonary:      Effort: Pulmonary effort is normal. No respiratory distress.      Breath sounds: Normal breath sounds. No wheezing, rhonchi or rales.   Chest:      Chest wall: Tenderness present.   Abdominal:      General: Abdomen is flat. Bowel sounds are normal.      Palpations: Abdomen is soft.      Tenderness: There is no abdominal tenderness. There is no left CVA tenderness, guarding or rebound.   Musculoskeletal:         General: Normal range of motion.      Right lower leg: No edema.      Left lower leg: No edema.   Skin:     General: Skin is warm and dry.      Capillary Refill: Capillary refill takes less than 2 seconds.   Neurological:      General: No focal deficit present.      Mental Status: She is alert and oriented to person, place, and time.   Psychiatric:         Mood and Affect: Mood normal.         Behavior: Behavior normal.          Results Reviewed       Procedure Component Value Units Date/Time    HS Troponin 0hr (reflex protocol) [[id number]]  (Normal) Collected: 04/24/25 2258    Lab Status: Final result Specimen: Blood from Arm, Left Updated: 04/24/25 2325     hs TnI 0hr <2 ng/L     Comprehensive metabolic panel [[id number]]  (Abnormal) Collected: 04/24/25 2258    Lab Status: Final result Specimen: Blood from Arm, Left Updated: 04/24/25 2319     Sodium 140 mmol/L      Potassium 3.5 mmol/L      Chloride 109 mmol/L      CO2 25 mmol/L      ANION GAP 6 mmol/L      BUN 14 mg/dL      Creatinine 0.64 mg/dL      Glucose 93 mg/dL      Calcium 9.8 mg/dL      AST 18 U/L      ALT 10 U/L      Alkaline Phosphatase 57 U/L      Total Protein 7.4 g/dL      Albumin 4.8 g/dL      Total Bilirubin 0.39 mg/dL      eGFR 117 ml/min/1.73sq m     Narrative:      National Kidney Disease Foundation guidelines for Chronic Kidney Disease (CKD):     Stage 1 with normal or high GFR (GFR > 90 mL/min/1.73 square meters)    Stage 2 Mild CKD (GFR = 60-89 mL/min/1.73 square meters)    Stage 3A Moderate CKD (GFR = 45-59 mL/min/1.73 square meters)    Stage 3B Moderate CKD (GFR = 30-44 mL/min/1.73 square meters)    Stage 4 Severe CKD (GFR = 15-29 mL/min/1.73 square meters)    Stage 5 End Stage CKD (GFR <15 mL/min/1.73 square meters)  Note: GFR calculation is accurate only with a steady state creatinine    POCT pregnancy, urine [[id number]]  (Normal) Collected: 04/24/25 2303    Lab Status: Final result Updated: 04/24/25 2303     EXT Preg Test, Ur Negative     Control Valid    CBC and differential [[id number]]  (Abnormal) Collected: 04/24/25 2258    Lab Status: Final result Specimen: Blood from Arm, Left Updated: 04/24/25 2303     WBC 11.03 Thousand/uL      RBC 4.31 Million/uL      Hemoglobin 13.2 g/dL      Hematocrit 40.3 %      MCV 94 fL      MCH 30.6 pg      MCHC 32.8 g/dL      RDW 13.4 %      MPV 10.9 fL      Platelets 249 Thousands/uL      nRBC 0 /100 WBCs      Segmented %  50 %      Immature Grans % 0 %      Lymphocytes % 42 %      Monocytes % 5 %      Eosinophils Relative 2 %      Basophils Relative 1 %      Absolute Neutrophils 5.46 Thousands/µL      Absolute Immature Grans 0.03 Thousand/uL      Absolute Lymphocytes 4.66 Thousands/µL      Absolute Monocytes 0.54 Thousand/µL      Eosinophils Absolute 0.25 Thousand/µL      Basophils Absolute 0.09 Thousands/µL             XR chest portable   ED Interpretation by Rogerio Charlton PA-C (15)   No acute cardiopulmonary disease          Procedures    ED Medication and Procedure Management   Prior to Admission Medications   Prescriptions Last Dose Informant Patient Reported? Taking?   DULoxetine (CYMBALTA) 30 mg delayed release capsule  Self Yes No   Sig: Take 30 mg by mouth daily   Galcanezumab-gnlm (Emgality) 120 MG/ML SOAJ   No No   Sig: Inject 120 mg under the skin every 30 (thirty) days   Ubrogepant (Ubrelvy) 100 MG tablet   No No   Sig: Take 1 tablet (100 mg) one time as needed for migraine. May repeat one additional tablet (100 mg) at least two hours after the first dose. Do not use more than two doses per day, or for more than eight days per month.   cetirizine (ZyrTEC) 10 mg tablet   No No   Sig: Take 1 tablet (10 mg total) by mouth daily   fluticasone (FLONASE) 50 mcg/act nasal spray   No No   Si spray into each nostril daily   linaCLOtide (Linzess) 145 MCG CAPS   No No   Sig: Take 1 capsule (145 mcg total) by mouth in the morning   lurasidone (LATUDA) 40 mg tablet  Self Yes No   Sig: Take 40 mg by mouth in the morning   medroxyPROGESTERone (DEPO-PROVERA) 150 mg/mL injection   No No   Sig: Inject 1 mL (150 mg total) into a muscle every 3 (three) months   mirtazapine (REMERON) 15 mg tablet   Yes No   omeprazole (PriLOSEC) 20 mg delayed release capsule   No No   Sig: TAKE 1 CAPSULE(20 MG) BY MOUTH TWICE DAILY   polyethylene glycol (GLYCOLAX) 17 GM/SCOOP powder  Self No No   Sig: Take 17 g by mouth daily   Patient not  taking: Reported on 11/13/2024      Facility-Administered Medications Last Administration Doses Remaining   medroxyPROGESTERone (DEPO-PROVERA) IM injection 150 mg 2/19/2025 11:27 AM 3        Discharge Medication List as of 4/25/2025 12:30 AM        CONTINUE these medications which have NOT CHANGED    Details   cetirizine (ZyrTEC) 10 mg tablet Take 1 tablet (10 mg total) by mouth daily, Starting Thu 4/10/2025, Normal      DULoxetine (CYMBALTA) 30 mg delayed release capsule Take 30 mg by mouth daily, Starting Thu 12/14/2023, Historical Med      fluticasone (FLONASE) 50 mcg/act nasal spray 1 spray into each nostril daily, Starting Thu 4/10/2025, Normal      Galcanezumab-gnlm (Emgality) 120 MG/ML SOAJ Inject 120 mg under the skin every 30 (thirty) days, Starting Wed 4/16/2025, Normal      linaCLOtide (Linzess) 145 MCG CAPS Take 1 capsule (145 mcg total) by mouth in the morning, Starting Thu 12/19/2024, Normal      lurasidone (LATUDA) 40 mg tablet Take 40 mg by mouth in the morning, Starting Sun 5/29/2022, Historical Med      medroxyPROGESTERone (DEPO-PROVERA) 150 mg/mL injection Inject 1 mL (150 mg total) into a muscle every 3 (three) months, Starting Wed 2/19/2025, Normal      mirtazapine (REMERON) 15 mg tablet Historical Med      omeprazole (PriLOSEC) 20 mg delayed release capsule TAKE 1 CAPSULE(20 MG) BY MOUTH TWICE DAILY, Normal      polyethylene glycol (GLYCOLAX) 17 GM/SCOOP powder Take 17 g by mouth daily, Starting Wed 6/26/2024, Normal      Ubrogepant (Ubrelvy) 100 MG tablet Take 1 tablet (100 mg) one time as needed for migraine. May repeat one additional tablet (100 mg) at least two hours after the first dose. Do not use more than two doses per day, or for more than eight days per month., Normal           No discharge procedures on file.  ED SEPSIS DOCUMENTATION   Time reflects when diagnosis was documented in both MDM as applicable and the Disposition within this note       Time User Action Codes Description  Comment    4/25/2025 12:26 AM Rogerio Charlton [R07.9] Chest pain     4/25/2025 12:27 AM Rogerio Charlton [R11.0] Nausea                  Rogerio Charlton PA-C  04/25/25 0748

## 2025-04-25 NOTE — DISCHARGE INSTRUCTIONS
Your workup was negative for cardiac causes of chest pain. Your xray was also negative for any pathology within the lungs. You may be experiencing an exacerbation of your acid reflux. Please ensure to continue taking Prilosec once daily in the morning as you have been previously prescribed. You can also try over the counter Tylenol or ibuprofen, taken by mouth every 6 hours as needed for further chest pain.     Return to the ER if you experience new or worsening symptoms. Follow up with your primary care provider if your symptoms are not resolving.

## 2025-05-07 DIAGNOSIS — K59.00 CONSTIPATION, UNSPECIFIED CONSTIPATION TYPE: ICD-10-CM

## 2025-05-07 RX ORDER — LINACLOTIDE 145 UG/1
CAPSULE, GELATIN COATED ORAL
Qty: 90 CAPSULE | Refills: 1 | Status: SHIPPED | OUTPATIENT
Start: 2025-05-07

## 2025-05-15 ENCOUNTER — HOSPITAL ENCOUNTER (OUTPATIENT)
Dept: NON INVASIVE DIAGNOSTICS | Facility: HOSPITAL | Age: 35
Discharge: HOME | End: 2025-05-15
Attending: NURSE PRACTITIONER
Payer: MEDICARE

## 2025-05-15 DIAGNOSIS — R00.0 TACHYCARDIA: ICD-10-CM

## 2025-05-15 PROCEDURE — 93226 XTRNL ECG REC<48 HR SCAN A/R: CPT

## 2025-05-15 PROCEDURE — 93225 XTRNL ECG REC<48 HRS REC: CPT

## 2025-05-19 ENCOUNTER — OFFICE VISIT (OUTPATIENT)
Dept: NEUROLOGY | Facility: CLINIC | Age: 35
End: 2025-05-19
Payer: MEDICARE

## 2025-05-19 VITALS
SYSTOLIC BLOOD PRESSURE: 102 MMHG | DIASTOLIC BLOOD PRESSURE: 62 MMHG | OXYGEN SATURATION: 98 % | BODY MASS INDEX: 19.2 KG/M2 | TEMPERATURE: 98 F | WEIGHT: 115.4 LBS

## 2025-05-19 DIAGNOSIS — F31.9 BIPOLAR 1 DISORDER (HCC): ICD-10-CM

## 2025-05-19 DIAGNOSIS — F41.9 ANXIETY: ICD-10-CM

## 2025-05-19 DIAGNOSIS — G43.E09 CHRONIC MIGRAINE WITH AURA WITHOUT STATUS MIGRAINOSUS, NOT INTRACTABLE: Primary | ICD-10-CM

## 2025-05-19 PROCEDURE — 99213 OFFICE O/P EST LOW 20 MIN: CPT

## 2025-05-19 NOTE — PROGRESS NOTES
Name: Monie Moraes      : 1990      MRN: [id number]  Encounter Provider: Abel Cleaning PA-C  Encounter Date: 2025   Encounter department: [facility] [place]  :  Assessment & Plan  Chronic migraine with aura without status migrainosus, not intractable  I had the pleasure of seeing Monie today in the office at [facility] and by phone.  She is presenting today for an office visit follow-up appointment in regard to her migraine headaches.  Since last appointment, patient notes that her migraines had stayed relatively around the same in regards to frequency and severity.  The patient states that she is currently experiencing 15/30 days a month with some type headache.  She had switched from Ajovy once monthly injectable to Emgality once monthly injectable back in 2025.  She has only been on the injectable for short period time so recommended that she go another few months with trialing Emgality to see if this helps improve her migraines.  Ubrelvy 100 mg however does work for abortive therapy of her migraines when they do occur.  No other issues or concerns, advised patient to continue with Emgality once monthly injections and continue with Ubrelvy for abortive therapy.  Advised patient to follow-up in approximately 3 months time with [facility].           Anxiety         Bipolar 1 disorder (HCC)           Patient Instructions   Headache Calendar  Please maintain a headache calendar  Consider using phone applications such as Migraine Chin or Migraine Diary    Headache/migraine treatment:     Rescue medications (for immediate treatment of a headache):   It is ok to take ibuprofen, acetaminophen or naproxen (Advil, Tylenol,  Aleve, Excedrin) if they help your headaches you should limit these to No more than 3 times a week to avoid medication overuse/rebound headaches.     For your more moderate to severe migraines take this medication  early     - Continue Ubrelvy 100 mg, take 1 tablet by mouth once at the onset of a migraine headache.  May repeat this dosage in 2 hours if needed.    Prescription preventive medications for headaches/migraines   (to take every day to help prevent headaches - not to take at the time of headache):  - Continue Cymbalta 30 mg once daily     - Continue Emgality 120 mg/mL once monthly injections for migraine prevention.    *Typically these types of medications take time until you see the benefit, although some may see improvement in days, often it may take weeks, especially if the medication is being titrated up to a beneficial level. Please contact us if there are any concerns or questions regarding the medication.     Over the counter preventive supplements for headaches/migraines (if you try, try for 3 months straight)  (to take every day to help prevent headaches - not to take at the time of headache):  There are combo pills online of these - none of which regulated by FDA and double check dosing - take appropriate dose only once a day- prevent a migraine, migravent, mind ease, migrelief   [x] Magnesium 400mg daily (If any diarrhea or upset stomach, decrease dose  as tolerated)  [x] Riboflavin (Vitamin B2) 400mg daily (may make your urine bright/neon yellow)    - All supplements can be purchased online     Lifestyle Recommendations:  [x] SLEEP - Maintain a regular sleep schedule: Adults need at least 7-8 hours of uninterrupted a night. Maintain good sleep hygiene:  Going to bed and waking up at consistent times, avoiding excessive daytime naps, avoiding caffeinated beverages in the evening, avoid excessive stimulation in the evening and generally using bed primarily for sleeping.  One hour before bedtime would recommend turning lights down lower, decreasing your activity (may read quietly, listen to music at a low volume). When you get into bed, should eliminate all technology (no texting, emailing, playing with your  phone, iPad or tablet in bed).  [x] HYDRATION - Maintain good hydration.  Drink  2L of fluid a day (4 typical small water bottles)  [x] DIET - Maintain good nutrition. In particular don't skip meals and try and eat healthy balanced meals regularly.  [x] TRIGGERS - Look for other triggers and avoid them: Limit caffeine to 1-2 cups a day or less. Avoid dietary triggers that you have noticed bring on your headaches (this could include aged cheese, peanuts, MSG, aspartame and nitrates).  [x] EXERCISE - physical exercise as we all know is good for you in many ways, and not only is good for your heart, but also is beneficial for your mental health, cognitive health and  chronic pain/headaches. I would encourage at the least 5 days of physical exercise weekly for at least 30 minutes.     Education and Follow-up  [x] Please call with any questions or concerns. Of course if any new concerning symptoms go to the emergency department.  [x] Follow up in 3 months with P[facility]        History of Present Illness   HPI     For Review:    The patient was last seen in the office on 1/16/2025 by myself.  At time of the last appointment the patient was presenting for a follow-up appointment in regard to her migraine headaches.  She noted that her headache frequency and severity at the time had significantly decreased.  Experiencing 15/30 days a month with some type of headache.  Noted that there was a drastic change since starting the Ajovy once monthly injections.  She stopped taking propranolol due to the fact that Ajovy was working so well.  She was continuing Ubrelvy for abortive therapy of her migraine headaches at this time.  She was advised to continue with Ajovy once monthly injections for migraine prevention continue with Ubrelvy.  The patient's insurance had recommended that she take Emgality which is preferred over Ajovy for coverage.  Switch from Ajovy to Emgality for migraine prevention.        Current medical illnesses:  Migraine with aura, history of epilepsy, cervical radiculopathy, cervical spondylosis, multiple environmental allergies, dizziness, history of ADHD, bipolar 1 disorder          What medications do you take or have you taken for your headaches?   Current Preventive:   Emgality, Cymbalta, Latuda     Current Abortive:   Ubrelvy, Tylenol      Prior Preventive:   Topamax, gabapentin, Depakote, Lyrica, Remeron, propranolol, Ajovy     Prior Abortive:   Sumatriptan, rizatriptan, Robaxin, ibuprofen, Toradol       Interval updates as of 5/19/2025:    Currently experiencing 15/30 days in the month with some type of headache.  Notes that headaches are relatively the same frequency and severity since last visit.  However, switch from Ajovy to Emgality due to insurance purposes.  The patient is still taking Cymbalta 30 mg daily.  Still taking Ubrelvy 100 mg as needed for abortive therapy of her migraines when they occur.      Review of Systems   Constitutional:  Negative for appetite change, fatigue and fever.   HENT: Negative.  Negative for hearing loss, tinnitus, trouble swallowing and voice change.    Eyes: Negative.  Negative for photophobia, pain and visual disturbance.   Respiratory: Negative.  Negative for shortness of breath.    Cardiovascular: Negative.  Negative for palpitations.   Gastrointestinal: Negative.  Negative for nausea and vomiting.   Endocrine: Negative.  Negative for cold intolerance.   Genitourinary: Negative.  Negative for dysuria, frequency and urgency.   Musculoskeletal:  Negative for back pain, gait problem, myalgias, neck pain and neck stiffness.   Skin: Negative.  Negative for rash.   Allergic/Immunologic: Negative.    Neurological:  Positive for headaches (15/30 HA). Negative for dizziness, tremors, seizures, syncope, facial asymmetry, speech difficulty, weakness, light-headedness and numbness.   Hematological: Negative.  Does not bruise/bleed easily.   Psychiatric/Behavioral: Negative.  Negative  for confusion, hallucinations and sleep disturbance.    All other systems reviewed and are negative.   I have personally reviewed the MA's review of systems and made changes as necessary.    Medical History Reviewed by provider this encounter:  Tobacco  Allergies  Meds  Problems  Med Hx  Surg Hx  Fam Hx     .  Past Medical History   Past Medical History:   Diagnosis Date    Abnormal Pap smear of cervix     2009 cryo; 6/2019 LSIL/AGC pap; 6/2019 colpo HSIL; 8/2019 LEEP HSIL w/neg margins; 6/2020 NILM pap/neg HPV; 6/2020 NILM pap/neg HPV    ADHD     Anemia     Bipolar 1 disorder (HCC)     Depression     Epilepsy (HCC)     last episode 2000    GERD (gastroesophageal reflux disease)     Migraine     OCD (obsessive compulsive disorder)     Prediabetes     Urogenital trichomoniasis 2019     Past Surgical History:   Procedure Laterality Date    FOREIGN BODY REMOVAL N/A 9/20/2023    Procedure: removal of IUD;  Surgeon: Miriam Lafleur MD;  Location: [facility];  Service: Gynecology    GYNECOLOGIC CRYOSURGERY  2009    NV COLPOSCOPY CERVIX VAG LOOP ELTRD BX CERVIX N/A 08/09/2019    Procedure: BIOPSY LEEP CERVIX;  Surgeon: Ramiro Carmen MD;  Location: [facility];  Service: Gynecology    NV LAPS ABD PRTM&OMENTUM DX W/WO SPEC BR/WA SPX N/A 9/20/2023    Procedure: LAPAROSCOPY DIAGNOSTIC;  Surgeon: Miriam Lafleur MD;  Location: [facility];  Service: Gynecology     Family History   Problem Relation Age of Onset    Bipolar disorder Mother     Diabetes Mother     Migraines Mother     Miscarriages / Stillbirths Mother     No Known Problems Father     Depression Sister     Asthma Sister     Osteoporosis Maternal Grandmother     Cancer Maternal Grandfather         lung    Breast cancer Neg Hx     Colon cancer Neg Hx     Ovarian cancer Neg Hx       reports that she has been smoking cigarettes. She has a 11 pack-year smoking history. She has been exposed to tobacco smoke. She has never used smokeless tobacco.  She reports current alcohol use. She reports that she does not use drugs.  Current Outpatient Medications   Medication Instructions    cetirizine (ZYRTEC) 10 mg, Oral, Daily    DULoxetine (CYMBALTA) 30 mg, Daily    Emgality 120 mg, Subcutaneous, Every 30 days    fluticasone (FLONASE) 50 mcg/act nasal spray 1 spray, Nasal, Daily    linaCLOtide (Linzess) 145 MCG CAPS TAKE 1 CAPSULE(145 MCG) BY MOUTH IN THE MORNING    lurasidone (LATUDA) 40 mg, Daily    mirtazapine (REMERON) 15 mg tablet     omeprazole (PriLOSEC) 20 mg delayed release capsule TAKE 1 CAPSULE(20 MG) BY MOUTH TWICE DAILY    Ubrogepant (Ubrelvy) 100 MG tablet Take 1 tablet (100 mg) one time as needed for migraine. May repeat one additional tablet (100 mg) at least two hours after the first dose. Do not use more than two doses per day, or for more than eight days per month.     Allergies[1]   Medications Ordered Prior to Encounter[2]   Social History     Tobacco Use    Smoking status: Every Day     Current packs/day: 1.00     Average packs/day: 1 pack/day for 11.0 years (11.0 ttl pk-yrs)     Types: Cigarettes     Passive exposure: Current    Smokeless tobacco: Never   Vaping Use    Vaping status: Never Used   Substance and Sexual Activity    Alcohol use: Yes     Comment: couple times/month    Drug use: Never    Sexual activity: Not Currently     Partners: Male     Birth control/protection: Injection        Objective   /62 (BP Location: Right arm, Patient Position: Sitting, Cuff Size: Standard)   Temp 98 °F (36.7 °C) (Temporal)   Wt 52.3 kg (115 lb 6.4 oz)   SpO2 98%   BMI 19.20 kg/m²     Physical Exam  Neurological Exam    Physical Exam:                                                                 Vitals:            Constitutional:    /62 (BP Location: Right arm, Patient Position: Sitting, Cuff Size: Standard)   Temp 98 °F (36.7 °C) (Temporal)   Wt 52.3 kg (115 lb 6.4 oz)   SpO2 98%   BMI 19.20 kg/m²   BP Readings from Last 3  Encounters:   05/19/25 102/62   04/25/25 118/75   04/10/25 102/70     Pulse Readings from Last 3 Encounters:   04/25/25 84   04/10/25 (!) 110   02/19/25 80         Well developed, well nourished, well groomed. No dysmorphic features.       Psychiatric:  Normal behavior and appropriate affect        Neurological Examination:     Mental status/cognitive function:   Orientated to time, place and person. Recent and remote memory intact. Attention span and concentration as well as fund of knowledge are appropriate for age. Normal language and spontaneous speech.    Cranial Nerves:  II-visual fields full.   III, IV, VI-Pupils were equal, round, and reactive to light and accomodation. Extraocular movements were full and conjugate without nystagmus. Conjugate gaze, normal smooth pursuits, normal saccades   V-facial sensation symmetric.    VII-facial expression symmetric, intact forehead wrinkle, strong eye closure, symmetric smile    VIII-hearing grossly intact bilaterally   IX, X-palate elevation symmetric, no dysarthria.   XI-shoulder shrug strength intact    XII-tongue protrusion midline.    Motor Exam: symmetric bulk and tone throughout, no pronator drift. Power/strength 5/5 bilateral upper and lower extremities, no atrophy, fasciculations or abnormal movements noted.   Sensory: grossly intact light touch in all extremities.   Reflexes: brachioradialis 1+, biceps 1+, knee 1+ bilaterally  Coordination: Finger nose finger intact bilaterally, no apparent dysmetria, ataxia or tremor noted  Gait: steady casual and tandem gait.     Administrative Statements   I have spent a total time of 20 minutes in caring for this patient on the day of the visit/encounter including Risks and benefits of tx options, Instructions for management, Patient and family education, Importance of tx compliance, Risk factor reductions, Impressions, Counseling / Coordination of care, Documenting in the medical record, Reviewing/placing orders in the  medical record (including tests, medications, and/or procedures), and Obtaining or reviewing history  .       [1] No Known Allergies  [2]   Current Outpatient Medications on File Prior to Visit   Medication Sig Dispense Refill    cetirizine (ZyrTEC) 10 mg tablet Take 1 tablet (10 mg total) by mouth daily 90 tablet 1    DULoxetine (CYMBALTA) 30 mg delayed release capsule Take 30 mg by mouth in the morning.      fluticasone (FLONASE) 50 mcg/act nasal spray 1 spray into each nostril daily 16 g 1    Galcanezumab-gnlm (Emgality) 120 MG/ML SOAJ Inject 120 mg under the skin every 30 (thirty) days 1 mL 11    linaCLOtide (Linzess) 145 MCG CAPS TAKE 1 CAPSULE(145 MCG) BY MOUTH IN THE MORNING 90 capsule 1    lurasidone (LATUDA) 40 mg tablet Take 40 mg by mouth in the morning      mirtazapine (REMERON) 15 mg tablet       omeprazole (PriLOSEC) 20 mg delayed release capsule TAKE 1 CAPSULE(20 MG) BY MOUTH TWICE DAILY 180 capsule 1    Ubrogepant (Ubrelvy) 100 MG tablet Take 1 tablet (100 mg) one time as needed for migraine. May repeat one additional tablet (100 mg) at least two hours after the first dose. Do not use more than two doses per day, or for more than eight days per month. 9 tablet 11    [DISCONTINUED] medroxyPROGESTERone (DEPO-PROVERA) 150 mg/mL injection Inject 1 mL (150 mg total) into a muscle every 3 (three) months (Patient not taking: Reported on 5/19/2025) 1 mL 2    [DISCONTINUED] polyethylene glycol (GLYCOLAX) 17 GM/SCOOP powder Take 17 g by mouth daily (Patient not taking: Reported on 11/13/2024) 850 g 0     Current Facility-Administered Medications on File Prior to Visit   Medication Dose Route Frequency Provider Last Rate Last Admin    medroxyPROGESTERone (DEPO-PROVERA) IM injection 150 mg  150 mg Intramuscular Q3 Months    150 mg at 02/19/25 [id number]

## 2025-05-19 NOTE — PATIENT INSTRUCTIONS
Headache Calendar  Please maintain a headache calendar  Consider using phone applications such as Migraine Chin or Migraine Diary    Headache/migraine treatment:     Rescue medications (for immediate treatment of a headache):   It is ok to take ibuprofen, acetaminophen or naproxen (Advil, Tylenol,  Aleve, Excedrin) if they help your headaches you should limit these to No more than 3 times a week to avoid medication overuse/rebound headaches.     For your more moderate to severe migraines take this medication early     - Continue Ubrelvy 100 mg, take 1 tablet by mouth once at the onset of a migraine headache.  May repeat this dosage in 2 hours if needed.    Prescription preventive medications for headaches/migraines   (to take every day to help prevent headaches - not to take at the time of headache):  - Continue Cymbalta 30 mg once daily     - Continue Emgality 120 mg/mL once monthly injections for migraine prevention.    *Typically these types of medications take time until you see the benefit, although some may see improvement in days, often it may take weeks, especially if the medication is being titrated up to a beneficial level. Please contact us if there are any concerns or questions regarding the medication.     Over the counter preventive supplements for headaches/migraines (if you try, try for 3 months straight)  (to take every day to help prevent headaches - not to take at the time of headache):  There are combo pills online of these - none of which regulated by FDA and double check dosing - take appropriate dose only once a day- prevent a migraine, migravent, mind ease, migrelief   [x] Magnesium 400mg daily (If any diarrhea or upset stomach, decrease dose  as tolerated)  [x] Riboflavin (Vitamin B2) 400mg daily (may make your urine bright/neon yellow)    - All supplements can be purchased online     Lifestyle Recommendations:  [x] SLEEP - Maintain a regular sleep schedule: Adults need at least 7-8 hours  of uninterrupted a night. Maintain good sleep hygiene:  Going to bed and waking up at consistent times, avoiding excessive daytime naps, avoiding caffeinated beverages in the evening, avoid excessive stimulation in the evening and generally using bed primarily for sleeping.  One hour before bedtime would recommend turning lights down lower, decreasing your activity (may read quietly, listen to music at a low volume). When you get into bed, should eliminate all technology (no texting, emailing, playing with your phone, iPad or tablet in bed).  [x] HYDRATION - Maintain good hydration.  Drink  2L of fluid a day (4 typical small water bottles)  [x] DIET - Maintain good nutrition. In particular don't skip meals and try and eat healthy balanced meals regularly.  [x] TRIGGERS - Look for other triggers and avoid them: Limit caffeine to 1-2 cups a day or less. Avoid dietary triggers that you have noticed bring on your headaches (this could include aged cheese, peanuts, MSG, aspartame and nitrates).  [x] EXERCISE - physical exercise as we all know is good for you in many ways, and not only is good for your heart, but also is beneficial for your mental health, cognitive health and  chronic pain/headaches. I would encourage at the least 5 days of physical exercise weekly for at least 30 minutes.     Education and Follow-up  [x] Please call with any questions or concerns. Of course if any new concerning symptoms go to the emergency department.  [x] Follow up in 3 months with P[facility]

## 2025-05-27 ENCOUNTER — TELEPHONE (OUTPATIENT)
Dept: FAMILY MEDICINE CLINIC | Facility: CLINIC | Age: 35
End: 2025-05-27

## 2025-05-27 ENCOUNTER — RESULTS FOLLOW-UP (OUTPATIENT)
Dept: FAMILY MEDICINE CLINIC | Facility: CLINIC | Age: 35
End: 2025-05-27

## 2025-05-27 NOTE — TELEPHONE ENCOUNTER
Pt called nurse line requesting results from the Holter monitor completed on 05/15/25.     Please advise, thanks

## 2025-05-28 ENCOUNTER — TELEPHONE (OUTPATIENT)
Dept: FAMILY MEDICINE CLINIC | Facility: CLINIC | Age: 35
End: 2025-05-28

## 2025-05-28 DIAGNOSIS — R00.0 TACHYCARDIA: Primary | ICD-10-CM

## 2025-05-28 NOTE — TELEPHONE ENCOUNTER
PATIENT CALLED BACK AND I READ THE MESSAGE OF KEREN GRAY AND SHE WOULD LIKE A REF TO BE PLACED IN EPIC FOR CARDIO. PATIENT WOULD LIKE A CALL BACK.

## 2025-05-30 ENCOUNTER — CONSULT (OUTPATIENT)
Dept: CARDIOLOGY CLINIC | Facility: CLINIC | Age: 35
End: 2025-05-30
Attending: NURSE PRACTITIONER
Payer: MEDICARE

## 2025-05-30 VITALS — WEIGHT: 114 LBS | BODY MASS INDEX: 18.97 KG/M2 | SYSTOLIC BLOOD PRESSURE: 108 MMHG | DIASTOLIC BLOOD PRESSURE: 64 MMHG

## 2025-05-30 DIAGNOSIS — R00.2 PALPITATIONS: ICD-10-CM

## 2025-05-30 DIAGNOSIS — E78.5 DYSLIPIDEMIA: ICD-10-CM

## 2025-05-30 DIAGNOSIS — R06.02 SOB (SHORTNESS OF BREATH): Primary | ICD-10-CM

## 2025-05-30 DIAGNOSIS — R07.2 PRECORDIAL PAIN: ICD-10-CM

## 2025-05-30 PROCEDURE — 99204 OFFICE O/P NEW MOD 45 MIN: CPT | Performed by: INTERNAL MEDICINE

## 2025-05-30 NOTE — ASSESSMENT & PLAN NOTE
Not specifically with exertion, no positional component.  She had a normal chest x-ray, normal CBC, normal EKG, and normal exam today.

## 2025-05-30 NOTE — ASSESSMENT & PLAN NOTE
Chest pressure on and off almost daily, no particular inducing or relieving factors, started in December, preceded by an influenza illness in the late fall.  EKGs have been normal

## 2025-05-30 NOTE — PROGRESS NOTES
[facility] Cardiology  Office Consultation  Monie Moraes 35 y.o. female MRN: [id number]        Assessment & Plan  SOB (shortness of breath)  Not specifically with exertion, no positional component.  She had a normal chest x-ray, normal CBC, normal EKG, and normal exam today.  Precordial pain  Chest pressure on and off almost daily, no particular inducing or relieving factors, started in December, preceded by an influenza illness in the late fall.  EKGs have been normal  Dyslipidemia  Low HDL 25-27 with a family history of early heart disease in her grandfather  Palpitations  Noted since December, racing sensation, Holter correlation with symptoms revealed mostly sinus rhythm and mild sinus tachycardia  She does not have inappropriate sinus tachycardia  She has a high baseline heart rate, vitals for the last 5 years indicate many heart rates resting between 86 and 112 bpm.    Plan    Will recommend an echo, treadmill stress test, APO B and lipoprotein a  I also recommended some routine exercise which will help lower her resting heart rate  She is on a multitude of medications including Cymbalta, Emgality, Linzess, Latuda, Remeron, Ubrelvy.  Unclear if any of these medications could be causing any of her symptoms.  If her cardiac testing is normal, could certainly look into possible offending agents.  Additionally with her OCD diagnosis, anxiety, it is possible all her symptoms could be related to that  As needed follow-up depending on testing results.      Reason for Consult / Principal Problem: Chest pain, palpitations, shortness of breath    HPI: Monie Moraes is a 35 y.o. year old female with migraines and OCD, on Cymbalta, Emgality, Linzess, Latuda, Remeron, Ubrelvy comes to see me for chest discomfort is described as pressure without radiation or relieving or aggravating factors, feeling at 7 out of 10 at peak, occurring daily on and off, and started in December.  Preceding that she did  have an influenza illness in the late fall.  She lives in [place], she is originally from [place], she lives with her mother.  She has a thin body habitus, and a healthy appetite she tells me.  She also has been experiencing shortness of breath without obvious triggers, not specifically exertional, no worse when laying down, and without any associated orthopnea, PND or lower extremity edema symptoms.  She also has palpitations, same timeline, feeling like a racing sensation, but the palpitation shortness of breath and chest pressure are not always associated with each other.        Review of Systems   Constitutional:  Negative for appetite change, diaphoresis, fatigue and fever.   Respiratory:  Positive for chest tightness and shortness of breath. Negative for wheezing.    Cardiovascular:  Positive for chest pain and palpitations. Negative for leg swelling.   Gastrointestinal:  Negative for abdominal pain and blood in stool.   Musculoskeletal:  Negative for arthralgias and joint swelling.   Skin:  Negative for rash.   Neurological:  Positive for headaches. Negative for dizziness, syncope and light-headedness.         Past Medical History[1]  Past Surgical History[2]  Social History     Substance and Sexual Activity   Alcohol Use Yes    Comment: couple times/month     Social History     Substance and Sexual Activity   Drug Use Never     Tobacco Use History[3]  Family History[4]    Allergies:  Allergies[5]    Medications:   Current Medications[6]       Vitals:    05/30/25 0846   BP: 108/64     Weight (last 2 days)     Date/Time Weight    05/30/25 0846 51.7 (114)        Physical Exam  Constitutional:       General: She is not in acute distress.     Appearance: Normal appearance. She is not diaphoretic.   HENT:      Head: Normocephalic and atraumatic.     Eyes:      General: No scleral icterus.     Conjunctiva/sclera: Conjunctivae normal.     Neck:      Vascular: No JVD.     Cardiovascular:      Rate and Rhythm:  "Normal rate and regular rhythm.      Heart sounds: Normal heart sounds. No murmur heard.  Pulmonary:      Effort: Pulmonary effort is normal. No respiratory distress.      Breath sounds: Normal breath sounds. No wheezing, rhonchi or rales.     Musculoskeletal:         General: No tenderness.      Right lower leg: Normal. No edema.      Left lower leg: Normal. No edema.     Skin:     General: Skin is warm and dry.     Neurological:      Mental Status: She is alert. Mental status is at baseline.           Laboratory Studies:    Laboratory studies personally reviewed    Cardiac testing:     EKG reviewed personally:   No results found for this visit on 05/30/25.    Echocardiogram:      Stress test:      Holter:  5/15/25-48-hour Holter-average heart rate 95, peak 160, lowest was 53, no significant PVCs or PACs, 6 chest tightness events correlate with sinus rhythm or sinus tachycardia and no other abnormalities.  This is personally reviewed and interpreted by me    Cardiac catheterization:        Kike Garcia MD    Portions of the record may have been created with voice recognition software.  Occasional wrong word or \"sound a like\" substitutions may have occurred due to the inherent limitations of voice recognition software.  Read the chart carefully and recognize, using context, where substitutions have occurred.         [1]  Past Medical History:  Diagnosis Date   • Abnormal Pap smear of cervix     2009 cryo; 6/2019 LSIL/AGC pap; 6/2019 colpo HSIL; 8/2019 LEEP HSIL w/neg margins; 6/2020 NILM pap/neg HPV; 6/2020 NILM pap/neg HPV   • ADHD    • Anemia    • Bipolar 1 disorder (HCC)    • Depression    • Epilepsy (HCC)     last episode 2000   • GERD (gastroesophageal reflux disease)    • Migraine    • OCD (obsessive compulsive disorder)    • Prediabetes    • Urogenital trichomoniasis 2019   [2]  Past Surgical History:  Procedure Laterality Date   • FOREIGN BODY REMOVAL N/A 9/20/2023    Procedure: removal of IUD;  Surgeon: " Miriam Lafleur MD;  Location: [facility];  Service: Gynecology   • GYNECOLOGIC CRYOSURGERY  2009   • MD COLPOSCOPY CERVIX VAG LOOP ELTRD BX CERVIX N/A 08/09/2019    Procedure: BIOPSY LEEP CERVIX;  Surgeon: Ramiro Carmen MD;  Location: [facility];  Service: Gynecology   • MD LAPS ABD PRTM&OMENTUM DX W/WO SPEC BR/WA SPX N/A 9/20/2023    Procedure: LAPAROSCOPY DIAGNOSTIC;  Surgeon: Miriam Lafleur MD;  Location: [facility];  Service: Gynecology   [3]  Social History  Tobacco Use   Smoking Status Every Day   • Current packs/day: 1.00   • Average packs/day: 1 pack/day for 11.0 years (11.0 ttl pk-yrs)   • Types: Cigarettes   • Passive exposure: Current   Smokeless Tobacco Never   [4]  Family History  Problem Relation Name Age of Onset   • Bipolar disorder Mother Raeann    • Diabetes Mother Raeann    • Migraines Mother Raeann    • Miscarriages / Stillbirths Mother Raeann    • No Known Problems Father     • Depression Sister     • Asthma Sister Ashli    • Osteoporosis Maternal Grandmother Denia    • Cancer Maternal Grandfather Yg         lung   • Breast cancer Neg Hx     • Colon cancer Neg Hx     • Ovarian cancer Neg Hx     [5]  No Known Allergies[6]    Current Outpatient Medications:   •  cetirizine (ZyrTEC) 10 mg tablet, Take 1 tablet (10 mg total) by mouth daily, Disp: 90 tablet, Rfl: 1  •  DULoxetine (CYMBALTA) 30 mg delayed release capsule, Take 30 mg by mouth in the morning., Disp: , Rfl:   •  fluticasone (FLONASE) 50 mcg/act nasal spray, 1 spray into each nostril daily, Disp: 16 g, Rfl: 1  •  Galcanezumab-gnlm (Emgality) 120 MG/ML SOAJ, Inject 120 mg under the skin every 30 (thirty) days, Disp: 1 mL, Rfl: 11  •  linaCLOtide (Linzess) 145 MCG CAPS, TAKE 1 CAPSULE(145 MCG) BY MOUTH IN THE MORNING, Disp: 90 capsule, Rfl: 1  •  lurasidone (LATUDA) 40 mg tablet, Take 40 mg by mouth in the morning, Disp: , Rfl:   •  mirtazapine (REMERON) 15 mg tablet, , Disp: , Rfl:   •  omeprazole  (PriLOSEC) 20 mg delayed release capsule, TAKE 1 CAPSULE(20 MG) BY MOUTH TWICE DAILY, Disp: 180 capsule, Rfl: 1  •  Ubrogepant (Ubrelvy) 100 MG tablet, Take 1 tablet (100 mg) one time as needed for migraine. May repeat one additional tablet (100 mg) at least two hours after the first dose. Do not use more than two doses per day, or for more than eight days per month., Disp: 9 tablet, Rfl: 11    Current Facility-Administered Medications:   •  medroxyPROGESTERone (DEPO-PROVERA) IM injection 150 mg, 150 mg, Intramuscular, Q3 Months, , 150 mg at 02/19/25 112

## 2025-05-30 NOTE — ASSESSMENT & PLAN NOTE
Noted since December, racing sensation, Holter correlation with symptoms revealed mostly sinus rhythm and mild sinus tachycardia  She does not have inappropriate sinus tachycardia  She has a high baseline heart rate, vitals for the last 5 years indicate many heart rates resting between 86 and 112 bpm.

## 2025-06-22 ENCOUNTER — HOSPITAL ENCOUNTER (EMERGENCY)
Facility: HOSPITAL | Age: 35
Discharge: HOME | End: 2025-06-22
Payer: MEDICARE

## 2025-06-22 ENCOUNTER — APPOINTMENT (EMERGENCY)
Dept: RADIOLOGY | Facility: HOSPITAL | Age: 35
End: 2025-06-22
Payer: MEDICARE

## 2025-06-22 VITALS
DIASTOLIC BLOOD PRESSURE: 64 MMHG | TEMPERATURE: 98 F | OXYGEN SATURATION: 98 % | WEIGHT: 114.86 LBS | HEART RATE: 98 BPM | RESPIRATION RATE: 18 BRPM | BODY MASS INDEX: 19.11 KG/M2 | SYSTOLIC BLOOD PRESSURE: 99 MMHG

## 2025-06-22 DIAGNOSIS — M79.641 RIGHT HAND PAIN: Primary | ICD-10-CM

## 2025-06-22 LAB
ANION GAP SERPL CALCULATED.3IONS-SCNC: 5 MMOL/L (ref 4–13)
BASOPHILS # BLD AUTO: 0.08 THOUSANDS/ÂΜL (ref 0–0.1)
BASOPHILS NFR BLD AUTO: 1 % (ref 0–1)
BUN SERPL-MCNC: 13 MG/DL (ref 5–25)
CALCIUM SERPL-MCNC: 9.6 MG/DL (ref 8.4–10.2)
CHLORIDE SERPL-SCNC: 109 MMOL/L (ref 96–108)
CK SERPL-CCNC: 95 U/L (ref 26–192)
CO2 SERPL-SCNC: 23 MMOL/L (ref 21–32)
CREAT SERPL-MCNC: 0.67 MG/DL (ref 0.6–1.3)
CRP SERPL QL: <1 MG/L
EOSINOPHIL # BLD AUTO: 0.24 THOUSAND/ÂΜL (ref 0–0.61)
EOSINOPHIL NFR BLD AUTO: 3 % (ref 0–6)
ERYTHROCYTE [DISTWIDTH] IN BLOOD BY AUTOMATED COUNT: 13.6 % (ref 11.6–15.1)
ERYTHROCYTE [SEDIMENTATION RATE] IN BLOOD: 9 MM/HOUR (ref 0–19)
GFR SERPL CREATININE-BSD FRML MDRD: 115 ML/MIN/1.73SQ M
GLUCOSE SERPL-MCNC: 87 MG/DL (ref 65–140)
HCT VFR BLD AUTO: 39.1 % (ref 34.8–46.1)
HGB BLD-MCNC: 13.1 G/DL (ref 11.5–15.4)
IMM GRANULOCYTES # BLD AUTO: 0.02 THOUSAND/UL (ref 0–0.2)
IMM GRANULOCYTES NFR BLD AUTO: 0 % (ref 0–2)
LYMPHOCYTES # BLD AUTO: 4.14 THOUSANDS/ÂΜL (ref 0.6–4.47)
LYMPHOCYTES NFR BLD AUTO: 43 % (ref 14–44)
MCH RBC QN AUTO: 30.4 PG (ref 26.8–34.3)
MCHC RBC AUTO-ENTMCNC: 33.5 G/DL (ref 31.4–37.4)
MCV RBC AUTO: 91 FL (ref 82–98)
MONOCYTES # BLD AUTO: 0.55 THOUSAND/ÂΜL (ref 0.17–1.22)
MONOCYTES NFR BLD AUTO: 6 % (ref 4–12)
NEUTROPHILS # BLD AUTO: 4.51 THOUSANDS/ÂΜL (ref 1.85–7.62)
NEUTS SEG NFR BLD AUTO: 47 % (ref 43–75)
NRBC BLD AUTO-RTO: 0 /100 WBCS
PLATELET # BLD AUTO: 286 THOUSANDS/UL (ref 149–390)
PMV BLD AUTO: 9.9 FL (ref 8.9–12.7)
POTASSIUM SERPL-SCNC: 4.1 MMOL/L (ref 3.5–5.3)
RBC # BLD AUTO: 4.31 MILLION/UL (ref 3.81–5.12)
SODIUM SERPL-SCNC: 137 MMOL/L (ref 135–147)
WBC # BLD AUTO: 9.54 THOUSAND/UL (ref 4.31–10.16)

## 2025-06-22 PROCEDURE — 80048 BASIC METABOLIC PNL TOTAL CA: CPT

## 2025-06-22 PROCEDURE — 99285 EMERGENCY DEPT VISIT HI MDM: CPT

## 2025-06-22 PROCEDURE — 86780 TREPONEMA PALLIDUM: CPT

## 2025-06-22 PROCEDURE — 82550 ASSAY OF CK (CPK): CPT

## 2025-06-22 PROCEDURE — 85652 RBC SED RATE AUTOMATED: CPT

## 2025-06-22 PROCEDURE — 85025 COMPLETE CBC W/AUTO DIFF WBC: CPT

## 2025-06-22 PROCEDURE — 73130 X-RAY EXAM OF HAND: CPT

## 2025-06-22 PROCEDURE — 36415 COLL VENOUS BLD VENIPUNCTURE: CPT

## 2025-06-22 PROCEDURE — 96374 THER/PROPH/DIAG INJ IV PUSH: CPT

## 2025-06-22 PROCEDURE — 86140 C-REACTIVE PROTEIN: CPT

## 2025-06-22 PROCEDURE — 99283 EMERGENCY DEPT VISIT LOW MDM: CPT

## 2025-06-22 RX ORDER — KETOROLAC TROMETHAMINE 30 MG/ML
15 INJECTION, SOLUTION INTRAMUSCULAR; INTRAVENOUS ONCE
Status: COMPLETED | OUTPATIENT
Start: 2025-06-22 | End: 2025-06-22

## 2025-06-22 RX ORDER — NAPROXEN 500 MG/1
500 TABLET ORAL 2 TIMES DAILY WITH MEALS
Qty: 14 TABLET | Refills: 0 | Status: SHIPPED | OUTPATIENT
Start: 2025-06-22 | End: 2025-06-29

## 2025-06-22 RX ADMIN — KETOROLAC TROMETHAMINE 15 MG: 30 INJECTION, SOLUTION INTRAMUSCULAR; INTRAVENOUS at 22:37

## 2025-06-23 LAB — TREPONEMA PALLIDUM IGG+IGM AB [PRESENCE] IN SERUM OR PLASMA BY IMMUNOASSAY: NORMAL

## 2025-06-23 NOTE — ED PROVIDER NOTES
Time reflects when diagnosis was documented in both MDM as applicable and the Disposition within this note       Time User Action Codes Description Comment    6/22/2025 11:09 PM Andres Monte Add [M79.641] Right hand pain           ED Disposition       ED Disposition   Discharge    Condition   Stable    Date/Time   Sun Jun 22, 2025 11:09 PM    Comment   Monie Max Dimitry discharge to home/self care.                   Assessment & Plan       Medical Decision Making  Patient with history as below presented with hand pain. Patient presents hemodynamically stable with vitals within normal limits. History obtained from patient.    Differential diagnosis includes: Sprain, strain, fracture, overuse injury, less likely to represent infection, syphilis.     Plan: Initial testing to include CBC, BMP, CK, ESR, CRP, syphilis. Initial therapeutics to include x-ray hand.     After initial evaluation and testing, labs reviewed and unremarkable. Independently reviewed imaging without acute osseous abnormality. Patient was treated therapeutically with below with improvement in symptoms. Reassessed the patient and they continue to be well appearing.  Patient seems to report to me that she uses the hand frequently.  It is her dominant hand and she reports doing a lot of mopping which she thinks is causing the areas of skin changes on the hand.  With reassuring workup, suspect that symptoms due to nonemergent injury, likely overuse.  Recommended NSAIDs, rest, given information for hand if patient has worsening symptoms, otherwise can have primary care follow-up.  Stable for outpatient management.    Disposition: Discharged with instructions to obtain outpatient follow up of patient's symptoms and findings, with strict return precautions if patient develops new or worsening symptoms. Patient understands this plan and is agreeable. All questions answered. Patient discharged home with return precautions.    Amount and/or  Complexity of Data Reviewed  Labs: ordered.  Radiology: ordered and independent interpretation performed.    Risk  Prescription drug management.             Medications   ketorolac (TORADOL) injection 15 mg (15 mg Intravenous Given 6/22/25 2237)       ED Risk Strat Scores                    No data recorded                            History of Present Illness       Chief Complaint   Patient presents with    Hand Pain     Right hand pain x3 days denies injury        Past Medical History[1]   Past Surgical History[2]   Family History[3]   Social History[4]   E-Cigarette/Vaping    E-Cigarette Use Never User       E-Cigarette/Vaping Substances    Nicotine No     THC No     CBD No     Flavoring No     Other No     Unknown No       I have reviewed and agree with the history as documented.     Patient is a 35-year-old right-hand-dominant female, presenting for evaluation of right-sided hand pain.  Patient reports that over the past few days she has been having some pain is diffusely throughout her right hand, and occasionally radiates up her right arm.  She denies any injuries to the area, but does report that she has been frequently mopping and using the hands to do so.  She denies any changes sensation.  She denies any change in temperature.  She took some over-the-counter pain relievers earlier with transient relief.  She is otherwise without complaint.        Review of Systems   Constitutional:  Negative for fever.   Musculoskeletal:  Positive for arthralgias.   Neurological:  Negative for weakness and numbness.           Objective       ED Triage Vitals [06/22/25 2118]   Temperature Pulse Blood Pressure Respirations SpO2 Patient Position - Orthostatic VS   98 °F (36.7 °C) 98 99/64 18 98 % Sitting      Temp Source Heart Rate Source BP Location FiO2 (%) Pain Score    Oral Monitor Right arm -- 8      Vitals      Date and Time Temp Pulse SpO2 Resp BP Pain Score FACES Pain Rating User   06/22/25 2237 -- -- -- -- -- 7 --     06/22/25 2118 98 °F (36.7 °C) 98 98 % 18 99/64 8 -- LAP            Physical Exam  Vitals and nursing note reviewed.   Constitutional:       General: She is not in acute distress.     Appearance: Normal appearance. She is not ill-appearing or toxic-appearing.   HENT:      Head: Normocephalic and atraumatic.      Right Ear: External ear normal.      Left Ear: External ear normal.      Nose: Nose normal.     Eyes:      General: No scleral icterus.        Right eye: No discharge.         Left eye: No discharge.      Extraocular Movements: Extraocular movements intact.      Conjunctiva/sclera: Conjunctivae normal.       Cardiovascular:      Rate and Rhythm: Normal rate.      Heart sounds: Normal heart sounds. No murmur heard.     No friction rub. No gallop.   Pulmonary:      Effort: Pulmonary effort is normal. No respiratory distress.      Breath sounds: Normal breath sounds.   Abdominal:      General: Abdomen is flat. There is no distension.      Palpations: Abdomen is soft. There is no mass.      Tenderness: There is no abdominal tenderness.   Genitourinary:     Comments: Deferred    Musculoskeletal:      Comments: Diffuse tenderness to palpation over the right hand, however no swelling.  Range of motion limited secondary to pain with full strength.  Neurovascularly intact as per routine.  Radial pulses 2+/4.  Capillary refill less than 2 seconds in all fingers.  1 small circular area on palm that appears to be callused skin, additional area on the dorsal aspect of the hand.  See media.     Skin:     General: Skin is warm and dry.     Neurological:      General: No focal deficit present.      Mental Status: She is alert.               Results Reviewed       Procedure Component Value Units Date/Time    Basic metabolic panel [[id number]]  (Abnormal) Collected: 06/22/25 2236    Lab Status: Final result Specimen: Blood from Arm, Right Updated: 06/22/25 2301     Sodium 137 mmol/L      Potassium 4.1 mmol/L      Chloride  109 mmol/L      CO2 23 mmol/L      ANION GAP 5 mmol/L      BUN 13 mg/dL      Creatinine 0.67 mg/dL      Glucose 87 mg/dL      Calcium 9.6 mg/dL      eGFR 115 ml/min/1.73sq m     Narrative:      National Kidney Disease Foundation guidelines for Chronic Kidney Disease (CKD):     Stage 1 with normal or high GFR (GFR > 90 mL/min/1.73 square meters)    Stage 2 Mild CKD (GFR = 60-89 mL/min/1.73 square meters)    Stage 3A Moderate CKD (GFR = 45-59 mL/min/1.73 square meters)    Stage 3B Moderate CKD (GFR = 30-44 mL/min/1.73 square meters)    Stage 4 Severe CKD (GFR = 15-29 mL/min/1.73 square meters)    Stage 5 End Stage CKD (GFR <15 mL/min/1.73 square meters)  Note: GFR calculation is accurate only with a steady state creatinine    C-reactive protein [[id number]]  (Normal) Collected: 06/22/25 2236    Lab Status: Final result Specimen: Blood from Arm, Right Updated: 06/22/25 2301     CRP <1.0 mg/L     CK [[id number]]  (Normal) Collected: 06/22/25 2236    Lab Status: Final result Specimen: Blood from Arm, Right Updated: 06/22/25 2301     Total CK 95 U/L     Sedimentation rate, automated [[id number]]  (Normal) Collected: 06/22/25 2236    Lab Status: Final result Specimen: Blood from Arm, Right Updated: 06/22/25 2248     Sed Rate 9 mm/hour     CBC and differential [[id number]] Collected: 06/22/25 2236    Lab Status: Final result Specimen: Blood from Arm, Right Updated: 06/22/25 2243     WBC 9.54 Thousand/uL      RBC 4.31 Million/uL      Hemoglobin 13.1 g/dL      Hematocrit 39.1 %      MCV 91 fL      MCH 30.4 pg      MCHC 33.5 g/dL      RDW 13.6 %      MPV 9.9 fL      Platelets 286 Thousands/uL      nRBC 0 /100 WBCs      Segmented % 47 %      Immature Grans % 0 %      Lymphocytes % 43 %      Monocytes % 6 %      Eosinophils Relative 3 %      Basophils Relative 1 %      Absolute Neutrophils 4.51 Thousands/µL      Absolute Immature Grans 0.02 Thousand/uL      Absolute Lymphocytes 4.14 Thousands/µL      Absolute Monocytes 0.55  Thousand/µL      Eosinophils Absolute 0.24 Thousand/µL      Basophils Absolute 0.08 Thousands/µL     RPR-Syphilis Screening (Total Syphilis IGG/IGM) [[id number]] Collected: 25    Lab Status: In process Specimen: Blood from Arm, Right Updated: 25            XR hand 3+ views RIGHT   ED Interpretation by Andres Monte DO ([id number])   No acute osseous abnormality          Procedures    ED Medication and Procedure Management   Prior to Admission Medications   Prescriptions Last Dose Informant Patient Reported? Taking?   DULoxetine (CYMBALTA) 30 mg delayed release capsule  Self Yes No   Sig: Take 30 mg by mouth in the morning.   Galcanezumab-gnlm (Emgality) 120 MG/ML SOAJ  Self No No   Sig: Inject 120 mg under the skin every 30 (thirty) days   Ubrogepant (Ubrelvy) 100 MG tablet  Self No No   Sig: Take 1 tablet (100 mg) one time as needed for migraine. May repeat one additional tablet (100 mg) at least two hours after the first dose. Do not use more than two doses per day, or for more than eight days per month.   cetirizine (ZyrTEC) 10 mg tablet  Self No No   Sig: Take 1 tablet (10 mg total) by mouth daily   fluticasone (FLONASE) 50 mcg/act nasal spray  Self No No   Si spray into each nostril daily   linaCLOtide (Linzess) 145 MCG CAPS  Self No No   Sig: TAKE 1 CAPSULE(145 MCG) BY MOUTH IN THE MORNING   lurasidone (LATUDA) 40 mg tablet  Self Yes No   Sig: Take 40 mg by mouth in the morning   mirtazapine (REMERON) 15 mg tablet  Self Yes No   omeprazole (PriLOSEC) 20 mg delayed release capsule  Self No No   Sig: TAKE 1 CAPSULE(20 MG) BY MOUTH TWICE DAILY      Facility-Administered Medications Last Administration Doses Remaining   medroxyPROGESTERone (DEPO-PROVERA) IM injection 150 mg 2025 11:27 AM 3        Discharge Medication List as of 2025 11:10 PM        START taking these medications    Details   naproxen (Naprosyn) 500 mg tablet Take 1 tablet (500 mg total) by mouth 2 (two)  times a day with meals for 7 days, Starting Sun 6/22/2025, Until Sun 6/29/2025, Normal           CONTINUE these medications which have NOT CHANGED    Details   cetirizine (ZyrTEC) 10 mg tablet Take 1 tablet (10 mg total) by mouth daily, Starting Thu 4/10/2025, Normal      DULoxetine (CYMBALTA) 30 mg delayed release capsule Take 30 mg by mouth in the morning., Starting Thu 12/14/2023, Historical Med      fluticasone (FLONASE) 50 mcg/act nasal spray 1 spray into each nostril daily, Starting Thu 4/10/2025, Normal      Galcanezumab-gnlm (Emgality) 120 MG/ML SOAJ Inject 120 mg under the skin every 30 (thirty) days, Starting Wed 4/16/2025, Normal      linaCLOtide (Linzess) 145 MCG CAPS TAKE 1 CAPSULE(145 MCG) BY MOUTH IN THE MORNING, Normal      lurasidone (LATUDA) 40 mg tablet Take 40 mg by mouth in the morning, Starting Sun 5/29/2022, Historical Med      mirtazapine (REMERON) 15 mg tablet Historical Med      omeprazole (PriLOSEC) 20 mg delayed release capsule TAKE 1 CAPSULE(20 MG) BY MOUTH TWICE DAILY, Normal      Ubrogepant (Ubrelvy) 100 MG tablet Take 1 tablet (100 mg) one time as needed for migraine. May repeat one additional tablet (100 mg) at least two hours after the first dose. Do not use more than two doses per day, or for more than eight days per month., Normal           No discharge procedures on file.  ED SEPSIS DOCUMENTATION   Time reflects when diagnosis was documented in both MDM as applicable and the Disposition within this note       Time User Action Codes Description Comment    6/22/2025 11:09 PM Andres Monte Add [M79.641] Right hand pain                      [1]   Past Medical History:  Diagnosis Date    Abnormal Pap smear of cervix     2009 cryo; 6/2019 LSIL/AGC pap; 6/2019 colpo HSIL; 8/2019 LEEP HSIL w/neg margins; 6/2020 NILM pap/neg HPV; 6/2020 NILM pap/neg HPV    ADHD     Anemia     Bipolar 1 disorder (HCC)     Depression     Epilepsy (HCC)     last episode 2000    GERD (gastroesophageal  reflux disease)     Migraine     OCD (obsessive compulsive disorder)     Prediabetes     Urogenital trichomoniasis 2019   [2]   Past Surgical History:  Procedure Laterality Date    FOREIGN BODY REMOVAL N/A 9/20/2023    Procedure: removal of IUD;  Surgeon: Miriam Lafleur MD;  [facility];  Service: Gynecology    GYNECOLOGIC CRYOSURGERY  2009    UT COLPOSCOPY CERVIX VAG LOOP ELTRD BX CERVIX N/A 08/09/2019    Procedure: BIOPSY LEEP CERVIX;  Surgeon: Ramiro Carmen MD;  [facility];  Service: Gynecology    UT LAPS ABD PRTM&OMENTUM DX W/WO SPEC BR/WA SPX N/A 9/20/2023    Procedure: LAPAROSCOPY DIAGNOSTIC;  Surgeon: Miriam Lafleur MD;  [facility];  Service: Gynecology   [3]   Family History  Problem Relation Name Age of Onset    Bipolar disorder Mother Raeann     Diabetes Mother Raeann     Migraines Mother Raeann     Miscarriages / Stillbirths Mother Raeann     No Known Problems Father      Depression Sister      Asthma Sister Sujeily     Osteoporosis Maternal Grandmother Denia     Cancer Maternal Grandfather Yg         lung    Breast cancer Neg Hx      Colon cancer Neg Hx      Ovarian cancer Neg Hx     [4]   Social History  Tobacco Use    Smoking status: Every Day     Current packs/day: 1.00     Average packs/day: 1 pack/day for 11.0 years (11.0 ttl pk-yrs)     Types: Cigarettes     Passive exposure: Current    Smokeless tobacco: Never   Vaping Use    Vaping status: Never Used   Substance Use Topics    Alcohol use: Yes     Comment: couple times/month    Drug use: Never        Andres Monte DO  06/23/25 0343

## 2025-06-24 ENCOUNTER — HOSPITAL ENCOUNTER (OUTPATIENT)
Dept: NON INVASIVE DIAGNOSTICS | Facility: HOSPITAL | Age: 35
Discharge: HOME | End: 2025-06-24
Attending: INTERNAL MEDICINE
Payer: MEDICARE

## 2025-06-24 VITALS
DIASTOLIC BLOOD PRESSURE: 64 MMHG | SYSTOLIC BLOOD PRESSURE: 99 MMHG | HEIGHT: 65 IN | BODY MASS INDEX: 18.99 KG/M2 | HEART RATE: 81 BPM | WEIGHT: 114 LBS

## 2025-06-24 DIAGNOSIS — R06.02 SOB (SHORTNESS OF BREATH): ICD-10-CM

## 2025-06-24 DIAGNOSIS — R07.2 PRECORDIAL PAIN: ICD-10-CM

## 2025-06-24 LAB
AORTIC ROOT: 2.6 CM
ASCENDING AORTA: 2.5 CM
BSA FOR ECHO PROCEDURE: 1.56 M2
E WAVE DECELERATION TIME: 218 MS
E/A RATIO: 1.09
FRACTIONAL SHORTENING: 31 (ref 28–44)
INTERVENTRICULAR SEPTUM IN DIASTOLE (PARASTERNAL SHORT AXIS VIEW): 0.6 CM
INTERVENTRICULAR SEPTUM: 0.6 CM (ref 0.6–1.1)
LAAS-AP2: 12.7 CM2
LAAS-AP4: 8 CM2
LEFT ATRIUM SIZE: 2.4 CM
LEFT ATRIUM VOLUME (MOD BIPLANE): 22 ML
LEFT ATRIUM VOLUME INDEX (MOD BIPLANE): 14.1 ML/M2
LEFT INTERNAL DIMENSION IN SYSTOLE: 3.1 CM (ref 2.1–4)
LEFT VENTRICULAR INTERNAL DIMENSION IN DIASTOLE: 4.5 CM (ref 3.5–6)
LEFT VENTRICULAR POSTERIOR WALL IN END DIASTOLE: 0.7 CM
LEFT VENTRICULAR STROKE VOLUME: 55 ML
LV EF US.2D.A4C+ESTIMATED: 50 %
LVSV (TEICH): 55 ML
MAX HR PERCENT: 97 %
MAX HR: 181 BPM
MV E'TISSUE VEL-LAT: 16 CM/S
MV E'TISSUE VEL-SEP: 13 CM/S
MV PEAK A VEL: 0.67 M/S
MV PEAK E VEL: 73 CM/S
MV STENOSIS PRESSURE HALF TIME: 63 MS
MV VALVE AREA P 1/2 METHOD: 3.49
RATE PRESSURE PRODUCT: NORMAL
RIGHT ATRIUM AREA SYSTOLE A4C: 9.3 CM2
RIGHT VENTRICLE ID DIMENSION: 2.6 CM
SL CV LEFT ATRIUM LENGTH A2C: 4.8 CM
SL CV LV EF: 55
SL CV PED ECHO LEFT VENTRICLE DIASTOLIC VOLUME (MOD BIPLANE) 2D: 93 ML
SL CV PED ECHO LEFT VENTRICLE SYSTOLIC VOLUME (MOD BIPLANE) 2D: 39 ML
SL CV STRESS RECOVERY BP: NORMAL MMHG
SL CV STRESS RECOVERY HR: 105 BPM
SL CV STRESS RECOVERY O2 SAT: 98 %
SL CV STRESS STAGE REACHED: 4
STRESS ANGINA INDEX: 1
STRESS BASELINE BP: NORMAL MMHG
STRESS BASELINE HR: 97 BPM
STRESS O2 SAT REST: 98 %
STRESS PEAK HR: 181 BPM
STRESS POST ESTIMATED WORKLOAD: 12.5 METS
STRESS POST EXERCISE DUR MIN: 10 MIN
STRESS POST EXERCISE DUR SEC: 31 SEC
STRESS POST O2 SAT PEAK: 96 %
STRESS POST PEAK BP: 124 MMHG
TRICUSPID ANNULAR PLANE SYSTOLIC EXCURSION: 1.8 CM

## 2025-06-24 PROCEDURE — 93306 TTE W/DOPPLER COMPLETE: CPT

## 2025-06-24 PROCEDURE — 93017 CV STRESS TEST TRACING ONLY: CPT

## 2025-06-24 PROCEDURE — 93018 CV STRESS TEST I&R ONLY: CPT

## 2025-06-24 PROCEDURE — 93016 CV STRESS TEST SUPVJ ONLY: CPT

## 2025-06-24 PROCEDURE — 93306 TTE W/DOPPLER COMPLETE: CPT | Performed by: STUDENT IN AN ORGANIZED HEALTH CARE EDUCATION/TRAINING PROGRAM

## 2025-06-25 ENCOUNTER — TELEPHONE (OUTPATIENT)
Age: 35
End: 2025-06-25

## 2025-06-25 LAB
CHEST PAIN STATEMENT: NORMAL
MAX DIASTOLIC BP: 70 MMHG
MAX PREDICTED HEART RATE: 186 BPM
PROTOCOL NAME: NORMAL
REASON FOR TERMINATION: NORMAL
STRESS POST EXERCISE DUR MIN: 10 MIN
STRESS POST EXERCISE DUR SEC: 31 SEC
STRESS POST PEAK HR: 181 BPM
STRESS POST PEAK SYSTOLIC BP: 124 MMHG
TARGET HR FORMULA: NORMAL
TEST INDICATION: NORMAL

## 2025-06-25 NOTE — TELEPHONE ENCOUNTER
Pt called to find out the results of her Stress test and ECHO done yesterday. Please call her when reviewed.

## 2025-07-07 ENCOUNTER — ANNUAL EXAM (OUTPATIENT)
Dept: OBGYN CLINIC | Facility: CLINIC | Age: 35
End: 2025-07-07

## 2025-07-07 VITALS
HEIGHT: 65 IN | SYSTOLIC BLOOD PRESSURE: 102 MMHG | DIASTOLIC BLOOD PRESSURE: 70 MMHG | WEIGHT: 113.2 LBS | BODY MASS INDEX: 18.86 KG/M2

## 2025-07-07 DIAGNOSIS — Z12.4 SCREENING FOR CERVICAL CANCER: ICD-10-CM

## 2025-07-07 DIAGNOSIS — Z11.3 SCREEN FOR STD (SEXUALLY TRANSMITTED DISEASE): ICD-10-CM

## 2025-07-07 DIAGNOSIS — Z01.411 ENCOUNTER FOR GYNECOLOGICAL EXAMINATION WITH ABNORMAL FINDING: Primary | ICD-10-CM

## 2025-07-07 DIAGNOSIS — N60.29 SEGMENTAL FIBROADENOSIS OF BREAST, UNSPECIFIED LATERALITY: ICD-10-CM

## 2025-07-07 DIAGNOSIS — Z12.39 ENCOUNTER FOR BREAST CANCER SCREENING USING NON-MAMMOGRAM MODALITY: ICD-10-CM

## 2025-07-07 PROCEDURE — 87661 TRICHOMONAS VAGINALIS AMPLIF: CPT | Performed by: NURSE PRACTITIONER

## 2025-07-07 PROCEDURE — 87591 N.GONORRHOEAE DNA AMP PROB: CPT | Performed by: NURSE PRACTITIONER

## 2025-07-07 PROCEDURE — 87491 CHLMYD TRACH DNA AMP PROBE: CPT | Performed by: NURSE PRACTITIONER

## 2025-07-07 PROCEDURE — 87563 M. GENITALIUM AMP PROBE: CPT | Performed by: NURSE PRACTITIONER

## 2025-07-07 PROCEDURE — G0101 CA SCREEN;PELVIC/BREAST EXAM: HCPCS | Performed by: NURSE PRACTITIONER

## 2025-07-07 RX ORDER — MEDROXYPROGESTERONE ACETATE 150 MG/ML
150 INJECTION, SUSPENSION INTRAMUSCULAR
Qty: 1 ML | Refills: 3 | Status: CANCELLED | OUTPATIENT
Start: 2025-07-07

## 2025-07-07 RX ORDER — LURASIDONE HYDROCHLORIDE 20 MG/1
TABLET, FILM COATED ORAL
COMMUNITY
Start: 2025-06-18

## 2025-07-07 NOTE — PROGRESS NOTES
ANNUAL GYNECOLOGICAL EXAMINATION    Monie Moraes is a 35 y.o. female who presents today for annual GYN exam.  Her last pap smear was performed 2024 and result was NILM with negative HPV.  She had HIV screening performed 10/28/2024 and it was negative.  She reports menses as absent due to recent Depoprovera therapy with last injection 2025 and she has discontinued since then.  Her general medical history has been reviewed and she reports it as follows:    Past Medical History:   Diagnosis Date    Abnormal Pap smear of cervix      cryo; 2019 LSIL/AGC pap; 2019 colpo HSIL; 2019 LEEP HSIL w/neg margins; 2020 NILM pap/neg HPV; 2020 NILM pap/neg HPV    ADHD     Bipolar 1 disorder (HCC)     Depression     Epilepsy (HCC)     last episode     GERD (gastroesophageal reflux disease)     Migraine     OCD (obsessive compulsive disorder)     Prediabetes     Urogenital trichomoniasis      Past Surgical History:   Procedure Laterality Date    FOREIGN BODY REMOVAL N/A 2023    Procedure: removal of IUD;  Surgeon: Miriam Lafleur MD;  Location: [facility];  Service: Gynecology    GYNECOLOGIC CRYOSURGERY      MD COLPOSCOPY CERVIX VAG LOOP ELTRD BX CERVIX N/A 2019    Procedure: BIOPSY LEEP CERVIX;  Surgeon: Ramiro Carmen MD;  Location: [facility];  Service: Gynecology    MD LAPS ABD PRTM&OMENTUM DX W/WO SPEC BR/WA SPX N/A 2023    Procedure: LAPAROSCOPY DIAGNOSTIC;  Surgeon: Miriam Lafleur MD;  Location: [facility];  Service: Gynecology     OB History          0    Para   0    Term   0       0    AB   0    Living   0         SAB   0    IAB   0    Ectopic   0    Multiple   0    Live Births   0               Social History     Tobacco Use    Smoking status: Every Day     Current packs/day: 1.00     Types: Cigarettes     Passive exposure: Current    Smokeless tobacco: Never   Vaping Use    Vaping status: Never Used   Substance Use Topics     "Alcohol use: Yes     Comment: couple times/month    Drug use: Never     Social History     Substance and Sexual Activity   Sexual Activity Not Currently    Partners: Male    Birth control/protection: None     Cancer-related family history includes Cancer in her maternal grandfather. There is no history of Breast cancer, Colon cancer, or Ovarian cancer.    Current Outpatient Medications   Medication Instructions    cetirizine (ZYRTEC) 10 mg, Oral, Daily    DULoxetine (CYMBALTA) 30 mg, Daily    Emgality 120 mg, Subcutaneous, Every 30 days    linaCLOtide (Linzess) 145 MCG CAPS TAKE 1 CAPSULE(145 MCG) BY MOUTH IN THE MORNING    lurasidone (LATUDA) 20 mg tablet     mirtazapine (REMERON) 15 mg tablet     naproxen (NAPROSYN) 500 mg, Oral, 2 times daily with meals    omeprazole (PriLOSEC) 20 mg delayed release capsule TAKE 1 CAPSULE(20 MG) BY MOUTH TWICE DAILY    Ubrogepant (Ubrelvy) 100 MG tablet Take 1 tablet (100 mg) one time as needed for migraine. May repeat one additional tablet (100 mg) at least two hours after the first dose. Do not use more than two doses per day, or for more than eight days per month.         Review of Systems:  Review of Systems   Constitutional: Negative.    Gastrointestinal: Negative.    Genitourinary:  Positive for menstrual problem (absent due to Depoprovera). Negative for difficulty urinating, pelvic pain and vaginal discharge.   Skin: Negative.        Physical Exam:  /70 (BP Location: Left arm, Patient Position: Sitting)   Ht 5' 5\" (1.651 m)   Wt 51.3 kg (113 lb 3.2 oz)   BMI 18.84 kg/m²   Physical Exam  Constitutional:       General: She is not in acute distress.     Appearance: She is well-developed.   Genitourinary:      Vulva normal.      No lesions in the vagina.        Right Adnexa: not tender and no mass present.     Left Adnexa: not tender and no mass present.     No cervical motion tenderness or lesion.      Uterus is not tender.   Breasts:     Right: No mass, nipple " discharge, skin change or tenderness.      Left: No mass, nipple discharge, skin change or tenderness.   Neck:      Thyroid: No thyromegaly.     Cardiovascular:      Rate and Rhythm: Normal rate and regular rhythm.   Pulmonary:      Effort: Pulmonary effort is normal.   Abdominal:      Palpations: Abdomen is soft.      Tenderness: There is no abdominal tenderness.     Musculoskeletal:      Cervical back: Neck supple.     Neurological:      Mental Status: She is alert and oriented to person, place, and time.     Skin:     General: Skin is warm and dry.   Vitals reviewed.       Assessment/Plan:   1. Abnormal well-woman GYN exam.  2. Cervical cancer screening:  Normal cervical exam.  Pap smear not indicated at this time.  Has received full HPV vaccine series in the past.   3. STD screening:  Orders placed for vaginal GC/CT, trichomonas/mycoplasma genitalium cultures.  Orders placed for serum anti-HIV, anti-HCV, HbsAg, syphilis panel.   4. Breast cancer screening:  Left breast with multiple lumps noted.  Order placed for bilateral breast diagnostic ultrasound.  Reviewed breast self-awareness.   5. Depression Screening: Patient's depression screening was assessed with a PHQ-2 score of 2. Their PHQ-9 score was 10. Patient with underlying depression and was advised to continue current medications as prescribed. Patient assessed for underlying major depression. They have no active suicidal ideations. Brief counseling provided and recommend additional follow-up/re-evaluation next office visit. Continue regular follow-up with their psychologist/therapist/psychiatrist who is managing their mental health condition(s).   6. BMI Counseling: Body mass index is 18.84 kg/m².  No intervention indicated.   7. Tobacco Cessation Counseling: Tobacco cessation counseling and education was provided. The patient is sincerely urged to quit consumption of tobacco. She is not ready to quit tobacco. The numerous health risks of tobacco  consumption were discussed. If she decides to quit, there are a number of helpful adjunctive aids, and she can see me to discuss nicotine replacement therapy, chantix, or bupropion anytime in the future.   8. Contraception:  Declines.   9. Return to office in 1 year for annual GYN exam.    Reviewed with patient that test results are available in [facility] immediately, but that they will not necessarily be reviewed by me immediately.  Explained that I will review results at my earliest opportunity and contact patient appropriately.

## 2025-07-07 NOTE — PATIENT INSTRUCTIONS
Thank you for your confidence in our team.   We appreciate you and welcome your feedback.   If you receive a survey from us, please take a few moments to let us know how we are doing.   Sincerely,  KARTIK Hodges       Cigarette Smoking and Your Health     What are the risks to my health if I smoke tobacco?  Nicotine and other chemicals found in tobacco damage every cell in your body. Even if you are a light smoker, you have an increased risk for cancer, heart disease, and lung disease. If you are pregnant or have diabetes, smoking increases your risk for complications.     What are the benefits to my health if I stop smoking?   You decrease respiratory symptoms such as coughing, wheezing, and shortness of breath.     You reduce your risk for cancers of the lung, mouth, throat, kidney, bladder, pancreas, stomach, and cervix. If you already have cancer, you increase the benefits of chemotherapy. You also reduce your risk for cancer returning or a second cancer from developing.     You reduce your risk for heart disease, blood clots, heart attack, and stroke.     You reduce your risk for lung infections, and diseases such as pneumonia, asthma, chronic bronchitis, and emphysema.    Your circulation improves. More oxygen can be delivered to your body. If you have diabetes, you lower your risk for complications, such as kidney, artery, and eye diseases. You also lower your risk for nerve damage. Nerve damage can lead to amputations, poor vision, and blindness.    You improve your body's ability to heal and to fight infections.    What are the health benefits to others if I stop smoking?  Tobacco is harmful to nonsmokers who breathe in your secondhand smoke. The following are ways the health of others around you may improve when you stop smoking:  You lower the risks for lung cancer and heart disease in nonsmoking adults.     If you are pregnant, you lower the risk for miscarriage, early delivery, low birth  weight, and stillbirth. You also lower your baby's risk for SIDS, obesity, developmental delay, and neurobehavioral problems, such as ADHD.     If you have children, you lower their risk for ear infections, colds, pneumonia, bronchitis, and asthma.    How to Stop Smoking     You will improve your health and the health of others around you  if you stop smoking. Your risk for heart and lung disease, cancer, stroke, heart attack, and vision problems will also decrease. You can benefit from quitting no matter how long you have smoked.  PREPARE to stop smoking.  Nicotine is a highly addictive drug found in cigarettes. Withdrawal symptoms can happen when you stop smoking and make it hard to quit. These include anxiety, depression, irritability, trouble sleeping, and increased appetite. You increase your chances of success if you PREPARE to quit.    Set a quit date.  Pick a date that is within the next 2 weeks. Do not pick a day that you think may be stressful or busy. Write down the day or Kake it on your calender.     Tell friends and family that you plan to quit.  Explain that you may have withdrawal symptoms when you try to quit. Ask them to support you. They may be able to encourage you and help reduce your stress to make it easier for you to quit.    Make a list of your reasons for quitting.  Put the list somewhere you will see it every day, such as your refrigerator. You can look at the list when you have a craving.     Remove all tobacco and nicotine products from your home, car, and workplace.  Also, remove anything else that will tempt you to smoke, such as lighters, matches, or ashtrays. Clean your car, home, and places at work that smell like smoke. The smell of smoke can trigger a craving.     Identify triggers that make you want to smoke.  This may include activities, feelings, or people. Also write down 1 way you can deal with each of your triggers. For example, if you want to smoke as soon as you wake up,  plan another activity during this time, such as exercise.     Make a plan for how you will quit.  Learn about the tools that can help you quit, such as medicine, counseling, or nicotine replacement therapy. Choose at least 2 options to help you quit.      Tools to help you stop smoking:     Counseling  from a trained healthcare provider can provide you with support and skills to quit smoking. The provider will also teach you to manage your withdrawal symptoms and cravings. You may receive counseling from one counselor, in group therapy, or through phone therapy called a quit line.     Nicotine replacement therapy (NRT)  such as nicotine patches, gum, or lozenges may help reduce your nicotine cravings. You may get these without a doctor's order. Do not use e-cigarettes or smokeless tobacco in place of cigarettes or to help you quit. They still contain nicotine.    Prescription medicines  such as nasal sprays or nicotine inhalers may help reduce your withdrawal symptoms. Other medicines may also be used to reduce your urge to smoke. Ask your healthcare provider about these medicines. You may need to start certain medicines 2 weeks before your quit date for them to work well.     Hypnosis  is a practice that helps guide you through thoughts and feelings. Hypnosis may help decrease your cravings and make you more willing to quit.     Acupuncture therapy  uses very thin needles to balance energy channels in the body. This is thought to help decrease cravings and symptoms of nicotine withdrawal.     Support groups  let you talk to others who are trying to quit or have already quit. It may be helpful to speak with others about how they quit.      Manage your cravings:     Avoid situations, people, and places that tempt you to smoke.  Go to nonsmoking places, such as libraries or restaurants. Understand what tempts you and try to avoid these things.    Keep your hands busy.  Hold things such as a stress ball or pen.     Put  candy or toothpicks in your mouth.  Keep lollipops, sugarless gum, or toothpicks with you at all times.     Do not have alcohol or caffeine.  These drinks may tempt you to smoke. Drink healthy liquids such as water or juice instead.    Reward yourself when you resist your cravings.  Rewards will motivate you and help you stay positive.     Do an activity that distracts you from your craving.  Examples include going for a walk, exercising, or cleaning.      Prevent weight gain after you quit:  You may gain a few pounds after you quit smoking. It is healthier for you to gain a few pounds than to continue to smoke. The following can help you prevent weight gain:    Eat healthy foods.  These include fruits, vegetables, whole-grain breads, low-fat dairy products, beans, lean meats, and fish. Eat healthy snacks, such as low-fat yogurt, if you get hungry between meals.     Drink water before, during, and between meals.  This will make your stomach feel full and help prevent you from overeating. Ask your healthcare provider how much liquid to drink each day and which liquids are best for you.    Exercise.  Take a walk or do some kind of exercise every day. Ask your healthcare provider what exercise is right for you. This may help reduce your cravings and reduce stress.

## 2025-07-08 LAB
C TRACH DNA SPEC QL NAA+PROBE: NEGATIVE
M GENITALIUM DNA SPEC QL NAA+PROBE: NEGATIVE
N GONORRHOEA DNA SPEC QL NAA+PROBE: NEGATIVE
T VAGINALIS DNA SPEC QL NAA+PROBE: NEGATIVE

## 2025-07-24 ENCOUNTER — TELEPHONE (OUTPATIENT)
Dept: GASTROENTEROLOGY | Facility: MEDICAL CENTER | Age: 35
End: 2025-07-24

## (undated) DEVICE — SMOKE EVACUATION TUBING WITH 7/8 IN TO 1/4 IN REDUCER: Brand: BUFFALO FILTER

## (undated) DEVICE — [HIGH FLOW INSUFFLATOR,  DO NOT USE IF PACKAGE IS DAMAGED,  KEEP DRY,  KEEP AWAY FROM SUNLIGHT,  PROTECT FROM HEAT AND RADIOACTIVE SOURCES.]: Brand: PNEUMOSURE

## (undated) DEVICE — INTENDED FOR TISSUE SEPARATION, AND OTHER PROCEDURES THAT REQUIRE A SHARP SURGICAL BLADE TO PUNCTURE OR CUT.: Brand: BARD-PARKER SAFETY BLADES SIZE 11, STERILE

## (undated) DEVICE — STERILE 8 INCH PROCTO SWAB: Brand: CARDINAL HEALTH

## (undated) DEVICE — PREMIUM DRY TRAY LF: Brand: MEDLINE INDUSTRIES, INC.

## (undated) DEVICE — GLOVE PI ULTRA TOUCH SZ.7.0

## (undated) DEVICE — DRAPE EQUIPMENT RF WAND

## (undated) DEVICE — TROCAR: Brand: KII FIOS FIRST ENTRY

## (undated) DEVICE — SUT PLAIN 3-0 PS-1 27 IN 1640H

## (undated) DEVICE — BETHLEHEM UNIVERSAL GYN LAP PK: Brand: CARDINAL HEALTH

## (undated) DEVICE — TROCAR: Brand: KII SLEEVE

## (undated) DEVICE — PVC URETHRAL CATHETER: Brand: DOVER

## (undated) DEVICE — BETHLEHEM UNIVERSAL MINOR VAG: Brand: CARDINAL HEALTH

## (undated) DEVICE — GLOVE INDICATOR PI UNDERGLOVE SZ 7 BLUE

## (undated) DEVICE — STRETCH BANDAGE: Brand: CURITY

## (undated) DEVICE — NEEDLE COUNTER LG W/RULER

## (undated) DEVICE — SYRINGE 10ML LL CONTROL TOP

## (undated) DEVICE — CHLORAPREP HI-LITE 26ML ORANGE

## (undated) DEVICE — SPECIMEN CONTAINER STERILE PEEL PACK

## (undated) DEVICE — SCD SEQUENTIAL COMPRESSION COMFORT SLEEVE MEDIUM KNEE LENGTH: Brand: KENDALL SCD

## (undated) DEVICE — INSUFFLATION NEEDLE TO ESTABLISH PNEUMOPERITONEUM.: Brand: INSUFFLATION NEEDLE

## (undated) DEVICE — TUBING SUCTION 5MM X 12 FT

## (undated) DEVICE — GLOVE PI ULTRA TOUCH SZ.7.5

## (undated) DEVICE — PLASTIC ADHESIVE BANDAGE: Brand: CURITY

## (undated) DEVICE — SPINAL NEEDLE 22 G X 2 1/2

## (undated) DEVICE — BLUE HEAT SCOPE WARMER

## (undated) DEVICE — PENCIL ELECTROSURG E-Z CLEAN -0035H

## (undated) DEVICE — LLETZ LOOP 15 X 12MM MEGADYNE

## (undated) DEVICE — ELECTRODE BALL E-Z CLEAN 5 IN -0009

## (undated) DEVICE — SUT SILK 2-0 SH 30 IN K833H